# Patient Record
Sex: MALE | Race: ASIAN | NOT HISPANIC OR LATINO | URBAN - METROPOLITAN AREA
[De-identification: names, ages, dates, MRNs, and addresses within clinical notes are randomized per-mention and may not be internally consistent; named-entity substitution may affect disease eponyms.]

---

## 2020-01-23 ENCOUNTER — INPATIENT (INPATIENT)
Facility: HOSPITAL | Age: 60
LOS: 3 days | Discharge: HOME | DRG: 389 | End: 2020-01-27
Attending: SURGERY | Admitting: SURGERY
Payer: MEDICARE

## 2020-01-23 VITALS
WEIGHT: 300.05 LBS | TEMPERATURE: 99 F | DIASTOLIC BLOOD PRESSURE: 67 MMHG | OXYGEN SATURATION: 95 % | HEART RATE: 84 BPM | SYSTOLIC BLOOD PRESSURE: 115 MMHG | RESPIRATION RATE: 20 BRPM | HEIGHT: 72 IN

## 2020-01-23 DIAGNOSIS — K56.609 UNSPECIFIED INTESTINAL OBSTRUCTION, UNSPECIFIED AS TO PARTIAL VERSUS COMPLETE OBSTRUCTION: ICD-10-CM

## 2020-01-23 LAB
ALBUMIN SERPL ELPH-MCNC: 3.8 G/DL — SIGNIFICANT CHANGE UP (ref 3.3–5)
ALP SERPL-CCNC: 85 U/L — SIGNIFICANT CHANGE UP (ref 40–120)
ALT FLD-CCNC: 11 U/L — SIGNIFICANT CHANGE UP (ref 10–45)
ANION GAP SERPL CALC-SCNC: 11 MMOL/L — SIGNIFICANT CHANGE UP (ref 5–17)
ANION GAP SERPL CALC-SCNC: 14 MMOL/L — SIGNIFICANT CHANGE UP (ref 5–17)
APTT BLD: 31.9 SEC — SIGNIFICANT CHANGE UP (ref 27.5–36.3)
AST SERPL-CCNC: 16 U/L — SIGNIFICANT CHANGE UP (ref 10–40)
BASOPHILS # BLD AUTO: 0.02 K/UL — SIGNIFICANT CHANGE UP (ref 0–0.2)
BASOPHILS # BLD AUTO: 0.02 K/UL — SIGNIFICANT CHANGE UP (ref 0–0.2)
BASOPHILS NFR BLD AUTO: 0.1 % — SIGNIFICANT CHANGE UP (ref 0–2)
BASOPHILS NFR BLD AUTO: 0.2 % — SIGNIFICANT CHANGE UP (ref 0–2)
BILIRUB SERPL-MCNC: 0.8 MG/DL — SIGNIFICANT CHANGE UP (ref 0.2–1.2)
BLD GP AB SCN SERPL QL: NEGATIVE — SIGNIFICANT CHANGE UP
BUN SERPL-MCNC: 10 MG/DL — SIGNIFICANT CHANGE UP (ref 7–23)
BUN SERPL-MCNC: 14 MG/DL — SIGNIFICANT CHANGE UP (ref 7–23)
CALCIUM SERPL-MCNC: 8.8 MG/DL — SIGNIFICANT CHANGE UP (ref 8.4–10.5)
CALCIUM SERPL-MCNC: 9.6 MG/DL — SIGNIFICANT CHANGE UP (ref 8.4–10.5)
CHLORIDE SERPL-SCNC: 90 MMOL/L — LOW (ref 96–108)
CHLORIDE SERPL-SCNC: 91 MMOL/L — LOW (ref 96–108)
CO2 SERPL-SCNC: 28 MMOL/L — SIGNIFICANT CHANGE UP (ref 22–31)
CO2 SERPL-SCNC: 29 MMOL/L — SIGNIFICANT CHANGE UP (ref 22–31)
CREAT SERPL-MCNC: 0.58 MG/DL — SIGNIFICANT CHANGE UP (ref 0.5–1.3)
CREAT SERPL-MCNC: 0.75 MG/DL — SIGNIFICANT CHANGE UP (ref 0.5–1.3)
EOSINOPHIL # BLD AUTO: 0 K/UL — SIGNIFICANT CHANGE UP (ref 0–0.5)
EOSINOPHIL # BLD AUTO: 0.03 K/UL — SIGNIFICANT CHANGE UP (ref 0–0.5)
EOSINOPHIL NFR BLD AUTO: 0 % — SIGNIFICANT CHANGE UP (ref 0–6)
EOSINOPHIL NFR BLD AUTO: 0.2 % — SIGNIFICANT CHANGE UP (ref 0–6)
GAS PNL BLDV: SIGNIFICANT CHANGE UP
GLUCOSE SERPL-MCNC: 127 MG/DL — HIGH (ref 70–99)
GLUCOSE SERPL-MCNC: 135 MG/DL — HIGH (ref 70–99)
HCT VFR BLD CALC: 38.8 % — LOW (ref 39–50)
HCT VFR BLD CALC: 41.5 % — SIGNIFICANT CHANGE UP (ref 39–50)
HGB BLD-MCNC: 12.5 G/DL — LOW (ref 13–17)
HGB BLD-MCNC: 13.3 G/DL — SIGNIFICANT CHANGE UP (ref 13–17)
IMM GRANULOCYTES NFR BLD AUTO: 0.4 % — SIGNIFICANT CHANGE UP (ref 0–1.5)
IMM GRANULOCYTES NFR BLD AUTO: 0.4 % — SIGNIFICANT CHANGE UP (ref 0–1.5)
INR BLD: 1.07 RATIO — SIGNIFICANT CHANGE UP (ref 0.88–1.16)
LACTATE SERPL-SCNC: 1 MMOL/L — SIGNIFICANT CHANGE UP (ref 0.7–2)
LYMPHOCYTES # BLD AUTO: 0.44 K/UL — LOW (ref 1–3.3)
LYMPHOCYTES # BLD AUTO: 0.48 K/UL — LOW (ref 1–3.3)
LYMPHOCYTES # BLD AUTO: 3.2 % — LOW (ref 13–44)
LYMPHOCYTES # BLD AUTO: 4.9 % — LOW (ref 13–44)
MCHC RBC-ENTMCNC: 27.9 PG — SIGNIFICANT CHANGE UP (ref 27–34)
MCHC RBC-ENTMCNC: 28.1 PG — SIGNIFICANT CHANGE UP (ref 27–34)
MCHC RBC-ENTMCNC: 32 GM/DL — SIGNIFICANT CHANGE UP (ref 32–36)
MCHC RBC-ENTMCNC: 32.2 GM/DL — SIGNIFICANT CHANGE UP (ref 32–36)
MCV RBC AUTO: 87 FL — SIGNIFICANT CHANGE UP (ref 80–100)
MCV RBC AUTO: 87.2 FL — SIGNIFICANT CHANGE UP (ref 80–100)
MONOCYTES # BLD AUTO: 1.06 K/UL — HIGH (ref 0–0.9)
MONOCYTES # BLD AUTO: 1.63 K/UL — HIGH (ref 0–0.9)
MONOCYTES NFR BLD AUTO: 10.7 % — SIGNIFICANT CHANGE UP (ref 2–14)
MONOCYTES NFR BLD AUTO: 12 % — SIGNIFICANT CHANGE UP (ref 2–14)
NEUTROPHILS # BLD AUTO: 11.45 K/UL — HIGH (ref 1.8–7.4)
NEUTROPHILS # BLD AUTO: 8.27 K/UL — HIGH (ref 1.8–7.4)
NEUTROPHILS NFR BLD AUTO: 83.8 % — HIGH (ref 43–77)
NEUTROPHILS NFR BLD AUTO: 84.1 % — HIGH (ref 43–77)
NRBC # BLD: 0 /100 WBCS — SIGNIFICANT CHANGE UP (ref 0–0)
NRBC # BLD: 0 /100 WBCS — SIGNIFICANT CHANGE UP (ref 0–0)
PLATELET # BLD AUTO: 209 K/UL — SIGNIFICANT CHANGE UP (ref 150–400)
PLATELET # BLD AUTO: 221 K/UL — SIGNIFICANT CHANGE UP (ref 150–400)
POTASSIUM SERPL-MCNC: 4 MMOL/L — SIGNIFICANT CHANGE UP (ref 3.5–5.3)
POTASSIUM SERPL-MCNC: 4.4 MMOL/L — SIGNIFICANT CHANGE UP (ref 3.5–5.3)
POTASSIUM SERPL-SCNC: 4 MMOL/L — SIGNIFICANT CHANGE UP (ref 3.5–5.3)
POTASSIUM SERPL-SCNC: 4.4 MMOL/L — SIGNIFICANT CHANGE UP (ref 3.5–5.3)
PROT SERPL-MCNC: 7.4 G/DL — SIGNIFICANT CHANGE UP (ref 6–8.3)
PROTHROM AB SERPL-ACNC: 12.2 SEC — SIGNIFICANT CHANGE UP (ref 10–12.9)
RBC # BLD: 4.45 M/UL — SIGNIFICANT CHANGE UP (ref 4.2–5.8)
RBC # BLD: 4.77 M/UL — SIGNIFICANT CHANGE UP (ref 4.2–5.8)
RBC # FLD: 13.9 % — SIGNIFICANT CHANGE UP (ref 10.3–14.5)
RBC # FLD: 14.2 % — SIGNIFICANT CHANGE UP (ref 10.3–14.5)
RH IG SCN BLD-IMP: POSITIVE — SIGNIFICANT CHANGE UP
RH IG SCN BLD-IMP: POSITIVE — SIGNIFICANT CHANGE UP
SODIUM SERPL-SCNC: 131 MMOL/L — LOW (ref 135–145)
SODIUM SERPL-SCNC: 132 MMOL/L — LOW (ref 135–145)
WBC # BLD: 13.63 K/UL — HIGH (ref 3.8–10.5)
WBC # BLD: 9.87 K/UL — SIGNIFICANT CHANGE UP (ref 3.8–10.5)
WBC # FLD AUTO: 13.63 K/UL — HIGH (ref 3.8–10.5)
WBC # FLD AUTO: 9.87 K/UL — SIGNIFICANT CHANGE UP (ref 3.8–10.5)

## 2020-01-23 PROCEDURE — 99222 1ST HOSP IP/OBS MODERATE 55: CPT

## 2020-01-23 PROCEDURE — 43753 TX GASTRO INTUB W/ASP: CPT

## 2020-01-23 PROCEDURE — 71045 X-RAY EXAM CHEST 1 VIEW: CPT | Mod: 26

## 2020-01-23 PROCEDURE — 99285 EMERGENCY DEPT VISIT HI MDM: CPT | Mod: 25

## 2020-01-23 PROCEDURE — 74177 CT ABD & PELVIS W/CONTRAST: CPT | Mod: 26

## 2020-01-23 RX ORDER — ONDANSETRON 8 MG/1
4 TABLET, FILM COATED ORAL EVERY 6 HOURS
Refills: 0 | Status: DISCONTINUED | OUTPATIENT
Start: 2020-01-23 | End: 2020-01-26

## 2020-01-23 RX ORDER — SODIUM CHLORIDE 9 MG/ML
1000 INJECTION INTRAMUSCULAR; INTRAVENOUS; SUBCUTANEOUS ONCE
Refills: 0 | Status: COMPLETED | OUTPATIENT
Start: 2020-01-23 | End: 2020-01-23

## 2020-01-23 RX ORDER — HEPARIN SODIUM 5000 [USP'U]/ML
5000 INJECTION INTRAVENOUS; SUBCUTANEOUS EVERY 8 HOURS
Refills: 0 | Status: DISCONTINUED | OUTPATIENT
Start: 2020-01-23 | End: 2020-01-27

## 2020-01-23 RX ORDER — BENZTROPINE MESYLATE 1 MG
1 TABLET ORAL
Refills: 0 | Status: DISCONTINUED | OUTPATIENT
Start: 2020-01-23 | End: 2020-01-26

## 2020-01-23 RX ORDER — VALPROIC ACID (AS SODIUM SALT) 250 MG/5ML
500 SOLUTION, ORAL ORAL
Refills: 0 | Status: DISCONTINUED | OUTPATIENT
Start: 2020-01-23 | End: 2020-01-26

## 2020-01-23 RX ORDER — METOPROLOL TARTRATE 50 MG
5 TABLET ORAL EVERY 6 HOURS
Refills: 0 | Status: DISCONTINUED | OUTPATIENT
Start: 2020-01-23 | End: 2020-01-26

## 2020-01-23 RX ORDER — BUDESONIDE AND FORMOTEROL FUMARATE DIHYDRATE 160; 4.5 UG/1; UG/1
2 AEROSOL RESPIRATORY (INHALATION)
Refills: 0 | Status: DISCONTINUED | OUTPATIENT
Start: 2020-01-23 | End: 2020-01-27

## 2020-01-23 RX ORDER — SODIUM CHLORIDE 9 MG/ML
1000 INJECTION, SOLUTION INTRAVENOUS
Refills: 0 | Status: DISCONTINUED | OUTPATIENT
Start: 2020-01-23 | End: 2020-01-25

## 2020-01-23 RX ORDER — RISPERIDONE 4 MG/1
4 TABLET ORAL AT BEDTIME
Refills: 0 | Status: DISCONTINUED | OUTPATIENT
Start: 2020-01-23 | End: 2020-01-27

## 2020-01-23 RX ADMIN — RISPERIDONE 4 MILLIGRAM(S): 4 TABLET ORAL at 22:12

## 2020-01-23 RX ADMIN — SODIUM CHLORIDE 1000 MILLILITER(S): 9 INJECTION INTRAMUSCULAR; INTRAVENOUS; SUBCUTANEOUS at 12:09

## 2020-01-23 RX ADMIN — Medication 27.5 MILLIGRAM(S): at 19:43

## 2020-01-23 RX ADMIN — SODIUM CHLORIDE 1000 MILLILITER(S): 9 INJECTION INTRAMUSCULAR; INTRAVENOUS; SUBCUTANEOUS at 17:43

## 2020-01-23 RX ADMIN — Medication 1 MILLIGRAM(S): at 19:44

## 2020-01-23 RX ADMIN — Medication 5 MILLIGRAM(S): at 19:43

## 2020-01-23 RX ADMIN — HEPARIN SODIUM 5000 UNIT(S): 5000 INJECTION INTRAVENOUS; SUBCUTANEOUS at 22:11

## 2020-01-23 RX ADMIN — BUDESONIDE AND FORMOTEROL FUMARATE DIHYDRATE 2 PUFF(S): 160; 4.5 AEROSOL RESPIRATORY (INHALATION) at 19:46

## 2020-01-23 NOTE — ED PROVIDER NOTE - PROGRESS NOTE DETAILS
DH: CT showing SBO. Surgery paged and will see patient in ED. DH: Surgery at bedside - will admit patient.

## 2020-01-23 NOTE — H&P ADULT - NSHPPHYSICALEXAM_GEN_ALL_CORE
Physical Exam  T(C): 36.9  HR: 88 (84 - 88)  BP: 144/74 (115/67 - 146/85)  RR: 20 (20 - 20)  SpO2: 95% (95% - 95%)  Tmax: T(C): , Max: 37.2 (01-23-20 @ 09:59)    01-23-20  -  01-23-20  --------------------------------------------------------  IN:    Oral Fluid: 360 mL    Sodium Chloride 0.9% IV Bolus: 1000 mL  Total IN: 1360 mL    OUT:  Total OUT: 0 mL    Total NET: 1360 mL        General: well developed, well nourished, NAD  Neuro: alert and oriented x3  CVS: regular rate and rhythm  Abdomen: soft, mild tenderness in the left hemiabdomen, significantly distended, tympanitic, midline abdominal surgical scar well healed  Extremities: no edema, sensation and movement grossly intact  Skin: warm, dry, appropriate color

## 2020-01-23 NOTE — ED PROVIDER NOTE - CLINICAL SUMMARY MEDICAL DECISION MAKING FREE TEXT BOX
DH: 59 year old male p/w flu-like sx x1 week progressing to include GI sx x2 days. Patient on abx and Tamiflu x3 days. Hx c-diff in June req [facility]. Patient afebrile w/ stable vitals. Non-toxic appearing, abd grossly distended and tympanitic. Patient states last BM this morning. Concern for SBO vs volvulus, severe constipation, oglvie's. Plan for labs, CT A/P, IVF, reassess. Likely will require admission. DH: 59 year old male p/w flu-like sx x1 week progressing to include GI sx x2 days. Patient on abx and Tamiflu x3 days. Hx c-diff in June req [facility]. Patient afebrile w/ stable vitals. Non-toxic appearing, abd grossly distended and tympanitic. Patient states last BM this morning. Concern for SBO vs volvulus, severe constipation, ileus, oglvie's. Plan for labs, CT A/P, IVF, reassess. Likely will require admission.

## 2020-01-23 NOTE — H&P ADULT - NSHPLABSRESULTS_GEN_ALL_CORE
Labs:                        13.3   13.63 )-----------( 221      ( 23 Jan 2020 12:28 )             41.5     PT/INR - ( 23 Jan 2020 12:28 )   PT: 12.2 sec;   INR: 1.07 ratio         PTT - ( 23 Jan 2020 12:28 )  PTT:31.9 sec  01-23    132<L>  |  90<L>  |  14  ----------------------------<  135<H>  4.4   |  28  |  0.75    Ca    9.6      23 Jan 2020 12:28    TPro  7.4  /  Alb  3.8  /  TBili  0.8  /  DBili  x   /  AST  16  /  ALT  11  /  AlkPhos  85  01-23            Imaging and other studies:  < from: CT Abdomen and Pelvis w/ Oral Cont and w/ IV Cont (01.23.20 @ 14:37) >      FINDINGS:    LOWER CHEST: Bibasilar linear atelectasis. Coronary arterial calcification.    LIVER: Within normal limits.  BILE DUCTS: Normal caliber.  GALLBLADDER: Cholelithiasis.  SPLEEN: Within normal limits.  PANCREAS: Within normal limits.  ADRENALS: Within normal limits.  KIDNEYS/URETERS: Mild left hydroureteronephrosis and mild dilatation of the distal right ureter.     BLADDER: Bladder wall thickening which may be due to underdistention and/or cystitis.   REPRODUCTIVE ORGANS: Prostate gland is within normal limits.    BOWEL: Multiple dilated airand fluid-filled loops of small bowel with a single transition point in the upper anterior abdomen (2, 72-2,83). The stomach is markedly distended and fluid-filled. Collapsed distal small bowel. Questionable pneumatosis in small bowel (602:64 and 2:84).   PERITONEUM: Small amount of mesenteric edema/ascites.  VESSELS: Atherosclerotic calcification.  RETROPERITONEUM/LYMPH NODES: No lymphadenopathy.    ABDOMINAL WALL: Calcification in the anterior abdominal wall which may be secondary to prior surgery.  BONES: Degenerative changes.    IMPRESSION:     Small bowel obstruction with transition point in the anterior abdomen. Small mesenteric edema/ascites.  Questionable foci of pneumatosis. Recommend clinical correlation to assess for possible ischemia..    Bladder wall thickening which may be due to underdistention and/or cystitis. Recommend correlation with urinalysis.    Mild left hydroureteronephrosis and mild dilatation of the distal right ureter.    These findings were discussed with Dr. Knox on 1/23/2020 at 3:04 PM and with Dr. Knapp on 1/23/2020 at 3:15 PM by Dr. Cunningham with read back confirmation.        < end of copied text >

## 2020-01-23 NOTE — ED ADULT NURSE NOTE - OBJECTIVE STATEMENT
pt to room 31 via wheel chair c/o abd distention vomiting pt sts he was dx with flu started om tamiflu and zitnhromax pt dev abd distention yesterday abdomen large hard tympanic pt tolerated oral conttrast for ct

## 2020-01-23 NOTE — ED PROVIDER NOTE - PHYSICAL EXAMINATION
GENERAL: A&Ox3, non-toxic appearing, no acute distress  HEENT: NCAT, EOMI, oral mucosa moist, normal conjunctiva  RESP: no respiratory distress, mild wheezes diffusely, speaking in full sentences  CV: RRR, no murmurs/rubs/gallops  ABDOMEN: soft, non-tender, distended, tympanitic, no guarding, midline surgical scar  MSK: no visible deformities  NEURO: no focal sensory or motor deficits, LYN   SKIN: warm, normal color, well perfused, no rash  PSYCH: normal affect    -Justin Delgadillo, PGY-1

## 2020-01-23 NOTE — ED PROVIDER NOTE - OBJECTIVE STATEMENT
59 year old male PMH NIDDM, asthma, gout, HTN, neurogenic bladder requiring self-cath, psych, p/w wife for vomiting and body aches. Patient states sx started 1 week ago of general malaise, body aches, sore throat, non-productive cough, fatigue, decreased PO intake. Pt's wife is sick contact, reportedly has flu. No recent travel. Patient went to PCP this week and started on Z-marcus and Tamiflu. Patient states 2 days ago began w/ nausea, vomiting, diarrhea and worsening abd distension. Patient had 2 episodes NBNB vomiting. Low grade temps at home around 100*F. Of note, patient had C-diff colitis in June requiring Vancomycin, states the abd distension was similar. Patient denies chills, sob, cp, abd pain, constipation, dysuria, or weakness.

## 2020-01-23 NOTE — H&P ADULT - NSICDXPASTMEDICALHX_GEN_ALL_CORE_FT
PAST MEDICAL HISTORY:  Anxiety     Asthma     Gout     History of Schizophrenia     HTN (Hypertension)     Hyperlipidemia

## 2020-01-23 NOTE — ED PROCEDURE NOTE - CPROC ED INFORMED CONSENT1
MD at bedside.   Benefits, risks, and possible complications of procedure explained to patient/caregiver who verbalized understanding and gave verbal consent.

## 2020-01-23 NOTE — H&P ADULT - HISTORY OF PRESENT ILLNESS
GENERAL SURGERY     HPI:  59M PMH NIDDM, asthma, gout, HTN, neurogenic bladder requiring self-cath, schizophrenia, presenting to the ED for 2 days of vomiting and abdominal distension. Patient states his wife has had the flu and he started having symptoms of body aches, sore throat, non-productive cough, fatigue, for the last few days a/w decreased PO intake. Patient went to PCP this week and started on Z-marcus and Tamiflu. Patient states 2 days ago began w/ nausea, vomiting, diarrhea and worsening abd distension. Patient had 2 episodes NBNB vomiting. Low grade temps at home around 100*F. Of note, patient had C-diff colitis in June requiring Vancomycin, states the abd distension was similar. Patient denies chills, sob, cp, abd pain, constipation, dysuria, or weakness.      PMHx: Asthma  Anxiety  Gout  Hyperlipidemia  HTN (Hypertension)  History of Schizophrenia    PSHx: S/P Laparotomy  History of Tonsillectomy    Medications (inpatient): sodium chloride 0.9% Bolus 1000 milliLiter(s) IV Bolus once    Medications (PRN):  Allergies: No Known Allergies  (Intolerances: )  Social Hx:   Family Hx: GENERAL SURGERY     HPI:  59M PMH ex-lap about 10 years ago at [facility] for a suicide attempt of swallowing a razor blade,  NIDDM, asthma, gout, HTN, neurogenic bladder requiring self-cath 2/2 DM, schizophrenia, presenting to the ED for 2 days of vomiting and abdominal distension. Patient states his wife has had the flu and he started having symptoms of body aches, sore throat, non-productive cough, fatigue, for the last few days a/w decreased PO intake. Patient went to PCP this week and started on Z-marcus and Tamiflu. Patient states 2 days ago began w/ nausea, vomiting, diarrhea and worsening abd distension. Patient had 2 episodes NBNB vomiting. Low grade temps at home around 100*F. Of note, patient had C-diff colitis in June requiring Vancomycin, states the abd distension was similar. Patient denies chills, sob, cp, abd pain, constipation, dysuria, or weakness.

## 2020-01-23 NOTE — ED ADULT NURSE REASSESSMENT NOTE - NS ED NURSE REASSESS COMMENT FT1
Pt received 1900 from CANDACE Carbajal. VSS/ NAD. Admitted and awaiting bed. 6pm meds not yet administered, pharmacy contacted to send drugs. Will give when arrived-- see MAR. A&O x4 and well appearing, states "I just feel tired". Pending blood lactate drawn and sent. Pt NGT maintained and has total 3300 output in cannisters at bedside. Currently draining small amounts dark green fluid. Pt aware of plan.
pt was st cathed for urine 600 cc jas color drained
returned from ct pt with sbo vielka blank placed pt [id number] cc green material

## 2020-01-23 NOTE — H&P ADULT - ASSESSMENT
ASSESSMENT      PLAN ASSESSMENT  59M PMH ex-lap about 10 years ago at [facility] for a suicide attempt of swallowing a razor blade,  NIDDM, asthma, gout, HTN, neurogenic bladder requiring self-cath 2/2 DM, schizophrenia, presenting to the ED for 2 days of vomiting and abdominal distension.     Patient found to have SBO in mid abdomen with question of pneumatosis    PLAN    - F/u stat Lactate  - NPO  - NGT  - IV fluids  - 1 liter bolus now for high initial output of ~3.5 liters of bilious liquids  - DVT ppx  - C/w home medications IV formulation  - Nausea control  - Abdominal exams  - Abdominal exam prior to pain medication administration  - Seen and discussed with attending    ATP  [id number]

## 2020-01-23 NOTE — ED PROVIDER NOTE - NS ED ROS FT
GENERAL: +fever, +chills, no weight loss, +fatigue  EYES: no change in vision, no irritation, no discharge, no redness, no pain  HEENT: no trouble swallowing or speaking, +throat pain  CARDIAC: no chest pain, no palpitations   PULMONARY: no cough, no shortness of breath, no wheezing  GI: no abdominal pain, +abdominal distension , +nausea, +vomiting, +diarrhea, no constipation, no melena, no hematochezia, no hematemesis  : no changes in urination, no dysuria, no frequency, patient self-catheterizes at home  SKIN: no rashes  NEURO: no headache, no numbness, no weakness  MSK: no joint pain, no muscle pain, no back pain, no calf pain     -Justin Delgadillo, PGY-1

## 2020-01-23 NOTE — H&P ADULT - ATTENDING COMMENTS
Patient seen and examined on 1/23/20 in ED  Symptoms / history / signs / imaging consistent with adhesive SBO  No indications for acute surgical management  Will admit for IVFs / NGT / NPO   Plan of care discussed with patient who agrees

## 2020-01-24 DIAGNOSIS — Z86.59 PERSONAL HISTORY OF OTHER MENTAL AND BEHAVIORAL DISORDERS: ICD-10-CM

## 2020-01-24 DIAGNOSIS — I10 ESSENTIAL (PRIMARY) HYPERTENSION: ICD-10-CM

## 2020-01-24 DIAGNOSIS — M10.9 GOUT, UNSPECIFIED: ICD-10-CM

## 2020-01-24 DIAGNOSIS — K56.609 UNSPECIFIED INTESTINAL OBSTRUCTION, UNSPECIFIED AS TO PARTIAL VERSUS COMPLETE OBSTRUCTION: ICD-10-CM

## 2020-01-24 DIAGNOSIS — E78.5 HYPERLIPIDEMIA, UNSPECIFIED: ICD-10-CM

## 2020-01-24 DIAGNOSIS — J45.909 UNSPECIFIED ASTHMA, UNCOMPLICATED: ICD-10-CM

## 2020-01-24 DIAGNOSIS — Z02.9 ENCOUNTER FOR ADMINISTRATIVE EXAMINATIONS, UNSPECIFIED: ICD-10-CM

## 2020-01-24 DIAGNOSIS — N13.30 UNSPECIFIED HYDRONEPHROSIS: ICD-10-CM

## 2020-01-24 DIAGNOSIS — F20.9 SCHIZOPHRENIA, UNSPECIFIED: ICD-10-CM

## 2020-01-24 LAB
ANION GAP SERPL CALC-SCNC: 11 MMOL/L — SIGNIFICANT CHANGE UP (ref 5–17)
BUN SERPL-MCNC: 11 MG/DL — SIGNIFICANT CHANGE UP (ref 7–23)
CALCIUM SERPL-MCNC: 8.9 MG/DL — SIGNIFICANT CHANGE UP (ref 8.4–10.5)
CHLORIDE SERPL-SCNC: 91 MMOL/L — LOW (ref 96–108)
CO2 SERPL-SCNC: 31 MMOL/L — SIGNIFICANT CHANGE UP (ref 22–31)
CREAT SERPL-MCNC: 0.63 MG/DL — SIGNIFICANT CHANGE UP (ref 0.5–1.3)
GLUCOSE SERPL-MCNC: 106 MG/DL — HIGH (ref 70–99)
HCT VFR BLD CALC: 38 % — LOW (ref 39–50)
HGB BLD-MCNC: 12.1 G/DL — LOW (ref 13–17)
MAGNESIUM SERPL-MCNC: 2.2 MG/DL — SIGNIFICANT CHANGE UP (ref 1.6–2.6)
MCHC RBC-ENTMCNC: 28.2 PG — SIGNIFICANT CHANGE UP (ref 27–34)
MCHC RBC-ENTMCNC: 31.8 GM/DL — LOW (ref 32–36)
MCV RBC AUTO: 88.6 FL — SIGNIFICANT CHANGE UP (ref 80–100)
PHOSPHATE SERPL-MCNC: 3.2 MG/DL — SIGNIFICANT CHANGE UP (ref 2.5–4.5)
PLATELET # BLD AUTO: 216 K/UL — SIGNIFICANT CHANGE UP (ref 150–400)
POTASSIUM SERPL-MCNC: 3.6 MMOL/L — SIGNIFICANT CHANGE UP (ref 3.5–5.3)
POTASSIUM SERPL-SCNC: 3.6 MMOL/L — SIGNIFICANT CHANGE UP (ref 3.5–5.3)
RBC # BLD: 4.29 M/UL — SIGNIFICANT CHANGE UP (ref 4.2–5.8)
RBC # FLD: 14.4 % — SIGNIFICANT CHANGE UP (ref 10.3–14.5)
SODIUM SERPL-SCNC: 133 MMOL/L — LOW (ref 135–145)
WBC # BLD: 7.89 K/UL — SIGNIFICANT CHANGE UP (ref 3.8–10.5)
WBC # FLD AUTO: 7.89 K/UL — SIGNIFICANT CHANGE UP (ref 3.8–10.5)

## 2020-01-24 PROCEDURE — 99232 SBSQ HOSP IP/OBS MODERATE 35: CPT

## 2020-01-24 PROCEDURE — 76770 US EXAM ABDO BACK WALL COMP: CPT | Mod: 26

## 2020-01-24 PROCEDURE — 99223 1ST HOSP IP/OBS HIGH 75: CPT

## 2020-01-24 PROCEDURE — 99222 1ST HOSP IP/OBS MODERATE 55: CPT

## 2020-01-24 RX ORDER — ACETAMINOPHEN 500 MG
1000 TABLET ORAL ONCE
Refills: 0 | Status: COMPLETED | OUTPATIENT
Start: 2020-01-24 | End: 2020-01-24

## 2020-01-24 RX ORDER — HALOPERIDOL DECANOATE 100 MG/ML
2 INJECTION INTRAMUSCULAR AT BEDTIME
Refills: 0 | Status: DISCONTINUED | OUTPATIENT
Start: 2020-01-24 | End: 2020-01-26

## 2020-01-24 RX ADMIN — Medication 1 MILLIGRAM(S): at 05:31

## 2020-01-24 RX ADMIN — Medication 5 MILLIGRAM(S): at 18:06

## 2020-01-24 RX ADMIN — Medication 5 MILLIGRAM(S): at 01:32

## 2020-01-24 RX ADMIN — Medication 5 MILLIGRAM(S): at 06:59

## 2020-01-24 RX ADMIN — HEPARIN SODIUM 5000 UNIT(S): 5000 INJECTION INTRAVENOUS; SUBCUTANEOUS at 05:30

## 2020-01-24 RX ADMIN — BUDESONIDE AND FORMOTEROL FUMARATE DIHYDRATE 2 PUFF(S): 160; 4.5 AEROSOL RESPIRATORY (INHALATION) at 05:31

## 2020-01-24 RX ADMIN — Medication 1000 MILLIGRAM(S): at 13:30

## 2020-01-24 RX ADMIN — Medication 400 MILLIGRAM(S): at 12:34

## 2020-01-24 RX ADMIN — Medication 27.5 MILLIGRAM(S): at 17:12

## 2020-01-24 RX ADMIN — Medication 27.5 MILLIGRAM(S): at 05:31

## 2020-01-24 RX ADMIN — Medication 1 MILLIGRAM(S): at 17:27

## 2020-01-24 RX ADMIN — HEPARIN SODIUM 5000 UNIT(S): 5000 INJECTION INTRAVENOUS; SUBCUTANEOUS at 21:19

## 2020-01-24 RX ADMIN — HEPARIN SODIUM 5000 UNIT(S): 5000 INJECTION INTRAVENOUS; SUBCUTANEOUS at 13:21

## 2020-01-24 RX ADMIN — RISPERIDONE 4 MILLIGRAM(S): 4 TABLET ORAL at 21:39

## 2020-01-24 RX ADMIN — HALOPERIDOL DECANOATE 2 MILLIGRAM(S): 100 INJECTION INTRAMUSCULAR at 21:19

## 2020-01-24 RX ADMIN — BUDESONIDE AND FORMOTEROL FUMARATE DIHYDRATE 2 PUFF(S): 160; 4.5 AEROSOL RESPIRATORY (INHALATION) at 17:12

## 2020-01-24 NOTE — PROGRESS NOTE ADULT - ATTENDING COMMENTS
Pt seen and examined.  Chart reviewed.  Resident note confirmed.  Pt is a 59 year old male with a history signficant for schizophrenia who presented to [facility] with a SBO.  WBC and lactate are normal. Pain has resolved. NGT output remains high.    Continue NPO/NGT  Monitor and replace lytes  Continue IVF  Psych follow up to adjust medical treatment of schizophrenia while NPO  Continue supportive care  D/c planning Pt seen and examined.  Chart reviewed.  Resident note confirmed.  Pt is a 59 year old male with a history signficant for schizophrenia who presented to [facility] with a SBO.  WBC and lactate are normal. Pain has resolved. NGT output remains high. Mild, bilateral hydronephrosis is noted    Continue NPO/NGT  Bilateral renal ultrasound  Monitor and replace lytes  Continue IVF  Psych follow up to adjust medical treatment of schizophrenia while NPO  Continue supportive care  D/c planning

## 2020-01-24 NOTE — PROGRESS NOTE ADULT - ASSESSMENT
59M PMH ex-lap about 10 years ago at [facility] for a suicide attempt of swallowing a razor blade,  NIDDM, asthma, gout, HTN, neurogenic bladder requiring self-cath 2/2 DM, schizophrenia, presenting to the ED for 2 days of vomiting and abdominal distension. Patient found to have SBO in mid abdomen.    Plan:    - NPO  - NGT  - IV fluids  - DVT ppx  - C/w home medications IV formulation  - Nausea control  - Abdominal exams  - Abdominal exam prior to pain medication administration      ATP  [id number]

## 2020-01-24 NOTE — BEHAVIORAL HEALTH ASSESSMENT NOTE - NSBHCHARTREVIEWVS_PSY_A_CORE FT
Vital Signs Last 24 Hrs  T(C): 36.8 (24 Jan 2020 10:00), Max: 37.5 (24 Jan 2020 00:02)  T(F): 98.3 (24 Jan 2020 10:00), Max: 99.5 (24 Jan 2020 00:02)  HR: 77 (24 Jan 2020 10:00) (77 - 88)  BP: 113/71 (24 Jan 2020 10:00) (113/71 - 146/79)  BP(mean): --  RR: 18 (24 Jan 2020 10:00) (18 - 20)  SpO2: 95% (24 Jan 2020 10:00) (94% - 99%)

## 2020-01-24 NOTE — BEHAVIORAL HEALTH ASSESSMENT NOTE - DETAILS
SA attempt 10 years ago, patient swallowed a razor blade resulting in ex-lap Aunt, Cousin abdominal discomfort

## 2020-01-24 NOTE — CONSULT NOTE ADULT - PROBLEM SELECTOR RECOMMENDATION 8
Transitions of Care Status:    1.  Name of PCP: Dr. Keith  2.  PCP Contacted on Admission: [ ] Y    [ ] N    3.  PCP contacted at Discharge: [ ] Y    [ ] N    [ ] N/A  4.  Post-Discharge Appointment Date and Location:  5.  Summary of Handoff given to PCP:

## 2020-01-24 NOTE — BEHAVIORAL HEALTH ASSESSMENT NOTE - NSBHCONSULTMEDS_PSY_A_CORE FT
Revlimid 10 mg 7 days on 7 days off, repeat.     Refill sent to [facility]: [id number]     Haldol 2mg i.v. at bedtime  Continue Depakote IV and Cogentin i.m.

## 2020-01-24 NOTE — CONSULT NOTE ADULT - ASSESSMENT
59M PMH ex-lap about 10 years ago at [facility] for a suicide attempt of swallowing a razor blade,  DM2, asthma, gout, HTN, HLD, neurogenic bladder requiring self-cath 2/2 DM, schizophrenia, presenting to the ED for 2 days of vomiting and abdominal distension found to have small bowel obstruction with transition point in the anterior abdomen

## 2020-01-24 NOTE — BEHAVIORAL HEALTH ASSESSMENT NOTE - RISK ASSESSMENT
Low Acute Suicide Risk Chronic mental illness, history of suicide attempts, no recent suicidality, no recent relapses or admissions

## 2020-01-24 NOTE — BEHAVIORAL HEALTH ASSESSMENT NOTE - SUMMARY
This is a 59 y.o. male patient, , domiciled with wife and son, on disability for employment, with PPH of schizophrenia, currently in outpatient treatment with Dr. Patterson (last seen at [facility] 1/4/2020), one prior psychiatric hospitalization for suicide attempt ~10 years ago, no history of violence or legal issues, no history of substance abuse, with PMH of NIDDM, asthma, gout, HTN, HLD, neurogenic bladder requiring self-cath 2/2 DM presents to ED for 2 days of vomiting and abd distention, hospitalized for SBO. Psychiatry consulted for medication management. Pt is currently NPO secondary to NG tube placement for SBO. He is AAOx3, but lethargic and inattentive at times. Valproate and Benztropine converted to IV formulations, risperidone unable to be converted to IV formulation.

## 2020-01-24 NOTE — CONSULT NOTE ADULT - SUBJECTIVE AND OBJECTIVE BOX
HPI:  59M PMH ex-lap about 10 years ago at [facility] for a suicide attempt of swallowing a razor blade,  DM2, asthma, gout, HTN, HLD, neurogenic bladder requiring self-cath 2/2 DM, schizophrenia, presenting to the ED for 2 days of vomiting and abdominal distension. Patient states his wife has had the flu and he started having symptoms of body aches, sore throat, non-productive cough, fatigue, for the last few days a/w decreased PO intake. Patient went to PCP this week and started on Z-marcus and Tamiflu. Patient states 2 days ago began w/ nausea, vomiting, diarrhea and worsening abd distension. Patient had 2 episodes NBNB vomiting. Low grade temps at home around 100*F. Of note, patient had C-diff colitis in June requiring Vancomycin, states the abd distension was similar. Patient denies chills, sob, cp, abd pain, constipation, dysuria, or weakness. (23 Jan 2020 17:22)      PAST MEDICAL & SURGICAL HISTORY:  Asthma  Anxiety  Gout  Hyperlipidemia  HTN (Hypertension)  History of Schizophrenia  S/P Laparotomy  History of Tonsillectomy      Review of Systems:   CONSTITUTIONAL: No fever  EYES: No eye pain,  ENMT:  No difficulty hearing,  NECK: No pain or stiffness  RESPIRATORY:  No shortness of breath  CARDIOVASCULAR: No chest pain, palpitations, dizziness  GASTROINTESTINAL: abdominal pain better   GENITOURINARY: No dysuria  NEUROLOGICAL: No headaches,  SKIN: No itching, burning, rashes, or lesions   MUSCULOSKELETAL: No joint pain or swelling;   PSYCHIATRIC: No depression  HEME/LYMPH: No easy bruising, or bleeding gums      Allergies    No Known Allergies    Intolerances        Social History: no smoking or etoh use     FAMILY HISTORY:non pertinent       MEDICATIONS  (STANDING):  benztropine Injectable 1 milliGRAM(s) IV Push two times a day  budesonide  80 MICROgram(s)/formoterol 4.5 MICROgram(s) Inhaler 2 Puff(s) Inhalation two times a day  heparin  Injectable 5000 Unit(s) SubCutaneous every 8 hours  lactated ringers. 1000 milliLiter(s) (150 mL/Hr) IV Continuous <Continuous>  metoprolol tartrate Injectable 5 milliGRAM(s) IV Push every 6 hours  risperiDONE  Disintegrating Tablet 4 milliGRAM(s) Oral at bedtime  valproate sodium IVPB 500 milliGRAM(s) IV Intermittent two times a day    MEDICATIONS  (PRN):  ondansetron Injectable 4 milliGRAM(s) IV Push every 6 hours PRN Nausea        CAPILLARY BLOOD GLUCOSE        I&O's Summary    23 Jan 2020 07:01  -  24 Jan 2020 07:00  --------------------------------------------------------  IN: 3760 mL / OUT: 1400 mL / NET: 2360 mL    24 Jan 2020 07:01  -  24 Jan 2020 14:26  --------------------------------------------------------  IN: 0 mL / OUT: 1450 mL / NET: -1450 mL        PHYSICAL EXAM:  GENERAL: NAD, well-developed, obese  HEAD:  Atraumatic, Normocephalic  EYES: EOMI, PERRLA, conjunctiva and sclera clear  NECK: Supple, No JVD  CHEST/LUNG: Clear to auscultation bilaterally; No wheeze  HEART: Regular rate and rhythm; S1S2  ABDOMEN: Soft, Nontender, Nondistended; Bowel sounds present, midline scar  EXTREMITIES:  2+ Peripheral Pulses,  PSYCH: AAOx3  NEUROLOGY: non-focal  SKIN: No rashes or lesions    LABS:                        12.1   7.89  )-----------( 216      ( 24 Jan 2020 08:57 )             38.0     01-24    133<L>  |  91<L>  |  11  ----------------------------<  106<H>  3.6   |  31  |  0.63    Ca    8.9      24 Jan 2020 07:27  Phos  3.2     01-24  Mg     2.2     01-24    TPro  7.4  /  Alb  3.8  /  TBili  0.8  /  DBili  x   /  AST  16  /  ALT  11  /  AlkPhos  85  01-23    PT/INR - ( 23 Jan 2020 12:28 )   PT: 12.2 sec;   INR: 1.07 ratio         PTT - ( 23 Jan 2020 12:28 )  PTT:31.9 sec          RADIOLOGY & ADDITIONAL TESTS:    Imaging Personally Reviewed:    Consultant(s) Notes Reviewed:  sx    Care Discussed with Consultants/Other Providers: sx

## 2020-01-24 NOTE — PROGRESS NOTE ADULT - SUBJECTIVE AND OBJECTIVE BOX
Surgery Progress Note     Subjective/24hour Events:   Patient seen and examined. No acute events overnight. Denies n/v or abdominal pain. NGT adjusted overnight and functioning well. No gas or flatus yet.    Vital Signs:  Vital Signs Last 24 Hrs  T(C): 36.4 (24 Jan 2020 05:13), Max: 37.5 (24 Jan 2020 00:02)  T(F): 97.6 (24 Jan 2020 05:13), Max: 99.5 (24 Jan 2020 00:02)  HR: 82 (24 Jan 2020 05:13) (80 - 88)  BP: 128/76 (24 Jan 2020 05:13) (115/67 - 146/85)  BP(mean): --  RR: 18 (24 Jan 2020 05:13) (18 - 20)  SpO2: 94% (24 Jan 2020 05:13) (94% - 99%)    CAPILLARY BLOOD GLUCOSE          I&O's Detail    23 Jan 2020 07:01  -  24 Jan 2020 06:08  --------------------------------------------------------  IN:    Oral Fluid: 360 mL    Sodium Chloride 0.9% IV Bolus: 2000 mL  Total IN: 2360 mL    OUT:    Nasoenteral Tube: 500 mL    Voided: 900 mL  Total OUT: 1400 mL    Total NET: 960 mL          MEDICATIONS  (STANDING):  benztropine Injectable 1 milliGRAM(s) IV Push two times a day  budesonide  80 MICROgram(s)/formoterol 4.5 MICROgram(s) Inhaler 2 Puff(s) Inhalation two times a day  heparin  Injectable 5000 Unit(s) SubCutaneous every 8 hours  lactated ringers. 1000 milliLiter(s) (150 mL/Hr) IV Continuous <Continuous>  metoprolol tartrate Injectable 5 milliGRAM(s) IV Push every 6 hours  risperiDONE  Disintegrating Tablet 4 milliGRAM(s) Oral at bedtime  valproate sodium IVPB 500 milliGRAM(s) IV Intermittent two times a day    MEDICATIONS  (PRN):  ondansetron Injectable 4 milliGRAM(s) IV Push every 6 hours PRN Nausea      Physical Exam:  Gen: NAD.  Lungs: Non labored breathing.   Ab: Soft, nontender, nondistended. NGT in place with bilious output  Ext: Moves all 4 spontaneously.     Labs:    01-23    131<L>  |  91<L>  |  10  ----------------------------<  127<H>  4.0   |  29  |  0.58    Ca    8.8      23 Jan 2020 18:36    TPro  7.4  /  Alb  3.8  /  TBili  0.8  /  DBili  x   /  AST  16  /  ALT  11  /  AlkPhos  85  01-23    LIVER FUNCTIONS - ( 23 Jan 2020 12:28 )  Alb: 3.8 g/dL / Pro: 7.4 g/dL / ALK PHOS: 85 U/L / ALT: 11 U/L / AST: 16 U/L / GGT: x                                 12.5   9.87  )-----------( 209      ( 23 Jan 2020 18:36 )             38.8     PT/INR - ( 23 Jan 2020 12:28 )   PT: 12.2 sec;   INR: 1.07 ratio         PTT - ( 23 Jan 2020 12:28 )  PTT:31.9 sec    Imaging:

## 2020-01-24 NOTE — BEHAVIORAL HEALTH ASSESSMENT NOTE - NSBHCHARTREVIEWLAB_PSY_A_CORE FT
12.1   7.89  )-----------( 216      ( 24 Jan 2020 08:57 )             38.0     01-24    133<L>  |  91<L>  |  11  ----------------------------<  106<H>  3.6   |  31  |  0.63    Ca    8.9      24 Jan 2020 07:27  Phos  3.2     01-24  Mg     2.2     01-24    TPro  7.4  /  Alb  3.8  /  TBili  0.8  /  DBili  x   /  AST  16  /  ALT  11  /  AlkPhos  85  01-23

## 2020-01-24 NOTE — CONSULT NOTE ADULT - PROBLEM SELECTOR RECOMMENDATION 4
home meds: cognetin 1mg bid, depakote 500mg bid, fluoxetine 20mg, risperdal 4mg qhs   appreciate psych recs   c/w cogentin 1mg IV bid, risperdal 4mg qhs, depacon 500mg IV bid

## 2020-01-24 NOTE — BEHAVIORAL HEALTH ASSESSMENT NOTE - HPI (INCLUDE ILLNESS QUALITY, SEVERITY, DURATION, TIMING, CONTEXT, MODIFYING FACTORS, ASSOCIATED SIGNS AND SYMPTOMS)
This is a 59 y.o. male patient, , domiciled with wife and son, on disability for employment, with PPH of schizophrenia, currently in outpatient treatment with Dr. Patterson (last seen at [facility] 1/4/2020), one prior psychiatric hospitalization for suicide attempt ~10 years ago, no history of violence or legal issues, no history of substance abuse, with PMH of NIDDM, asthma, gout, HTN, HLD, neurogenic bladder requiring self-cath 2/2 DM presents to ED for 2 days of vomiting and abd distention, hospitalized for SBO. Psychiatry consulted for medication management.    Pt seen and examined. He is sleeping in chair, no acute distress, and wife at bedside. He is AAOx3 and cooperative, but somewhat irritable and inattentive secondary to lethargy. He reports some abdominal discomfort and poor sleep last night due to NG tube discomfort. He expresses understanding of his current medical condition and the reason for being kept NPO. He states that he is compliant with psychiatric medications and follow-ups, and receives Haldol injection once monthly at [facility], last dose 1/4/2020, in conjunction with other prescribed oral medications. He denies any current SIIP/HIIP, depressed mood, constant worry, paranoia, visual/auditory hallucinations, lack of concentration or lack of appetite. Pt fell asleep several times during the duration of interview, making it somewhat difficult to fully assess.    Per wife, patient has been compliant with medications, with her surveillance and SW weekly visits. He can be intermittently "cook," but is overall relatively asymptomatic. She states that he enjoys staying up-to-date with the stock market, watching TV, and spending time with their 4-year-old son. She denies patient having recent or current SIIP/HIIP, depressed mood, constant worry, paranoia, visual/auditory hallucinations, concentration changes, lack of appetite, or substance abuse.

## 2020-01-25 LAB
ALBUMIN SERPL ELPH-MCNC: 3.3 G/DL — SIGNIFICANT CHANGE UP (ref 3.3–5)
ALP SERPL-CCNC: 73 U/L — SIGNIFICANT CHANGE UP (ref 40–120)
ALT FLD-CCNC: 9 U/L — LOW (ref 10–45)
ANION GAP SERPL CALC-SCNC: 13 MMOL/L — SIGNIFICANT CHANGE UP (ref 5–17)
AST SERPL-CCNC: 13 U/L — SIGNIFICANT CHANGE UP (ref 10–40)
BILIRUB DIRECT SERPL-MCNC: 0.1 MG/DL — SIGNIFICANT CHANGE UP (ref 0–0.2)
BILIRUB INDIRECT FLD-MCNC: 0.4 MG/DL — SIGNIFICANT CHANGE UP (ref 0.2–1)
BILIRUB SERPL-MCNC: 0.5 MG/DL — SIGNIFICANT CHANGE UP (ref 0.2–1.2)
BUN SERPL-MCNC: 8 MG/DL — SIGNIFICANT CHANGE UP (ref 7–23)
CALCIUM SERPL-MCNC: 8.7 MG/DL — SIGNIFICANT CHANGE UP (ref 8.4–10.5)
CHLORIDE SERPL-SCNC: 94 MMOL/L — LOW (ref 96–108)
CO2 SERPL-SCNC: 25 MMOL/L — SIGNIFICANT CHANGE UP (ref 22–31)
CREAT SERPL-MCNC: 0.59 MG/DL — SIGNIFICANT CHANGE UP (ref 0.5–1.3)
GLUCOSE SERPL-MCNC: 81 MG/DL — SIGNIFICANT CHANGE UP (ref 70–99)
HCT VFR BLD CALC: 37.8 % — LOW (ref 39–50)
HGB BLD-MCNC: 12.1 G/DL — LOW (ref 13–17)
MAGNESIUM SERPL-MCNC: 2.2 MG/DL — SIGNIFICANT CHANGE UP (ref 1.6–2.6)
MCHC RBC-ENTMCNC: 28.5 PG — SIGNIFICANT CHANGE UP (ref 27–34)
MCHC RBC-ENTMCNC: 32 GM/DL — SIGNIFICANT CHANGE UP (ref 32–36)
MCV RBC AUTO: 89.2 FL — SIGNIFICANT CHANGE UP (ref 80–100)
PHOSPHATE SERPL-MCNC: 2.9 MG/DL — SIGNIFICANT CHANGE UP (ref 2.5–4.5)
PLATELET # BLD AUTO: 191 K/UL — SIGNIFICANT CHANGE UP (ref 150–400)
POTASSIUM SERPL-MCNC: 3.7 MMOL/L — SIGNIFICANT CHANGE UP (ref 3.5–5.3)
POTASSIUM SERPL-SCNC: 3.7 MMOL/L — SIGNIFICANT CHANGE UP (ref 3.5–5.3)
PROT SERPL-MCNC: 6.5 G/DL — SIGNIFICANT CHANGE UP (ref 6–8.3)
RBC # BLD: 4.24 M/UL — SIGNIFICANT CHANGE UP (ref 4.2–5.8)
RBC # FLD: 14.1 % — SIGNIFICANT CHANGE UP (ref 10.3–14.5)
SODIUM SERPL-SCNC: 132 MMOL/L — LOW (ref 135–145)
WBC # BLD: 9.06 K/UL — SIGNIFICANT CHANGE UP (ref 3.8–10.5)
WBC # FLD AUTO: 9.06 K/UL — SIGNIFICANT CHANGE UP (ref 3.8–10.5)

## 2020-01-25 PROCEDURE — 99232 SBSQ HOSP IP/OBS MODERATE 35: CPT

## 2020-01-25 PROCEDURE — 74018 RADEX ABDOMEN 1 VIEW: CPT | Mod: 26

## 2020-01-25 PROCEDURE — 71045 X-RAY EXAM CHEST 1 VIEW: CPT | Mod: 26

## 2020-01-25 RX ORDER — DEXTROSE MONOHYDRATE, SODIUM CHLORIDE, AND POTASSIUM CHLORIDE 50; .745; 4.5 G/1000ML; G/1000ML; G/1000ML
1000 INJECTION, SOLUTION INTRAVENOUS
Refills: 0 | Status: DISCONTINUED | OUTPATIENT
Start: 2020-01-25 | End: 2020-01-26

## 2020-01-25 RX ORDER — POTASSIUM CHLORIDE 20 MEQ
10 PACKET (EA) ORAL
Refills: 0 | Status: COMPLETED | OUTPATIENT
Start: 2020-01-25 | End: 2020-01-25

## 2020-01-25 RX ADMIN — RISPERIDONE 4 MILLIGRAM(S): 4 TABLET ORAL at 21:15

## 2020-01-25 RX ADMIN — Medication 5 MILLIGRAM(S): at 13:34

## 2020-01-25 RX ADMIN — HEPARIN SODIUM 5000 UNIT(S): 5000 INJECTION INTRAVENOUS; SUBCUTANEOUS at 13:33

## 2020-01-25 RX ADMIN — BUDESONIDE AND FORMOTEROL FUMARATE DIHYDRATE 2 PUFF(S): 160; 4.5 AEROSOL RESPIRATORY (INHALATION) at 05:12

## 2020-01-25 RX ADMIN — Medication 1 MILLIGRAM(S): at 18:05

## 2020-01-25 RX ADMIN — Medication 27.5 MILLIGRAM(S): at 05:12

## 2020-01-25 RX ADMIN — HEPARIN SODIUM 5000 UNIT(S): 5000 INJECTION INTRAVENOUS; SUBCUTANEOUS at 21:14

## 2020-01-25 RX ADMIN — Medication 27.5 MILLIGRAM(S): at 17:06

## 2020-01-25 RX ADMIN — Medication 5 MILLIGRAM(S): at 17:55

## 2020-01-25 RX ADMIN — Medication 100 MILLIEQUIVALENT(S): at 21:06

## 2020-01-25 RX ADMIN — Medication 1 MILLIGRAM(S): at 05:13

## 2020-01-25 RX ADMIN — Medication 100 MILLIEQUIVALENT(S): at 16:44

## 2020-01-25 RX ADMIN — Medication 100 MILLIEQUIVALENT(S): at 22:29

## 2020-01-25 RX ADMIN — BUDESONIDE AND FORMOTEROL FUMARATE DIHYDRATE 2 PUFF(S): 160; 4.5 AEROSOL RESPIRATORY (INHALATION) at 17:07

## 2020-01-25 RX ADMIN — HEPARIN SODIUM 5000 UNIT(S): 5000 INJECTION INTRAVENOUS; SUBCUTANEOUS at 05:12

## 2020-01-25 RX ADMIN — Medication 5 MILLIGRAM(S): at 05:13

## 2020-01-25 RX ADMIN — HALOPERIDOL DECANOATE 2 MILLIGRAM(S): 100 INJECTION INTRAMUSCULAR at 21:15

## 2020-01-25 RX ADMIN — Medication 5 MILLIGRAM(S): at 00:55

## 2020-01-25 NOTE — PROVIDER CONTACT NOTE (OTHER) - ASSESSMENT
pt resting comfortably in bed, no c/o pain, no s/s of distress. all VSS. Pt found with NGT out of place and resting on pt chest, pt stated "he is hungry and wants to eat and does not want to have the tube anymore."

## 2020-01-25 NOTE — PROGRESS NOTE ADULT - ATTENDING COMMENTS
Pt seen and examined.  Chart reviewed.  Resident note confirmed.  Pt is a 59 year old male with a history significant for schizophrenia who presented to [facility] with a SBO.  WBC and lactate remain normal. Pain has resolved. NGT output is decreasing. No acute events overnight    Continue NPO/NGT  F/u bilateral renal ultrasound  Monitor and replace lytes  Continue IVF  Psych follow up appreciated  Continue supportive care  D/c planning .

## 2020-01-25 NOTE — PROGRESS NOTE ADULT - SUBJECTIVE AND OBJECTIVE BOX
SURGERY DAILY PROGRESS NOTE:       SUBJECTIVE/ROS: Patient examined at bedside. denies abdominal pain and claims passing gas, patient endorses pain from GT  Denies nausea, vomiting, chest pain, shortness of breath         MEDICATIONS  (STANDING):  benztropine Injectable 1 milliGRAM(s) IV Push two times a day  budesonide  80 MICROgram(s)/formoterol 4.5 MICROgram(s) Inhaler 2 Puff(s) Inhalation two times a day  dextrose 5% + sodium chloride 0.45% with potassium chloride 20 mEq/L 1000 milliLiter(s) (125 mL/Hr) IV Continuous <Continuous>  haloperidol    Injectable 2 milliGRAM(s) IV Push at bedtime  heparin  Injectable 5000 Unit(s) SubCutaneous every 8 hours  metoprolol tartrate Injectable 5 milliGRAM(s) IV Push every 6 hours  risperiDONE  Disintegrating Tablet 4 milliGRAM(s) Oral at bedtime  valproate sodium IVPB 500 milliGRAM(s) IV Intermittent two times a day    MEDICATIONS  (PRN):  ondansetron Injectable 4 milliGRAM(s) IV Push every 6 hours PRN Nausea      OBJECTIVE:    Vital Signs Last 24 Hrs  T(C): 36.8 (25 Jan 2020 08:55), Max: 37.3 (25 Jan 2020 00:47)  T(F): 98.3 (25 Jan 2020 08:55), Max: 99.2 (25 Jan 2020 00:47)  HR: 82 (25 Jan 2020 08:55) (74 - 86)  BP: 157/79 (25 Jan 2020 08:55) (122/67 - 161/95)  BP(mean): --  RR: 18 (25 Jan 2020 08:55) (18 - 18)  SpO2: 94% (25 Jan 2020 08:55) (93% - 95%)        I&O's Detail    24 Jan 2020 07:01  -  25 Jan 2020 07:00  --------------------------------------------------------  IN:    dextrose 5% + sodium chloride 0.45% with potassium chloride 20 mEq/L: 1500 mL    lactated ringers.: 1800 mL    Solution: 100 mL  Total IN: 3400 mL    OUT:    Intermittent Catheterization - Urethral: 1200 mL    Nasoenteral Tube: 815 mL    Voided: 2450 mL  Total OUT: 4465 mL    Total NET: -1065 mL      25 Jan 2020 07:01  -  25 Jan 2020 10:02  --------------------------------------------------------  IN:  Total IN: 0 mL    OUT:    Voided: 1800 mL  Total OUT: 1800 mL    Total NET: -1800 mL          Daily     Daily     LABS:                        12.1   7.89  )-----------( 216      ( 24 Jan 2020 08:57 )             38.0     01-25    132<L>  |  94<L>  |  8   ----------------------------<  81  3.7   |  25  |  0.59    Ca    8.7      25 Jan 2020 07:26  Phos  2.9     01-25  Mg     2.2     01-25    TPro  6.5  /  Alb  3.3  /  TBili  0.5  /  DBili  0.1  /  AST  13  /  ALT  9<L>  /  AlkPhos  73  01-25    PT/INR - ( 23 Jan 2020 12:28 )   PT: 12.2 sec;   INR: 1.07 ratio         PTT - ( 23 Jan 2020 12:28 )  PTT:31.9 sec                  PHYSICAL EXAM:  Constitutional: well developed, well nourished, NAD  Eyes: anicteric  ENMT: normal facies, symmetric  Respiratory: Breathing comfortably    Gastrointestinal: abdomen soft, nontender, moderately distended, NGT in place.   Extremities: FROM, warm  Neurological: intact, non-focal  Psychiatric: oriented x 3; appropriate

## 2020-01-25 NOTE — PROVIDER CONTACT NOTE (OTHER) - ACTION/TREATMENT ORDERED:
MD made aware, MD to come and place a new NGT. PT educated on importance of NGT. will continue to monitor. pt safety maintained.

## 2020-01-25 NOTE — PROGRESS NOTE ADULT - ASSESSMENT
59M PMH ex-lap about 10 years ago at [facility] for a suicide attempt of swallowing a razor blade,  NIDDM, asthma, gout, HTN, neurogenic bladder requiring self-cath 2/2 DM, schizophrenia, presenting to the ED for 2 days of vomiting and abdominal distension. Patient found to have SBO in mid abdomen.    Plan:    - NPO  - NGT  - IV fluids  - DVT ppx  - C/w home medications IV formulation  - Nausea control  - Abdominal exams  - Abdominal exam prior to pain medication administration

## 2020-01-26 LAB
ANION GAP SERPL CALC-SCNC: 11 MMOL/L — SIGNIFICANT CHANGE UP (ref 5–17)
ANION GAP SERPL CALC-SCNC: 2 MMOL/L — LOW (ref 5–17)
BUN SERPL-MCNC: 5 MG/DL — LOW (ref 7–23)
BUN SERPL-MCNC: 6 MG/DL — LOW (ref 7–23)
CALCIUM SERPL-MCNC: 6.5 MG/DL — CRITICAL LOW (ref 8.4–10.5)
CALCIUM SERPL-MCNC: 9.1 MG/DL — SIGNIFICANT CHANGE UP (ref 8.4–10.5)
CHLORIDE SERPL-SCNC: 96 MMOL/L — SIGNIFICANT CHANGE UP (ref 96–108)
CHLORIDE SERPL-SCNC: 98 MMOL/L — SIGNIFICANT CHANGE UP (ref 96–108)
CO2 SERPL-SCNC: 21 MMOL/L — LOW (ref 22–31)
CO2 SERPL-SCNC: 26 MMOL/L — SIGNIFICANT CHANGE UP (ref 22–31)
CREAT SERPL-MCNC: 0.47 MG/DL — LOW (ref 0.5–1.3)
CREAT SERPL-MCNC: 0.61 MG/DL — SIGNIFICANT CHANGE UP (ref 0.5–1.3)
GLUCOSE SERPL-MCNC: 112 MG/DL — HIGH (ref 70–99)
GLUCOSE SERPL-MCNC: 1156 MG/DL — CRITICAL HIGH (ref 70–99)
HCT VFR BLD CALC: 33.3 % — LOW (ref 39–50)
HCT VFR BLD CALC: 37.2 % — LOW (ref 39–50)
HGB BLD-MCNC: 10 G/DL — LOW (ref 13–17)
HGB BLD-MCNC: 11.8 G/DL — LOW (ref 13–17)
MAGNESIUM SERPL-MCNC: 1.7 MG/DL — SIGNIFICANT CHANGE UP (ref 1.6–2.6)
MAGNESIUM SERPL-MCNC: 2.3 MG/DL — SIGNIFICANT CHANGE UP (ref 1.6–2.6)
MCHC RBC-ENTMCNC: 27.9 PG — SIGNIFICANT CHANGE UP (ref 27–34)
MCHC RBC-ENTMCNC: 28.3 PG — SIGNIFICANT CHANGE UP (ref 27–34)
MCHC RBC-ENTMCNC: 30 GM/DL — LOW (ref 32–36)
MCHC RBC-ENTMCNC: 31.7 GM/DL — LOW (ref 32–36)
MCV RBC AUTO: 87.9 FL — SIGNIFICANT CHANGE UP (ref 80–100)
MCV RBC AUTO: 94.3 FL — SIGNIFICANT CHANGE UP (ref 80–100)
NRBC # BLD: 0 /100 WBCS — SIGNIFICANT CHANGE UP (ref 0–0)
PHOSPHATE SERPL-MCNC: 2 MG/DL — LOW (ref 2.5–4.5)
PHOSPHATE SERPL-MCNC: 2.8 MG/DL — SIGNIFICANT CHANGE UP (ref 2.5–4.5)
PLATELET # BLD AUTO: 171 K/UL — SIGNIFICANT CHANGE UP (ref 150–400)
PLATELET # BLD AUTO: 208 K/UL — SIGNIFICANT CHANGE UP (ref 150–400)
POTASSIUM SERPL-MCNC: 4 MMOL/L — SIGNIFICANT CHANGE UP (ref 3.5–5.3)
POTASSIUM SERPL-MCNC: 8.9 MMOL/L — CRITICAL HIGH (ref 3.5–5.3)
POTASSIUM SERPL-SCNC: 4 MMOL/L — SIGNIFICANT CHANGE UP (ref 3.5–5.3)
POTASSIUM SERPL-SCNC: 8.9 MMOL/L — CRITICAL HIGH (ref 3.5–5.3)
RBC # BLD: 3.53 M/UL — LOW (ref 4.2–5.8)
RBC # BLD: 4.23 M/UL — SIGNIFICANT CHANGE UP (ref 4.2–5.8)
RBC # FLD: 14.1 % — SIGNIFICANT CHANGE UP (ref 10.3–14.5)
RBC # FLD: 14.7 % — HIGH (ref 10.3–14.5)
SODIUM SERPL-SCNC: 119 MMOL/L — CRITICAL LOW (ref 135–145)
SODIUM SERPL-SCNC: 135 MMOL/L — SIGNIFICANT CHANGE UP (ref 135–145)
WBC # BLD: 8.68 K/UL — SIGNIFICANT CHANGE UP (ref 3.8–10.5)
WBC # BLD: 9.34 K/UL — SIGNIFICANT CHANGE UP (ref 3.8–10.5)
WBC # FLD AUTO: 8.68 K/UL — SIGNIFICANT CHANGE UP (ref 3.8–10.5)
WBC # FLD AUTO: 9.34 K/UL — SIGNIFICANT CHANGE UP (ref 3.8–10.5)

## 2020-01-26 PROCEDURE — 71045 X-RAY EXAM CHEST 1 VIEW: CPT | Mod: 26

## 2020-01-26 RX ORDER — FLUOXETINE HCL 10 MG
20 CAPSULE ORAL DAILY
Refills: 0 | Status: DISCONTINUED | OUTPATIENT
Start: 2020-01-26 | End: 2020-01-27

## 2020-01-26 RX ORDER — MONTELUKAST 4 MG/1
10 TABLET, CHEWABLE ORAL DAILY
Refills: 0 | Status: DISCONTINUED | OUTPATIENT
Start: 2020-01-26 | End: 2020-01-27

## 2020-01-26 RX ORDER — BENZOCAINE AND MENTHOL 5; 1 G/100ML; G/100ML
1 LIQUID ORAL
Refills: 0 | Status: DISCONTINUED | OUTPATIENT
Start: 2020-01-26 | End: 2020-01-27

## 2020-01-26 RX ORDER — DIVALPROEX SODIUM 500 MG/1
500 TABLET, DELAYED RELEASE ORAL
Refills: 0 | Status: DISCONTINUED | OUTPATIENT
Start: 2020-01-26 | End: 2020-01-27

## 2020-01-26 RX ORDER — TAMSULOSIN HYDROCHLORIDE 0.4 MG/1
0.4 CAPSULE ORAL AT BEDTIME
Refills: 0 | Status: DISCONTINUED | OUTPATIENT
Start: 2020-01-26 | End: 2020-01-27

## 2020-01-26 RX ORDER — ATORVASTATIN CALCIUM 80 MG/1
20 TABLET, FILM COATED ORAL AT BEDTIME
Refills: 0 | Status: DISCONTINUED | OUTPATIENT
Start: 2020-01-26 | End: 2020-01-27

## 2020-01-26 RX ORDER — BETHANECHOL CHLORIDE 25 MG
50 TABLET ORAL DAILY
Refills: 0 | Status: DISCONTINUED | OUTPATIENT
Start: 2020-01-26 | End: 2020-01-27

## 2020-01-26 RX ORDER — FUROSEMIDE 40 MG
20 TABLET ORAL DAILY
Refills: 0 | Status: DISCONTINUED | OUTPATIENT
Start: 2020-01-26 | End: 2020-01-27

## 2020-01-26 RX ORDER — CARVEDILOL PHOSPHATE 80 MG/1
12.5 CAPSULE, EXTENDED RELEASE ORAL EVERY 12 HOURS
Refills: 0 | Status: DISCONTINUED | OUTPATIENT
Start: 2020-01-26 | End: 2020-01-27

## 2020-01-26 RX ORDER — RISPERIDONE 4 MG/1
4 TABLET ORAL AT BEDTIME
Refills: 0 | Status: DISCONTINUED | OUTPATIENT
Start: 2020-01-26 | End: 2020-01-26

## 2020-01-26 RX ORDER — BENZTROPINE MESYLATE 1 MG
1 TABLET ORAL
Refills: 0 | Status: DISCONTINUED | OUTPATIENT
Start: 2020-01-26 | End: 2020-01-27

## 2020-01-26 RX ORDER — ALLOPURINOL 300 MG
100 TABLET ORAL
Refills: 0 | Status: DISCONTINUED | OUTPATIENT
Start: 2020-01-26 | End: 2020-01-27

## 2020-01-26 RX ORDER — NIFEDIPINE 30 MG
30 TABLET, EXTENDED RELEASE 24 HR ORAL
Refills: 0 | Status: DISCONTINUED | OUTPATIENT
Start: 2020-01-26 | End: 2020-01-27

## 2020-01-26 RX ADMIN — TAMSULOSIN HYDROCHLORIDE 0.4 MILLIGRAM(S): 0.4 CAPSULE ORAL at 21:42

## 2020-01-26 RX ADMIN — Medication 30 MILLIGRAM(S): at 17:59

## 2020-01-26 RX ADMIN — BENZOCAINE AND MENTHOL 1 LOZENGE: 5; 1 LIQUID ORAL at 22:50

## 2020-01-26 RX ADMIN — DIVALPROEX SODIUM 500 MILLIGRAM(S): 500 TABLET, DELAYED RELEASE ORAL at 21:42

## 2020-01-26 RX ADMIN — BUDESONIDE AND FORMOTEROL FUMARATE DIHYDRATE 2 PUFF(S): 160; 4.5 AEROSOL RESPIRATORY (INHALATION) at 06:16

## 2020-01-26 RX ADMIN — HEPARIN SODIUM 5000 UNIT(S): 5000 INJECTION INTRAVENOUS; SUBCUTANEOUS at 06:15

## 2020-01-26 RX ADMIN — MONTELUKAST 10 MILLIGRAM(S): 4 TABLET, CHEWABLE ORAL at 22:50

## 2020-01-26 RX ADMIN — BENZOCAINE AND MENTHOL 1 LOZENGE: 5; 1 LIQUID ORAL at 14:29

## 2020-01-26 RX ADMIN — Medication 1 MILLIGRAM(S): at 06:16

## 2020-01-26 RX ADMIN — Medication 27.5 MILLIGRAM(S): at 06:15

## 2020-01-26 RX ADMIN — BUDESONIDE AND FORMOTEROL FUMARATE DIHYDRATE 2 PUFF(S): 160; 4.5 AEROSOL RESPIRATORY (INHALATION) at 17:56

## 2020-01-26 RX ADMIN — HEPARIN SODIUM 5000 UNIT(S): 5000 INJECTION INTRAVENOUS; SUBCUTANEOUS at 14:30

## 2020-01-26 RX ADMIN — Medication 100 MILLIGRAM(S): at 17:56

## 2020-01-26 RX ADMIN — ATORVASTATIN CALCIUM 20 MILLIGRAM(S): 80 TABLET, FILM COATED ORAL at 21:42

## 2020-01-26 RX ADMIN — Medication 1 MILLIGRAM(S): at 17:58

## 2020-01-26 RX ADMIN — BENZOCAINE AND MENTHOL 1 LOZENGE: 5; 1 LIQUID ORAL at 18:40

## 2020-01-26 RX ADMIN — CARVEDILOL PHOSPHATE 12.5 MILLIGRAM(S): 80 CAPSULE, EXTENDED RELEASE ORAL at 17:57

## 2020-01-26 RX ADMIN — Medication 5 MILLIGRAM(S): at 01:06

## 2020-01-26 RX ADMIN — HEPARIN SODIUM 5000 UNIT(S): 5000 INJECTION INTRAVENOUS; SUBCUTANEOUS at 21:42

## 2020-01-26 RX ADMIN — RISPERIDONE 4 MILLIGRAM(S): 4 TABLET ORAL at 21:42

## 2020-01-26 NOTE — PROGRESS NOTE ADULT - SUBJECTIVE AND OBJECTIVE BOX
SURGERY DAILY PROGRESS NOTE:       SUBJECTIVE/ROS: Patient examined at bedside. denies abdominal pain and claims passing gas, patient endorses pain from NGT  Denies nausea, vomiting, chest pain, shortness of breath       Vital Signs:  Vital Signs Last 24 Hrs  T(C): 36.3 (26 Jan 2020 09:41), Max: 37.2 (25 Jan 2020 13:25)  T(F): 97.4 (26 Jan 2020 09:41), Max: 98.9 (25 Jan 2020 13:25)  HR: 77 (26 Jan 2020 09:41) (61 - 82)  BP: 144/88 (26 Jan 2020 09:41) (131/77 - 160/79)  BP(mean): --  RR: 18 (26 Jan 2020 09:41) (18 - 18)  SpO2: 95% (26 Jan 2020 09:41) (93% - 96%)    CAPILLARY BLOOD GLUCOSE    PHYSICAL EXAM:  Constitutional: well developed, well nourished, NAD  Eyes: anicteric  ENMT: normal facies, symmetric  Respiratory: Breathing comfortably    Gastrointestinal: abdomen soft, nontender, moderately distended, NGT in place.   Extremities: FROM, warm  Neurological: intact, non-focal  Psychiatric: oriented x 3; appropriate          I&O's Detail    25 Jan 2020 07:01  -  26 Jan 2020 07:00  --------------------------------------------------------  IN:    dextrose 5% + sodium chloride 0.45% with potassium chloride 20 mEq/L: 2400 mL    Solution: 200 mL  Total IN: 2600 mL    OUT:    Intermittent Catheterization - Urethral: 2100 mL    Nasoenteral Tube: 650 mL    Voided: 2800 mL  Total OUT: 5550 mL    Total NET: -2950 mL      26 Jan 2020 07:01  -  26 Jan 2020 10:41  --------------------------------------------------------  IN:  Total IN: 0 mL    OUT:    Intermittent Catheterization - Urethral: 450 mL  Total OUT: 450 mL    Total NET: -450 mL          MEDICATIONS  (STANDING):  benztropine Injectable 1 milliGRAM(s) IV Push two times a day  budesonide  80 MICROgram(s)/formoterol 4.5 MICROgram(s) Inhaler 2 Puff(s) Inhalation two times a day  dextrose 5% + sodium chloride 0.45% with potassium chloride 20 mEq/L 1000 milliLiter(s) (100 mL/Hr) IV Continuous <Continuous>  haloperidol    Injectable 2 milliGRAM(s) IV Push at bedtime  heparin  Injectable 5000 Unit(s) SubCutaneous every 8 hours  metoprolol tartrate Injectable 5 milliGRAM(s) IV Push every 6 hours  risperiDONE  Disintegrating Tablet 4 milliGRAM(s) Oral at bedtime  valproate sodium IVPB 500 milliGRAM(s) IV Intermittent two times a day    MEDICATIONS  (PRN):  ondansetron Injectable 4 milliGRAM(s) IV Push every 6 hours PRN Nausea          Labs:    01-26    135  |  98  |  6<L>  ----------------------------<  112<H>  4.0   |  26  |  0.61    Ca    9.1      26 Jan 2020 09:28  Phos  2.8     01-26  Mg     2.3     01-26    TPro  6.5  /  Alb  3.3  /  TBili  0.5  /  DBili  0.1  /  AST  13  /  ALT  9<L>  /  AlkPhos  73  01-25    LIVER FUNCTIONS - ( 25 Jan 2020 07:26 )  Alb: 3.3 g/dL / Pro: 6.5 g/dL / ALK PHOS: 73 U/L / ALT: 9 U/L / AST: 13 U/L / GGT: x                                 10.0   8.68  )-----------( 171      ( 26 Jan 2020 09:30 )             33.3         Imaging:

## 2020-01-26 NOTE — PROGRESS NOTE ADULT - ASSESSMENT
59M PMH ex-lap about 10 years ago at [facility] for a suicide attempt of swallowing a razor blade,  NIDDM, asthma, gout, HTN, neurogenic bladder requiring self-cath 2/2 DM, schizophrenia, presenting to the ED for 2 days of vomiting and abdominal distension. Patient found to have SBO in mid abdomen.    Plan:    - NPO  - NGT clamp trial  - IV fluids  - DVT ppx  - C/w home medications IV formulation  - Nausea control  - Abdominal exams  - Abdominal exam prior to pain medication administration

## 2020-01-26 NOTE — PROGRESS NOTE ADULT - ATTENDING COMMENTS
Pt seen and examined.  Chart reviewed.  Resident note confirmed.  Pt is a 59 year old male with a history significant for schizophrenia who presented to [facility] with a SBO.  WBC and lactate remain normal. Pain has resolved. NGT output is decreasing. No acute events overnight    Continue NPO/NGT  F/u bilateral renal ultrasound  Monitor and replace lytes  Continue IVF  Psych follow up appreciated  Continue supportive care  D/c planning .

## 2020-01-27 ENCOUNTER — TRANSCRIPTION ENCOUNTER (OUTPATIENT)
Age: 60
End: 2020-01-27

## 2020-01-27 VITALS
RESPIRATION RATE: 18 BRPM | HEART RATE: 73 BPM | SYSTOLIC BLOOD PRESSURE: 152 MMHG | OXYGEN SATURATION: 95 % | TEMPERATURE: 98 F | DIASTOLIC BLOOD PRESSURE: 89 MMHG

## 2020-01-27 LAB
ANION GAP SERPL CALC-SCNC: 11 MMOL/L — SIGNIFICANT CHANGE UP (ref 5–17)
BUN SERPL-MCNC: 4 MG/DL — LOW (ref 7–23)
CALCIUM SERPL-MCNC: 8.9 MG/DL — SIGNIFICANT CHANGE UP (ref 8.4–10.5)
CHLORIDE SERPL-SCNC: 97 MMOL/L — SIGNIFICANT CHANGE UP (ref 96–108)
CO2 SERPL-SCNC: 26 MMOL/L — SIGNIFICANT CHANGE UP (ref 22–31)
CREAT SERPL-MCNC: 0.67 MG/DL — SIGNIFICANT CHANGE UP (ref 0.5–1.3)
GLUCOSE BLDC GLUCOMTR-MCNC: 126 MG/DL — HIGH (ref 70–99)
GLUCOSE BLDC GLUCOMTR-MCNC: 168 MG/DL — HIGH (ref 70–99)
GLUCOSE SERPL-MCNC: 108 MG/DL — HIGH (ref 70–99)
HCT VFR BLD CALC: 36.2 % — LOW (ref 39–50)
HGB BLD-MCNC: 11.8 G/DL — LOW (ref 13–17)
MAGNESIUM SERPL-MCNC: 2.3 MG/DL — SIGNIFICANT CHANGE UP (ref 1.6–2.6)
MCHC RBC-ENTMCNC: 28.6 PG — SIGNIFICANT CHANGE UP (ref 27–34)
MCHC RBC-ENTMCNC: 32.6 GM/DL — SIGNIFICANT CHANGE UP (ref 32–36)
MCV RBC AUTO: 87.9 FL — SIGNIFICANT CHANGE UP (ref 80–100)
PHOSPHATE SERPL-MCNC: 4.2 MG/DL — SIGNIFICANT CHANGE UP (ref 2.5–4.5)
PLATELET # BLD AUTO: 211 K/UL — SIGNIFICANT CHANGE UP (ref 150–400)
POTASSIUM SERPL-MCNC: 4 MMOL/L — SIGNIFICANT CHANGE UP (ref 3.5–5.3)
POTASSIUM SERPL-SCNC: 4 MMOL/L — SIGNIFICANT CHANGE UP (ref 3.5–5.3)
RBC # BLD: 4.12 M/UL — LOW (ref 4.2–5.8)
RBC # FLD: 14.2 % — SIGNIFICANT CHANGE UP (ref 10.3–14.5)
SODIUM SERPL-SCNC: 134 MMOL/L — LOW (ref 135–145)
WBC # BLD: 9.75 K/UL — SIGNIFICANT CHANGE UP (ref 3.8–10.5)
WBC # FLD AUTO: 9.75 K/UL — SIGNIFICANT CHANGE UP (ref 3.8–10.5)

## 2020-01-27 PROCEDURE — 94640 AIRWAY INHALATION TREATMENT: CPT

## 2020-01-27 PROCEDURE — 84132 ASSAY OF SERUM POTASSIUM: CPT

## 2020-01-27 PROCEDURE — 85730 THROMBOPLASTIN TIME PARTIAL: CPT

## 2020-01-27 PROCEDURE — 86850 RBC ANTIBODY SCREEN: CPT

## 2020-01-27 PROCEDURE — 99285 EMERGENCY DEPT VISIT HI MDM: CPT | Mod: 25

## 2020-01-27 PROCEDURE — 74177 CT ABD & PELVIS W/CONTRAST: CPT

## 2020-01-27 PROCEDURE — 83605 ASSAY OF LACTIC ACID: CPT

## 2020-01-27 PROCEDURE — 83735 ASSAY OF MAGNESIUM: CPT

## 2020-01-27 PROCEDURE — 86900 BLOOD TYPING SEROLOGIC ABO: CPT

## 2020-01-27 PROCEDURE — 99238 HOSP IP/OBS DSCHRG MGMT 30/<: CPT

## 2020-01-27 PROCEDURE — 74018 RADEX ABDOMEN 1 VIEW: CPT

## 2020-01-27 PROCEDURE — 86901 BLOOD TYPING SEROLOGIC RH(D): CPT

## 2020-01-27 PROCEDURE — 80053 COMPREHEN METABOLIC PANEL: CPT

## 2020-01-27 PROCEDURE — 85014 HEMATOCRIT: CPT

## 2020-01-27 PROCEDURE — 82803 BLOOD GASES ANY COMBINATION: CPT

## 2020-01-27 PROCEDURE — 85610 PROTHROMBIN TIME: CPT

## 2020-01-27 PROCEDURE — 85027 COMPLETE CBC AUTOMATED: CPT

## 2020-01-27 PROCEDURE — 84100 ASSAY OF PHOSPHORUS: CPT

## 2020-01-27 PROCEDURE — 80076 HEPATIC FUNCTION PANEL: CPT

## 2020-01-27 PROCEDURE — 76770 US EXAM ABDO BACK WALL COMP: CPT

## 2020-01-27 PROCEDURE — 51701 INSERT BLADDER CATHETER: CPT | Mod: XU

## 2020-01-27 PROCEDURE — 82947 ASSAY GLUCOSE BLOOD QUANT: CPT

## 2020-01-27 PROCEDURE — 99233 SBSQ HOSP IP/OBS HIGH 50: CPT

## 2020-01-27 PROCEDURE — 80048 BASIC METABOLIC PNL TOTAL CA: CPT

## 2020-01-27 PROCEDURE — 71045 X-RAY EXAM CHEST 1 VIEW: CPT

## 2020-01-27 PROCEDURE — 43753 TX GASTRO INTUB W/ASP: CPT

## 2020-01-27 PROCEDURE — 99232 SBSQ HOSP IP/OBS MODERATE 35: CPT

## 2020-01-27 PROCEDURE — 84295 ASSAY OF SERUM SODIUM: CPT

## 2020-01-27 PROCEDURE — 82435 ASSAY OF BLOOD CHLORIDE: CPT

## 2020-01-27 PROCEDURE — 82962 GLUCOSE BLOOD TEST: CPT

## 2020-01-27 PROCEDURE — 82330 ASSAY OF CALCIUM: CPT

## 2020-01-27 RX ORDER — SODIUM,POTASSIUM PHOSPHATES 278-250MG
1 POWDER IN PACKET (EA) ORAL ONCE
Refills: 0 | Status: COMPLETED | OUTPATIENT
Start: 2020-01-27 | End: 2020-01-27

## 2020-01-27 RX ORDER — ACETAMINOPHEN 500 MG
975 TABLET ORAL ONCE
Refills: 0 | Status: COMPLETED | OUTPATIENT
Start: 2020-01-27 | End: 2020-01-27

## 2020-01-27 RX ADMIN — DIVALPROEX SODIUM 500 MILLIGRAM(S): 500 TABLET, DELAYED RELEASE ORAL at 08:27

## 2020-01-27 RX ADMIN — Medication 100 MILLIGRAM(S): at 05:46

## 2020-01-27 RX ADMIN — Medication 20 MILLIGRAM(S): at 05:46

## 2020-01-27 RX ADMIN — BENZOCAINE AND MENTHOL 1 LOZENGE: 5; 1 LIQUID ORAL at 01:15

## 2020-01-27 RX ADMIN — HEPARIN SODIUM 5000 UNIT(S): 5000 INJECTION INTRAVENOUS; SUBCUTANEOUS at 05:45

## 2020-01-27 RX ADMIN — Medication 1 MILLIGRAM(S): at 17:04

## 2020-01-27 RX ADMIN — Medication 100 MILLIGRAM(S): at 17:04

## 2020-01-27 RX ADMIN — BENZOCAINE AND MENTHOL 1 LOZENGE: 5; 1 LIQUID ORAL at 11:32

## 2020-01-27 RX ADMIN — Medication 50 MILLIGRAM(S): at 11:32

## 2020-01-27 RX ADMIN — Medication 20 MILLIGRAM(S): at 11:29

## 2020-01-27 RX ADMIN — CARVEDILOL PHOSPHATE 12.5 MILLIGRAM(S): 80 CAPSULE, EXTENDED RELEASE ORAL at 17:04

## 2020-01-27 RX ADMIN — Medication 1 MILLIGRAM(S): at 05:45

## 2020-01-27 RX ADMIN — Medication 1 PACKET(S): at 08:33

## 2020-01-27 RX ADMIN — Medication 30 MILLIGRAM(S): at 17:04

## 2020-01-27 RX ADMIN — Medication 30 MILLIGRAM(S): at 05:45

## 2020-01-27 RX ADMIN — BUDESONIDE AND FORMOTEROL FUMARATE DIHYDRATE 2 PUFF(S): 160; 4.5 AEROSOL RESPIRATORY (INHALATION) at 17:04

## 2020-01-27 RX ADMIN — MONTELUKAST 10 MILLIGRAM(S): 4 TABLET, CHEWABLE ORAL at 11:32

## 2020-01-27 RX ADMIN — Medication 975 MILLIGRAM(S): at 17:06

## 2020-01-27 RX ADMIN — Medication 975 MILLIGRAM(S): at 17:11

## 2020-01-27 RX ADMIN — BUDESONIDE AND FORMOTEROL FUMARATE DIHYDRATE 2 PUFF(S): 160; 4.5 AEROSOL RESPIRATORY (INHALATION) at 05:45

## 2020-01-27 RX ADMIN — CARVEDILOL PHOSPHATE 12.5 MILLIGRAM(S): 80 CAPSULE, EXTENDED RELEASE ORAL at 05:46

## 2020-01-27 NOTE — DISCHARGE NOTE PROVIDER - HOSPITAL COURSE
59M PMH ex-lap about 10 years ago at [facility] for a suicide attempt of swallowing a razor blade,  NIDDM, asthma, gout, HTN, neurogenic bladder requiring self-cath 2/2 DM, schizophrenia, presenting to the ED for 2 days of vomiting and abdominal distension. Patient states his wife has had the flu and he started having symptoms of body aches, sore throat, non-productive cough, fatigue, for the last few days a/w decreased PO intake. Patient went to PCP this week and started on Z-marcus and Tamiflu. Patient states 2 days ago began w/ nausea, vomiting, diarrhea and worsening abd distension. Patient had 2 episodes NBNB vomiting. Low grade temps at home around 100*F. Of note, patient had C-diff colitis in June requiring Vancomycin, states the abd distension was similar. Patient denies chills, sob, cp, abd pain, constipation, dysuria, or weakness. Patient found to have SBO in mid abdomen with question of pneumatosis.  Admit to ACS, F/u stat Lactate, NPO/ NGT/ IV fluids, 1 liter bolus now for high initial output of ~3.5 liters of bilious liquids.  Psych was consulted for home meds conversation to IV while NPO. Trauma hospitalist following for medical co-management. Pt self-caths. Renal US showed hydronephrosis. HD4, clamp trial, passed and NGT dc, diet advanced to clears which he tolerated. IV meds changed to PO home regimen. HD5, diet adv to solid food. At the time of discharge, the patient was hemodynamically stable, was tolerating PO diet, was voiding urine and passing stool, was ambulating, and was comfortable with adequate pain control. The patient was instructed to follow up with Dr. Vargas as needed and PMD within 1-2 weeks after discharge from the hospital. The patient/family felt comfortable with discharge. The patient was discharged to home. The patient had no other issues.

## 2020-01-27 NOTE — DIETITIAN INITIAL EVALUATION ADULT. - ADD RECOMMEND
1. Continue current consistent carbohydrate diet order as tolerated 2. Diet education provided, reinforce as needed 3. BMI >40 alert placed in EMR, provider alerted 4. Monitor PO intake/tolerance, weights, labs, hydration status, bowels, and skin integrity.

## 2020-01-27 NOTE — DIETITIAN INITIAL EVALUATION ADULT. - LAB (SPECIFY)
electrolytes, blood glucose, renal indices electrolytes, blood glucose, renal indices, recommend obtaining A1c

## 2020-01-27 NOTE — PROGRESS NOTE ADULT - ASSESSMENT
59M PMH ex-lap about 10 years ago at [facility] for a suicide attempt of swallowing a razor blade,  DM2, asthma, gout, HTN, HLD, neurogenic bladder requiring self-cath 2/2 DM, schizophrenia, presenting to the ED for 2 days of vomiting and abdominal distension found to have small bowel obstruction with transition point in the anterior abdomen

## 2020-01-27 NOTE — DISCHARGE NOTE PROVIDER - NSDCCPCAREPLAN_GEN_ALL_CORE_FT
PRINCIPAL DISCHARGE DIAGNOSIS  Diagnosis: SBO (small bowel obstruction)  Assessment and Plan of Treatment: Notify your surgeon and return to ER for temperatures greater than 101, chills sweats, pain not controlled with pain medications, persistent nausea and vomiting, inability to tolerate food, significant abdominal bloating/distension, no passing of flatus or bowel movements, or acutely concerning matters to you, that may require urgent medical attention.   Follow up with your PMD within 1 week of discharge regarding your hospitalization.  Follow up with your trauma attending within 1-2 weeks upon discharge.      SECONDARY DISCHARGE DIAGNOSES  Diagnosis: History of Schizophrenia  Assessment and Plan of Treatment: Continue home meds and outpatient follow up as scheduled.    Diagnosis: Hyponatremia  Assessment and Plan of Treatment: repleted during admission, resolved.    Diagnosis: Nausea and vomiting  Assessment and Plan of Treatment: resolved.

## 2020-01-27 NOTE — DIETITIAN INITIAL EVALUATION ADULT. - PHYSICAL APPEARANCE
obese/other (specify) Ht:  72"   Wt:  299lbs   IBW: 190lbs  +/-10%    BMI:  40.7 kg/m2    %IBW: 157%  Skin: Intact, no pressure injuries per nurse's flow sheets  Edema: None noted per nurse's flow sheets

## 2020-01-27 NOTE — PROGRESS NOTE ADULT - PROBLEM SELECTOR PLAN 8
Discharge planning issues. Recommendation: Transitions of Care Status:    1.  Name of PCP: Dr. Keith  2.  PCP Contacted on Admission: [ ] Y    [ ] N    3.  PCP contacted at Discharge: [ ] Y    [ ] N    [ ] N/A  4.  Post-Discharge Appointment Date and Location:  5.  Summary of Handoff given to PCP:

## 2020-01-27 NOTE — DISCHARGE NOTE NURSING/CASE MANAGEMENT/SOCIAL WORK - PATIENT PORTAL LINK FT
You can access the FollowMyHealth Patient Portal offered by [facility] by registering at the following website: [email or web address]. By joining [facility]’s FollowMyHealth portal, you will also be able to view your health information using other applications (apps) compatible with our system.

## 2020-01-27 NOTE — PROGRESS NOTE ADULT - SUBJECTIVE AND OBJECTIVE BOX
Patient is a 59y old  Male who presents with a chief complaint of     SUBJECTIVE / OVERNIGHT EVENTS: somewhat lethargic appearing, pt just woke up, feels ok, no cp, sob, chills     MEDICATIONS  (STANDING):  allopurinol 100 milliGRAM(s) Oral two times a day  atorvastatin 20 milliGRAM(s) Oral at bedtime  benztropine 1 milliGRAM(s) Oral two times a day  bethanechol 50 milliGRAM(s) Oral daily  budesonide  80 MICROgram(s)/formoterol 4.5 MICROgram(s) Inhaler 2 Puff(s) Inhalation two times a day  carvedilol 12.5 milliGRAM(s) Oral every 12 hours  diVALproex  milliGRAM(s) Oral <User Schedule>  enalapril 20 milliGRAM(s) Oral daily  FLUoxetine 20 milliGRAM(s) Oral daily  furosemide    Tablet 20 milliGRAM(s) Oral daily  heparin  Injectable 5000 Unit(s) SubCutaneous every 8 hours  montelukast 10 milliGRAM(s) Oral daily  NIFEdipine XL 30 milliGRAM(s) Oral two times a day  risperiDONE  Disintegrating Tablet 4 milliGRAM(s) Oral at bedtime  tamsulosin 0.4 milliGRAM(s) Oral at bedtime    MEDICATIONS  (PRN):  benzocaine 15 mG/menthol 3.6 mG (Sugar-Free) Lozenge 1 Lozenge Oral every 2 hours PRN Sore Throat        CAPILLARY BLOOD GLUCOSE      POCT Blood Glucose.: 126 mg/dL (27 Jan 2020 11:45)  POCT Blood Glucose.: 168 mg/dL (27 Jan 2020 10:26)    I&O's Summary    26 Jan 2020 07:01  -  27 Jan 2020 07:00  --------------------------------------------------------  IN: 640 mL / OUT: 6350 mL / NET: -5710 mL    27 Jan 2020 07:01  -  27 Jan 2020 12:09  --------------------------------------------------------  IN: 360 mL / OUT: 1300 mL / NET: -940 mL        PHYSICAL EXAM:  GENERAL: NAD, well-developed, obese  HEAD:  Atraumatic, Normocephalic  EYES: conjunctiva and sclera clear  NECK:  No JVD  CHEST/LUNG: Clear to auscultation bilaterally; No wheeze  HEART: Regular rate and rhythm; S1S2  ABDOMEN: Soft, Nontender, Nondistended; Bowel sounds present  EXTREMITIES:  2+ Peripheral Pulses  PSYCH: AAOx3  NEUROLOGY: non-focal  SKIN: No rashes or lesions    LABS:                        11.8   9.75  )-----------( 211      ( 27 Jan 2020 07:48 )             36.2     01-27    134<L>  |  97  |  4<L>  ----------------------------<  108<H>  4.0   |  26  |  0.67    Ca    8.9      27 Jan 2020 06:39  Phos  4.2     01-27  Mg     2.3     01-27                RADIOLOGY & ADDITIONAL TESTS:    Imaging Personally Reviewed:    Consultant(s) Notes Reviewed:  sx    Care Discussed with Consultants/Other Providers: sx

## 2020-01-27 NOTE — PROGRESS NOTE ADULT - ATTENDING COMMENTS
seen and examined 01-27-20 @ 0910    tolerating clears w/o nausea or vomiting  +flatus / +BM    RRR with distant heart sounds  CTA bilat  soft / NT / ND  normoactive bowel sounds  obese  laparotomy scar (removal of ingested razor blade)    SBO from post-op adhesions has resolved  -discharge home if he tolerates a regular diet

## 2020-01-27 NOTE — PROGRESS NOTE ADULT - PROBLEM SELECTOR PLAN 1
Small bowel obstruction with transition point in the anterior abdomen  carb consistent diet  assess gi function   sx follow up.

## 2020-01-27 NOTE — PROGRESS NOTE ADULT - ASSESSMENT
59M PMH ex-lap about 10 years ago at [facility] for a suicide attempt of swallowing a razor blade,  NIDDM, asthma, gout, HTN, neurogenic bladder requiring self-cath 2/2 DM, schizophrenia, presenting to the ED for 2 days of vomiting and abdominal distension. Patient found to have SBO in mid abdomen.    Plan:  - Clears for breakfast, ADAT  - DVT ppx  - C/w home medications (transitioned to PO)  - Nausea control  - Abdominal exams      ACS  [id number]

## 2020-01-27 NOTE — DIETITIAN INITIAL EVALUATION ADULT. - OTHER INFO
Diet PTA: Pt reports good appetite and po intake PTA. At baseline eats 3 meals with snacks in between. Tries to follow carbohydrate consistent diet at home. Reports he eats out and cooks at home with his wife. Takes metformin to manage diabetes, uses finger sticks once a week. Usual fingerstick reading is ~140mg/dl. Reports taking CoQ10 and multivitamin. Confirms NKFA. No Restorationist or cultural food preferences. Denies difficulties chewing or swallowing.     Weights: UBW ~290lbs for the last 6 months. Reports he has noticed that he is gaining wt recently, believes it's due to overeating. Current admission dosing wt 299lbs. Last admission wt from 2010 reports pt weighed Diet PTA: Pt reports good appetite and po intake PTA. At baseline eats 3 meals with snacks in between. Tries to follow carbohydrate consistent diet at home. Reports he eats out and cooks at home with his wife. Takes metformin to manage diabetes, uses finger sticks once a week. Usual fingerstick reading is ~140mg/dl. Reports taking CoQ10 and multivitamin. Confirms NKFA. No Yazidism or cultural food preferences. Denies difficulties chewing or swallowing. Vomiting and abdominal distension x 2 days PTA.     Weights: UBW ~290lbs for the last 6 months. Reports he has noticed that he is gaining wt recently, believes it's due to overeating. Current admission dosing wt 299lbs. Last admission wts; 308lbs (2013) and 284lbs (2010).     Pt was tolerating clear liquids, per team advanced to consistent carbohydrate today (01-27). No c/o N+V, diarrhea or constipation. Passing flatus. Per RN, had BM last night.     Diet Education: Discussed use of low fiber diet at home to ease digestion and recovery. Encouraged adequate intake of protein for recovery. Provided general healthy eating guidelines. Emphasized importance of avoiding high sodium foods such as canned products and processed sweets and snacks. Briefly discussed carbohydrate serving size for meals and snacks. Patient demonstrated understanding. Written material left at bedside. Diet PTA: Pt reports good appetite and po intake PTA. At baseline eats 3 meals with snacks in between. Tries to follow carbohydrate consistent diet at home. Reports he eats out and cooks at home with his wife. Takes metformin to manage diabetes, uses finger sticks once a week. Usual fingerstick reading is ~140mg/dl. Reports taking CoQ10 and multivitamin. Confirms NKFA. No Advent or cultural food preferences. Denies difficulties chewing or swallowing. Vomiting and abdominal distension x 2 days PTA.     Weights: UBW ~290lbs for the last 6 months. Reports he has noticed that he is gaining wt recently, believes it's due to overeating. Current admission dosing wt 299lbs. Last admission wts; 308lbs (2013) and 284lbs (2010).     Pt was tolerating clear liquids, per team advanced to consistent carbohydrate today (01-27). No c/o N+V, diarrhea or constipation. Passing flatus. Per RN, had BM last night.     Diet Education: Pt interested in diet education. Discussed use of low fiber diet at home to ease digestion and recovery. Encouraged adequate intake of protein for recovery. Provided general healthy eating guidelines. Emphasized importance of avoiding high sodium foods such as canned products and processed sweets and snacks. Briefly discussed carbohydrate serving size for meals and snacks. Patient demonstrated understanding. Written material left at bedside.

## 2020-01-27 NOTE — DISCHARGE NOTE PROVIDER - CARE PROVIDER_API CALL
Ben Vargas (MD)  Surgery; Surgical Critical Care  [address], [address], [place] [id number]  Phone: [phone]  Fax: [phone]  Follow Up Time:

## 2020-01-27 NOTE — CHART NOTE - NSCHARTNOTEFT_GEN_A_CORE
Upon Nutritional Assessment by the Registered Dietitian your patient was determined to meet criteria / has evidence of the following diagnosis/diagnoses:          [ ]  Mild Protein Calorie Malnutrition        [ ]  Moderate Protein Calorie Malnutrition        [ ] Severe Protein Calorie Malnutrition        [ ] Unspecified Protein Calorie Malnutrition        [ ] Underweight / BMI <19        [x ] Morbid Obesity / BMI > 40      Findings as based on:  [ ] Comprehensive nutrition assessment   [ ] Nutrition Focused Physical Exam  [x ] Other: BMI 40.7kg/m2      Nutrition Plan/Recommendations:    see initial nutrition assessment in documents for details     Diet education provided, reinforce as needed    RD to remain available and follow-up as medically appropriate.        PROVIDER Section:     By signing this assessment you are acknowledging and agree with the diagnosis/diagnoses assigned by the Registered Dietitian    Comments:

## 2020-01-27 NOTE — DIETITIAN INITIAL EVALUATION ADULT. - REASON INDICATOR FOR ASSESSMENT
Pt NPO and clear liquids x 4 days    Per chart, Pt NPO and clear liquids x 4 days    Per chart, 59M PMH ex-lap about 10 years ago at [facility] for a suicide attempt of swallowing a razor blade,  NIDDM, asthma, gout, HTN, neurogenic bladder requiring self-cath 2/2 DM, schizophrenia, presenting to the ED for 2 days of vomiting and abdominal distension. Patient found to have SBO in mid abdomen. Pt seen for NPO and clear liquids x 4 days, information obtained pt, pt's wife at bedside and comprehensive chart review.     Per chart, 59M PMH ex-lap about 10 years ago at [facility] for a suicide attempt of swallowing a razor blade,  NIDDM, asthma, gout, HTN, neurogenic bladder requiring self-cath 2/2 DM, schizophrenia, presenting to the ED for 2 days of vomiting and abdominal distension. Patient found to have SBO in mid abdomen. Pt seen for NPO and clear liquids x 4 days and BMI >40, information obtained pt, pt's wife at bedside and comprehensive chart review.     Per chart, 59M PMH ex-lap about 10 years ago at [facility] for a suicide attempt of swallowing a razor blade,  NIDDM, asthma, gout, HTN, neurogenic bladder requiring self-cath 2/2 DM, schizophrenia, presenting to the ED for 2 days of vomiting and abdominal distension. Patient found to have SBO in mid abdomen.

## 2020-01-27 NOTE — PROGRESS NOTE ADULT - SUBJECTIVE AND OBJECTIVE BOX
SURGERY DAILY PROGRESS NOTE:       SUBJECTIVE/ROS: Patient examined at bedside. denies abdominal pain and claims passing gas and having BM.  NGT removed yesterday. Tolerating clears. Denies nausea, vomiting, chest pain, shortness of breath       Vital Signs:  Vital Signs Last 24 Hrs  T(C): 36.9 (27 Jan 2020 05:42), Max: 37.2 (26 Jan 2020 17:16)  T(F): 98.4 (27 Jan 2020 05:42), Max: 99 (26 Jan 2020 17:16)  HR: 75 (27 Jan 2020 05:42) (69 - 80)  BP: 124/71 (27 Jan 2020 05:42) (124/71 - 169/93)  BP(mean): --  RR: 18 (27 Jan 2020 05:42) (18 - 18)  SpO2: 95% (27 Jan 2020 05:42) (95% - 97%)  I&O's Detail    25 Jan 2020 07:01  -  26 Jan 2020 07:00  --------------------------------------------------------  IN:    dextrose 5% + sodium chloride 0.45% with potassium chloride 20 mEq/L: 2400 mL    Solution: 200 mL  Total IN: 2600 mL    OUT:    Intermittent Catheterization - Urethral: 2100 mL    Nasoenteral Tube: 650 mL    Voided: 2800 mL  Total OUT: 5550 mL    Total NET: -2950 mL      26 Jan 2020 07:01  -  27 Jan 2020 06:54  --------------------------------------------------------  IN:    Oral Fluid: 640 mL  Total IN: 640 mL    OUT:    Intermittent Catheterization - Urethral: 6350 mL  Total OUT: 6350 mL    Total NET: -5710 mL        CAPILLARY BLOOD GLUCOSE    PHYSICAL EXAM:  Constitutional: well developed, well nourished, NAD  Eyes: anicteric  ENMT: normal facies, symmetric  Respiratory: Breathing comfortably    Gastrointestinal: abdomen soft, nontender, moderately distended  Extremities: FROM, warm  Neurological: intact, non-focal  Psychiatric: oriented x 3; appropriate              MEDICATIONS  (STANDING):  benztropine Injectable 1 milliGRAM(s) IV Push two times a day  budesonide  80 MICROgram(s)/formoterol 4.5 MICROgram(s) Inhaler 2 Puff(s) Inhalation two times a day  dextrose 5% + sodium chloride 0.45% with potassium chloride 20 mEq/L 1000 milliLiter(s) (100 mL/Hr) IV Continuous <Continuous>  haloperidol    Injectable 2 milliGRAM(s) IV Push at bedtime  heparin  Injectable 5000 Unit(s) SubCutaneous every 8 hours  metoprolol tartrate Injectable 5 milliGRAM(s) IV Push every 6 hours  risperiDONE  Disintegrating Tablet 4 milliGRAM(s) Oral at bedtime  valproate sodium IVPB 500 milliGRAM(s) IV Intermittent two times a day    MEDICATIONS  (PRN):  ondansetron Injectable 4 milliGRAM(s) IV Push every 6 hours PRN Nausea          Labs:    01-26    135  |  98  |  6<L>  ----------------------------<  112<H>  4.0   |  26  |  0.61    Ca    9.1      26 Jan 2020 09:28  Phos  2.8     01-26  Mg     2.3     01-26    TPro  6.5  /  Alb  3.3  /  TBili  0.5  /  DBili  0.1  /  AST  13  /  ALT  9<L>  /  AlkPhos  73  01-25    LIVER FUNCTIONS - ( 25 Jan 2020 07:26 )  Alb: 3.3 g/dL / Pro: 6.5 g/dL / ALK PHOS: 73 U/L / ALT: 9 U/L / AST: 13 U/L / GGT: x                                 10.0   8.68  )-----------( 171      ( 26 Jan 2020 09:30 )             33.3         Imaging:

## 2020-01-27 NOTE — DISCHARGE NOTE PROVIDER - NSDCMRMEDTOKEN_GEN_ALL_CORE_FT
allopurinol 100 mg oral tablet: 1 tab(s) orally 2 times a day  benztropine 1 mg oral tablet: 1 tab(s) orally 2 times a day  bethanechol 50 mg oral tablet: orally once a day  carvedilol 12.5 mg oral tablet: 1 tab(s) orally 2 times a day  Crestor 5 mg oral tablet: 1 tab(s) orally once a day  divalproex sodium 500 mg oral tablet, extended release: orally 2 times a day  enalapril 20 mg oral tablet: 1 tab(s) orally once a day  Flomax 0.4 mg oral capsule: 1 cap(s) orally once a day  Flovent 220 mcg/inh inhalation aerosol with adapter:   FLUoxetine 20 mg oral tablet: 1 tab(s) orally once a day  furosemide 20 mg oral tablet: 1 tab(s) orally once a day  metFORMIN 500 mg oral tablet: 1 tab(s) orally 2 times a day  montelukast 10 mg oral tablet: 1 tab(s) orally once a day  NIFEdipine 30 mg oral tablet, extended release: 1 tab(s) orally 2 times a day  risperiDONE 4 mg oral tablet: orally once a day (at bedtime)

## 2020-08-01 ENCOUNTER — EMERGENCY (EMERGENCY)
Facility: HOSPITAL | Age: 60
LOS: 1 days | Discharge: HOME | End: 2020-08-01
Attending: EMERGENCY MEDICINE
Payer: MEDICARE

## 2020-08-01 VITALS
TEMPERATURE: 98 F | DIASTOLIC BLOOD PRESSURE: 92 MMHG | SYSTOLIC BLOOD PRESSURE: 152 MMHG | OXYGEN SATURATION: 96 % | RESPIRATION RATE: 20 BRPM | HEART RATE: 73 BPM

## 2020-08-01 VITALS
HEART RATE: 85 BPM | SYSTOLIC BLOOD PRESSURE: 152 MMHG | TEMPERATURE: 98 F | RESPIRATION RATE: 22 BRPM | WEIGHT: 300.05 LBS | DIASTOLIC BLOOD PRESSURE: 89 MMHG | OXYGEN SATURATION: 96 % | HEIGHT: 72 IN

## 2020-08-01 LAB
ALBUMIN SERPL ELPH-MCNC: 4.2 G/DL — SIGNIFICANT CHANGE UP (ref 3.3–5)
ALP SERPL-CCNC: 103 U/L — SIGNIFICANT CHANGE UP (ref 40–120)
ALT FLD-CCNC: 25 U/L — SIGNIFICANT CHANGE UP (ref 10–45)
ANION GAP SERPL CALC-SCNC: 15 MMOL/L — SIGNIFICANT CHANGE UP (ref 5–17)
AST SERPL-CCNC: 25 U/L — SIGNIFICANT CHANGE UP (ref 10–40)
BASOPHILS # BLD AUTO: 0.04 K/UL — SIGNIFICANT CHANGE UP (ref 0–0.2)
BASOPHILS NFR BLD AUTO: 0.3 % — SIGNIFICANT CHANGE UP (ref 0–2)
BILIRUB SERPL-MCNC: 0.5 MG/DL — SIGNIFICANT CHANGE UP (ref 0.2–1.2)
BUN SERPL-MCNC: 13 MG/DL — SIGNIFICANT CHANGE UP (ref 7–23)
CALCIUM SERPL-MCNC: 9.9 MG/DL — SIGNIFICANT CHANGE UP (ref 8.4–10.5)
CHLORIDE SERPL-SCNC: 96 MMOL/L — SIGNIFICANT CHANGE UP (ref 96–108)
CO2 SERPL-SCNC: 25 MMOL/L — SIGNIFICANT CHANGE UP (ref 22–31)
CREAT SERPL-MCNC: 0.66 MG/DL — SIGNIFICANT CHANGE UP (ref 0.5–1.3)
EOSINOPHIL # BLD AUTO: 0.27 K/UL — SIGNIFICANT CHANGE UP (ref 0–0.5)
EOSINOPHIL NFR BLD AUTO: 2.2 % — SIGNIFICANT CHANGE UP (ref 0–6)
GLUCOSE SERPL-MCNC: 135 MG/DL — HIGH (ref 70–99)
HCT VFR BLD CALC: 39.7 % — SIGNIFICANT CHANGE UP (ref 39–50)
HGB BLD-MCNC: 13.2 G/DL — SIGNIFICANT CHANGE UP (ref 13–17)
IMM GRANULOCYTES NFR BLD AUTO: 0.5 % — SIGNIFICANT CHANGE UP (ref 0–1.5)
LYMPHOCYTES # BLD AUTO: 1.2 K/UL — SIGNIFICANT CHANGE UP (ref 1–3.3)
LYMPHOCYTES # BLD AUTO: 9.6 % — LOW (ref 13–44)
MCHC RBC-ENTMCNC: 29 PG — SIGNIFICANT CHANGE UP (ref 27–34)
MCHC RBC-ENTMCNC: 33.2 GM/DL — SIGNIFICANT CHANGE UP (ref 32–36)
MCV RBC AUTO: 87.3 FL — SIGNIFICANT CHANGE UP (ref 80–100)
MONOCYTES # BLD AUTO: 1.2 K/UL — HIGH (ref 0–0.9)
MONOCYTES NFR BLD AUTO: 9.6 % — SIGNIFICANT CHANGE UP (ref 2–14)
NEUTROPHILS # BLD AUTO: 9.76 K/UL — HIGH (ref 1.8–7.4)
NEUTROPHILS NFR BLD AUTO: 77.8 % — HIGH (ref 43–77)
NRBC # BLD: 0 /100 WBCS — SIGNIFICANT CHANGE UP (ref 0–0)
PLATELET # BLD AUTO: 269 K/UL — SIGNIFICANT CHANGE UP (ref 150–400)
POTASSIUM SERPL-MCNC: 4 MMOL/L — SIGNIFICANT CHANGE UP (ref 3.5–5.3)
POTASSIUM SERPL-SCNC: 4 MMOL/L — SIGNIFICANT CHANGE UP (ref 3.5–5.3)
PROT SERPL-MCNC: 7.7 G/DL — SIGNIFICANT CHANGE UP (ref 6–8.3)
RBC # BLD: 4.55 M/UL — SIGNIFICANT CHANGE UP (ref 4.2–5.8)
RBC # FLD: 13 % — SIGNIFICANT CHANGE UP (ref 10.3–14.5)
SODIUM SERPL-SCNC: 136 MMOL/L — SIGNIFICANT CHANGE UP (ref 135–145)
TROPONIN T, HIGH SENSITIVITY RESULT: 13 NG/L — SIGNIFICANT CHANGE UP (ref 0–51)
TROPONIN T, HIGH SENSITIVITY RESULT: 16 NG/L — SIGNIFICANT CHANGE UP (ref 0–51)
WBC # BLD: 12.53 K/UL — HIGH (ref 3.8–10.5)
WBC # FLD AUTO: 12.53 K/UL — HIGH (ref 3.8–10.5)

## 2020-08-01 PROCEDURE — 99284 EMERGENCY DEPT VISIT MOD MDM: CPT | Mod: 25

## 2020-08-01 PROCEDURE — 93005 ELECTROCARDIOGRAM TRACING: CPT

## 2020-08-01 PROCEDURE — 84484 ASSAY OF TROPONIN QUANT: CPT

## 2020-08-01 PROCEDURE — 80053 COMPREHEN METABOLIC PANEL: CPT

## 2020-08-01 PROCEDURE — 96374 THER/PROPH/DIAG INJ IV PUSH: CPT

## 2020-08-01 PROCEDURE — 96376 TX/PRO/DX INJ SAME DRUG ADON: CPT

## 2020-08-01 PROCEDURE — 71045 X-RAY EXAM CHEST 1 VIEW: CPT

## 2020-08-01 PROCEDURE — 99285 EMERGENCY DEPT VISIT HI MDM: CPT

## 2020-08-01 PROCEDURE — 85027 COMPLETE CBC AUTOMATED: CPT

## 2020-08-01 PROCEDURE — 71045 X-RAY EXAM CHEST 1 VIEW: CPT | Mod: 26

## 2020-08-01 RX ORDER — KETOROLAC TROMETHAMINE 30 MG/ML
1 SYRINGE (ML) INJECTION
Qty: 20 | Refills: 0
Start: 2020-08-01

## 2020-08-01 RX ORDER — KETOROLAC TROMETHAMINE 30 MG/ML
15 SYRINGE (ML) INJECTION ONCE
Refills: 0 | Status: DISCONTINUED | OUTPATIENT
Start: 2020-08-01 | End: 2020-08-01

## 2020-08-01 RX ADMIN — Medication 15 MILLIGRAM(S): at 05:43

## 2020-08-01 RX ADMIN — Medication 15 MILLIGRAM(S): at 04:11

## 2020-08-01 RX ADMIN — Medication 15 MILLIGRAM(S): at 05:44

## 2020-08-01 NOTE — ED PROVIDER NOTE - ATTENDING CONTRIBUTION TO CARE
MD Vicente:  patient seen and evaluated personally.   I agree with the History & Physical,  Impression & Plan other than what was detailed in my note.  MD Vicente  59 y/o m hx of chronic neck pain, w acute exac x 1 week, numbness in finger tips, saw surgeon for this yest, no actaully presenting w/ cc of r chest pain underneath his r arm/axilla, worse with movement. no recent trauma, no hx of dvt/pe, not worse with ambulation. afebrile vitals stable, large habitus, pleasant, pt has point ttp r under r arm, no ecchymosis or skin discoloration no lymphadenopathy noted, no rash, pain is not radiating. likely msk however given habitus, will get trop ekg cbc cmp. pain control.  re eavluate.

## 2020-08-01 NOTE — ED PROVIDER NOTE - OBJECTIVE STATEMENT
Patient is a 61 yo man with chronic neck pain and PMH disc herniation coming in due to increased pain and numbness/tingling of fingertips of 1 week duration. Patient states he has had neck pain for 10 years, and recently pain has increased over the last week. This prompted him to see an orthopedic surgeon yesterday 7/31; x-ray showed disc herniation. Patient took Tylenol and Advil for pain, last time at 1am; no relief. Patient also took Mitrocin which he says was given to him for pain. Neck pain is 9/10 and travels down the spine. Denies smoking, drug use. Denies headache, trauma, new episodes of incontinence, N/V/D, LOC, weakness. Patient is a 59 yo man with chronic neck pain and PMH disc herniation coming in due to increased pain and numbness/tingling of fingertips of 1 week duration. Patient states he has had neck pain for 10 years, and recently pain has increased over the last week. This prompted him to see an orthopedic surgeon yesterday 7/31; x-ray showed disc herniation. Patient took Tylenol and Advil for pain, last time at 1am; no relief. Patient also took Mitrocin which he says was given to him for pain. Neck pain is 9/10 and travels down the spine. Denies smoking, drug use. Denies headache, trauma, new episodes of incontinence, N/V/D, LOC, weakness.    Robertoto DO PGY2: MS4 note as per above. Evaluated patient at bedside, reports he came to the ED for pain underneath his right axilla. States the pain is worse with a deep breath. Denies SOB, nausea, vomiting, fever, or chills. No hx of blood clots. States legs are chronically swollen. Reports pain is 9/10.

## 2020-08-01 NOTE — ED PROVIDER NOTE - CLINICAL SUMMARY MEDICAL DECISION MAKING FREE TEXT BOX
59 y/o male with hx of HTN, HLD, schizophrenia presenting to the ED c/o right sided chest wall pain. Vitals stable. Exam with tenderness underneath right axilla. Given risk factors, will check labs including CBC, CMP, troponin, and check CXR and EKG to r/o ACS. Will treat pain with toradol. Reassess. Rusty Noguera DO

## 2020-08-01 NOTE — ED ADULT NURSE NOTE - OBJECTIVE STATEMENT
Pt is a 60y Male c/o neck pain with numbness in R fingers and pain in his R upper back. Pt PMHx obesity, asthma, HLD, HTN, spinal stenosis. Pt states that he went to see his PCP and orthopedist today after having his MRI for his spine which showed herniated discs. Pt states he has been having these symptoms for the past week but came in due to his inability to sleep tonight due to his pain. Pt is sitting in bed and appears uncomfortable. Pt states he took Tylenol at 1900 and 0100 with no relief. Pt prefers to go by [place] and uses He/Him/His pronouns. Pt educated on call bell use and call bell placed at bedside. Pt safety maintained.

## 2020-08-01 NOTE — ED PROVIDER NOTE - PHYSICAL EXAMINATION
GENERAL: Awake, alert, NAD, morbidly obese  HEENT: NC/AT, moist mucous membranes  LUNGS: CTAB, no wheezes or crackles   CARDIAC: RRR, no m/r/g  CHEST WALL: right chest wall tender to palpation  ABDOMEN: Soft, normal BS, non tender, non distended, no rebound, no guarding  BACK: No midline spinal tenderness, no CVA tenderness  EXT: bilateral non pitting edema, no calf tenderness, 2+ DP pulses bilaterally, no deformities.  NEURO: A&Ox3. Moving all extremities. 5/5 strengths all extremities.  SKIN: Warm and dry. No rash.  PSYCH: Normal affect.    BARBI Noguera DO PGY2

## 2020-08-01 NOTE — ED PROVIDER NOTE - PATIENT PORTAL LINK FT
You can access the FollowMyHealth Patient Portal offered by [facility] by registering at the following website: [email or web address]. By joining [employer]’s FollowMyHealth portal, you will also be able to view your health information using other applications (apps) compatible with our system.

## 2020-08-01 NOTE — ED PROVIDER NOTE - PROGRESS NOTE DETAILS
Repeat troponin stable. Patient feels better with toradol. Will d/c home with prescription for toradol and follow up with his PCP.

## 2020-08-01 NOTE — ED ADULT TRIAGE NOTE - CHIEF COMPLAINT QUOTE
neck and back pain x 1 week; +numbness/tingling in fingertips x 1 week  dx with disk herniation after seeing orthopedic surgeon 7/31/20

## 2020-08-01 NOTE — ED ADULT NURSE NOTE - CAS EDN DISCHARGE ASSESSMENT
Pt hit in R side of chest with helmet during football game around [address], +pain and MANUEL noted after injury. Pt denies MANUEL at this time but continues to have pain with breathing.
Alert and oriented to person, place and time

## 2020-09-30 ENCOUNTER — EMERGENCY (EMERGENCY)
Facility: HOSPITAL | Age: 60
LOS: 1 days | Discharge: HOME | End: 2020-09-30
Attending: EMERGENCY MEDICINE
Payer: MEDICARE

## 2020-09-30 VITALS
OXYGEN SATURATION: 96 % | RESPIRATION RATE: 20 BRPM | SYSTOLIC BLOOD PRESSURE: 122 MMHG | HEART RATE: 90 BPM | DIASTOLIC BLOOD PRESSURE: 78 MMHG | TEMPERATURE: 99 F

## 2020-09-30 VITALS
HEART RATE: 98 BPM | DIASTOLIC BLOOD PRESSURE: 78 MMHG | RESPIRATION RATE: 18 BRPM | TEMPERATURE: 101 F | SYSTOLIC BLOOD PRESSURE: 132 MMHG | WEIGHT: 309.97 LBS | HEIGHT: 72 IN | OXYGEN SATURATION: 96 %

## 2020-09-30 LAB
ALBUMIN SERPL ELPH-MCNC: 4.2 G/DL — SIGNIFICANT CHANGE UP (ref 3.3–5)
ALP SERPL-CCNC: 93 U/L — SIGNIFICANT CHANGE UP (ref 40–120)
ALT FLD-CCNC: 18 U/L — SIGNIFICANT CHANGE UP (ref 10–45)
ANION GAP SERPL CALC-SCNC: 13 MMOL/L — SIGNIFICANT CHANGE UP (ref 5–17)
AST SERPL-CCNC: 14 U/L — SIGNIFICANT CHANGE UP (ref 10–40)
BASOPHILS # BLD AUTO: 0 K/UL — SIGNIFICANT CHANGE UP (ref 0–0.2)
BASOPHILS NFR BLD AUTO: 0 % — SIGNIFICANT CHANGE UP (ref 0–2)
BILIRUB SERPL-MCNC: 0.9 MG/DL — SIGNIFICANT CHANGE UP (ref 0.2–1.2)
BUN SERPL-MCNC: 12 MG/DL — SIGNIFICANT CHANGE UP (ref 7–23)
CALCIUM SERPL-MCNC: 9.6 MG/DL — SIGNIFICANT CHANGE UP (ref 8.4–10.5)
CHLORIDE SERPL-SCNC: 93 MMOL/L — LOW (ref 96–108)
CO2 SERPL-SCNC: 27 MMOL/L — SIGNIFICANT CHANGE UP (ref 22–31)
CREAT SERPL-MCNC: 0.92 MG/DL — SIGNIFICANT CHANGE UP (ref 0.5–1.3)
EOSINOPHIL # BLD AUTO: 0.15 K/UL — SIGNIFICANT CHANGE UP (ref 0–0.5)
EOSINOPHIL NFR BLD AUTO: 0.9 % — SIGNIFICANT CHANGE UP (ref 0–6)
GLUCOSE SERPL-MCNC: 170 MG/DL — HIGH (ref 70–99)
HCT VFR BLD CALC: 39.8 % — SIGNIFICANT CHANGE UP (ref 39–50)
HGB BLD-MCNC: 13.3 G/DL — SIGNIFICANT CHANGE UP (ref 13–17)
LYMPHOCYTES # BLD AUTO: 0.15 K/UL — LOW (ref 1–3.3)
LYMPHOCYTES # BLD AUTO: 0.9 % — LOW (ref 13–44)
MCHC RBC-ENTMCNC: 29.1 PG — SIGNIFICANT CHANGE UP (ref 27–34)
MCHC RBC-ENTMCNC: 33.4 GM/DL — SIGNIFICANT CHANGE UP (ref 32–36)
MCV RBC AUTO: 87.1 FL — SIGNIFICANT CHANGE UP (ref 80–100)
MONOCYTES # BLD AUTO: 1.15 K/UL — HIGH (ref 0–0.9)
MONOCYTES NFR BLD AUTO: 7 % — SIGNIFICANT CHANGE UP (ref 2–14)
NEUTROPHILS # BLD AUTO: 14.75 K/UL — HIGH (ref 1.8–7.4)
NEUTROPHILS NFR BLD AUTO: 89.5 % — HIGH (ref 43–77)
PLATELET # BLD AUTO: 209 K/UL — SIGNIFICANT CHANGE UP (ref 150–400)
POTASSIUM SERPL-MCNC: 3.4 MMOL/L — LOW (ref 3.5–5.3)
POTASSIUM SERPL-SCNC: 3.4 MMOL/L — LOW (ref 3.5–5.3)
PROT SERPL-MCNC: 7.2 G/DL — SIGNIFICANT CHANGE UP (ref 6–8.3)
RAPID RVP RESULT: SIGNIFICANT CHANGE UP
RBC # BLD: 4.57 M/UL — SIGNIFICANT CHANGE UP (ref 4.2–5.8)
RBC # FLD: 14.1 % — SIGNIFICANT CHANGE UP (ref 10.3–14.5)
SARS-COV-2 RNA SPEC QL NAA+PROBE: SIGNIFICANT CHANGE UP
SODIUM SERPL-SCNC: 133 MMOL/L — LOW (ref 135–145)
WBC # BLD: 16.48 K/UL — HIGH (ref 3.8–10.5)
WBC # FLD AUTO: 16.48 K/UL — HIGH (ref 3.8–10.5)

## 2020-09-30 PROCEDURE — 0225U NFCT DS DNA&RNA 21 SARSCOV2: CPT

## 2020-09-30 PROCEDURE — 99285 EMERGENCY DEPT VISIT HI MDM: CPT

## 2020-09-30 PROCEDURE — 71250 CT THORAX DX C-: CPT | Mod: 26

## 2020-09-30 PROCEDURE — 71250 CT THORAX DX C-: CPT

## 2020-09-30 PROCEDURE — 93005 ELECTROCARDIOGRAM TRACING: CPT

## 2020-09-30 PROCEDURE — 71045 X-RAY EXAM CHEST 1 VIEW: CPT

## 2020-09-30 PROCEDURE — 85025 COMPLETE CBC W/AUTO DIFF WBC: CPT

## 2020-09-30 PROCEDURE — 94640 AIRWAY INHALATION TREATMENT: CPT

## 2020-09-30 PROCEDURE — 71045 X-RAY EXAM CHEST 1 VIEW: CPT | Mod: 26

## 2020-09-30 PROCEDURE — 80053 COMPREHEN METABOLIC PANEL: CPT

## 2020-09-30 PROCEDURE — 99284 EMERGENCY DEPT VISIT MOD MDM: CPT | Mod: 25

## 2020-09-30 RX ORDER — ACETAMINOPHEN 500 MG
650 TABLET ORAL ONCE
Refills: 0 | Status: COMPLETED | OUTPATIENT
Start: 2020-09-30 | End: 2020-09-30

## 2020-09-30 RX ORDER — SODIUM CHLORIDE 9 MG/ML
1000 INJECTION INTRAMUSCULAR; INTRAVENOUS; SUBCUTANEOUS ONCE
Refills: 0 | Status: COMPLETED | OUTPATIENT
Start: 2020-09-30 | End: 2020-09-30

## 2020-09-30 RX ORDER — POTASSIUM CHLORIDE 20 MEQ
40 PACKET (EA) ORAL ONCE
Refills: 0 | Status: COMPLETED | OUTPATIENT
Start: 2020-09-30 | End: 2020-09-30

## 2020-09-30 RX ORDER — ALBUTEROL 90 UG/1
2.5 AEROSOL, METERED ORAL ONCE
Refills: 0 | Status: COMPLETED | OUTPATIENT
Start: 2020-09-30 | End: 2020-09-30

## 2020-09-30 RX ORDER — IPRATROPIUM/ALBUTEROL SULFATE 18-103MCG
3 AEROSOL WITH ADAPTER (GRAM) INHALATION ONCE
Refills: 0 | Status: COMPLETED | OUTPATIENT
Start: 2020-09-30 | End: 2020-09-30

## 2020-09-30 RX ORDER — IBUPROFEN 200 MG
600 TABLET ORAL ONCE
Refills: 0 | Status: COMPLETED | OUTPATIENT
Start: 2020-09-30 | End: 2020-09-30

## 2020-09-30 RX ADMIN — Medication 650 MILLIGRAM(S): at 19:20

## 2020-09-30 RX ADMIN — Medication 600 MILLIGRAM(S): at 19:19

## 2020-09-30 RX ADMIN — Medication 40 MILLIEQUIVALENT(S): at 17:56

## 2020-09-30 RX ADMIN — Medication 600 MILLIGRAM(S): at 16:04

## 2020-09-30 RX ADMIN — Medication 3 MILLILITER(S): at 19:20

## 2020-09-30 RX ADMIN — Medication 40 MILLIGRAM(S): at 19:20

## 2020-09-30 RX ADMIN — ALBUTEROL 2.5 MILLIGRAM(S): 90 AEROSOL, METERED ORAL at 16:05

## 2020-09-30 RX ADMIN — SODIUM CHLORIDE 1000 MILLILITER(S): 9 INJECTION INTRAMUSCULAR; INTRAVENOUS; SUBCUTANEOUS at 16:05

## 2020-09-30 NOTE — ED PROVIDER NOTE - CARE PLAN
Principal Discharge DX:	Fever and chills  Secondary Diagnosis:	Asthma exacerbation   Principal Discharge DX:	Fever and chills  Secondary Diagnosis:	Asthma exacerbation  Secondary Diagnosis:	URI (upper respiratory infection)

## 2020-09-30 NOTE — ED PROVIDER NOTE - PROGRESS NOTE DETAILS
spO2 94-95% on RA. Pending CT chest. - Raquel Luna PA-C Pt re-evaluated, + mild b/l expiratory wheezing. Not hypoxic. Plan for additional nebulizer treatment and will give steroids. Pending CT chest results prior to d/c. Pt has asthma inhalers at home and PCP to f/u with. - Raquel Luna PA-C

## 2020-09-30 NOTE — ED ADULT NURSE NOTE - OBJECTIVE STATEMENT
61 y/o male with pmhx of htn, dm and asthma c/o sudden onset of fever this morning with the highest T=103.  per pt, he took ibuprofen at 0600 and tylenol at 1300 today with no relief.  pt denies any h/a, dizziness, weakness, cough or covid contacts at this time.  pt is awake, alert and responsive to all stimuli.  mild sob noted but no respiratory distress at this time.  pt is febrile with all other v/s WNL.  pt resting in bed awaiting md reeval.  safety precautions in place.  will continue to monitor.

## 2020-09-30 NOTE — ED PROVIDER NOTE - PATIENT PORTAL LINK FT
You can access the FollowMyHealth Patient Portal offered by [facility] by registering at the following website: [email or web address]. By joining [employer]’s FollowMyHealth portal, you will also be able to view your health information using other applications (apps) compatible with our system.

## 2020-09-30 NOTE — ED PROVIDER NOTE - CLINICAL SUMMARY MEDICAL DECISION MAKING FREE TEXT BOX
ALEJANDRO Luna: 61yo M with PMH obesity, HTN, DMT2, asthma presenting with generalized fatigue and fever/chills tmax 102F x 2 days. Denies CP/cough/SOB/abd pain/n/v/d. Pt febrile in ED, borderline tachycardic, not hypoxic, exam otherwise unremarkable. Will give IVF, check cbc, cmp, RVP +COVID, check CXR, re-evaluate. ALEJANDRO Luna: 61yo M with PMH obesity, HTN, DMT2, asthma presenting with generalized fatigue and fever/chills tmax 102F x 2 days. Denies CP/cough/SOB/abd pain/n/v/d. Pt febrile in ED, borderline tachycardic, not hypoxic, exam otherwise unremarkable. Will give IVF, check cbc, cmp, RVP, check CXR, re-evaluate. ALEJANDRO Luna: 59yo M with PMH obesity, HTN, DMT2, asthma presenting with generalized fatigue and fever/chills tmax 102F x 2 days. Denies CP/cough/SOB/abd pain/n/v/d. Pt febrile in ED, borderline tachycardic, not hypoxic, exam otherwise unremarkable. Will give IVF, check cbc, cmp, RVP, check CXR, re-evaluate.  Hossein: 60 year old male with pmhx of obesity, htn, dm, asthma here with generalized fatigue and fever/chills. tmax 102 at home. + tachycardic, not hypoxic, mild wheeze b/l on exam. will get labs, covid swab, cxr, ct chest, reassess

## 2020-09-30 NOTE — ED PROVIDER NOTE - NSFOLLOWUPINSTRUCTIONS_ED_ALL_ED_FT
Rest. Stay well hydrated.   Take Prednisone 40mg daily for additional 4 days.   Use Albuterol inhaler 2 inhalations every 4-6 hours as needed for wheezing/bronchospasm.   Take all of your other medications as previously prescribed.     Follow up with your PMD within 48-72 hours.    Bring copy of results with you.   You may follow up with [facility] at [phone].     Return to ER for any worsening wheezing, shortness of breath, chest pain, fever, chills, or any other concerning symptoms. Rest. Stay well hydrated.   Take Prednisone 40mg daily for additional 4 days.   Use Albuterol inhaler 2 inhalations every 4-6 hours as needed for wheezing/bronchospasm.     May take Motrin 600mg every 8hrs for fever as needed. Take with food.   May alternate with Tylenol 650mg every 6 hours as needed.     Take all of your other medications as previously prescribed.     Follow up with your PMD within 48-72 hours.    Bring copy of results with you.   You may follow up with [facility] at [phone].     Return to ER for any worsening wheezing, shortness of breath, chest pain, fever, chills, or any other concerning symptoms.    --------------------------------------------------------    Bronchospasm    WHAT YOU NEED TO KNOW:    Bronchospasm is a narrowing of the airway that usually comes and goes. You may be at risk for bronchospasm if you have a chest cold or allergies. You may also be at risk if you are bothered by air pollution, certain medicines, cold, dry air, smoke, or strong odors. Exercise may worsen your symptoms. Bronchospasms may make it hard for you to breathe.    DISCHARGE INSTRUCTIONS:    Medicines: You may need any of the following:   •Bronchodilators help expand your airway for easier breathing. Some of these medicines may help prevent future spasms.      •Inhaled steroids help reduce swelling in your airway and soothe your breathing. These are used for long-term control.      •Anticholinergics help relax and open your airway.      •Take your medicine as directed. Contact your healthcare provider if you think your medicine is not helping or if you have side effects. Tell him of her if you are allergic to any medicine. Keep a list of the medicines, vitamins, and herbs you take. Include the amounts, and when and why you take them. Bring the list or the pill bottles to follow-up visits. Carry your medicine list with you in case of an emergency.      Follow up with your healthcare provider as directed: You may need more tests to find the cause of your condition. Write down your questions so you remember to ask them during your visits.    Self-care:   •Avoid triggers.      •Warm up before you exercise. Ask your healthcare provider about the best exercise plan for you.      •Try to avoid people who are sick. Ask your healthcare provider if you need a flu or pneumonia vaccine.       •Breathe through your nose when you are in cold, dry air or weather. This may help reduce lung irritation by warming the air before it reaches your lungs.      Contact your healthcare provider if:   •You have a fever.      •You have a cough that will not go away.       •Your wheezing worsens.      •You have questions or concerns about your condition or care.      Return to the emergency department if:   •You cough or spit up blood.      •You have trouble breathing.      •You have blue fingernails or toenails.      •You have chest pain.      •You have a fast or uneven heartbeat.

## 2020-09-30 NOTE — ED PROVIDER NOTE - OBJECTIVE STATEMENT
61yo M with PMH obesity, HTN, DMT2, asthma presenting with generalized fatigue and fever/chills tmax 102F x 2 days. +intermittent mild HA. Reports he goes to PT for his neck, otherwise no known sick contacts or recent travel. Last took motrin at 6am and 1300mg of tylenol 2 hours ago. Denies neck stiffness, sore throat, CP, SOB, cough, abd pain, n/v/d, urinary symptoms, LE pain/swelling.

## 2021-01-22 ENCOUNTER — APPOINTMENT (OUTPATIENT)
Dept: ANESTHESIOLOGY | Facility: CLINIC | Age: 61
End: 2021-01-22

## 2021-01-22 ENCOUNTER — OUTPATIENT (OUTPATIENT)
Dept: OUTPATIENT SERVICES | Facility: HOSPITAL | Age: 61
LOS: 1 days | End: 2021-01-22
Payer: MEDICARE

## 2021-01-22 DIAGNOSIS — M54.12 RADICULOPATHY, CERVICAL REGION: ICD-10-CM

## 2021-01-22 PROCEDURE — 62321 NJX INTERLAMINAR CRV/THRC: CPT

## 2021-05-17 ENCOUNTER — INPATIENT (INPATIENT)
Facility: HOSPITAL | Age: 61
LOS: 1 days | Discharge: HOME | DRG: 694 | End: 2021-05-19
Attending: INTERNAL MEDICINE | Admitting: STUDENT IN AN ORGANIZED HEALTH CARE EDUCATION/TRAINING PROGRAM
Payer: MEDICARE

## 2021-05-17 VITALS
WEIGHT: 300.05 LBS | HEART RATE: 69 BPM | RESPIRATION RATE: 18 BRPM | HEIGHT: 72 IN | OXYGEN SATURATION: 97 % | DIASTOLIC BLOOD PRESSURE: 69 MMHG | TEMPERATURE: 99 F | SYSTOLIC BLOOD PRESSURE: 106 MMHG

## 2021-05-17 PROCEDURE — 93010 ELECTROCARDIOGRAM REPORT: CPT

## 2021-05-17 PROCEDURE — 99285 EMERGENCY DEPT VISIT HI MDM: CPT

## 2021-05-18 DIAGNOSIS — N13.30 UNSPECIFIED HYDRONEPHROSIS: ICD-10-CM

## 2021-05-18 DIAGNOSIS — R55 SYNCOPE AND COLLAPSE: ICD-10-CM

## 2021-05-18 DIAGNOSIS — E11.9 TYPE 2 DIABETES MELLITUS WITHOUT COMPLICATIONS: ICD-10-CM

## 2021-05-18 DIAGNOSIS — Z29.9 ENCOUNTER FOR PROPHYLACTIC MEASURES, UNSPECIFIED: ICD-10-CM

## 2021-05-18 DIAGNOSIS — I10 ESSENTIAL (PRIMARY) HYPERTENSION: ICD-10-CM
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
## 2021-05-18 DIAGNOSIS — M10.9 GOUT, UNSPECIFIED: ICD-10-CM

## 2021-05-18 DIAGNOSIS — F20.9 SCHIZOPHRENIA, UNSPECIFIED: ICD-10-CM

## 2021-05-18 DIAGNOSIS — J45.909 UNSPECIFIED ASTHMA, UNCOMPLICATED: ICD-10-CM

## 2021-05-18 LAB
ALBUMIN SERPL ELPH-MCNC: 4.2 G/DL — SIGNIFICANT CHANGE UP (ref 3.3–5)
ALP SERPL-CCNC: 96 U/L — SIGNIFICANT CHANGE UP (ref 40–120)
ALT FLD-CCNC: 12 U/L — SIGNIFICANT CHANGE UP (ref 10–45)
ANION GAP SERPL CALC-SCNC: 11 MMOL/L — SIGNIFICANT CHANGE UP (ref 5–17)
ANION GAP SERPL CALC-SCNC: 18 MMOL/L — HIGH (ref 5–17)
APPEARANCE UR: CLEAR — SIGNIFICANT CHANGE UP
AST SERPL-CCNC: 22 U/L — SIGNIFICANT CHANGE UP (ref 10–40)
BACTERIA # UR AUTO: NEGATIVE — SIGNIFICANT CHANGE UP
BASOPHILS # BLD AUTO: 0 K/UL — SIGNIFICANT CHANGE UP (ref 0–0.2)
BASOPHILS NFR BLD AUTO: 0 % — SIGNIFICANT CHANGE UP (ref 0–2)
BILIRUB SERPL-MCNC: 0.6 MG/DL — SIGNIFICANT CHANGE UP (ref 0.2–1.2)
BILIRUB UR-MCNC: NEGATIVE — SIGNIFICANT CHANGE UP
BUN SERPL-MCNC: 12 MG/DL — SIGNIFICANT CHANGE UP (ref 7–23)
BUN SERPL-MCNC: 13 MG/DL — SIGNIFICANT CHANGE UP (ref 7–23)
CALCIUM SERPL-MCNC: 9.2 MG/DL — SIGNIFICANT CHANGE UP (ref 8.4–10.5)
CALCIUM SERPL-MCNC: 9.8 MG/DL — SIGNIFICANT CHANGE UP (ref 8.4–10.5)
CHLORIDE SERPL-SCNC: 91 MMOL/L — LOW (ref 96–108)
CHLORIDE SERPL-SCNC: 97 MMOL/L — SIGNIFICANT CHANGE UP (ref 96–108)
CO2 SERPL-SCNC: 25 MMOL/L — SIGNIFICANT CHANGE UP (ref 22–31)
CO2 SERPL-SCNC: 30 MMOL/L — SIGNIFICANT CHANGE UP (ref 22–31)
COLOR SPEC: COLORLESS — SIGNIFICANT CHANGE UP
CREAT SERPL-MCNC: 0.94 MG/DL — SIGNIFICANT CHANGE UP (ref 0.5–1.3)
CREAT SERPL-MCNC: 1.05 MG/DL — SIGNIFICANT CHANGE UP (ref 0.5–1.3)
DIFF PNL FLD: ABNORMAL
EOSINOPHIL # BLD AUTO: 0.62 K/UL — HIGH (ref 0–0.5)
EOSINOPHIL NFR BLD AUTO: 4.4 % — SIGNIFICANT CHANGE UP (ref 0–6)
EPI CELLS # UR: 0 /HPF — SIGNIFICANT CHANGE UP
GLUCOSE BLDC GLUCOMTR-MCNC: 133 MG/DL — HIGH (ref 70–99)
GLUCOSE BLDC GLUCOMTR-MCNC: 141 MG/DL — HIGH (ref 70–99)
GLUCOSE BLDC GLUCOMTR-MCNC: 163 MG/DL — HIGH (ref 70–99)
GLUCOSE SERPL-MCNC: 115 MG/DL — HIGH (ref 70–99)
GLUCOSE SERPL-MCNC: 146 MG/DL — HIGH (ref 70–99)
GLUCOSE UR QL: NEGATIVE — SIGNIFICANT CHANGE UP
HCT VFR BLD CALC: 41.6 % — SIGNIFICANT CHANGE UP (ref 39–50)
HGB BLD-MCNC: 13.6 G/DL — SIGNIFICANT CHANGE UP (ref 13–17)
HYALINE CASTS # UR AUTO: 0 /LPF — SIGNIFICANT CHANGE UP (ref 0–2)
KETONES UR-MCNC: NEGATIVE — SIGNIFICANT CHANGE UP
LEUKOCYTE ESTERASE UR-ACNC: NEGATIVE — SIGNIFICANT CHANGE UP
LYMPHOCYTES # BLD AUTO: 0.75 K/UL — LOW (ref 1–3.3)
LYMPHOCYTES # BLD AUTO: 5.3 % — LOW (ref 13–44)
MAGNESIUM SERPL-MCNC: 2.1 MG/DL — SIGNIFICANT CHANGE UP (ref 1.6–2.6)
MAGNESIUM SERPL-MCNC: 2.2 MG/DL — SIGNIFICANT CHANGE UP (ref 1.6–2.6)
MANUAL SMEAR VERIFICATION: SIGNIFICANT CHANGE UP
MCHC RBC-ENTMCNC: 28.7 PG — SIGNIFICANT CHANGE UP (ref 27–34)
MCHC RBC-ENTMCNC: 32.7 GM/DL — SIGNIFICANT CHANGE UP (ref 32–36)
MCV RBC AUTO: 87.8 FL — SIGNIFICANT CHANGE UP (ref 80–100)
MONOCYTES # BLD AUTO: 0.62 K/UL — SIGNIFICANT CHANGE UP (ref 0–0.9)
MONOCYTES NFR BLD AUTO: 4.4 % — SIGNIFICANT CHANGE UP (ref 2–14)
NEUTROPHILS # BLD AUTO: 12.08 K/UL — HIGH (ref 1.8–7.4)
NEUTROPHILS NFR BLD AUTO: 85.9 % — HIGH (ref 43–77)
NITRITE UR-MCNC: NEGATIVE — SIGNIFICANT CHANGE UP
PH UR: 6 — SIGNIFICANT CHANGE UP (ref 5–8)
PHOSPHATE SERPL-MCNC: 4.1 MG/DL — SIGNIFICANT CHANGE UP (ref 2.5–4.5)
PHOSPHATE SERPL-MCNC: 4.3 MG/DL — SIGNIFICANT CHANGE UP (ref 2.5–4.5)
PLAT MORPH BLD: NORMAL — SIGNIFICANT CHANGE UP
PLATELET # BLD AUTO: 242 K/UL — SIGNIFICANT CHANGE UP (ref 150–400)
POTASSIUM SERPL-MCNC: 3.7 MMOL/L — SIGNIFICANT CHANGE UP (ref 3.5–5.3)
POTASSIUM SERPL-MCNC: 4 MMOL/L — SIGNIFICANT CHANGE UP (ref 3.5–5.3)
POTASSIUM SERPL-SCNC: 3.7 MMOL/L — SIGNIFICANT CHANGE UP (ref 3.5–5.3)
POTASSIUM SERPL-SCNC: 4 MMOL/L — SIGNIFICANT CHANGE UP (ref 3.5–5.3)
PROT SERPL-MCNC: 7.3 G/DL — SIGNIFICANT CHANGE UP (ref 6–8.3)
PROT UR-MCNC: ABNORMAL
RBC # BLD: 4.74 M/UL — SIGNIFICANT CHANGE UP (ref 4.2–5.8)
RBC # FLD: 13.9 % — SIGNIFICANT CHANGE UP (ref 10.3–14.5)
RBC BLD AUTO: NORMAL — SIGNIFICANT CHANGE UP
RBC CASTS # UR COMP ASSIST: 2 /HPF — SIGNIFICANT CHANGE UP (ref 0–4)
SARS-COV-2 RNA SPEC QL NAA+PROBE: SIGNIFICANT CHANGE UP
SODIUM SERPL-SCNC: 134 MMOL/L — LOW (ref 135–145)
SODIUM SERPL-SCNC: 138 MMOL/L — SIGNIFICANT CHANGE UP (ref 135–145)
SP GR SPEC: 1.01 — LOW (ref 1.01–1.02)
UROBILINOGEN FLD QL: NEGATIVE — SIGNIFICANT CHANGE UP
WBC # BLD: 14.06 K/UL — HIGH (ref 3.8–10.5)
WBC # FLD AUTO: 14.06 K/UL — HIGH (ref 3.8–10.5)
WBC UR QL: 1 /HPF — SIGNIFICANT CHANGE UP (ref 0–5)

## 2021-05-18 PROCEDURE — 99221 1ST HOSP IP/OBS SF/LOW 40: CPT

## 2021-05-18 PROCEDURE — 99223 1ST HOSP IP/OBS HIGH 75: CPT | Mod: GC

## 2021-05-18 PROCEDURE — 74177 CT ABD & PELVIS W/CONTRAST: CPT | Mod: 26,MA

## 2021-05-18 RX ORDER — SENNA PLUS 8.6 MG/1
2 TABLET ORAL AT BEDTIME
Refills: 0 | Status: DISCONTINUED | OUTPATIENT
Start: 2021-05-18 | End: 2021-05-19

## 2021-05-18 RX ORDER — MONTELUKAST 4 MG/1
10 TABLET, CHEWABLE ORAL DAILY
Refills: 0 | Status: DISCONTINUED | OUTPATIENT
Start: 2021-05-18 | End: 2021-05-19

## 2021-05-18 RX ORDER — INSULIN LISPRO 100/ML
VIAL (ML) SUBCUTANEOUS AT BEDTIME
Refills: 0 | Status: DISCONTINUED | OUTPATIENT
Start: 2021-05-18 | End: 2021-05-19

## 2021-05-18 RX ORDER — CARVEDILOL PHOSPHATE 80 MG/1
12.5 CAPSULE, EXTENDED RELEASE ORAL EVERY 12 HOURS
Refills: 0 | Status: DISCONTINUED | OUTPATIENT
Start: 2021-05-18 | End: 2021-05-19

## 2021-05-18 RX ORDER — ENOXAPARIN SODIUM 100 MG/ML
40 INJECTION SUBCUTANEOUS DAILY
Refills: 0 | Status: DISCONTINUED | OUTPATIENT
Start: 2021-05-18 | End: 2021-05-19

## 2021-05-18 RX ORDER — MOMETASONE FUROATE 220 UG/1
2 INHALANT RESPIRATORY (INHALATION) DAILY
Refills: 0 | Status: DISCONTINUED | OUTPATIENT
Start: 2021-05-18 | End: 2021-05-18

## 2021-05-18 RX ORDER — FLUTICASONE PROPIONATE 220 MCG
1 AEROSOL WITH ADAPTER (GRAM) INHALATION
Refills: 0 | Status: DISCONTINUED | OUTPATIENT
Start: 2021-05-18 | End: 2021-05-19

## 2021-05-18 RX ORDER — TAMSULOSIN HYDROCHLORIDE 0.4 MG/1
1 CAPSULE ORAL
Qty: 0 | Refills: 0 | DISCHARGE

## 2021-05-18 RX ORDER — NIFEDIPINE 30 MG
30 TABLET, EXTENDED RELEASE 24 HR ORAL DAILY
Refills: 0 | Status: DISCONTINUED | OUTPATIENT
Start: 2021-05-18 | End: 2021-05-19

## 2021-05-18 RX ORDER — INSULIN LISPRO 100/ML
VIAL (ML) SUBCUTANEOUS
Refills: 0 | Status: DISCONTINUED | OUTPATIENT
Start: 2021-05-18 | End: 2021-05-19

## 2021-05-18 RX ORDER — BENZTROPINE MESYLATE 1 MG
1 TABLET ORAL DAILY
Refills: 0 | Status: DISCONTINUED | OUTPATIENT
Start: 2021-05-18 | End: 2021-05-19

## 2021-05-18 RX ORDER — METFORMIN HYDROCHLORIDE 850 MG/1
1 TABLET ORAL
Qty: 0 | Refills: 0 | DISCHARGE

## 2021-05-18 RX ORDER — DEXTROSE 50 % IN WATER 50 %
15 SYRINGE (ML) INTRAVENOUS ONCE
Refills: 0 | Status: DISCONTINUED | OUTPATIENT
Start: 2021-05-18 | End: 2021-05-19

## 2021-05-18 RX ORDER — DEXTROSE 50 % IN WATER 50 %
12.5 SYRINGE (ML) INTRAVENOUS ONCE
Refills: 0 | Status: DISCONTINUED | OUTPATIENT
Start: 2021-05-18 | End: 2021-05-19

## 2021-05-18 RX ORDER — BETHANECHOL CHLORIDE 25 MG
0 TABLET ORAL
Qty: 0 | Refills: 0 | DISCHARGE

## 2021-05-18 RX ORDER — GLUCAGON INJECTION, SOLUTION 0.5 MG/.1ML
1 INJECTION, SOLUTION SUBCUTANEOUS ONCE
Refills: 0 | Status: DISCONTINUED | OUTPATIENT
Start: 2021-05-18 | End: 2021-05-19

## 2021-05-18 RX ORDER — RISPERIDONE 4 MG/1
4 TABLET ORAL AT BEDTIME
Refills: 0 | Status: DISCONTINUED | OUTPATIENT
Start: 2021-05-18 | End: 2021-05-19

## 2021-05-18 RX ORDER — ALLOPURINOL 300 MG
100 TABLET ORAL
Refills: 0 | Status: DISCONTINUED | OUTPATIENT
Start: 2021-05-18 | End: 2021-05-19

## 2021-05-18 RX ORDER — HALOPERIDOL DECANOATE 100 MG/ML
10 INJECTION INTRAMUSCULAR DAILY
Refills: 0 | Status: DISCONTINUED | OUTPATIENT
Start: 2021-05-18 | End: 2021-05-19

## 2021-05-18 RX ORDER — DEXTROSE 50 % IN WATER 50 %
25 SYRINGE (ML) INTRAVENOUS ONCE
Refills: 0 | Status: DISCONTINUED | OUTPATIENT
Start: 2021-05-18 | End: 2021-05-19

## 2021-05-18 RX ORDER — FLUOXETINE HCL 10 MG
20 CAPSULE ORAL DAILY
Refills: 0 | Status: DISCONTINUED | OUTPATIENT
Start: 2021-05-18 | End: 2021-05-19

## 2021-05-18 RX ORDER — TAMSULOSIN HYDROCHLORIDE 0.4 MG/1
0.4 CAPSULE ORAL
Refills: 0 | Status: DISCONTINUED | OUTPATIENT
Start: 2021-05-18 | End: 2021-05-19

## 2021-05-18 RX ORDER — BENZTROPINE MESYLATE 1 MG
1 TABLET ORAL
Qty: 0 | Refills: 0 | DISCHARGE

## 2021-05-18 RX ORDER — SODIUM CHLORIDE 9 MG/ML
1000 INJECTION, SOLUTION INTRAVENOUS
Refills: 0 | Status: DISCONTINUED | OUTPATIENT
Start: 2021-05-18 | End: 2021-05-19

## 2021-05-18 RX ORDER — DIVALPROEX SODIUM 500 MG/1
500 TABLET, DELAYED RELEASE ORAL
Refills: 0 | Status: DISCONTINUED | OUTPATIENT
Start: 2021-05-18 | End: 2021-05-19

## 2021-05-18 RX ORDER — ACETAMINOPHEN 500 MG
975 TABLET ORAL EVERY 8 HOURS
Refills: 0 | Status: DISCONTINUED | OUTPATIENT
Start: 2021-05-18 | End: 2021-05-19

## 2021-05-18 RX ORDER — FUROSEMIDE 40 MG
40 TABLET ORAL DAILY
Refills: 0 | Status: DISCONTINUED | OUTPATIENT
Start: 2021-05-18 | End: 2021-05-19

## 2021-05-18 RX ORDER — FUROSEMIDE 40 MG
1 TABLET ORAL
Qty: 0 | Refills: 0 | DISCHARGE

## 2021-05-18 RX ORDER — ATORVASTATIN CALCIUM 80 MG/1
20 TABLET, FILM COATED ORAL AT BEDTIME
Refills: 0 | Status: DISCONTINUED | OUTPATIENT
Start: 2021-05-18 | End: 2021-05-19

## 2021-05-18 RX ADMIN — CARVEDILOL PHOSPHATE 12.5 MILLIGRAM(S): 80 CAPSULE, EXTENDED RELEASE ORAL at 19:08

## 2021-05-18 RX ADMIN — MONTELUKAST 10 MILLIGRAM(S): 4 TABLET, CHEWABLE ORAL at 15:27

## 2021-05-18 RX ADMIN — DIVALPROEX SODIUM 500 MILLIGRAM(S): 500 TABLET, DELAYED RELEASE ORAL at 23:05

## 2021-05-18 RX ADMIN — ENOXAPARIN SODIUM 40 MILLIGRAM(S): 100 INJECTION SUBCUTANEOUS at 15:27

## 2021-05-18 RX ADMIN — Medication 20 MILLIGRAM(S): at 09:52

## 2021-05-18 RX ADMIN — Medication 100 MILLIGRAM(S): at 19:07

## 2021-05-18 RX ADMIN — HALOPERIDOL DECANOATE 10 MILLIGRAM(S): 100 INJECTION INTRAMUSCULAR at 15:27

## 2021-05-18 RX ADMIN — Medication 1 TABLET(S): at 15:26

## 2021-05-18 RX ADMIN — RISPERIDONE 4 MILLIGRAM(S): 4 TABLET ORAL at 23:06

## 2021-05-18 RX ADMIN — Medication 1: at 19:44

## 2021-05-18 RX ADMIN — Medication 1 MILLIGRAM(S): at 15:27

## 2021-05-18 RX ADMIN — Medication 30 MILLIGRAM(S): at 09:52

## 2021-05-18 RX ADMIN — Medication 20 MILLIGRAM(S): at 15:27

## 2021-05-18 RX ADMIN — Medication 40 MILLIGRAM(S): at 09:52

## 2021-05-18 RX ADMIN — ATORVASTATIN CALCIUM 20 MILLIGRAM(S): 80 TABLET, FILM COATED ORAL at 23:05

## 2021-05-18 RX ADMIN — TAMSULOSIN HYDROCHLORIDE 0.4 MILLIGRAM(S): 0.4 CAPSULE ORAL at 19:07

## 2021-05-18 NOTE — H&P ADULT - PROBLEM SELECTOR PLAN 2
s/p Barnes placement with 3L output in ED. u/a unremarkable  - urology consult s/p Barnes placement with 3L output in ED. u/a unremarkable  - urology consult  - monitor for post obstructive diuresis

## 2021-05-18 NOTE — ED PROVIDER NOTE - CLINICAL SUMMARY MEDICAL DECISION MAKING FREE TEXT BOX
61M p/w syncopal episode after a sharp RLQ abd pain.  EKG shows 1st degree AV block and incomplete RBBB, which is unchanged from prior.  Syncope likely 2/2 pain, lower likelihood of arrhythmogenic etiology.  Appendicitis vs SBO vs ileitis.  Will obtain labs, CT abdomen/pelvis.  Will reassess and dispo pending results.

## 2021-05-18 NOTE — ED PROVIDER NOTE - PROGRESS NOTE DETAILS
Candido PGY2 - Repeat BMP does not show significant electrolyte derangements.  Will admit for syncope w/u and monitoring of electrolytes given pt. is high risk for postobstructive diuresis considering he self-catheterizes everyday and has DM.  Hospitalist paged. Candido PGY2 - Pt. voided more than 2.8L after pryor placement.  On further history, pt. stated that he has a neurogenic bladder and has known b/l hydronephrosis and is scheduled for a procedure in July.  Obtained repeat BMP to assess for postobstructive diuresis.  Repeat BMP does not show significant electrolyte derangements.  Will admit for syncope w/u and monitoring of electrolytes given pt. is high risk for postobstructive diuresis considering he self-catheterizes everyday and has DM.  Hospitalist paged.

## 2021-05-18 NOTE — H&P ADULT - NSHPPHYSICALEXAM_GEN_ALL_CORE
VITALS:   T(C): 36.9 (05-18-21 @ 06:55), Max: 37 (05-17-21 @ 23:42)  HR: 84 (05-18-21 @ 06:55) (69 - 85)  BP: 160/79 (05-18-21 @ 06:55) (106/69 - 162/99)  RR: 18 (05-18-21 @ 06:55) (18 - 20)  SpO2: 96% (05-18-21 @ 06:55) (95% - 99%)    GENERAL: NAD, lying in bed comfortably  HEAD:  Atraumatic, Normocephalic  EYES: EOMI, PERRLA, conjunctiva and sclera clear  ENT: Moist mucous membranes  NECK: Supple, No JVD  CHEST/LUNG: Clear to auscultation bilaterally; No rales, rhonchi, wheezing, or rubs. Unlabored respirations  HEART: Regular rate and rhythm; No murmurs, rubs, or gallops  ABDOMEN: NABS; Soft, nontender, nondistended  EXTREMITIES:  2+ Peripheral Pulses, brisk capillary refill. No clubbing, cyanosis, or edema  NERVOUS SYSTEM:  A&Ox3, no focal deficits   SKIN: No rashes or lesions VITALS:   T(C): 36.9 (05-18-21 @ 06:55), Max: 37 (05-17-21 @ 23:42)  HR: 84 (05-18-21 @ 06:55) (69 - 85)  BP: 160/79 (05-18-21 @ 06:55) (106/69 - 162/99)  RR: 18 (05-18-21 @ 06:55) (18 - 20)  SpO2: 96% (05-18-21 @ 06:55) (95% - 99%)    GENERAL: Obese male in NAD, lying in bed comfortably  HEAD:  Atraumatic, Normocephalic  EYES: EOMI, PERRLA, conjunctiva and sclera clear  ENT: Moist mucous membranes  NECK: Supple,  CHEST/LUNG: Clear to auscultation bilaterally; No rales, rhonchi, wheezing, or rubs. Unlabored respirations  HEART: Regular rate and rhythm; No murmurs, rubs, or gallops  ABDOMEN: NABS; Soft, nontender, nondistended  EXTREMITIES:  2+ Peripheral Pulses, brisk capillary refill. No clubbing, cyanosis, 1+ edema in LE to mid-shin, Feet with early changes concerning for charot foot  NERVOUS SYSTEM:  A&Ox3, no focal deficits   SKIN: venous stasis changes in bilat LE

## 2021-05-18 NOTE — H&P ADULT - ATTENDING COMMENTS
61M with hx significant for asthma and schizophrenia, prior suicide attempt, neurogenic bladder (self catheterizes) and IBD who presents with acute RLQ pain, near syncope and fall. Denies LOC, head trauma. Reports intermittent diarrhea which is chronic.  While In ED, pryor cath which produced 3L urine output. CT A/P revealed severe bilateral hydronephrosis, which per pt is chronic.  1) Fall - likely mechanical given that pt denies syncope. F/u TTE. PT eval.  2) Urinary retention - chronic; urology consult for imaging findings. F/u UA, Ucx. Maintain pryor for now. Trend BNP BID to monitor for post obstructive diuresis.  3) Diarrhea - chronic; stool culture collected in ED, will f/u results.   4) Asthma - not in exacerbation; cont home inhalers  5) Schizophrenia - cont home antipsychotics  Dispo: likely tomorrow pending PT results, urology recs and urine studies  Rest as above

## 2021-05-18 NOTE — ED PROVIDER NOTE - ATTENDING CONTRIBUTION TO CARE
Attending MD Trevino:  I personally have seen and examined this patient.  Resident note reviewed and agree on plan of care and except where noted.  See HPI, PE, and MDM for details.       61M with DM, neurogenic bladder with intermittent self-cath, schizophrenia presenting with abdominal pain and syncope. ECG with unchanged 1st deg AVB and RBBB, history consistent with likely vagal event and not suggestive of malignant dysrhythmia. CT a/p notable for massive urinary distention and severe hydro, pryor placed with 3L output, pain likely related to bladder distention. Plan to observe sp pryor for post-obstructive diuresis, rpt BMP and reassess

## 2021-05-18 NOTE — PROVIDER CONTACT NOTE (MEDICATION) - ACTION/TREATMENT ORDERED:
Dr. Reyes suspended asmanex, Dr. Reyes discontinued asmanex, Dr. Reyes discontinued asmanex, ordered flovent inhaler

## 2021-05-18 NOTE — ED PROVIDER NOTE - CARE PLAN
Principal Discharge DX:	Syncope  Secondary Diagnosis:	Urinary retention  Secondary Diagnosis:	Hydronephrosis

## 2021-05-18 NOTE — ED ADULT NURSE NOTE - OBJECTIVE STATEMENT
Pt is a 61 yr old male with pmh of HTN, HLD, previous suicide attempt in 2009 (no HI or SI now) and asthma coming from home after a syncopal episode. Pt states he started to have lower abd pain and went to use the bathroom when he fainted. Per the family the patient was out for 2 minutes. Pt was aroused by family and ems was called. Pt in the ED is a/ox 3- vitals stable but patient diaphoretic. Pt having diarrhea x 2- which relieved his abd pain. Pt states he now feels better. No abx therapy recently.

## 2021-05-18 NOTE — PROVIDER CONTACT NOTE (MEDICATION) - RECOMMENDATIONS
Kev Mae is a 56 y.o. male in for follow up of Serrated adenoma of colon  s/p laparoscopic sigmoid colon resection, laparoscopic left nephrectomy 5/25/2017    Eating well    No issues with BMs    No fevers or chills    Pain minimal    Went to ED in [place] 6/22/2017 for abdominal pain: diagnosed with H pylori and discharged to home with amox, clarithromycin    /78 (BP Location: Left arm, Patient Position: Sitting)  Pulse 78  Temp 97.8 °F (36.6 °C)  Wt 185 lb 11.2 oz (84.2 kg)  SpO2 97%  BMI 26.65 kg/m2  Body mass index is 26.65 kg/(m^2).      PE:  Physical Exam   Constitutional: He appears well-developed. No distress.   HENT:   Head: Normocephalic and atraumatic.   Abdominal:   -soft, non-tender, non-distended  -well-healed midline vertical incision   Musculoskeletal: Normal range of motion.   Neurological: He is alert.   Psychiatric: Thought content normal.         Assessment:   1. Serrated adenoma of colon     s/p laparoscopic sigmoid colon resection, laparoscopic left nephrectomy 5/25/2017    Plan:    -doing well post-op  -can drive as long as not taking pain medications  -Can slowly return to normal activities.  Can lift 20 lbs  -call, come in, or go to ED if fevers or chills, nausea or vomiting, issues with bowel or bladder function, or any questions or concerns    RTC 3 weeks      Scribed for Claudia Tang MD by Rosario Keller PA-C 6/30/2017    This patient was evaluated by me, recommendations made, documentation reviewed, edited, and revised by me, Claudia Tang MD          EMR Dragon/Transcription disclaimer:   Much of this encounter note is an electronic transcription/translation of spoken language to printed text. The electronic translation of spoken language may permit erroneous, or at times, nonsensical words or phrases to be inadvertently transcribed; Although I have reviewed the note for such errors, some may still exist.     
Pt get other med

## 2021-05-18 NOTE — ED PROVIDER NOTE - NS ED ROS FT
General: denies fever, chills, weight loss/weight gain.  HENT: denies nasal congestion, sore throat, rhinorrhea, ear pain.  Eyes: denies visual changes, blurred vision, eye discharge, eye redness.  Neck: denies neck pain, neck swelling.  CV: denies chest pain, palpitations.  Resp: denies difficulty breathing, cough.  Abdominal: +abd pain, diarrhea; denies nausea, vomiting, blood in stool, dark stool.  MSK: denies muscle aches, bony pain, leg pain, leg swelling.  Neuro: denies headaches, numbness, tingling, dizziness, lightheadedness.  Skin: denies rashes, cuts, bruises.  Hematologic: denies unexplained bruises.

## 2021-05-18 NOTE — ED PROVIDER NOTE - OBJECTIVE STATEMENT
61M w/ PMHx of HTN, HLD, gout, asthma p/w syncopal episode after a sharp RLQ abd pain today evening.  Denies any f/c, sick contacts, n/v.  Has had an abdominal surgery in 2009 for removal of razor blades (suicide attempt).  States he has diarrhea now.  States he gets alternating diarrhea and normal bowel movements.  Denies recent abx use.  No CP, SOB.  Syncope was witnessed by friend and wife, but pt. doesn't know how long he was down.

## 2021-05-18 NOTE — H&P ADULT - NSHPSOCIALHISTORY_GEN_ALL_CORE
Lives with wife and child at house in [place], friend lives with him. 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 Denies smoking, alc, drug use.

## 2021-05-18 NOTE — CONSULT NOTE ADULT - ATTENDING COMMENTS
Agree with plan as above. Patient with severe neurogenic bladder and bilateral hydronephrosis. Agree with plan for discharge with pryor catheter and outpatient follow up. Please send urine culture.

## 2021-05-18 NOTE — ED PROVIDER NOTE - PHYSICAL EXAMINATION
GENERAL: Patient awake alert NAD.  HEENT: NC/AT.  LUNGS: CTAB, no wheezes or crackles.  CARDIAC: RRR, no m/r/g.  ABDOMEN: Soft, RLQ tenderness, diffuse distention of the abdomen, negative psoas sign. No rebound, guarding.  EXT: No edema. No calf tenderness. CV 2+DP/PT bilaterally.   MSK: No pain with movement, no deformities.  NEURO: A&Ox3. Moving all extremities.  SKIN: Laparotomy scar on abdomen.  PSYCH: Normal affect.

## 2021-05-18 NOTE — ED ADULT NURSE REASSESSMENT NOTE - NS ED NURSE REASSESS COMMENT FT1
16F Barnes catheter inserted using sterile technique. Second RN present to confirm sterility. Bedside drainage to gravity. Stat lock in place, secured to upper thigh. Initial output approx 1800 cc yellow urine. 16F Barnes catheter inserted using sterile technique. Second RN present to confirm sterility. Bedside drainage to gravity. Stat lock in place, secured to upper thigh. Initial output approx 2800 cc yellow urine.

## 2021-05-18 NOTE — H&P ADULT - HISTORY OF PRESENT ILLNESS
61M PMH HTN, HLD, gout, asthma, hx of suicide attempt in past (swallowed razor blades which required abdominal surgery) presenting with syncope.  61M PMH HTN, HLD, gout, asthma, hx of suicide attempt in past (swallowed razor blades which required abdominal surgery >10 yrs prior) presenting with syncope.  61M PMH HTN, HLD, gout, asthma, hx of suicide attempt in past (swallowed razor blades which required abdominal surgery >10 yrs prior) presenting after a fall. Pt states that he has been having intermittent diarrhea recently, occurred on day of fall where he had a loose (but partially formed) BM. Pt states he remembers falling down and was awake the whole time he was down 61M PMH DM2, HTN, HLD, gout, asthma, schizophrenia, hx of suicide attempt in past (swallowed razor blades which required abdominal surgery >10 yrs prior), neurogenic bladder (has been self-cathing 3x daily) presenting after a fall. Pt states that he has been having intermittent diarrhea recently, occurred on day of fall where he had a loose (but partially formed) BM. Pt states he remembers falling down and was awake the whole time he was on the floor. Denies head trauma or LOC.     Of note pt has been self-catheterizing for years and follow with urologist Dr Yo at [facility].     Denies recent fevers, weight loss, HA, SOB, CP, palpitations, abd pain, dysuria, leg swelling, recent travel/illness other than above.

## 2021-05-18 NOTE — H&P ADULT - ASSESSMENT
61M PMH DM2, HTN, HLD, gout, asthma, schizophrenia, hx of suicide attempt in past (swallowed razor blades which required abdominal surgery >10 yrs prior), neurogenic bladder (has been self-cathing 3x daily); admitted for fall and severe hydronephrosis.

## 2021-05-18 NOTE — CONSULT NOTE ADULT - ASSESSMENT
A/P: 60 yo M PMH DM2, HTN, HLD, gout, asthma, schizophrenia, hx of suicide attempt in past (swallowed razor blades which required abdominal surgery >10 yrs prior), neurogenic bladder (has been self-cathing 3x daily) presenting after a fall, found to have severe bilateral hydronephrosis with distended bladder on CT scan. WBC 14.1, SCr 1.05 -> 0.88. UA with trace blood.  Bilateral hydro likely secondary to chronic obstruction from poor self cathing regimen/compliance.    Recs:  - Would keep pryor catheter for now  - follow up urine culture  - monitor for Post Obstructive Diuresis based on UOP after pryor placement   - Please trend BMP to monitor electrolytes.   - Replete electrolytes as necessary based on labs  - please have two pitchers of water at the bedside at all times and encourage the patient to drink to thirst. If pt unable to drink to thirst, may replete fluids at a rate of half the hourly output with 1/2NS.  - If you have any further questions or concerns, please feel free to page the Urology Department at [phone]   - Pt wishes to follow up with outpatient urologist after discharge.  - May consider repeat imaging in a few days to check that hydronephrosis is resolving. Renal US is sufficient.     WILL DISCUSS WITH ATTENDING ON CALL A/P: 62 yo M PMH DM2, HTN, HLD, gout, asthma, schizophrenia, hx of suicide attempt in past (swallowed razor blades which required abdominal surgery >10 yrs prior), neurogenic bladder (has been self-cathing 3x daily) presenting after a fall, found to have severe bilateral hydronephrosis with distended bladder on CT scan. WBC 14.1, SCr 1.05 -> 0.88. UA with trace blood.  Bilateral hydro likely secondary to chronic obstruction from poor self cathing regimen/compliance.    Recs:  - Would keep pryor catheter for now  - follow up urine culture  - monitor for Post Obstructive Diuresis based on UOP after pryor placement   - Please trend BMP to monitor electrolytes.   - Replete electrolytes as necessary based on labs  - please have two pitchers of water at the bedside at all times and encourage the patient to drink to thirst. If pt unable to drink to thirst, may replete fluids at a rate of half the hourly output with 1/2NS.  - If you have any further questions or concerns, please feel free to page the Urology Department at [phone]   - Pt wishes to follow up with outpatient urologist after discharge.  - May consider repeat imaging in a few days to check that hydronephrosis is resolving. Renal US is sufficient.     * post-obstructive diuresis*  -Maintain strict intake and output  -POD = >200cc of urine/hr for 3 or more consecutive hours  -Allow patient to drink to thirst.  Keep two pitchers of water at the bedside at all times.  -Check q6 BMPs to monitor electrolytes until UOP normalizes

## 2021-05-18 NOTE — CONSULT NOTE ADULT - SUBJECTIVE AND OBJECTIVE BOX
HPI: 61M PMH DM2, HTN, HLD, gout, asthma, schizophrenia, hx of suicide attempt in past (swallowed razor blades which required abdominal surgery >10 yrs prior), neurogenic bladder (has been self-cathing 3x daily) presenting after a fall. Pt states that severe lower abdominal pain and then remembers  falling down and was awake the whole time he was on the floor. Called ambulance to bring him in for further evaluation. No LOC or head trauma. In ED, pt found to have severe b/l hydro and a distended bladder on CT and ~3L of urine following pryor placement. Urology team consulted. Pt seen and examined. Follows with urologist Dr. Chu at [facility]. Has a history of neurogenic bladder secondary to poorly controlled diabetes. Pt has been self cathing for the past twelve years. Pt is supposed to cath 3 times a day, but admits to not always being complaint with catheterization. Has had urinary tract infections in the past requiring admission to hospital (last a few months ago at [facility]). Pt denies fever/chills, chest pain, shortness of breath, current abd pain, flank pain, dysuria, hematuria or other acute complaint.     PAST MEDICAL & SURGICAL HISTORY:  History of Schizophrenia  DM  HTN (Hypertension)  Hyperlipidemia  Gout  Anxiety  Asthma  History of Tonsillectomy  S/P Laparotomy    MEDICATIONS  (STANDING):  allopurinol 100 milliGRAM(s) Oral two times a day  atorvastatin 20 milliGRAM(s) Oral at bedtime  benztropine 1 milliGRAM(s) Oral daily  carvedilol 12.5 milliGRAM(s) Oral every 12 hours  dextrose 40% Gel 15 Gram(s) Oral once  dextrose 5%. 1000 milliLiter(s) (50 mL/Hr) IV Continuous <Continuous>  dextrose 5%. 1000 milliLiter(s) (100 mL/Hr) IV Continuous <Continuous>  dextrose 50% Injectable 25 Gram(s) IV Push once  dextrose 50% Injectable 12.5 Gram(s) IV Push once  dextrose 50% Injectable 25 Gram(s) IV Push once  diVALproex  milliGRAM(s) Oral <User Schedule>  enalapril 20 milliGRAM(s) Oral daily  enoxaparin Injectable 40 milliGRAM(s) SubCutaneous daily  FLUoxetine 20 milliGRAM(s) Oral daily  furosemide    Tablet 40 milliGRAM(s) Oral daily  glucagon  Injectable 1 milliGRAM(s) IntraMuscular once  haloperidol     Tablet 10 milliGRAM(s) Oral daily  insulin lispro (ADMELOG) corrective regimen sliding scale   SubCutaneous three times a day before meals  insulin lispro (ADMELOG) corrective regimen sliding scale   SubCutaneous at bedtime  mometasone 220 MICROgram(s) Inhaler 2 Puff(s) Inhalation daily  montelukast 10 milliGRAM(s) Oral daily  multivitamin 1 Tablet(s) Oral daily  NIFEdipine XL 30 milliGRAM(s) Oral daily  risperiDONE   Tablet 4 milliGRAM(s) Oral at bedtime  tamsulosin 0.4 milliGRAM(s) Oral two times a day    MEDICATIONS  (PRN):  senna 2 Tablet(s) Oral at bedtime PRN Constipation    FAMILY HISTORY:    Allergies:  No Known Allergies    SOCIAL HISTORY: Lives with family.     REVIEW OF SYSTEMS: Otherwise negative as stated in HPI    Physical Exam  Vital signs  T(C): 36.7 (21 @ 07:42), Max: 37 (21 @ 23:42)  HR: 87 (21 @ 09:47)  BP: 160/89 (21 @ 09:47)  SpO2: 100% (21 @ 07:42)    Output  OUT:    Indwelling Catheter - Urethral (mL): 2800 mL  Total OUT: 2800 mL    Gen: No Acute Distress  Pulm: No respiratory distress  Abd: obese, soft, nontender. no rebound or guarding. midline scar well healed.   Back: no CVAT bilaterally  : no suprapubic tenderness. pryor secured with yellow urine      LABS:             13.6   14.06 )-----------( 242      ( 18 May 2021 00:57 )             41.6         132<L>  |  93<L>  |  13  ----------------------------<  122<H>  3.7   |  25  |  0.88    Ca    8.9      18 May 2021 04:59  Phos  4.1       Mg     2.1         TPro  7.3  /  Alb  4.2  /  TBili  0.6  /  DBili  x   /  AST  22  /  ALT  12  /  AlkPhos  96        Urinalysis Basic - ( 18 May 2021 10:58 )    Color: Colorless / Appearance: Clear / S.009 / pH: x  Gluc: x / Ketone: Negative  / Bili: Negative / Urobili: Negative   Blood: x / Protein: Trace / Nitrite: Negative   Leuk Esterase: Negative / RBC: 2 /hpf / WBC 1 /HPF   Sq Epi: x / Non Sq Epi: 0 /hpf / Bacteria: Negative    Urine Cx: pending    RADIOLOGY:  < from: CT Abdomen and Pelvis w/ IV Cont (21 @ 01:54) >  EXAM:  CT ABDOMEN AND PELVIS IC                          PROCEDURE DATE:  2021      INTERPRETATION:  CLINICAL INFORMATION: Right lower quadrant abdominal pain, syncope.    COMPARISON: CT abdomen pelvis dated 2020.    CONTRAST/COMPLICATIONS:  IV Contrast: 90 mL of Omnipaque 350 were administered. 10 mL were discarded.  Oral Contrast: None.  Complications: None reported.    PROCEDURE:  CT of the Abdomen and Pelvis was performed.  Sagittal and coronal reformats were performed.    FINDINGS:  LOWER CHEST: Trace bilateral pleural effusions.    LIVER: Subcentimeter hypodensity along the upper border of the liver, too small to characterize.  BILE DUCTS: Normal caliber.  GALLBLADDER: Cholelithiasis.  SPLEEN: Within normal limits.  PANCREAS: Within normal limits.  ADRENALS: Within normal limits.  KIDNEYS/URETERS: Severe bilateral hydroureteronephrosis. No associated obstructing mass or stone is appreciated.    BLADDER: Distended bladder with diverticula and trabeculation at thedome indicative of chronic obstructive uropathy.  REPRODUCTIVE ORGANS: The prostate is not enlarged. Likely transurethral resection of prostate.    BOWEL: No bowel obstruction. Appendix is normal. The colon is underdistended without significant fecalload.  PERITONEUM: No ascites.  VESSELS: Atherosclerotic changes.  RETROPERITONEUM/LYMPH NODES: No lymphadenopathy.  ABDOMINAL WALL: Within normal limits.  BONES: Degenerative changes.    IMPRESSION:  Severe bilateral hydroureteronephrosis along with bladder distention may represent urinary retention.  Gallstones.  Normal appendix.  No small bowel obstruction.    JESSICA SMITH MD; Resident Radiology  This document has been electronically signed.  JAVIER WILLIS MD; Attending Radiologist  This document has been electronically signed. May 18 2021  3:21AM    < end of copied text >

## 2021-05-18 NOTE — ED ADULT NURSE NOTE - NSIMPLEMENTINTERV_GEN_ALL_ED
Implemented All Fall Risk Interventions:  [place] to call system. Call bell, personal items and telephone within reach. Instruct patient to call for assistance. Room bathroom lighting operational. Non-slip footwear when patient is off stretcher. Physically safe environment: no spills, clutter or unnecessary equipment. Stretcher in lowest position, wheels locked, appropriate side rails in place. Provide visual cue, wrist band, yellow gown, etc. Monitor gait and stability. Monitor for mental status changes and reorient to person, place, and time. Review medications for side effects contributing to fall risk. Reinforce activity limits and safety measures with patient and family.

## 2021-05-18 NOTE — H&P ADULT - NSHPREVIEWOFSYSTEMS_GEN_ALL_CORE

## 2021-05-18 NOTE — H&P ADULT - PROBLEM SELECTOR PLAN 1
Seems mechanical in nature. No head trauma, no areas of pain or bruising noted on exam.   - PT  - no need for syncope w/u as pt did not syncopize

## 2021-05-19 ENCOUNTER — TRANSCRIPTION ENCOUNTER (OUTPATIENT)
Age: 61
End: 2021-05-19

## 2021-05-19 VITALS
HEART RATE: 85 BPM | DIASTOLIC BLOOD PRESSURE: 79 MMHG | TEMPERATURE: 98 F | RESPIRATION RATE: 18 BRPM | OXYGEN SATURATION: 96 % | SYSTOLIC BLOOD PRESSURE: 142 MMHG

## 2021-05-19 LAB
A1C WITH ESTIMATED AVERAGE GLUCOSE RESULT: 6.3 % — HIGH (ref 4–5.6)
ANION GAP SERPL CALC-SCNC: 12 MMOL/L — SIGNIFICANT CHANGE UP (ref 5–17)
ANION GAP SERPL CALC-SCNC: 14 MMOL/L — SIGNIFICANT CHANGE UP (ref 5–17)
BUN SERPL-MCNC: 15 MG/DL — SIGNIFICANT CHANGE UP (ref 7–23)
BUN SERPL-MCNC: 16 MG/DL — SIGNIFICANT CHANGE UP (ref 7–23)
CALCIUM SERPL-MCNC: 9.1 MG/DL — SIGNIFICANT CHANGE UP (ref 8.4–10.5)
CALCIUM SERPL-MCNC: 9.4 MG/DL — SIGNIFICANT CHANGE UP (ref 8.4–10.5)
CHLORIDE SERPL-SCNC: 100 MMOL/L — SIGNIFICANT CHANGE UP (ref 96–108)
CHLORIDE SERPL-SCNC: 99 MMOL/L — SIGNIFICANT CHANGE UP (ref 96–108)
CO2 SERPL-SCNC: 27 MMOL/L — SIGNIFICANT CHANGE UP (ref 22–31)
CO2 SERPL-SCNC: 28 MMOL/L — SIGNIFICANT CHANGE UP (ref 22–31)
COVID-19 SPIKE DOMAIN AB INTERP: POSITIVE
COVID-19 SPIKE DOMAIN ANTIBODY RESULT: >250 U/ML — HIGH
CREAT SERPL-MCNC: 1.05 MG/DL — SIGNIFICANT CHANGE UP (ref 0.5–1.3)
CREAT SERPL-MCNC: 1.07 MG/DL — SIGNIFICANT CHANGE UP (ref 0.5–1.3)
CULTURE RESULTS: NO GROWTH — SIGNIFICANT CHANGE UP
CULTURE RESULTS: SIGNIFICANT CHANGE UP
ESTIMATED AVERAGE GLUCOSE: 134 MG/DL — HIGH (ref 68–114)
GLUCOSE BLDC GLUCOMTR-MCNC: 140 MG/DL — HIGH (ref 70–99)
GLUCOSE BLDC GLUCOMTR-MCNC: 190 MG/DL — HIGH (ref 70–99)
GLUCOSE BLDC GLUCOMTR-MCNC: 95 MG/DL — SIGNIFICANT CHANGE UP (ref 70–99)
GLUCOSE SERPL-MCNC: 107 MG/DL — HIGH (ref 70–99)
GLUCOSE SERPL-MCNC: 130 MG/DL — HIGH (ref 70–99)
HCT VFR BLD CALC: 40.2 % — SIGNIFICANT CHANGE UP (ref 39–50)
HCV AB S/CO SERPL IA: 0.21 S/CO — SIGNIFICANT CHANGE UP (ref 0–0.99)
HCV AB SERPL-IMP: SIGNIFICANT CHANGE UP
HGB BLD-MCNC: 12.5 G/DL — LOW (ref 13–17)
MAGNESIUM SERPL-MCNC: 2.3 MG/DL — SIGNIFICANT CHANGE UP (ref 1.6–2.6)
MCHC RBC-ENTMCNC: 27.7 PG — SIGNIFICANT CHANGE UP (ref 27–34)
MCHC RBC-ENTMCNC: 31.1 GM/DL — LOW (ref 32–36)
MCV RBC AUTO: 89.1 FL — SIGNIFICANT CHANGE UP (ref 80–100)
NRBC # BLD: 0 /100 WBCS — SIGNIFICANT CHANGE UP (ref 0–0)
PHOSPHATE SERPL-MCNC: 4 MG/DL — SIGNIFICANT CHANGE UP (ref 2.5–4.5)
PLATELET # BLD AUTO: 223 K/UL — SIGNIFICANT CHANGE UP (ref 150–400)
POTASSIUM SERPL-MCNC: 3.9 MMOL/L — SIGNIFICANT CHANGE UP (ref 3.5–5.3)
POTASSIUM SERPL-MCNC: 4 MMOL/L — SIGNIFICANT CHANGE UP (ref 3.5–5.3)
POTASSIUM SERPL-SCNC: 3.9 MMOL/L — SIGNIFICANT CHANGE UP (ref 3.5–5.3)
POTASSIUM SERPL-SCNC: 4 MMOL/L — SIGNIFICANT CHANGE UP (ref 3.5–5.3)
RBC # BLD: 4.51 M/UL — SIGNIFICANT CHANGE UP (ref 4.2–5.8)
RBC # FLD: 14.4 % — SIGNIFICANT CHANGE UP (ref 10.3–14.5)
SARS-COV-2 IGG+IGM SERPL QL IA: >250 U/ML — HIGH
SARS-COV-2 IGG+IGM SERPL QL IA: POSITIVE
SODIUM SERPL-SCNC: 140 MMOL/L — SIGNIFICANT CHANGE UP (ref 135–145)
SODIUM SERPL-SCNC: 140 MMOL/L — SIGNIFICANT CHANGE UP (ref 135–145)
SPECIMEN SOURCE: SIGNIFICANT CHANGE UP
SPECIMEN SOURCE: SIGNIFICANT CHANGE UP
WBC # BLD: 9.36 K/UL — SIGNIFICANT CHANGE UP (ref 3.8–10.5)
WBC # FLD AUTO: 9.36 K/UL — SIGNIFICANT CHANGE UP (ref 3.8–10.5)

## 2021-05-19 PROCEDURE — 87635 SARS-COV-2 COVID-19 AMP PRB: CPT

## 2021-05-19 PROCEDURE — 80048 BASIC METABOLIC PNL TOTAL CA: CPT

## 2021-05-19 PROCEDURE — 86803 HEPATITIS C AB TEST: CPT

## 2021-05-19 PROCEDURE — 87086 URINE CULTURE/COLONY COUNT: CPT

## 2021-05-19 PROCEDURE — 85025 COMPLETE CBC W/AUTO DIFF WBC: CPT

## 2021-05-19 PROCEDURE — 85027 COMPLETE CBC AUTOMATED: CPT

## 2021-05-19 PROCEDURE — 86769 SARS-COV-2 COVID-19 ANTIBODY: CPT

## 2021-05-19 PROCEDURE — 97162 PT EVAL MOD COMPLEX 30 MIN: CPT

## 2021-05-19 PROCEDURE — 87077 CULTURE AEROBIC IDENTIFY: CPT

## 2021-05-19 PROCEDURE — 87177 OVA AND PARASITES SMEARS: CPT

## 2021-05-19 PROCEDURE — 83036 HEMOGLOBIN GLYCOSYLATED A1C: CPT

## 2021-05-19 PROCEDURE — 94640 AIRWAY INHALATION TREATMENT: CPT

## 2021-05-19 PROCEDURE — 87045 FECES CULTURE AEROBIC BACT: CPT

## 2021-05-19 PROCEDURE — 80053 COMPREHEN METABOLIC PANEL: CPT

## 2021-05-19 PROCEDURE — 83735 ASSAY OF MAGNESIUM: CPT

## 2021-05-19 PROCEDURE — 84100 ASSAY OF PHOSPHORUS: CPT

## 2021-05-19 PROCEDURE — 99239 HOSP IP/OBS DSCHRG MGMT >30: CPT | Mod: GC

## 2021-05-19 PROCEDURE — 99285 EMERGENCY DEPT VISIT HI MDM: CPT

## 2021-05-19 PROCEDURE — 82962 GLUCOSE BLOOD TEST: CPT

## 2021-05-19 PROCEDURE — 87046 STOOL CULTR AEROBIC BACT EA: CPT

## 2021-05-19 PROCEDURE — 81001 URINALYSIS AUTO W/SCOPE: CPT

## 2021-05-19 PROCEDURE — G0378: CPT

## 2021-05-19 PROCEDURE — 74177 CT ABD & PELVIS W/CONTRAST: CPT

## 2021-05-19 RX ADMIN — Medication 100 MILLIGRAM(S): at 06:14

## 2021-05-19 RX ADMIN — Medication 20 MILLIGRAM(S): at 11:18

## 2021-05-19 RX ADMIN — HALOPERIDOL DECANOATE 10 MILLIGRAM(S): 100 INJECTION INTRAMUSCULAR at 11:17

## 2021-05-19 RX ADMIN — Medication 30 MILLIGRAM(S): at 06:15

## 2021-05-19 RX ADMIN — Medication 1 MILLIGRAM(S): at 11:18

## 2021-05-19 RX ADMIN — ENOXAPARIN SODIUM 40 MILLIGRAM(S): 100 INJECTION SUBCUTANEOUS at 11:18

## 2021-05-19 RX ADMIN — TAMSULOSIN HYDROCHLORIDE 0.4 MILLIGRAM(S): 0.4 CAPSULE ORAL at 06:22

## 2021-05-19 RX ADMIN — CARVEDILOL PHOSPHATE 12.5 MILLIGRAM(S): 80 CAPSULE, EXTENDED RELEASE ORAL at 06:14

## 2021-05-19 RX ADMIN — Medication 40 MILLIGRAM(S): at 06:15

## 2021-05-19 RX ADMIN — Medication 1 PUFF(S): at 06:15

## 2021-05-19 RX ADMIN — DIVALPROEX SODIUM 500 MILLIGRAM(S): 500 TABLET, DELAYED RELEASE ORAL at 09:19

## 2021-05-19 RX ADMIN — MONTELUKAST 10 MILLIGRAM(S): 4 TABLET, CHEWABLE ORAL at 11:17

## 2021-05-19 RX ADMIN — Medication 1 TABLET(S): at 11:17

## 2021-05-19 RX ADMIN — Medication 0: at 09:16

## 2021-05-19 RX ADMIN — Medication 20 MILLIGRAM(S): at 06:14

## 2021-05-19 RX ADMIN — Medication 975 MILLIGRAM(S): at 06:21

## 2021-05-19 NOTE — PHYSICAL THERAPY INITIAL EVALUATION ADULT - PERTINENT HX OF CURRENT PROBLEM, REHAB EVAL
61M PMH DM2, HTN, HLD, gout, asthma, schizophrenia, hx of suicide attempt in past (swallowed razor blades which required abdominal surgery >10 yrs prior), neurogenic bladder (has been self-cathing 3x daily); admitted for fall and severe hydronephrosis. CT ABD 5/18, severe b/l hydroureteronephrosis, Gallstones.

## 2021-05-19 NOTE — PROGRESS NOTE ADULT - ATTENDING COMMENTS
61M with hx significant for asthma and schizophrenia, prior suicide attempt, neurogenic bladder (self catheterizes) and IBD who presents with acute RLQ pain, near syncope and fall. CT A/P revealed b/l hydro, chronic in setting of poor self catheter compliance.  No acute events overnight. Electrolytes stable, and accurate output with pryor catheter. Appreciate urology recs.  Stable for discharge to home today with outpatient urology f/u, scheduled for 6/2. Will be discharged with pryor catheter.  F/u with PMD within 5-7 days   35 minutes spent on discharge planning

## 2021-05-19 NOTE — PROGRESS NOTE ADULT - SUBJECTIVE AND OBJECTIVE BOX
Subjective    Seen and examined.   Pryor clear yellow.    Objective    Vital signs  T(F): , Max: 98.8 (05-18-21 @ 20:11)  HR: 83 (05-19-21 @ 04:31)  BP: 148/79 (05-19-21 @ 04:31)  SpO2: 93% (05-19-21 @ 04:31)  Wt(kg): --    Output     OUT:    Indwelling Catheter - Urethral (mL): 1300 mL  Total OUT: 1300 mL    Total NET: -1300 mL          Physical Exam  Gen: NAD  Abd: soft NT ND  : pryor draining clear yellow urine     Labs      05-19 @ 01:31    WBC --    / Hct --    / SCr 1.07     05-18 @ 20:41    WBC --    / Hct --    / SCr 0.94       Imaging  < from: CT Abdomen and Pelvis w/ IV Cont (05.18.21 @ 01:54) >    EXAM:  CT ABDOMEN AND PELVIS IC                            PROCEDURE DATE:  05/18/2021            INTERPRETATION:  CLINICAL INFORMATION: Right lower quadrant abdominal pain, syncope.    COMPARISON: CT abdomen pelvis dated 1/23/2020.    CONTRAST/COMPLICATIONS:  IV Contrast: 90 mL of Omnipaque 350 were administered. 10 mL were discarded.  Oral Contrast: None.  Complications: None reported.    PROCEDURE:  CT of the Abdomen and Pelvis was performed.  Sagittal and coronal reformats were performed.    FINDINGS:  LOWER CHEST: Trace bilateral pleural effusions.    LIVER: Subcentimeter hypodensity along the upper border of the liver, too small to characterize.  BILE DUCTS: Normal caliber.  GALLBLADDER: Cholelithiasis.  SPLEEN: Within normal limits.  PANCREAS: Within normal limits.  ADRENALS: Within normal limits.  KIDNEYS/URETERS: Severe bilateral hydroureteronephrosis. No associated obstructing mass or stone is appreciated.    BLADDER: Distended bladder with diverticula and trabeculation at thedome indicative of chronic obstructive uropathy.  REPRODUCTIVE ORGANS: The prostate is not enlarged. Likely transurethral resection of prostate.    BOWEL: No bowel obstruction. Appendix is normal. The colon is underdistended without significant fecalload.  PERITONEUM: No ascites.  VESSELS: Atherosclerotic changes.  RETROPERITONEUM/LYMPH NODES: No lymphadenopathy.  ABDOMINAL WALL: Within normal limits.  BONES: Degenerative changes.    IMPRESSION:    Severe bilateral hydroureteronephrosis along with bladder distention may represent urinary retention.  Gallstones.  Normal appendix.  No small bowel obstruction.              JESSICA SMITH MD; Resident Radiology  This document has been electronically signed.  JAVIER WILLIS MD; Attending Radiologist  This document has been electronically signed. May 18 2021  3:21AM    < end of copied text >  
PROGRESS NOTE:   Jacque Angelo  PGY2 Internal Medicine  Pager [phone]    Patient is a 61y old  Male who presents with a chief complaint of urinary retention (19 May 2021 11:32)      SUBJECTIVE / OVERNIGHT EVENTS: No overnight events. examined in am at bedside. No complaints    ADDITIONAL REVIEW OF SYSTEMS: none    MEDICATIONS  (STANDING):  allopurinol 100 milliGRAM(s) Oral two times a day  atorvastatin 20 milliGRAM(s) Oral at bedtime  benztropine 1 milliGRAM(s) Oral daily  carvedilol 12.5 milliGRAM(s) Oral every 12 hours  dextrose 40% Gel 15 Gram(s) Oral once  dextrose 5%. 1000 milliLiter(s) (50 mL/Hr) IV Continuous <Continuous>  dextrose 5%. 1000 milliLiter(s) (100 mL/Hr) IV Continuous <Continuous>  dextrose 50% Injectable 25 Gram(s) IV Push once  dextrose 50% Injectable 12.5 Gram(s) IV Push once  dextrose 50% Injectable 25 Gram(s) IV Push once  diVALproex  milliGRAM(s) Oral <User Schedule>  enalapril 20 milliGRAM(s) Oral daily  enoxaparin Injectable 40 milliGRAM(s) SubCutaneous daily  FLUoxetine 20 milliGRAM(s) Oral daily  fluticasone propionate   220 MICROgram(s) HFA Inhaler 1 Puff(s) Inhalation two times a day  furosemide    Tablet 40 milliGRAM(s) Oral daily  glucagon  Injectable 1 milliGRAM(s) IntraMuscular once  haloperidol     Tablet 10 milliGRAM(s) Oral daily  insulin lispro (ADMELOG) corrective regimen sliding scale   SubCutaneous three times a day before meals  insulin lispro (ADMELOG) corrective regimen sliding scale   SubCutaneous at bedtime  montelukast 10 milliGRAM(s) Oral daily  multivitamin 1 Tablet(s) Oral daily  NIFEdipine XL 30 milliGRAM(s) Oral daily  risperiDONE   Tablet 4 milliGRAM(s) Oral at bedtime  tamsulosin 0.4 milliGRAM(s) Oral two times a day    MEDICATIONS  (PRN):  acetaminophen   Tablet .. 975 milliGRAM(s) Oral every 8 hours PRN Temp greater or equal to 38C (100.4F), Mild Pain (1 - 3), Moderate Pain (4 - 6), Severe Pain (7 - 10)  senna 2 Tablet(s) Oral at bedtime PRN Constipation      CAPILLARY BLOOD GLUCOSE      POCT Blood Glucose.: 95 mg/dL (19 May 2021 12:25)  POCT Blood Glucose.: 140 mg/dL (19 May 2021 08:23)  POCT Blood Glucose.: 141 mg/dL (18 May 2021 22:32)  POCT Blood Glucose.: 163 mg/dL (18 May 2021 19:39)  POCT Blood Glucose.: 190 mg/dL (18 May 2021 19:03)  POCT Blood Glucose.: 133 mg/dL (18 May 2021 13:02)    I&O's Summary    18 May 2021 07:01  -  19 May 2021 07:00  --------------------------------------------------------  IN: 600 mL / OUT: 1600 mL / NET: -1000 mL    19 May 2021 07:01  -  19 May 2021 12:47  --------------------------------------------------------  IN: 480 mL / OUT: 0 mL / NET: 480 mL        PHYSICAL EXAM:  Vital Signs Last 24 Hrs  T(C): 36.8 (19 May 2021 11:42), Max: 37.1 (18 May 2021 20:11)  T(F): 98.2 (19 May 2021 11:42), Max: 98.8 (18 May 2021 20:11)  HR: 74 (19 May 2021 11:42) (74 - 90)  BP: 148/84 (19 May 2021 11:42) (133/80 - 148/84)  BP(mean): --  RR: 18 (19 May 2021 11:42) (18 - 18)  SpO2: 95% (19 May 2021 11:42) (93% - 96%)    GENERAL: Obese male in NAD, lying in bed comfortably  HEAD:  Atraumatic, Normocephalic  EYES: EOMI, PERRLA, conjunctiva and sclera clear  ENT: Moist mucous membranes  NECK: Supple,  CHEST/LUNG: Clear to auscultation bilaterally; No rales, rhonchi, wheezing, or rubs. Unlabored respirations  HEART: Regular rate and rhythm; No murmurs, rubs, or gallops  ABDOMEN: NABS; Soft, nontender, nondistended  EXTREMITIES:  2+ Peripheral Pulses, brisk capillary refill. No clubbing, cyanosis, 1+ edema in LE to mid-shin, Feet with early changes concerning for charot foot  NERVOUS SYSTEM:  A&Ox3, no focal deficits   SKIN: venous stasis changes in bilat LE    LABS:                        12.5   9.36  )-----------( 223      ( 19 May 2021 07:04 )             40.2     05-    140  |  100  |  16  ----------------------------<  107<H>  4.0   |  28  |  1.05    Ca    9.1      19 May 2021 07:12  Phos  4.0       Mg     2.3         TPro  7.3  /  Alb  4.2  /  TBili  0.6  /  DBili  x   /  AST  22  /  ALT  12  /  AlkPhos  96  -          Urinalysis Basic - ( 18 May 2021 10:58 )    Color: Colorless / Appearance: Clear / S.009 / pH: x  Gluc: x / Ketone: Negative  / Bili: Negative / Urobili: Negative   Blood: x / Protein: Trace / Nitrite: Negative   Leuk Esterase: Negative / RBC: 2 /hpf / WBC 1 /HPF   Sq Epi: x / Non Sq Epi: 0 /hpf / Bacteria: Negative        Culture - Urine (collected 18 May 2021 14:26)  Source: .Urine Catheterized  Final Report (19 May 2021 10:23):    No growth    Culture - Stool (collected 18 May 2021 04:56)  Source: .Stool Feces  Preliminary Report (19 May 2021 10:10):    No enteric pathogens to date: Final culture pending        RADIOLOGY & ADDITIONAL TESTS:  Results Reviewed:   Imaging Personally Reviewed:  Electrocardiogram Personally Reviewed:    COORDINATION OF CARE:  Care Discussed with Consultants/Other Providers [Y/N]:  Prior or Outpatient Records Reviewed [Y/N]:

## 2021-05-19 NOTE — DISCHARGE NOTE PROVIDER - NSDCFUADDAPPT_GEN_ALL_CORE_FT
Please follow up with your urologist within 2 weeks.  Please follow up with your Primary care doctor within 1 month. If you don't have a PCP please schedule for follow up appointment with the clinic above, if you like you can schedule the follow up with Dr. Stephens.

## 2021-05-19 NOTE — DISCHARGE NOTE PROVIDER - HOSPITAL COURSE
HPI:  61M PMH DM2, HTN, HLD, gout, asthma, schizophrenia, hx of suicide attempt in past (swallowed razor blades which required abdominal surgery >10 yrs prior), neurogenic bladder (has been self-cathing 3x daily) presenting after a fall. Pt states that he has been having intermittent diarrhea recently, occurred on day of fall where he had a loose (but partially formed) BM. Pt states he remembers falling down and was awake the whole time he was on the floor. Denies head trauma or LOC.   Of note pt has been self-catheterizing for years and follow with urologist Dr Yo at [facility].   Denies recent fevers, weight loss, HA, SOB, CP, palpitations, abd pain, dysuria, leg swelling, recent travel/illness other than above.     ED: Pryor placed with 3L output.     Floors; Pt admitted to medicine floors for mechanical fall and severe hydronephrosis. Urology consulted and recommended leaving pryor in and monitoring for post obstructive diuresis. Repeat labs showed no electrolyte abnormalities. PT evaluated pt and recommended home with outpatient PT services. Pt ready for discharge.

## 2021-05-19 NOTE — DISCHARGE NOTE PROVIDER - NSFOLLOWUPCLINICS_GEN_ALL_ED_FT
[facility] [place]  Internal Medicine  [address] [place] [address]  Phone: [phone]  Fax: [phone]

## 2021-05-19 NOTE — DISCHARGE NOTE NURSING/CASE MANAGEMENT/SOCIAL WORK - PATIENT PORTAL LINK FT
You can access the FollowMyHealth Patient Portal offered by [facility] by registering at the following website: [email or web address]. By joining [employer]’s FollowMyHealth portal, you will also be able to view your health information using other applications (apps) compatible with our system.

## 2021-05-19 NOTE — PROGRESS NOTE ADULT - PROBLEM SELECTOR PLAN 1
Seems mechanical in nature. No head trauma, no areas of pain or bruising noted on exam.   - PT said home w outpatient PT  - dc home today

## 2021-05-19 NOTE — DISCHARGE NOTE PROVIDER - NSDCCPCAREPLAN_GEN_ALL_CORE_FT
PRINCIPAL DISCHARGE DIAGNOSIS  Diagnosis: Fall at home  Assessment and Plan of Treatment: You came in after a fall. You were checked for any injuries but none were found. Physical therapy evaluated you and recommended you to follow up with outpatient physical therapy to get stronger      SECONDARY DISCHARGE DIAGNOSES  Diagnosis: Hydronephrosis  Assessment and Plan of Treatment: An ultrasound was done of your kidneys that showed severe hydronephrosis, which is swelling of the kidneys. You had a pryor placed, that released more than 3 liters from your bladder. You were watched for electrolyte abnormalities that can be caused after a pryor placement with your neurpathic bladder, but your electrolytes continue to be stable. You are good to go home with the pryor and follow up with your Urologist within 1-2 weeks. A visiting nurse will come to help manage your pryor.

## 2021-05-19 NOTE — DISCHARGE NOTE NURSING/CASE MANAGEMENT/SOCIAL WORK - NSDCPEFALRISK_GEN_ALL_CORE
Patient information on fall and injury prevention
Well baby nursery  Routine [place] care  Feed ad melania  TCB @ 24HOL

## 2021-05-19 NOTE — PHYSICAL THERAPY INITIAL EVALUATION ADULT - ADDITIONAL COMMENTS
as per pt, resides in a PH with spouse, 1 stair to enter, one floor set up, PTA, pt amb (I), (I) with ADLs.

## 2021-05-19 NOTE — PROGRESS NOTE ADULT - PROBLEM SELECTOR PLAN 2
s/p Barnes placement with 3L output in ED. u/a unremarkable  - urology consult  - follow up w outpatient urology

## 2021-05-19 NOTE — PROGRESS NOTE ADULT - ASSESSMENT
A/P: 60 yo M PMH DM2, HTN, HLD, gout, asthma, schizophrenia, hx of suicide attempt in past (swallowed razor blades which required abdominal surgery >10 yrs prior), neurogenic bladder (has been self-cathing 3x daily) presenting after a fall, found to have severe bilateral hydronephrosis with distended bladder on CT scan. WBC 14.1, SCr 1.05 -> 0.88. UA with trace blood.  Bilateral hydro likely secondary to chronic obstruction from poor self cathing regimen/compliance.    Recs:  - Would keep pryor catheter for now  - follow up urine culture  - monitor for Post Obstructive Diuresis based on UOP after pryor placement   - Please trend BMP to monitor electrolytes.   - Replete electrolytes as necessary based on labs  - please have two pitchers of water at the bedside at all times and encourage the patient to drink to thirst. If pt unable to drink to thirst, may replete fluids at a rate of half the hourly output with 1/2NS.  - If you have any further questions or concerns, please feel free to page the Urology Department at [phone]   - Pt wishes to follow up with outpatient urologist after discharge.  - May consider repeat imaging in a few days to check that hydronephrosis is resolving. Renal US is sufficient.     * post-obstructive diuresis*  -Maintain strict intake and output  -POD = >200cc of urine/hr for 3 or more consecutive hours  -Allow patient to drink to thirst.  Keep two pitchers of water at the bedside at all times.  -Check q6 BMPs to monitor electrolytes until UOP normalizes      No further urologic workup, please call back with further questions    
61M PMH DM2, HTN, HLD, gout, asthma, schizophrenia, hx of suicide attempt in past (swallowed razor blades which required abdominal surgery >10 yrs prior), neurogenic bladder (has been self-cathing 3x daily); admitted for fall and severe hydronephrosis.

## 2021-05-19 NOTE — DISCHARGE NOTE PROVIDER - NSDCMRMEDTOKEN_GEN_ALL_CORE_FT
allopurinol 100 mg oral tablet: 1 tab(s) orally 2 times a day  BENZTROPINE MESYLATE  1 MG TABS:   carvedilol 12.5 mg oral tablet: 1 tab(s) orally 2 times a day  Crestor 5 mg oral tablet: 1 tab(s) orally once a day  divalproex sodium 500 mg oral tablet, extended release: orally 2 times a day  enalapril 20 mg oral tablet: 1 tab(s) orally once a day  Flomax 0.4 mg oral capsule: 1 cap(s) orally 2 times a day  Flovent 220 mcg/inh inhalation aerosol with adapter:   FLUoxetine 20 mg oral tablet: 1 tab(s) orally once a day  furosemide 20 mg oral tablet: 2 tab(s) orally once a day  HALOPERIDOL  10 MG TABS:   metFORMIN 500 mg oral tablet: 2 tab(s) orally 2 times a day  montelukast 10 mg oral tablet: 1 tab(s) orally once a day  NIFEdipine 30 mg oral tablet, extended release: 1 tab(s) orally 2 times a day  risperiDONE 4 mg oral tablet: orally once a day (at bedtime)  Trulicity Pen 1.5 mg/0.5 mL subcutaneous solution: 1 dose(s) subcutaneous once a week   allopurinol 100 mg oral tablet: 1 tab(s) orally 2 times a day  BENZTROPINE MESYLATE  1 MG TABS:   carvedilol 12.5 mg oral tablet: 1 tab(s) orally 2 times a day  Crestor 5 mg oral tablet: 1 tab(s) orally once a day  divalproex sodium 500 mg oral tablet, extended release: orally 2 times a day  enalapril 20 mg oral tablet: 1 tab(s) orally once a day  Flomax 0.4 mg oral capsule: 1 cap(s) orally 2 times a day  Flovent 220 mcg/inh inhalation aerosol with adapter:   FLUoxetine 20 mg oral tablet: 1 tab(s) orally once a day  furosemide 20 mg oral tablet: 2 tab(s) orally once a day  HALOPERIDOL  10 MG TABS:   metFORMIN 500 mg oral tablet: 2 tab(s) orally 2 times a day  montelukast 10 mg oral tablet: 1 tab(s) orally once a day  NIFEdipine 30 mg oral tablet, extended release: 1 tab(s) orally 2 times a day  Physical Therapy: 1 day(s) oral and rectal 3 times a day   risperiDONE 4 mg oral tablet: orally once a day (at bedtime)  Trulicity Pen 1.5 mg/0.5 mL subcutaneous solution: 1 dose(s) subcutaneous once a week

## 2021-05-20 LAB
CULTURE RESULTS: SIGNIFICANT CHANGE UP
SPECIMEN SOURCE: SIGNIFICANT CHANGE UP

## 2021-06-17 ENCOUNTER — INPATIENT (INPATIENT)
Facility: HOSPITAL | Age: 61
LOS: 4 days | Discharge: HOME | DRG: 872 | End: 2021-06-22
Attending: STUDENT IN AN ORGANIZED HEALTH CARE EDUCATION/TRAINING PROGRAM | Admitting: HOSPITALIST
Payer: MEDICARE

## 2021-06-17 VITALS
TEMPERATURE: 99 F | DIASTOLIC BLOOD PRESSURE: 65 MMHG | RESPIRATION RATE: 16 BRPM | WEIGHT: 304.9 LBS | HEIGHT: 72 IN | HEART RATE: 88 BPM | OXYGEN SATURATION: 98 % | SYSTOLIC BLOOD PRESSURE: 115 MMHG

## 2021-06-17 DIAGNOSIS — I10 ESSENTIAL (PRIMARY) HYPERTENSION: ICD-10-CM

## 2021-06-17 DIAGNOSIS — Z98.890 OTHER SPECIFIED POSTPROCEDURAL STATES: Chronic | ICD-10-CM

## 2021-06-17 DIAGNOSIS — N39.0 URINARY TRACT INFECTION, SITE NOT SPECIFIED: ICD-10-CM

## 2021-06-17 DIAGNOSIS — E11.9 TYPE 2 DIABETES MELLITUS WITHOUT COMPLICATIONS: ICD-10-CM

## 2021-06-17 DIAGNOSIS — R33.9 RETENTION OF URINE, UNSPECIFIED: ICD-10-CM

## 2021-06-17 DIAGNOSIS — Z29.9 ENCOUNTER FOR PROPHYLACTIC MEASURES, UNSPECIFIED: ICD-10-CM

## 2021-06-17 DIAGNOSIS — Z86.59 PERSONAL HISTORY OF OTHER MENTAL AND BEHAVIORAL DISORDERS: ICD-10-CM

## 2021-06-17 DIAGNOSIS — N13.30 UNSPECIFIED HYDRONEPHROSIS: ICD-10-CM

## 2021-06-17 LAB
ALBUMIN SERPL ELPH-MCNC: 3.9 G/DL — SIGNIFICANT CHANGE UP (ref 3.3–5)
ALP SERPL-CCNC: 77 U/L — SIGNIFICANT CHANGE UP (ref 40–120)
ALT FLD-CCNC: 10 U/L — SIGNIFICANT CHANGE UP (ref 10–45)
ANION GAP SERPL CALC-SCNC: 15 MMOL/L — SIGNIFICANT CHANGE UP (ref 5–17)
APPEARANCE UR: ABNORMAL
AST SERPL-CCNC: 26 U/L — SIGNIFICANT CHANGE UP (ref 10–40)
BACTERIA # UR AUTO: ABNORMAL
BASOPHILS # BLD AUTO: 0.12 K/UL — SIGNIFICANT CHANGE UP (ref 0–0.2)
BASOPHILS NFR BLD AUTO: 0.9 % — SIGNIFICANT CHANGE UP (ref 0–2)
BILIRUB SERPL-MCNC: 1 MG/DL — SIGNIFICANT CHANGE UP (ref 0.2–1.2)
BILIRUB UR-MCNC: NEGATIVE — SIGNIFICANT CHANGE UP
BUN SERPL-MCNC: 13 MG/DL — SIGNIFICANT CHANGE UP (ref 7–23)
CALCIUM SERPL-MCNC: 9.8 MG/DL — SIGNIFICANT CHANGE UP (ref 8.4–10.5)
CHLORIDE SERPL-SCNC: 93 MMOL/L — LOW (ref 96–108)
CO2 SERPL-SCNC: 23 MMOL/L — SIGNIFICANT CHANGE UP (ref 22–31)
COLOR SPEC: YELLOW — SIGNIFICANT CHANGE UP
CREAT SERPL-MCNC: 0.95 MG/DL — SIGNIFICANT CHANGE UP (ref 0.5–1.3)
DIFF PNL FLD: ABNORMAL
EOSINOPHIL # BLD AUTO: 0.12 K/UL — SIGNIFICANT CHANGE UP (ref 0–0.5)
EOSINOPHIL NFR BLD AUTO: 0.9 % — SIGNIFICANT CHANGE UP (ref 0–6)
EPI CELLS # UR: 0 /HPF — SIGNIFICANT CHANGE UP
GAS PNL BLDV: SIGNIFICANT CHANGE UP
GLUCOSE SERPL-MCNC: 111 MG/DL — HIGH (ref 70–99)
GLUCOSE UR QL: NEGATIVE — SIGNIFICANT CHANGE UP
HCT VFR BLD CALC: 38.5 % — LOW (ref 39–50)
HGB BLD-MCNC: 12.7 G/DL — LOW (ref 13–17)
HYALINE CASTS # UR AUTO: 1 /LPF — SIGNIFICANT CHANGE UP (ref 0–2)
KETONES UR-MCNC: NEGATIVE — SIGNIFICANT CHANGE UP
LEUKOCYTE ESTERASE UR-ACNC: ABNORMAL
LIDOCAIN IGE QN: 15 U/L — SIGNIFICANT CHANGE UP (ref 7–60)
LYMPHOCYTES # BLD AUTO: 0.57 K/UL — LOW (ref 1–3.3)
LYMPHOCYTES # BLD AUTO: 4.3 % — LOW (ref 13–44)
MANUAL SMEAR VERIFICATION: SIGNIFICANT CHANGE UP
MCHC RBC-ENTMCNC: 28.3 PG — SIGNIFICANT CHANGE UP (ref 27–34)
MCHC RBC-ENTMCNC: 33 GM/DL — SIGNIFICANT CHANGE UP (ref 32–36)
MCV RBC AUTO: 85.9 FL — SIGNIFICANT CHANGE UP (ref 80–100)
MONOCYTES # BLD AUTO: 1.37 K/UL — HIGH (ref 0–0.9)
MONOCYTES NFR BLD AUTO: 10.4 % — SIGNIFICANT CHANGE UP (ref 2–14)
NEUTROPHILS # BLD AUTO: 10.66 K/UL — HIGH (ref 1.8–7.4)
NEUTROPHILS NFR BLD AUTO: 80.9 % — HIGH (ref 43–77)
NITRITE UR-MCNC: NEGATIVE — SIGNIFICANT CHANGE UP
PH UR: 6.5 — SIGNIFICANT CHANGE UP (ref 5–8)
PLAT MORPH BLD: NORMAL — SIGNIFICANT CHANGE UP
PLATELET # BLD AUTO: 202 K/UL — SIGNIFICANT CHANGE UP (ref 150–400)
POTASSIUM SERPL-MCNC: 4.8 MMOL/L — SIGNIFICANT CHANGE UP (ref 3.5–5.3)
POTASSIUM SERPL-SCNC: 4.8 MMOL/L — SIGNIFICANT CHANGE UP (ref 3.5–5.3)
PROT SERPL-MCNC: 7.6 G/DL — SIGNIFICANT CHANGE UP (ref 6–8.3)
PROT UR-MCNC: ABNORMAL
RBC # BLD: 4.48 M/UL — SIGNIFICANT CHANGE UP (ref 4.2–5.8)
RBC # FLD: 13.9 % — SIGNIFICANT CHANGE UP (ref 10.3–14.5)
RBC BLD AUTO: SIGNIFICANT CHANGE UP
RBC CASTS # UR COMP ASSIST: 2 /HPF — SIGNIFICANT CHANGE UP (ref 0–4)
SARS-COV-2 RNA SPEC QL NAA+PROBE: SIGNIFICANT CHANGE UP
SODIUM SERPL-SCNC: 131 MMOL/L — LOW (ref 135–145)
SP GR SPEC: 1.01 — LOW (ref 1.01–1.02)
UROBILINOGEN FLD QL: NEGATIVE — SIGNIFICANT CHANGE UP
VARIANT LYMPHS # BLD: 2.6 % — SIGNIFICANT CHANGE UP (ref 0–6)
WBC # BLD: 13.18 K/UL — HIGH (ref 3.8–10.5)
WBC # FLD AUTO: 13.18 K/UL — HIGH (ref 3.8–10.5)
WBC UR QL: 147 /HPF — HIGH (ref 0–5)

## 2021-06-17 PROCEDURE — 74177 CT ABD & PELVIS W/CONTRAST: CPT | Mod: 26,MA

## 2021-06-17 PROCEDURE — 93010 ELECTROCARDIOGRAM REPORT: CPT

## 2021-06-17 PROCEDURE — 99223 1ST HOSP IP/OBS HIGH 75: CPT

## 2021-06-17 PROCEDURE — 99285 EMERGENCY DEPT VISIT HI MDM: CPT

## 2021-06-17 RX ORDER — CEFTRIAXONE 500 MG/1
1000 INJECTION, POWDER, FOR SOLUTION INTRAMUSCULAR; INTRAVENOUS ONCE
Refills: 0 | Status: COMPLETED | OUTPATIENT
Start: 2021-06-17 | End: 2021-06-17

## 2021-06-17 RX ORDER — DIVALPROEX SODIUM 500 MG/1
500 TABLET, DELAYED RELEASE ORAL
Refills: 0 | Status: DISCONTINUED | OUTPATIENT
Start: 2021-06-17 | End: 2021-06-22

## 2021-06-17 RX ORDER — INSULIN LISPRO 100/ML
VIAL (ML) SUBCUTANEOUS AT BEDTIME
Refills: 0 | Status: DISCONTINUED | OUTPATIENT
Start: 2021-06-17 | End: 2021-06-22

## 2021-06-17 RX ORDER — NIFEDIPINE 30 MG
30 TABLET, EXTENDED RELEASE 24 HR ORAL
Refills: 0 | Status: DISCONTINUED | OUTPATIENT
Start: 2021-06-17 | End: 2021-06-22

## 2021-06-17 RX ORDER — DEXTROSE 50 % IN WATER 50 %
25 SYRINGE (ML) INTRAVENOUS ONCE
Refills: 0 | Status: DISCONTINUED | OUTPATIENT
Start: 2021-06-17 | End: 2021-06-22

## 2021-06-17 RX ORDER — SODIUM CHLORIDE 9 MG/ML
1000 INJECTION INTRAMUSCULAR; INTRAVENOUS; SUBCUTANEOUS
Refills: 0 | Status: COMPLETED | OUTPATIENT
Start: 2021-06-17 | End: 2021-06-18

## 2021-06-17 RX ORDER — SODIUM CHLORIDE 9 MG/ML
1000 INJECTION, SOLUTION INTRAVENOUS
Refills: 0 | Status: DISCONTINUED | OUTPATIENT
Start: 2021-06-17 | End: 2021-06-22

## 2021-06-17 RX ORDER — DEXTROSE 50 % IN WATER 50 %
12.5 SYRINGE (ML) INTRAVENOUS ONCE
Refills: 0 | Status: DISCONTINUED | OUTPATIENT
Start: 2021-06-17 | End: 2021-06-22

## 2021-06-17 RX ORDER — FUROSEMIDE 40 MG
40 TABLET ORAL DAILY
Refills: 0 | Status: DISCONTINUED | OUTPATIENT
Start: 2021-06-17 | End: 2021-06-22

## 2021-06-17 RX ORDER — FLUOXETINE HCL 10 MG
20 CAPSULE ORAL DAILY
Refills: 0 | Status: DISCONTINUED | OUTPATIENT
Start: 2021-06-17 | End: 2021-06-22

## 2021-06-17 RX ORDER — ALLOPURINOL 300 MG
100 TABLET ORAL
Refills: 0 | Status: DISCONTINUED | OUTPATIENT
Start: 2021-06-17 | End: 2021-06-22

## 2021-06-17 RX ORDER — ACETAMINOPHEN 500 MG
650 TABLET ORAL ONCE
Refills: 0 | Status: COMPLETED | OUTPATIENT
Start: 2021-06-17 | End: 2021-06-17

## 2021-06-17 RX ORDER — GLUCAGON INJECTION, SOLUTION 0.5 MG/.1ML
1 INJECTION, SOLUTION SUBCUTANEOUS ONCE
Refills: 0 | Status: DISCONTINUED | OUTPATIENT
Start: 2021-06-17 | End: 2021-06-22

## 2021-06-17 RX ORDER — CEFTRIAXONE 500 MG/1
1000 INJECTION, POWDER, FOR SOLUTION INTRAMUSCULAR; INTRAVENOUS EVERY 24 HOURS
Refills: 0 | Status: DISCONTINUED | OUTPATIENT
Start: 2021-06-17 | End: 2021-06-22

## 2021-06-17 RX ORDER — MONTELUKAST 4 MG/1
10 TABLET, CHEWABLE ORAL DAILY
Refills: 0 | Status: DISCONTINUED | OUTPATIENT
Start: 2021-06-17 | End: 2021-06-22

## 2021-06-17 RX ORDER — ATORVASTATIN CALCIUM 80 MG/1
0 TABLET, FILM COATED ORAL
Qty: 0 | Refills: 0 | DISCHARGE

## 2021-06-17 RX ORDER — ENOXAPARIN SODIUM 100 MG/ML
40 INJECTION SUBCUTANEOUS DAILY
Refills: 0 | Status: DISCONTINUED | OUTPATIENT
Start: 2021-06-17 | End: 2021-06-22

## 2021-06-17 RX ORDER — SODIUM CHLORIDE 9 MG/ML
1000 INJECTION INTRAMUSCULAR; INTRAVENOUS; SUBCUTANEOUS ONCE
Refills: 0 | Status: COMPLETED | OUTPATIENT
Start: 2021-06-17 | End: 2021-06-17

## 2021-06-17 RX ORDER — CARVEDILOL PHOSPHATE 80 MG/1
12.5 CAPSULE, EXTENDED RELEASE ORAL EVERY 12 HOURS
Refills: 0 | Status: DISCONTINUED | OUTPATIENT
Start: 2021-06-17 | End: 2021-06-22

## 2021-06-17 RX ORDER — INSULIN LISPRO 100/ML
VIAL (ML) SUBCUTANEOUS
Refills: 0 | Status: DISCONTINUED | OUTPATIENT
Start: 2021-06-17 | End: 2021-06-22

## 2021-06-17 RX ORDER — TAMSULOSIN HYDROCHLORIDE 0.4 MG/1
0.4 CAPSULE ORAL AT BEDTIME
Refills: 0 | Status: DISCONTINUED | OUTPATIENT
Start: 2021-06-17 | End: 2021-06-22

## 2021-06-17 RX ORDER — BENZTROPINE MESYLATE 1 MG
1 TABLET ORAL DAILY
Refills: 0 | Status: DISCONTINUED | OUTPATIENT
Start: 2021-06-17 | End: 2021-06-22

## 2021-06-17 RX ORDER — RISPERIDONE 4 MG/1
4 TABLET ORAL AT BEDTIME
Refills: 0 | Status: DISCONTINUED | OUTPATIENT
Start: 2021-06-17 | End: 2021-06-22

## 2021-06-17 RX ORDER — DEXTROSE 50 % IN WATER 50 %
15 SYRINGE (ML) INTRAVENOUS ONCE
Refills: 0 | Status: DISCONTINUED | OUTPATIENT
Start: 2021-06-17 | End: 2021-06-22

## 2021-06-17 RX ORDER — ATORVASTATIN CALCIUM 80 MG/1
20 TABLET, FILM COATED ORAL AT BEDTIME
Refills: 0 | Status: DISCONTINUED | OUTPATIENT
Start: 2021-06-17 | End: 2021-06-22

## 2021-06-17 RX ADMIN — CEFTRIAXONE 100 MILLIGRAM(S): 500 INJECTION, POWDER, FOR SOLUTION INTRAMUSCULAR; INTRAVENOUS at 16:52

## 2021-06-17 RX ADMIN — Medication 650 MILLIGRAM(S): at 22:49

## 2021-06-17 RX ADMIN — Medication 30 MILLIGRAM(S): at 22:50

## 2021-06-17 RX ADMIN — CARVEDILOL PHOSPHATE 12.5 MILLIGRAM(S): 80 CAPSULE, EXTENDED RELEASE ORAL at 22:50

## 2021-06-17 RX ADMIN — Medication 650 MILLIGRAM(S): at 23:34

## 2021-06-17 RX ADMIN — SODIUM CHLORIDE 1000 MILLILITER(S): 9 INJECTION INTRAMUSCULAR; INTRAVENOUS; SUBCUTANEOUS at 16:52

## 2021-06-17 RX ADMIN — Medication 100 MILLIGRAM(S): at 22:49

## 2021-06-17 RX ADMIN — ATORVASTATIN CALCIUM 20 MILLIGRAM(S): 80 TABLET, FILM COATED ORAL at 22:50

## 2021-06-17 RX ADMIN — DIVALPROEX SODIUM 500 MILLIGRAM(S): 500 TABLET, DELAYED RELEASE ORAL at 22:50

## 2021-06-17 RX ADMIN — Medication 20 MILLIGRAM(S): at 22:49

## 2021-06-17 RX ADMIN — RISPERIDONE 4 MILLIGRAM(S): 4 TABLET ORAL at 22:50

## 2021-06-17 RX ADMIN — TAMSULOSIN HYDROCHLORIDE 0.4 MILLIGRAM(S): 0.4 CAPSULE ORAL at 22:50

## 2021-06-17 NOTE — H&P ADULT - NSICDXFAMILYHX_GEN_ALL_CORE_FT
FAMILY HISTORY:  Father  Still living? Unknown  FH: heart disease, Age at diagnosis: Age Unknown    Mother  Still living? Unknown  FH: diabetes mellitus, Age at diagnosis: Age Unknown

## 2021-06-17 NOTE — ED PROVIDER NOTE - PHYSICAL EXAMINATION
General: well appearing, no acute distress, AOx3  Skin: no rash, no pallor  Head: normocephalic, atraumatic  Eyes: clear conjunctiva, EOMI  ENMT: airway patent, no nasal discharge  Cardiovascular: normal rate, normal rhythm, S1/S2  Pulmonary: clear to auscultation bilaterally, no rales, rhonchi, or wheeze  Abdomen: soft, nontender, no guarding, non peritoneal, old surgical incisions in place   Musculoskeletal: moving extremities well, no deformity  Psych: normal mood, normal affect

## 2021-06-17 NOTE — ED ADULT NURSE REASSESSMENT NOTE - NS ED NURSE REASSESS COMMENT FT1
2025 [facility] Patient Status:  Hospital Outpatient Surgery   Age/Gender 54year old male MRN [id number]   [facility] Attending Jaime English MD   Hosp Day # 0 PCP Maggie Ac MD       Anesthesia Po pt resting on bed, have not complaints, VSS. pt remains in enhanced supervision for safety precautions.

## 2021-06-17 NOTE — H&P ADULT - NSHPPHYSICALEXAM_GEN_ALL_CORE
PHYSICAL EXAM:  Vital Signs Last 24 Hrs  T(C): 36.6 (06-17-21 @ 20:00)  T(F): 97.8 (06-17-21 @ 20:00), Max: 98.8 (06-17-21 @ 11:48)  HR: 94 (06-17-21 @ 20:00) (76 - 94)  BP: 172/93 (06-17-21 @ 20:00)  BP(mean): --  RR: 18 (06-17-21 @ 20:00) (16 - 20)  SpO2: 97% (06-17-21 @ 20:00) (96% - 99%)  Wt(kg): --    Constitutional: NAD, awake and alert  EYES: EOMI  ENMT:  Normal Hearing, no tonsillar exudates ; dry mucous membrane  Neck: Soft and supple, No JVD  Lungs: Breath sounds are clear bilaterally, No wheezing, rales or rhonchi  Heart: S1 and S2, regular rate and rhythm, no Murmurs, gallops or rubs  Abdomen: Bowel Sounds present, soft, nontender, nondistended, no guarding, no rebound  Extremities: No cyanosis or clubbing; warm to touch  Vascular: 2+ peripheral pulses lower ex  Neurological: A/O x 3, no focal deficits  Musculoskeletal: 5/5 strength b/l upper and lower extremities  Skin: No rashes  Psych: no depression or anhedonia  HEME: no bruises, no nose bleeds

## 2021-06-17 NOTE — H&P ADULT - PROBLEM SELECTOR PLAN 3
Patient with b/l severe hydronephrosis  per patient, he is scheduled for cystoscopy in July with his urologist  f/u as outpatient

## 2021-06-17 NOTE — ED PROVIDER NOTE - PROGRESS NOTE DETAILS
attending Chong: pt called for help from room. Pt found sitting on the floor, he reports he slipped and fell onto buttocks. Denies head strike or LOC. Pt able to get up with assistance and ambulated. Pt given call bell and instructed on how to use it.

## 2021-06-17 NOTE — H&P ADULT - HISTORY OF PRESENT ILLNESS
62 yo M with PMH of DM2, HTN, HLD, Gout, asthma, schizophrenia, previous suicide attempt (swallowed razor blades requiring abd surgery >10 yrs ago), neurogenic bladder (self catheterization 3x/daily), presents here with abdominal pain.    Patient was recently discharged on May 19th after being admitted for fall, diarrhea and urinary retention.  Patient had a WBC of 14.1, creatinine of 1.05.  CT showed b/l severe hydro. In ED pryor placed with 3L output.  Urology saw patient, recommended leaving pryor in and monitored for post obstructive diuresis.       60 yo M with PMH of Asthma, DM2, HTN, HLD, Gout, asthma, schizophrenia, previous suicide attempt (swallowed razor blades requiring abd surgery >10 yrs ago), neurogenic bladder (self catheterization 3x/daily), presents here with abdominal pain.    Patient was recently discharged on May 19th after being admitted for fall, diarrhea and urinary retention.  Patient had a WBC of 14.1, creatinine of 1.05.  CT showed b/l severe hydro. In ED pryor placed with 3L output.  Urology saw patient and monitored for post obstructive diuresis.  Patient states about one week ago he noticed cloudy urine and foul smelling urine.  On wednesday he provided urine sample to his Urologist, who sent him abx today for urine infection, but patient did not take it yet.  He also noticed decreased appetite over the last few days.  Denies fever, chills, cough, SOB, nausea or vomiting.  Today patient had diffuse abdominal pain and one episode of diarrhea.  His abdominal pain improved after bowel movement.  At home his BP was on the lower side, so his wife sent him to hospital.  In ED, vitals showed temp 98.8, HR 88, /65, saturation 98% on RA.  Bladder scan showed 750cc urine, s/p pryor catheter placement, drained 1.3L cloudy urine.    While in ED, patient had an episode of fall.  He says he was trying to get the nurse, but was unsuccessful.  So when he tried to get out of bed, he slipped on something and fell on his buttock.  ED staff helped him get up and patient was able to ambulate afterwards without any pain.

## 2021-06-17 NOTE — H&P ADULT - NSHPLABSRESULTS_GEN_ALL_CORE
Labs personally reviewed:                          12.7   13.18 )-----------( 202      ( 2021 16:26 )             38.5     06-17    131<L>  |  93<L>  |  13  ----------------------------<  111<H>  4.8   |  23  |  0.95    Ca    9.8      2021 15:10    TPro  7.6  /  Alb  3.9  /  TBili  1.0  /  DBili  x   /  AST  26  /  ALT  10  /  AlkPhos  77  06-17        LIVER FUNCTIONS - ( 2021 15:10 )  Alb: 3.9 g/dL / Pro: 7.6 g/dL / ALK PHOS: 77 U/L / ALT: 10 U/L / AST: 26 U/L / GGT: x             Urinalysis Basic - ( 2021 16:08 )    Color: Yellow / Appearance: Slightly Turbid / S.006 / pH: x  Gluc: x / Ketone: Negative  / Bili: Negative / Urobili: Negative   Blood: x / Protein: 30 mg/dL / Nitrite: Negative   Leuk Esterase: Large / RBC: 2 /hpf /  /HPF   Sq Epi: x / Non Sq Epi: 0 /hpf / Bacteria: Many    CT abd/pelv reviewed  FINDINGS:  LOWER CHEST: Bibasilar linear scarring versus atelectasis.    LIVER: A few scattered subcentimeter hypodensities too small to characterize.  BILE DUCTS: Normal caliber.  GALLBLADDER: Cholelithiasis.  SPLEEN: Within normal limits.  PANCREAS: Within normal limits.  ADRENALS: Within normal limits.  KIDNEYS/URETERS: Redemonstrated severe bilateral hydroureteronephrosis, unchanged when compared with 2021.    BLADDER: Circumferential bladder wall thickening. Trabeculation and small bladder diverticulum at the dome.  REPRODUCTIVE ORGANS: Prostate gland is normal in size.    BOWEL: No bowel obstruction. Appendix is normal.  PERITONEUM: No ascites.  VESSELS: Atherosclerotic changes.  RETROPERITONEUM/LYMPH NODES: No lymphadenopathy.  ABDOMINAL WALL: Small bilateral fat-containing inguinal hernias.  BONES: Degenerative changes.    EKG ordered

## 2021-06-17 NOTE — H&P ADULT - PROBLEM SELECTOR PLAN 2
s/p pryor with 1.3 L cloudy urine drained  no sign of renal failure  monitor for post obstructive diuresis  monitor BMP, replete electrolytes as needed  c/w IVF

## 2021-06-17 NOTE — ED ADULT NURSE NOTE - OBJECTIVE STATEMENT
60 y/o male arrives to the ER ambulatory c/o abdominal pain. PMH of Anxiety, schizophrenia, asthma, HTN, HLD, obesity, neurogenic bladder. Pt is A&Ox3, speaking coherently. Pt states having abdominal pain and diarrhea since this morning. At arrival denies any abdominal pain.  Pt denies SOB, chest pain, dizziness, N/V/D, fevers, chills.  On assessment airway is patent, breathing spontaneously and unlabored. Skin is dry, warm and color appropriate to race.  Abdomen is soft, no distended, no tender. Full ROM in all extremities. Comfort measures provided. call bell within reach, bed locked in the lowest position. will continue to reassess .

## 2021-06-17 NOTE — ED PROVIDER NOTE - ATTENDING CONTRIBUTION TO CARE
attending Chong: 61yM h/o DM, HTN, HLD, schizophrenia, previous suicide attempt (swallowed razor blades which required abdominal surgery >10 years ago), neurogenic bladder (self-catheterizes 3x a day), presents after episode of lower abdominal pain last night assoc with 2 episodes diarrhea, now since resolved. Obese abdomen, nontender. Given significant prior abdominal surgery will obtain labs, CT imaging, reassess

## 2021-06-17 NOTE — ED ADULT NURSE REASSESSMENT NOTE - NS ED NURSE REASSESS COMMENT FT1
Pt  alert and oriented x 3, ambulatory, found on the floor by Ed staff, after pt called for help. Pt reports trying to settle into the bed, getting up and slipping on the floor on his buttock. pt denies hinting hs head, LOC, dizziness, pain.  pt is neurologic intact PERRL, EOM intact, sensory intact, equal strength b/l in all upper and lower extremities.

## 2021-06-17 NOTE — H&P ADULT - PROBLEM SELECTOR PLAN 1
Patient with likely UTI  s/p pryor in ED  s/p 1L NS; c/w mainenance  s/p IV ceftriaxone in ED, which we will continue  f/u urine culture

## 2021-06-17 NOTE — ED ADULT NURSE REASSESSMENT NOTE - NS ED NURSE REASSESS COMMENT FT1
Post fall 4 hour vitals completed and documented. Paper documentation placed in chart, copy of paper handed to ADIS Ko.

## 2021-06-17 NOTE — ED PROVIDER NOTE - OBJECTIVE STATEMENT
61 year old male with a pmhx of DM, HTN, HLD, schizophrenia, previous suicide attempt (swallowed razor blades which required abdominal surgery >10 years prior), neurogenic bladder (self-catheterizes 3x a day), presents to ED for evaluation of RLQ abd pain. Pt reports pain began last night and worsened this morning. Notes 2 episodes of diarrhea. States pain is completely resolved now. No fever, chills, cp, sob, cough, nausea, vomiting, or hematuria.

## 2021-06-17 NOTE — H&P ADULT - NSHPREVIEWOFSYSTEMS_GEN_ALL_CORE
CONSTITUTIONAL: No weakness, fevers or chills  EYES/ENT: No visual changes;  No dysphagia  NECK: No pain or stiffness  RESPIRATORY: No cough, wheezing, hemoptysis; No shortness of breath  CARDIOVASCULAR: No chest pain or palpitations; No lower extremity edema  EXTREMITIES: no le edema, cyanosis, clubbing  MUSCULOSKELETAL: no joint pain, swelling  GASTROINTESTINAL: +abdominal pain, +one episode of diarrhea  BACK: No back pain  GENITOURINARY: neurogenic bladder  NEUROLOGICAL: No numbness or weakness  SKIN: No itching, burning, rashes, or lesions   PSYCH: no agitation  All other review of systems is negative unless indicated above.

## 2021-06-17 NOTE — ED ADULT NURSE REASSESSMENT NOTE - NS ED NURSE REASSESS COMMENT FT1
Barnes catheter placed using sterile technique. Second RN present to confirm sterility. Draining to gravity, 1300 cc of cloudy urine came out Secured w/ stat lock. Pt tolerated procedure well. Will cont to monitor.

## 2021-06-18 LAB
ALBUMIN SERPL ELPH-MCNC: 3.2 G/DL — LOW (ref 3.3–5)
ALBUMIN SERPL ELPH-MCNC: 3.6 G/DL — SIGNIFICANT CHANGE UP (ref 3.3–5)
ALP SERPL-CCNC: 64 U/L — SIGNIFICANT CHANGE UP (ref 40–120)
ALP SERPL-CCNC: 69 U/L — SIGNIFICANT CHANGE UP (ref 40–120)
ALT FLD-CCNC: 7 U/L — LOW (ref 10–45)
ALT FLD-CCNC: 8 U/L — LOW (ref 10–45)
ANION GAP SERPL CALC-SCNC: 12 MMOL/L — SIGNIFICANT CHANGE UP (ref 5–17)
ANION GAP SERPL CALC-SCNC: 13 MMOL/L — SIGNIFICANT CHANGE UP (ref 5–17)
AST SERPL-CCNC: 11 U/L — SIGNIFICANT CHANGE UP (ref 10–40)
AST SERPL-CCNC: 12 U/L — SIGNIFICANT CHANGE UP (ref 10–40)
BASOPHILS # BLD AUTO: 0.02 K/UL — SIGNIFICANT CHANGE UP (ref 0–0.2)
BASOPHILS NFR BLD AUTO: 0.2 % — SIGNIFICANT CHANGE UP (ref 0–2)
BILIRUB SERPL-MCNC: 0.5 MG/DL — SIGNIFICANT CHANGE UP (ref 0.2–1.2)
BILIRUB SERPL-MCNC: 0.5 MG/DL — SIGNIFICANT CHANGE UP (ref 0.2–1.2)
BUN SERPL-MCNC: 12 MG/DL — SIGNIFICANT CHANGE UP (ref 7–23)
BUN SERPL-MCNC: 13 MG/DL — SIGNIFICANT CHANGE UP (ref 7–23)
CALCIUM SERPL-MCNC: 8.8 MG/DL — SIGNIFICANT CHANGE UP (ref 8.4–10.5)
CALCIUM SERPL-MCNC: 9.3 MG/DL — SIGNIFICANT CHANGE UP (ref 8.4–10.5)
CHLORIDE SERPL-SCNC: 97 MMOL/L — SIGNIFICANT CHANGE UP (ref 96–108)
CHLORIDE SERPL-SCNC: 99 MMOL/L — SIGNIFICANT CHANGE UP (ref 96–108)
CO2 SERPL-SCNC: 25 MMOL/L — SIGNIFICANT CHANGE UP (ref 22–31)
CO2 SERPL-SCNC: 26 MMOL/L — SIGNIFICANT CHANGE UP (ref 22–31)
COVID-19 SPIKE DOMAIN AB INTERP: POSITIVE
COVID-19 SPIKE DOMAIN ANTIBODY RESULT: >250 U/ML — HIGH
CREAT SERPL-MCNC: 1.02 MG/DL — SIGNIFICANT CHANGE UP (ref 0.5–1.3)
CREAT SERPL-MCNC: 1.06 MG/DL — SIGNIFICANT CHANGE UP (ref 0.5–1.3)
EOSINOPHIL # BLD AUTO: 0.13 K/UL — SIGNIFICANT CHANGE UP (ref 0–0.5)
EOSINOPHIL NFR BLD AUTO: 1.3 % — SIGNIFICANT CHANGE UP (ref 0–6)
GLUCOSE SERPL-MCNC: 105 MG/DL — HIGH (ref 70–99)
GLUCOSE SERPL-MCNC: 139 MG/DL — HIGH (ref 70–99)
HCT VFR BLD CALC: 34.7 % — LOW (ref 39–50)
HGB BLD-MCNC: 11.2 G/DL — LOW (ref 13–17)
IMM GRANULOCYTES NFR BLD AUTO: 0.6 % — SIGNIFICANT CHANGE UP (ref 0–1.5)
LACTATE SERPL-SCNC: 0.9 MMOL/L — SIGNIFICANT CHANGE UP (ref 0.7–2)
LYMPHOCYTES # BLD AUTO: 0.84 K/UL — LOW (ref 1–3.3)
LYMPHOCYTES # BLD AUTO: 8.3 % — LOW (ref 13–44)
MAGNESIUM SERPL-MCNC: 2.3 MG/DL — SIGNIFICANT CHANGE UP (ref 1.6–2.6)
MCHC RBC-ENTMCNC: 28.4 PG — SIGNIFICANT CHANGE UP (ref 27–34)
MCHC RBC-ENTMCNC: 32.3 GM/DL — SIGNIFICANT CHANGE UP (ref 32–36)
MCV RBC AUTO: 88.1 FL — SIGNIFICANT CHANGE UP (ref 80–100)
MONOCYTES # BLD AUTO: 1.65 K/UL — HIGH (ref 0–0.9)
MONOCYTES NFR BLD AUTO: 16.4 % — HIGH (ref 2–14)
NEUTROPHILS # BLD AUTO: 7.37 K/UL — SIGNIFICANT CHANGE UP (ref 1.8–7.4)
NEUTROPHILS NFR BLD AUTO: 73.2 % — SIGNIFICANT CHANGE UP (ref 43–77)
NRBC # BLD: 0 /100 WBCS — SIGNIFICANT CHANGE UP (ref 0–0)
PHOSPHATE SERPL-MCNC: 4.4 MG/DL — SIGNIFICANT CHANGE UP (ref 2.5–4.5)
PLATELET # BLD AUTO: 200 K/UL — SIGNIFICANT CHANGE UP (ref 150–400)
POTASSIUM SERPL-MCNC: 3.6 MMOL/L — SIGNIFICANT CHANGE UP (ref 3.5–5.3)
POTASSIUM SERPL-MCNC: 3.6 MMOL/L — SIGNIFICANT CHANGE UP (ref 3.5–5.3)
POTASSIUM SERPL-SCNC: 3.6 MMOL/L — SIGNIFICANT CHANGE UP (ref 3.5–5.3)
POTASSIUM SERPL-SCNC: 3.6 MMOL/L — SIGNIFICANT CHANGE UP (ref 3.5–5.3)
PROT SERPL-MCNC: 6.5 G/DL — SIGNIFICANT CHANGE UP (ref 6–8.3)
PROT SERPL-MCNC: 6.9 G/DL — SIGNIFICANT CHANGE UP (ref 6–8.3)
RBC # BLD: 3.94 M/UL — LOW (ref 4.2–5.8)
RBC # FLD: 14.2 % — SIGNIFICANT CHANGE UP (ref 10.3–14.5)
SARS-COV-2 IGG+IGM SERPL QL IA: >250 U/ML — HIGH
SARS-COV-2 IGG+IGM SERPL QL IA: POSITIVE
SODIUM SERPL-SCNC: 135 MMOL/L — SIGNIFICANT CHANGE UP (ref 135–145)
SODIUM SERPL-SCNC: 137 MMOL/L — SIGNIFICANT CHANGE UP (ref 135–145)
WBC # BLD: 10.07 K/UL — SIGNIFICANT CHANGE UP (ref 3.8–10.5)
WBC # FLD AUTO: 10.07 K/UL — SIGNIFICANT CHANGE UP (ref 3.8–10.5)

## 2021-06-18 PROCEDURE — 99233 SBSQ HOSP IP/OBS HIGH 50: CPT

## 2021-06-18 RX ADMIN — SODIUM CHLORIDE 150 MILLILITER(S): 9 INJECTION INTRAMUSCULAR; INTRAVENOUS; SUBCUTANEOUS at 02:22

## 2021-06-18 RX ADMIN — ENOXAPARIN SODIUM 40 MILLIGRAM(S): 100 INJECTION SUBCUTANEOUS at 13:06

## 2021-06-18 RX ADMIN — Medication 1 MILLIGRAM(S): at 14:46

## 2021-06-18 RX ADMIN — MONTELUKAST 10 MILLIGRAM(S): 4 TABLET, CHEWABLE ORAL at 13:05

## 2021-06-18 RX ADMIN — Medication 30 MILLIGRAM(S): at 17:40

## 2021-06-18 RX ADMIN — Medication 100 MILLIGRAM(S): at 17:40

## 2021-06-18 RX ADMIN — CARVEDILOL PHOSPHATE 12.5 MILLIGRAM(S): 80 CAPSULE, EXTENDED RELEASE ORAL at 05:44

## 2021-06-18 RX ADMIN — Medication 20 MILLIGRAM(S): at 14:06

## 2021-06-18 RX ADMIN — Medication 40 MILLIGRAM(S): at 05:45

## 2021-06-18 RX ADMIN — Medication 20 MILLIGRAM(S): at 05:44

## 2021-06-18 RX ADMIN — ATORVASTATIN CALCIUM 20 MILLIGRAM(S): 80 TABLET, FILM COATED ORAL at 22:47

## 2021-06-18 RX ADMIN — DIVALPROEX SODIUM 500 MILLIGRAM(S): 500 TABLET, DELAYED RELEASE ORAL at 08:28

## 2021-06-18 RX ADMIN — DIVALPROEX SODIUM 500 MILLIGRAM(S): 500 TABLET, DELAYED RELEASE ORAL at 21:10

## 2021-06-18 RX ADMIN — CARVEDILOL PHOSPHATE 12.5 MILLIGRAM(S): 80 CAPSULE, EXTENDED RELEASE ORAL at 17:40

## 2021-06-18 RX ADMIN — Medication 100 MILLIGRAM(S): at 05:44

## 2021-06-18 RX ADMIN — TAMSULOSIN HYDROCHLORIDE 0.4 MILLIGRAM(S): 0.4 CAPSULE ORAL at 22:48

## 2021-06-18 RX ADMIN — RISPERIDONE 4 MILLIGRAM(S): 4 TABLET ORAL at 22:47

## 2021-06-18 RX ADMIN — CEFTRIAXONE 100 MILLIGRAM(S): 500 INJECTION, POWDER, FOR SOLUTION INTRAMUSCULAR; INTRAVENOUS at 14:46

## 2021-06-18 RX ADMIN — Medication 30 MILLIGRAM(S): at 07:15

## 2021-06-18 NOTE — PROGRESS NOTE ADULT - SUBJECTIVE AND OBJECTIVE BOX
PROGRESS NOTE:   Maryann Solitario DO  Hospitalist  Pager [phone]  After 5pm/weekends or if no answer ext: [phone]      Patient is a 61y old  Male who presents with a chief complaint of abdominal pain (2021 20:49)      SUBJECTIVE / OVERNIGHT EVENTS: MOJGAN    ADDITIONAL REVIEW OF SYSTEMS:  +fever overnight no chills  no n/v/d    MEDICATIONS  (STANDING):  allopurinol 100 milliGRAM(s) Oral two times a day  atorvastatin 20 milliGRAM(s) Oral at bedtime  benztropine 1 milliGRAM(s) Oral daily  carvedilol 12.5 milliGRAM(s) Oral every 12 hours  cefTRIAXone   IVPB 1000 milliGRAM(s) IV Intermittent every 24 hours  dextrose 40% Gel 15 Gram(s) Oral once  dextrose 5%. 1000 milliLiter(s) (50 mL/Hr) IV Continuous <Continuous>  dextrose 5%. 1000 milliLiter(s) (100 mL/Hr) IV Continuous <Continuous>  dextrose 50% Injectable 25 Gram(s) IV Push once  dextrose 50% Injectable 12.5 Gram(s) IV Push once  dextrose 50% Injectable 25 Gram(s) IV Push once  diVALproex  milliGRAM(s) Oral <User Schedule>  enalapril 20 milliGRAM(s) Oral daily  enoxaparin Injectable 40 milliGRAM(s) SubCutaneous daily  FLUoxetine 20 milliGRAM(s) Oral daily  furosemide    Tablet 40 milliGRAM(s) Oral daily  glucagon  Injectable 1 milliGRAM(s) IntraMuscular once  insulin lispro (ADMELOG) corrective regimen sliding scale   SubCutaneous three times a day before meals  insulin lispro (ADMELOG) corrective regimen sliding scale   SubCutaneous at bedtime  montelukast 10 milliGRAM(s) Oral daily  NIFEdipine XL 30 milliGRAM(s) Oral two times a day  risperiDONE   Tablet 4 milliGRAM(s) Oral at bedtime  tamsulosin 0.4 milliGRAM(s) Oral at bedtime    MEDICATIONS  (PRN):      CAPILLARY BLOOD GLUCOSE      POCT Blood Glucose.: 114 mg/dL (2021 09:24)  POCT Blood Glucose.: 127 mg/dL (2021 22:17)    I&O's Summary    2021 07:01  -  2021 07:00  --------------------------------------------------------  IN: 0 mL / OUT: 1200 mL / NET: -1200 mL        PHYSICAL EXAM:  Vital Signs Last 24 Hrs  T(C): 37.1 (2021 12:19), Max: 38.3 (2021 22:33)  T(F): 98.8 (2021 12:19), Max: 101 (2021 22:33)  HR: 73 (2021 12:19) (73 - 95)  BP: 119/81 (2021 12:19) (110/69 - 172/93)  BP(mean): --  RR: 20 (2021 12:19) (16 - 20)  SpO2: 93% (2021 12:19) (92% - 99%)    CONSTITUTIONAL: NAD, well-developed; non toxic appearing  RESPIRATORY: Normal respiratory effort; lungs are clear to auscultation bilaterally  CARDIOVASCULAR: Regular rate and rhythm, normal S1 and S2, no murmur/rub/gallop; No lower extremity edema; Peripheral pulses are 2+ bilaterally  ABDOMEN: Nontender to palpation, normoactive bowel sounds, no rebound/guarding; No hepatosplenomegaly  MUSCLOSKELETAL: no clubbing or cyanosis of digits; no joint swelling or tenderness to palpation  : pryor in place  PSYCH: A+O to person, place, and time; affect appropriate    LABS:                        11.2   10.07 )-----------( 200      ( 2021 06:33 )             34.7     06-18    137  |  99  |  13  ----------------------------<  105<H>  3.6   |  25  |  1.06    Ca    8.8      2021 06:33  Phos  4.4     06-18  Mg     2.3     06-18    TPro  6.5  /  Alb  3.2<L>  /  TBili  0.5  /  DBili  x   /  AST  11  /  ALT  7<L>  /  AlkPhos  64  06-18          Urinalysis Basic - ( 2021 16:08 )    Color: Yellow / Appearance: Slightly Turbid / S.006 / pH: x  Gluc: x / Ketone: Negative  / Bili: Negative / Urobili: Negative   Blood: x / Protein: 30 mg/dL / Nitrite: Negative   Leuk Esterase: Large / RBC: 2 /hpf /  /HPF   Sq Epi: x / Non Sq Epi: 0 /hpf / Bacteria: Many          RADIOLOGY & ADDITIONAL TESTS:  Results Reviewed:   Imaging Personally Reviewed:  Electrocardiogram Personally Reviewed:    COORDINATION OF CARE:  Care Discussed with Consultants/Other Providers [Y/N]:  Prior or Outpatient Records Reviewed [Y/N]:

## 2021-06-19 LAB
ANION GAP SERPL CALC-SCNC: 12 MMOL/L — SIGNIFICANT CHANGE UP (ref 5–17)
ANION GAP SERPL CALC-SCNC: 14 MMOL/L — SIGNIFICANT CHANGE UP (ref 5–17)
BUN SERPL-MCNC: 10 MG/DL — SIGNIFICANT CHANGE UP (ref 7–23)
BUN SERPL-MCNC: 12 MG/DL — SIGNIFICANT CHANGE UP (ref 7–23)
CALCIUM SERPL-MCNC: 8.9 MG/DL — SIGNIFICANT CHANGE UP (ref 8.4–10.5)
CALCIUM SERPL-MCNC: 9.3 MG/DL — SIGNIFICANT CHANGE UP (ref 8.4–10.5)
CHLORIDE SERPL-SCNC: 95 MMOL/L — LOW (ref 96–108)
CHLORIDE SERPL-SCNC: 96 MMOL/L — SIGNIFICANT CHANGE UP (ref 96–108)
CO2 SERPL-SCNC: 26 MMOL/L — SIGNIFICANT CHANGE UP (ref 22–31)
CO2 SERPL-SCNC: 26 MMOL/L — SIGNIFICANT CHANGE UP (ref 22–31)
CREAT SERPL-MCNC: 0.82 MG/DL — SIGNIFICANT CHANGE UP (ref 0.5–1.3)
CREAT SERPL-MCNC: 0.84 MG/DL — SIGNIFICANT CHANGE UP (ref 0.5–1.3)
GLUCOSE SERPL-MCNC: 128 MG/DL — HIGH (ref 70–99)
GLUCOSE SERPL-MCNC: 131 MG/DL — HIGH (ref 70–99)
HCT VFR BLD CALC: 35.4 % — LOW (ref 39–50)
HGB BLD-MCNC: 11.5 G/DL — LOW (ref 13–17)
MAGNESIUM SERPL-MCNC: 2.3 MG/DL — SIGNIFICANT CHANGE UP (ref 1.6–2.6)
MCHC RBC-ENTMCNC: 28.5 PG — SIGNIFICANT CHANGE UP (ref 27–34)
MCHC RBC-ENTMCNC: 32.5 GM/DL — SIGNIFICANT CHANGE UP (ref 32–36)
MCV RBC AUTO: 87.6 FL — SIGNIFICANT CHANGE UP (ref 80–100)
NRBC # BLD: 0 /100 WBCS — SIGNIFICANT CHANGE UP (ref 0–0)
PHOSPHATE SERPL-MCNC: 3.6 MG/DL — SIGNIFICANT CHANGE UP (ref 2.5–4.5)
PLATELET # BLD AUTO: 212 K/UL — SIGNIFICANT CHANGE UP (ref 150–400)
POTASSIUM SERPL-MCNC: 3.5 MMOL/L — SIGNIFICANT CHANGE UP (ref 3.5–5.3)
POTASSIUM SERPL-MCNC: 3.8 MMOL/L — SIGNIFICANT CHANGE UP (ref 3.5–5.3)
POTASSIUM SERPL-SCNC: 3.5 MMOL/L — SIGNIFICANT CHANGE UP (ref 3.5–5.3)
POTASSIUM SERPL-SCNC: 3.8 MMOL/L — SIGNIFICANT CHANGE UP (ref 3.5–5.3)
RBC # BLD: 4.04 M/UL — LOW (ref 4.2–5.8)
RBC # FLD: 14.2 % — SIGNIFICANT CHANGE UP (ref 10.3–14.5)
SODIUM SERPL-SCNC: 134 MMOL/L — LOW (ref 135–145)
SODIUM SERPL-SCNC: 135 MMOL/L — SIGNIFICANT CHANGE UP (ref 135–145)
WBC # BLD: 8.54 K/UL — SIGNIFICANT CHANGE UP (ref 3.8–10.5)
WBC # FLD AUTO: 8.54 K/UL — SIGNIFICANT CHANGE UP (ref 3.8–10.5)

## 2021-06-19 PROCEDURE — 99232 SBSQ HOSP IP/OBS MODERATE 35: CPT

## 2021-06-19 RX ORDER — ACETAMINOPHEN 500 MG
975 TABLET ORAL ONCE
Refills: 0 | Status: COMPLETED | OUTPATIENT
Start: 2021-06-19 | End: 2021-06-19

## 2021-06-19 RX ORDER — MOMETASONE FUROATE 220 UG/1
2 INHALANT RESPIRATORY (INHALATION) DAILY
Refills: 0 | Status: DISCONTINUED | OUTPATIENT
Start: 2021-06-19 | End: 2021-06-22

## 2021-06-19 RX ADMIN — CARVEDILOL PHOSPHATE 12.5 MILLIGRAM(S): 80 CAPSULE, EXTENDED RELEASE ORAL at 17:21

## 2021-06-19 RX ADMIN — Medication 20 MILLIGRAM(S): at 11:40

## 2021-06-19 RX ADMIN — CEFTRIAXONE 100 MILLIGRAM(S): 500 INJECTION, POWDER, FOR SOLUTION INTRAMUSCULAR; INTRAVENOUS at 13:51

## 2021-06-19 RX ADMIN — Medication 20 MILLIGRAM(S): at 07:04

## 2021-06-19 RX ADMIN — Medication 975 MILLIGRAM(S): at 21:44

## 2021-06-19 RX ADMIN — RISPERIDONE 4 MILLIGRAM(S): 4 TABLET ORAL at 21:26

## 2021-06-19 RX ADMIN — Medication 975 MILLIGRAM(S): at 01:13

## 2021-06-19 RX ADMIN — Medication 30 MILLIGRAM(S): at 17:21

## 2021-06-19 RX ADMIN — Medication 40 MILLIGRAM(S): at 07:04

## 2021-06-19 RX ADMIN — Medication 975 MILLIGRAM(S): at 02:10

## 2021-06-19 RX ADMIN — DIVALPROEX SODIUM 500 MILLIGRAM(S): 500 TABLET, DELAYED RELEASE ORAL at 09:14

## 2021-06-19 RX ADMIN — MONTELUKAST 10 MILLIGRAM(S): 4 TABLET, CHEWABLE ORAL at 11:40

## 2021-06-19 RX ADMIN — Medication 100 MILLIGRAM(S): at 07:04

## 2021-06-19 RX ADMIN — MOMETASONE FUROATE 2 PUFF(S): 220 INHALANT RESPIRATORY (INHALATION) at 21:12

## 2021-06-19 RX ADMIN — Medication 100 MILLIGRAM(S): at 17:22

## 2021-06-19 RX ADMIN — CARVEDILOL PHOSPHATE 12.5 MILLIGRAM(S): 80 CAPSULE, EXTENDED RELEASE ORAL at 07:04

## 2021-06-19 RX ADMIN — Medication 975 MILLIGRAM(S): at 20:56

## 2021-06-19 RX ADMIN — TAMSULOSIN HYDROCHLORIDE 0.4 MILLIGRAM(S): 0.4 CAPSULE ORAL at 21:26

## 2021-06-19 RX ADMIN — Medication 30 MILLIGRAM(S): at 07:11

## 2021-06-19 RX ADMIN — Medication 1 MILLIGRAM(S): at 11:40

## 2021-06-19 RX ADMIN — DIVALPROEX SODIUM 500 MILLIGRAM(S): 500 TABLET, DELAYED RELEASE ORAL at 20:18

## 2021-06-19 NOTE — CONSULT NOTE ADULT - ASSESSMENT
A/P: 62 yo M PMH DM2, HTN, HLD, gout, asthma, schizophrenia, hx of suicide attempt in past (swallowed razor blades which required abdominal surgery >10 yrs prior), neurogenic bladder (has been self-cathing 3x daily) presenting     , found to have severe bilateral hydronephrosis with distended bladder on CT scan. WBC 14.1, SCr 1.05 -> 0.88. UA with trace blood.  Bilateral hydro likely secondary to chronic obstruction from poor self cathing regimen/compliance.    Recs: A/P: 60 yo M PMH DM2, HTN, HLD, gout, asthma, schizophrenia, hx of suicide attempt in past (swallowed razor blades which required abdominal surgery >10 yrs prior), neurogenic bladder (has been self-cathing 3x daily) presenting with crampy abd pain and was found to be in urinary retention with b/l hydro and with UTI. Pryor placed and 1.3L of cloudy urine drained. Primary team monitored for post obstructive diursesis. Found to have severe bilateral hydronephrosis with distended bladder and bladder wall thickening on CT scan. WBC 13.2 -> 8.54, SCr 0.82. UCx with GNR.  Bilateral hydro likely secondary to chronic obstruction from poor self cathing regimen/compliance. Pt still with large UOP (>3L daily).    Recs:  - Would keep pryor catheter for now  - follow up urine culture final results  - continue antibiotics for treatment of UTI. Can narrow based on sensitivities  - continue to monitor for Post Obstructive Diuresis    - Please trend BMP to monitor electrolytes.   - Replete electrolytes as necessary based on labs  - please have two pitchers of water at the bedside at all times and encourage the patient to drink to thirst. If pt unable to drink to thirst, may replete fluids at a rate of half the hourly output with 1/2NS.  - If you have any further questions or concerns, please feel free to page the Urology Department at [phone]   - Pt wishes to follow up with outpatient urologist after discharge.  - May consider repeat imaging in a few days to check that hydronephrosis is resolving. Renal US is sufficient.     * post-obstructive diuresis*  -Maintain strict intake and output  -POD = >200cc of urine/hr for 3 or more consecutive hours  -Allow patient to drink to thirst.  Keep two pitchers of water at the bedside at all times.  -Check q6 BMPs to monitor electrolytes until UOP normalizes

## 2021-06-19 NOTE — PHYSICAL THERAPY INITIAL EVALUATION ADULT - PERTINENT HX OF CURRENT PROBLEM, REHAB EVAL
62 yo M with PMH of Asthma, DM2, HTN, HLD, Gout, asthma, schizophrenia, previous suicide attempt (swallowed razor blades requiring abd surgery >10 yrs ago), neurogenic bladder (self catheterization 3x/daily), p/w abdominal pain. Pt saw urologist and px'd abx but did not start.

## 2021-06-19 NOTE — CHART NOTE - NSCHARTNOTEFT_GEN_A_CORE
[id number]  ALEJANDRO BENITEZ      Notified by RN that patient has had large amount of urine output. Review of I/O's noted a 24 hour net balance of 6.8 Liters. Concern is for post obstructive diuresis. Recommendations at this time include:    -CMP, Mag and phos  -encouraging PO fluid intake  -strict i/o's  -if not improved, f/u with urology      Tami Lee [facility] #[id number]  Department of Medicine [id number]  ALEJANDRO BENITEZ      Notified by RN that patient has had large amount of urine output. Review of I/O's noted a 24 hour net balance of 6.8 Liters. Concern is for possible post obstructive diuresis. Unclear if net negative balance is accurate given inconsistent documentation of PO intake. Recommendations at this time include:    -CMP, Mag and phos  -encouraging PO fluid intake  -strict i/o's  -if not improved, f/u with urology      Tami Lee [facility] #[id number]  Department of Medicine

## 2021-06-19 NOTE — CHART NOTE - NSCHARTNOTEFT_GEN_A_CORE
[id number]  ALEJANDRO BENITEZ        CC:  60 yo M with PMH of Asthma, DM2, HTN, HLD, Gout, asthma, schizophrenia, previous suicide attempt (swallowed razor blades requiring abd surgery >10 yrs ago), neurogenic bladder (self catheterization 3x/daily), presents here with abdominal pain.    HPI:  Notified by RN that patient consumed PO contrast that was ordered for neighboring patient. Patient seen, assessed at bedside and made aware. States that he is feeling "fine", VS stable. Denies headache, dizziness, CP, SOB, abdominal  pain, N/V/D, numbness/tingling, extremity weakness, dysuria. Patient states he has received contrast previously with no incident. Discussed possible allergic reactions associated with contrast such as pruritus, urticaria, angioedema, anaphylaxis  and side effects that may include constipation/diarrhea. Spoke with hospitalist,  Dr. Hood, regarding PO contrast and agreed to continue to monitor for now.     -Primary Team to follow up in AM, attending to follow         Vital Signs Last 24 Hrs  T(C): 37.4 (18 Jun 2021 21:52), Max: 37.4 (18 Jun 2021 21:52)  T(F): 99.3 (18 Jun 2021 21:52), Max: 99.3 (18 Jun 2021 21:52)  HR: 80 (18 Jun 2021 22:53) (73 - 81)  BP: 147/91 (18 Jun 2021 22:53) (110/69 - 183/92)  BP(mean): --  RR: 20 (18 Jun 2021 21:52) (18 - 20)  SpO2: 94% (18 Jun 2021 21:52) (92% - 95%)        Tami Lee [facility]  Dept of Medicine                While in ED, patient had an episode of fall.  He says he was trying to get the nurse, but was unsuccessful.  So when he tried to get out of bed, he slipped on something and fell on his buttock.  ED staff helped him get up and patient was able to ambulate afterwards without any pain.       (17 Jun 2021 20:49)        -  -  -Will continue to closely monitor patient/vitals

## 2021-06-19 NOTE — PROGRESS NOTE ADULT - SUBJECTIVE AND OBJECTIVE BOX
Mohsin Khan, MD  Attending Physician, Division Of Hospital Medicine  Pager: [phone], Office: [phone]  Off hour pager: [phone]    Patient is a 61y old  Male who presents with a chief complaint of abdominal pain     SUBJECTIVE / OVERNIGHT EVENTS:  Seen, examined the patient this am  Resting in bed, afebrile- Tmax 99F, has pryor, feels better, no N/V or abdominal pain,. Hemodynamically stable      MEDICATIONS  (STANDING):  allopurinol 100 milliGRAM(s) Oral two times a day  atorvastatin 20 milliGRAM(s) Oral at bedtime  benztropine 1 milliGRAM(s) Oral daily  carvedilol 12.5 milliGRAM(s) Oral every 12 hours  cefTRIAXone   IVPB 1000 milliGRAM(s) IV Intermittent every 24 hours  dextrose 40% Gel 15 Gram(s) Oral once  dextrose 5%. 1000 milliLiter(s) (50 mL/Hr) IV Continuous <Continuous>  dextrose 5%. 1000 milliLiter(s) (100 mL/Hr) IV Continuous <Continuous>  dextrose 50% Injectable 25 Gram(s) IV Push once  dextrose 50% Injectable 12.5 Gram(s) IV Push once  dextrose 50% Injectable 25 Gram(s) IV Push once  diVALproex  milliGRAM(s) Oral <User Schedule>  enalapril 20 milliGRAM(s) Oral daily  enoxaparin Injectable 40 milliGRAM(s) SubCutaneous daily  FLUoxetine 20 milliGRAM(s) Oral daily  furosemide    Tablet 40 milliGRAM(s) Oral daily  glucagon  Injectable 1 milliGRAM(s) IntraMuscular once  insulin lispro (ADMELOG) corrective regimen sliding scale   SubCutaneous three times a day before meals  insulin lispro (ADMELOG) corrective regimen sliding scale   SubCutaneous at bedtime  montelukast 10 milliGRAM(s) Oral daily  NIFEdipine XL 30 milliGRAM(s) Oral two times a day  risperiDONE   Tablet 4 milliGRAM(s) Oral at bedtime  tamsulosin 0.4 milliGRAM(s) Oral at bedtime    MEDICATIONS  (PRN):      Vital Signs Last 24 Hrs  T(C): 36.5 (2021 04:37), Max: 37.4 (2021 21:52)  T(F): 97.7 (2021 04:37), Max: 99.3 (2021 21:52)  HR: 72 (2021 04:37) (72 - 81)  BP: 112/75 (2021 04:37) (112/75 - 183/92)  BP(mean): --  RR: 20 (2021 04:37) (18 - 20)  SpO2: 88% (2021 04:37) (88% - 95%)  CAPILLARY BLOOD GLUCOSE      POCT Blood Glucose.: 122 mg/dL (2021 08:23)  POCT Blood Glucose.: 151 mg/dL (2021 22:27)  POCT Blood Glucose.: 129 mg/dL (2021 17:37)  POCT Blood Glucose.: 138 mg/dL (2021 12:34)    I&O's Summary    2021 07:01  -  2021 07:00  --------------------------------------------------------  IN: 2290 mL / OUT: 9600 mL / NET: -7310 mL        PHYSICAL EXAM:-  GENERAL: NAD, well-developed  EYES: EOMI, PERRLA, conjunctiva and sclera clear  NECK: Supple, No JVD, no thyromegaly  CHEST/LUNG: Clear to auscultation bilaterally; No wheeze  HEART: Regular rate and rhythm; S1, S2 audible, No murmurs, rubs, or gallops  ABDOMEN: Soft, Nontender, Nondistended; Bowel sounds present  EXTREMITIES:  2+ Peripheral Pulses, No clubbing, cyanosis, or edema  NEURO: AAOx3, no focal deficit      LABS:                        11.5   8.54  )-----------( 212      ( 2021 07:29 )             35.4     06-19    134<L>  |  96  |  12  ----------------------------<  131<H>  3.8   |  26  |  0.82    Ca    8.9      2021 07:29  Phos  3.6     06-19  Mg     2.3     06-19    TPro  6.5  /  Alb  3.2<L>  /  TBili  0.5  /  DBili  x   /  AST  11  /  ALT  7<L>  /  AlkPhos  64  -18      Urinalysis Basic - ( 2021 16:08 )    Color: Yellow / Appearance: Slightly Turbid / S.006 / pH: x  Gluc: x / Ketone: Negative  / Bili: Negative / Urobili: Negative   Blood: x / Protein: 30 mg/dL / Nitrite: Negative   Leuk Esterase: Large / RBC: 2 /hpf /  /HPF   Sq Epi: x / Non Sq Epi: 0 /hpf / Bacteria: Many        RADIOLOGY & ADDITIONAL TESTS:    Imaging Personally Reviewed: CT abd/pelvis

## 2021-06-19 NOTE — PHYSICAL THERAPY INITIAL EVALUATION ADULT - ADDITIONAL COMMENTS
Pt lives with wife and young child  in a private house with 1 step to enter, and living area all one level.

## 2021-06-19 NOTE — PHYSICAL THERAPY INITIAL EVALUATION ADULT - PRECAUTIONS/LIMITATIONS, REHAB EVAL
In ED pt found to have retetion, so pryor placed; + UTI and sever hydronephrosis. Pt also fell in ED and ambulatory, no injuries noted./fall precautions In ED pt found to have retention, so pryor placed; + UTI and sever hydronephrosis. Pt also fell in ED and ambulatory, no injuries noted./fall precautions

## 2021-06-19 NOTE — CONSULT NOTE ADULT - SUBJECTIVE AND OBJECTIVE BOX
HPI: 61M PMH DM2, HTN, HLD, gout, asthma, schizophrenia, hx of suicide attempt in past (swallowed razor blades which required abdominal surgery >10 yrs prior), neurogenic bladder (has been self-cathing 3x daily) presenting     Follows with urologist Dr. Chu at [facility]. Has a history of neurogenic bladder secondary to poorly controlled diabetes. Pt has been self cathing for the past twelve years. Pt is supposed to cath 3 times a day, but admits to not always being complaint with catheterization. Has had urinary tract infections in the past requiring admission to hospital (last a few months ago at [facility]).    PAST MEDICAL & SURGICAL HISTORY:  History of Schizophrenia    HTN (Hypertension)    Hyperlipidemia    Gout    Anxiety    Asthma    History of Tonsillectomy    S/P Laparotomy    H/O knee surgery        MEDICATIONS  (STANDING):  allopurinol 100 milliGRAM(s) Oral two times a day  atorvastatin 20 milliGRAM(s) Oral at bedtime  benztropine 1 milliGRAM(s) Oral daily  carvedilol 12.5 milliGRAM(s) Oral every 12 hours  cefTRIAXone   IVPB 1000 milliGRAM(s) IV Intermittent every 24 hours  dextrose 40% Gel 15 Gram(s) Oral once  dextrose 5%. 1000 milliLiter(s) (50 mL/Hr) IV Continuous <Continuous>  dextrose 5%. 1000 milliLiter(s) (100 mL/Hr) IV Continuous <Continuous>  dextrose 50% Injectable 25 Gram(s) IV Push once  dextrose 50% Injectable 12.5 Gram(s) IV Push once  dextrose 50% Injectable 25 Gram(s) IV Push once  diVALproex  milliGRAM(s) Oral <User Schedule>  enalapril 20 milliGRAM(s) Oral daily  enoxaparin Injectable 40 milliGRAM(s) SubCutaneous daily  FLUoxetine 20 milliGRAM(s) Oral daily  furosemide    Tablet 40 milliGRAM(s) Oral daily  glucagon  Injectable 1 milliGRAM(s) IntraMuscular once  insulin lispro (ADMELOG) corrective regimen sliding scale   SubCutaneous three times a day before meals  insulin lispro (ADMELOG) corrective regimen sliding scale   SubCutaneous at bedtime  montelukast 10 milliGRAM(s) Oral daily  NIFEdipine XL 30 milliGRAM(s) Oral two times a day  risperiDONE   Tablet 4 milliGRAM(s) Oral at bedtime  tamsulosin 0.4 milliGRAM(s) Oral at bedtime    MEDICATIONS  (PRN):      FAMILY HISTORY:  FH: heart disease (Father)    FH: diabetes mellitus (Mother)        Allergies    No Known Allergies    Intolerances        SOCIAL HISTORY:    REVIEW OF SYSTEMS: Otherwise negative as stated in HPI    Physical Exam  Vital signs  T(C): 36.5 (21 @ 04:37), Max: 37.4 (21 @ 21:52)  HR: 72 (21 @ 04:37)  BP: 112/75 (21 @ 04:37)  SpO2: 88% (21 @ 04:37)    Output    OUT:    Indwelling Catheter - Urethral (mL): 9600 mL  Total OUT: 9600 mL    Total NET: -9600 mL      OUT:    Indwelling Catheter - Urethral (mL): 2150 mL  Total OUT: 2150 mL    Total NET: -2150 mL    Gen:   Pulm:  Abd:  Back:  :       LABS:                        11.5   8.54  )-----------( 212      ( 2021 07:29 )             35.4           134<L>  |  96  |  12  ----------------------------<  131<H>  3.8   |  26  |  0.82    Ca    8.9      2021 07:29  Phos  3.6       Mg     2.3         TPro  6.5  /  Alb  3.2<L>  /  TBili  0.5  /  DBili  x   /  AST  11  /  ALT  7<L>  /  AlkPhos  64  -18      Urinalysis Basic - ( 2021 16:08 )    Color: Yellow / Appearance: Slightly Turbid / S.006 / pH: x  Gluc: x / Ketone: Negative  / Bili: Negative / Urobili: Negative   Blood: x / Protein: 30 mg/dL / Nitrite: Negative   Leuk Esterase: Large / RBC: 2 /hpf /  /HPF   Sq Epi: x / Non Sq Epi: 0 /hpf / Bacteria: Many        Urine Cx: Culture - Urine (21 @ 18:57)    Specimen Source: .Urine Clean Catch (Midstream)    Culture Results:   >100,000 CFU/ml Gram Negative Rods    RADIOLOGY:  < from: CT Abdomen and Pelvis w/ IV Cont (21 @ 18:27) >  EXAM:  CT ABDOMEN AND PELVIS IC                            PROCEDURE DATE:  2021            INTERPRETATION:  CLINICAL INFORMATION: Abdominal pain and diarrhea. Prior abdominal surgery. History of neurogenic bladder with self-catheterization.    COMPARISON: CT abdomen pelvis 2021.    CONTRAST/COMPLICATIONS:  IV Contrast: Omnipaque 350  90 cc administered   10 cc discarded  Oral Contrast: None  Complications: None reported at study completion    PROCEDURE:  CT of the Abdomen and Pelviswas performed.  Sagittal and coronal reformats were performed.    FINDINGS:  LOWER CHEST: Bibasilar linear scarring versus atelectasis.    LIVER: A few scattered subcentimeter hypodensities too small to characterize.  BILE DUCTS: Normal caliber.  GALLBLADDER: Cholelithiasis.  SPLEEN: Within normal limits.  PANCREAS: Within normal limits.  ADRENALS: Within normal limits.  KIDNEYS/URETERS: Redemonstrated severe bilateral hydroureteronephrosis, unchanged when compared with 2021.    BLADDER: Circumferential bladder wall thickening. Trabeculation and small bladder diverticulum at the dome.  REPRODUCTIVE ORGANS: Prostate gland is normal in size.    BOWEL: No bowel obstruction. Appendix is normal.  PERITONEUM: No ascites.  VESSELS: Atherosclerotic changes.  RETROPERITONEUM/LYMPH NODES: No lymphadenopathy.  ABDOMINAL WALL: Small bilateral fat-containing inguinal hernias.  BONES: Degenerative changes.    IMPRESSION:  No significant interval change when compared with CT abdomen pelvis 2021.  Redemonstrated severe bilateral hydroureteronephrosis, unchanged.      JULIUS LIRA MD; Resident Radiology  This document has been electronically signed.  OLIVE SEN MD; Attending Radiologist  This document has been electronicallysigned. 2021  7:05PM    < end of copied text >     HPI: 61M PMH DM2, HTN, HLD, gout, asthma, schizophrenia, hx of suicide attempt in past (swallowed razor blades which required abdominal surgery >10 yrs prior), neurogenic bladder (has been self-cathing 3x daily) presented to ED with crampy abdominal pain after drinking coffee and was found to have a UTI and was subsequently admitted. Follows with urologist Dr. Chu at [facility]. Has a history of neurogenic bladder secondary to poorly controlled diabetes. Pt has been self cathing for the past twelve years. Pt is supposed to cath 3 times a day, but admits to not always being complaint with catheterization. Has had urinary tract infections in the past requiring admission to hospital (last a few months ago at [facility]). Patient states about one week ago he noticed cloudy urine and foul smelling urine.  On Wednesday, he provided urine sample to his Urologist, who sent him abx for urine infection, but patient did not take it yet. Pt had one episode of abd pain on Thursday after drinking coffe (which he believes "was poisoned by political enemies") and diarrhea and was sent to hospital for evaluation. Pt noticed decreased appetite over the last few days.  Denies fever, chills, cough, SOB, nausea or vomiting, flank pain, hematuria or other acute complaints. In ED, pryor, placed with 1.3L of cloudy urine draining.    PAST MEDICAL & SURGICAL HISTORY:  History of Schizophrenia    HTN (Hypertension)    Hyperlipidemia    Gout    Anxiety    Asthma    History of Tonsillectomy    S/P Laparotomy    H/O knee surgery        MEDICATIONS  (STANDING):  allopurinol 100 milliGRAM(s) Oral two times a day  atorvastatin 20 milliGRAM(s) Oral at bedtime  benztropine 1 milliGRAM(s) Oral daily  carvedilol 12.5 milliGRAM(s) Oral every 12 hours  cefTRIAXone   IVPB 1000 milliGRAM(s) IV Intermittent every 24 hours  dextrose 40% Gel 15 Gram(s) Oral once  dextrose 5%. 1000 milliLiter(s) (50 mL/Hr) IV Continuous <Continuous>  dextrose 5%. 1000 milliLiter(s) (100 mL/Hr) IV Continuous <Continuous>  dextrose 50% Injectable 25 Gram(s) IV Push once  dextrose 50% Injectable 12.5 Gram(s) IV Push once  dextrose 50% Injectable 25 Gram(s) IV Push once  diVALproex  milliGRAM(s) Oral <User Schedule>  enalapril 20 milliGRAM(s) Oral daily  enoxaparin Injectable 40 milliGRAM(s) SubCutaneous daily  FLUoxetine 20 milliGRAM(s) Oral daily  furosemide    Tablet 40 milliGRAM(s) Oral daily  glucagon  Injectable 1 milliGRAM(s) IntraMuscular once  insulin lispro (ADMELOG) corrective regimen sliding scale   SubCutaneous three times a day before meals  insulin lispro (ADMELOG) corrective regimen sliding scale   SubCutaneous at bedtime  montelukast 10 milliGRAM(s) Oral daily  NIFEdipine XL 30 milliGRAM(s) Oral two times a day  risperiDONE   Tablet 4 milliGRAM(s) Oral at bedtime  tamsulosin 0.4 milliGRAM(s) Oral at bedtime    MEDICATIONS  (PRN):      FAMILY HISTORY:  FH: heart disease (Father)    FH: diabetes mellitus (Mother)        Allergies    No Known Allergies    Intolerances        SOCIAL HISTORY:    REVIEW OF SYSTEMS: Otherwise negative as stated in HPI    Physical Exam  Vital signs  T(C): 36.5 (21 @ 04:37), Max: 37.4 (21 @ 21:52)  HR: 72 (21 @ 04:37)  BP: 112/75 (21 @ 04:37)  SpO2: 88% (21 @ 04:37)    Output    OUT:    Indwelling Catheter - Urethral (mL): 9600 mL  Total OUT: 9600 mL    Total NET: -9600 mL      OUT:    Indwelling Catheter - Urethral (mL): 2150 mL  Total OUT: 2150 mL    Total NET: -2150 mL    Gen: No Acute distress  Pulm: no respiratory distress  Abd: obese, soft, nontender. no rebound or guarding. midline scar C/D/I.  Back: no CVAT b/l  : pryor secured with clear yellow urine.       LABS:                        11.5   8.54  )-----------( 212      ( 2021 07:29 )             35.4           134<L>  |  96  |  12  ----------------------------<  131<H>  3.8   |  26  |  0.82    Ca    8.9      2021 07:29  Phos  3.6       Mg     2.3         TPro  6.5  /  Alb  3.2<L>  /  TBili  0.5  /  DBili  x   /  AST  11  /  ALT  7<L>  /  AlkPhos  64        Urinalysis Basic - ( 2021 16:08 )    Color: Yellow / Appearance: Slightly Turbid / S.006 / pH: x  Gluc: x / Ketone: Negative  / Bili: Negative / Urobili: Negative   Blood: x / Protein: 30 mg/dL / Nitrite: Negative   Leuk Esterase: Large / RBC: 2 /hpf /  /HPF   Sq Epi: x / Non Sq Epi: 0 /hpf / Bacteria: Many        Urine Cx: Culture - Urine (21 @ 18:57)    Specimen Source: .Urine Clean Catch (Midstream)    Culture Results:   >100,000 CFU/ml Gram Negative Rods    RADIOLOGY:  < from: CT Abdomen and Pelvis w/ IV Cont (21 @ 18:27) >  EXAM:  CT ABDOMEN AND PELVIS IC                            PROCEDURE DATE:  2021            INTERPRETATION:  CLINICAL INFORMATION: Abdominal pain and diarrhea. Prior abdominal surgery. History of neurogenic bladder with self-catheterization.    COMPARISON: CT abdomen pelvis 2021.    CONTRAST/COMPLICATIONS:  IV Contrast: Omnipaque 350  90 cc administered   10 cc discarded  Oral Contrast: None  Complications: None reported at study completion    PROCEDURE:  CT of the Abdomen and Pelviswas performed.  Sagittal and coronal reformats were performed.    FINDINGS:  LOWER CHEST: Bibasilar linear scarring versus atelectasis.    LIVER: A few scattered subcentimeter hypodensities too small to characterize.  BILE DUCTS: Normal caliber.  GALLBLADDER: Cholelithiasis.  SPLEEN: Within normal limits.  PANCREAS: Within normal limits.  ADRENALS: Within normal limits.  KIDNEYS/URETERS: Redemonstrated severe bilateral hydroureteronephrosis, unchanged when compared with 2021.    BLADDER: Circumferential bladder wall thickening. Trabeculation and small bladder diverticulum at the dome.  REPRODUCTIVE ORGANS: Prostate gland is normal in size.    BOWEL: No bowel obstruction. Appendix is normal.  PERITONEUM: No ascites.  VESSELS: Atherosclerotic changes.  RETROPERITONEUM/LYMPH NODES: No lymphadenopathy.  ABDOMINAL WALL: Small bilateral fat-containing inguinal hernias.  BONES: Degenerative changes.    IMPRESSION:  No significant interval change when compared with CT abdomen pelvis 2021.  Redemonstrated severe bilateral hydroureteronephrosis, unchanged.      JULIUS LIRA MD; Resident Radiology  This document has been electronically signed.  OLIVE SEN MD; Attending Radiologist  This document has been electronicallysigned. 2021  7:05PM    < end of copied text >

## 2021-06-19 NOTE — PROVIDER CONTACT NOTE (MEDICATION) - ASSESSMENT
A&Ox4, pt requests crestor, refusing atorvastatin A&Ox4, pt requests crestor, refusing atorvastatin, reason of change of med not documented

## 2021-06-19 NOTE — PROVIDER CONTACT NOTE (OTHER) - REASON
UC positive for e.coli I have personally seen and examined this patient. I have fully participated in the care of this patient. I have reviewed all pertinent clinical information, including history physical exam, plan and the Resident's note and agree except as noted

## 2021-06-19 NOTE — PROVIDER CONTACT NOTE (MEDICATION) - ACTION/TREATMENT ORDERED:
Will ask day team if pt can have crestor tomorrow night Will ask day team if pt can have crestor tomorrow night,

## 2021-06-20 LAB
-  AMIKACIN: SIGNIFICANT CHANGE UP
-  AMOXICILLIN/CLAVULANIC ACID: SIGNIFICANT CHANGE UP
-  AMPICILLIN/SULBACTAM: SIGNIFICANT CHANGE UP
-  AMPICILLIN: SIGNIFICANT CHANGE UP
-  AZTREONAM: SIGNIFICANT CHANGE UP
-  CEFAZOLIN: SIGNIFICANT CHANGE UP
-  CEFEPIME: SIGNIFICANT CHANGE UP
-  CEFOXITIN: SIGNIFICANT CHANGE UP
-  CEFTRIAXONE: SIGNIFICANT CHANGE UP
-  CIPROFLOXACIN: SIGNIFICANT CHANGE UP
-  ERTAPENEM: SIGNIFICANT CHANGE UP
-  GENTAMICIN: SIGNIFICANT CHANGE UP
-  IMIPENEM: SIGNIFICANT CHANGE UP
-  LEVOFLOXACIN: SIGNIFICANT CHANGE UP
-  MEROPENEM: SIGNIFICANT CHANGE UP
-  NITROFURANTOIN: SIGNIFICANT CHANGE UP
-  PIPERACILLIN/TAZOBACTAM: SIGNIFICANT CHANGE UP
-  TIGECYCLINE: SIGNIFICANT CHANGE UP
-  TOBRAMYCIN: SIGNIFICANT CHANGE UP
-  TRIMETHOPRIM/SULFAMETHOXAZOLE: SIGNIFICANT CHANGE UP
ANION GAP SERPL CALC-SCNC: 14 MMOL/L — SIGNIFICANT CHANGE UP (ref 5–17)
BUN SERPL-MCNC: 11 MG/DL — SIGNIFICANT CHANGE UP (ref 7–23)
CALCIUM SERPL-MCNC: 9.4 MG/DL — SIGNIFICANT CHANGE UP (ref 8.4–10.5)
CHLORIDE SERPL-SCNC: 96 MMOL/L — SIGNIFICANT CHANGE UP (ref 96–108)
CO2 SERPL-SCNC: 25 MMOL/L — SIGNIFICANT CHANGE UP (ref 22–31)
CREAT SERPL-MCNC: 0.77 MG/DL — SIGNIFICANT CHANGE UP (ref 0.5–1.3)
CULTURE RESULTS: SIGNIFICANT CHANGE UP
GLUCOSE SERPL-MCNC: 126 MG/DL — HIGH (ref 70–99)
HCT VFR BLD CALC: 37.3 % — LOW (ref 39–50)
HGB BLD-MCNC: 11.8 G/DL — LOW (ref 13–17)
MCHC RBC-ENTMCNC: 28.1 PG — SIGNIFICANT CHANGE UP (ref 27–34)
MCHC RBC-ENTMCNC: 31.6 GM/DL — LOW (ref 32–36)
MCV RBC AUTO: 88.8 FL — SIGNIFICANT CHANGE UP (ref 80–100)
METHOD TYPE: SIGNIFICANT CHANGE UP
NRBC # BLD: 0 /100 WBCS — SIGNIFICANT CHANGE UP (ref 0–0)
ORGANISM # SPEC MICROSCOPIC CNT: SIGNIFICANT CHANGE UP
ORGANISM # SPEC MICROSCOPIC CNT: SIGNIFICANT CHANGE UP
PLATELET # BLD AUTO: 231 K/UL — SIGNIFICANT CHANGE UP (ref 150–400)
POTASSIUM SERPL-MCNC: 3.7 MMOL/L — SIGNIFICANT CHANGE UP (ref 3.5–5.3)
POTASSIUM SERPL-SCNC: 3.7 MMOL/L — SIGNIFICANT CHANGE UP (ref 3.5–5.3)
RBC # BLD: 4.2 M/UL — SIGNIFICANT CHANGE UP (ref 4.2–5.8)
RBC # FLD: 13.8 % — SIGNIFICANT CHANGE UP (ref 10.3–14.5)
SODIUM SERPL-SCNC: 135 MMOL/L — SIGNIFICANT CHANGE UP (ref 135–145)
SPECIMEN SOURCE: SIGNIFICANT CHANGE UP
WBC # BLD: 9.34 K/UL — SIGNIFICANT CHANGE UP (ref 3.8–10.5)
WBC # FLD AUTO: 9.34 K/UL — SIGNIFICANT CHANGE UP (ref 3.8–10.5)

## 2021-06-20 PROCEDURE — 99232 SBSQ HOSP IP/OBS MODERATE 35: CPT

## 2021-06-20 RX ORDER — POLYETHYLENE GLYCOL 3350 17 G/17G
17 POWDER, FOR SOLUTION ORAL ONCE
Refills: 0 | Status: COMPLETED | OUTPATIENT
Start: 2021-06-20 | End: 2021-06-21

## 2021-06-20 RX ADMIN — MOMETASONE FUROATE 2 PUFF(S): 220 INHALANT RESPIRATORY (INHALATION) at 12:31

## 2021-06-20 RX ADMIN — MONTELUKAST 10 MILLIGRAM(S): 4 TABLET, CHEWABLE ORAL at 12:31

## 2021-06-20 RX ADMIN — Medication 100 MILLIGRAM(S): at 06:44

## 2021-06-20 RX ADMIN — Medication 100 MILLIGRAM(S): at 17:05

## 2021-06-20 RX ADMIN — ATORVASTATIN CALCIUM 20 MILLIGRAM(S): 80 TABLET, FILM COATED ORAL at 21:35

## 2021-06-20 RX ADMIN — TAMSULOSIN HYDROCHLORIDE 0.4 MILLIGRAM(S): 0.4 CAPSULE ORAL at 21:35

## 2021-06-20 RX ADMIN — Medication 20 MILLIGRAM(S): at 06:44

## 2021-06-20 RX ADMIN — DIVALPROEX SODIUM 500 MILLIGRAM(S): 500 TABLET, DELAYED RELEASE ORAL at 20:46

## 2021-06-20 RX ADMIN — RISPERIDONE 4 MILLIGRAM(S): 4 TABLET ORAL at 21:35

## 2021-06-20 RX ADMIN — Medication 30 MILLIGRAM(S): at 06:44

## 2021-06-20 RX ADMIN — CEFTRIAXONE 100 MILLIGRAM(S): 500 INJECTION, POWDER, FOR SOLUTION INTRAMUSCULAR; INTRAVENOUS at 13:50

## 2021-06-20 RX ADMIN — CARVEDILOL PHOSPHATE 12.5 MILLIGRAM(S): 80 CAPSULE, EXTENDED RELEASE ORAL at 06:44

## 2021-06-20 RX ADMIN — DIVALPROEX SODIUM 500 MILLIGRAM(S): 500 TABLET, DELAYED RELEASE ORAL at 08:54

## 2021-06-20 RX ADMIN — Medication 30 MILLIGRAM(S): at 17:05

## 2021-06-20 RX ADMIN — CARVEDILOL PHOSPHATE 12.5 MILLIGRAM(S): 80 CAPSULE, EXTENDED RELEASE ORAL at 17:05

## 2021-06-20 RX ADMIN — Medication 40 MILLIGRAM(S): at 06:44

## 2021-06-20 RX ADMIN — Medication 20 MILLIGRAM(S): at 12:31

## 2021-06-20 RX ADMIN — Medication 1 MILLIGRAM(S): at 12:31

## 2021-06-20 NOTE — PROGRESS NOTE ADULT - SUBJECTIVE AND OBJECTIVE BOX
Mohsin Khan, MD  Attending Physician, Division Of Hospital Medicine  Pager: [phone], Office: [phone]  Off hour pager: [phone]    Patient is a 61y old  Male who presents with a chief complaint of abdominal pain    SUBJECTIVE / OVERNIGHT EVENTS:  Seen, examined the patient     MEDICATIONS  (STANDING):  allopurinol 100 milliGRAM(s) Oral two times a day  atorvastatin 20 milliGRAM(s) Oral at bedtime  benztropine 1 milliGRAM(s) Oral daily  carvedilol 12.5 milliGRAM(s) Oral every 12 hours  cefTRIAXone   IVPB 1000 milliGRAM(s) IV Intermittent every 24 hours  dextrose 40% Gel 15 Gram(s) Oral once  dextrose 5%. 1000 milliLiter(s) (50 mL/Hr) IV Continuous <Continuous>  dextrose 5%. 1000 milliLiter(s) (100 mL/Hr) IV Continuous <Continuous>  dextrose 50% Injectable 25 Gram(s) IV Push once  dextrose 50% Injectable 12.5 Gram(s) IV Push once  dextrose 50% Injectable 25 Gram(s) IV Push once  diVALproex  milliGRAM(s) Oral <User Schedule>  enalapril 20 milliGRAM(s) Oral daily  enoxaparin Injectable 40 milliGRAM(s) SubCutaneous daily  FLUoxetine 20 milliGRAM(s) Oral daily  furosemide    Tablet 40 milliGRAM(s) Oral daily  glucagon  Injectable 1 milliGRAM(s) IntraMuscular once  insulin lispro (ADMELOG) corrective regimen sliding scale   SubCutaneous three times a day before meals  insulin lispro (ADMELOG) corrective regimen sliding scale   SubCutaneous at bedtime  mometasone 220 MICROgram(s) Inhaler 2 Puff(s) Inhalation daily  montelukast 10 milliGRAM(s) Oral daily  NIFEdipine XL 30 milliGRAM(s) Oral two times a day  risperiDONE   Tablet 4 milliGRAM(s) Oral at bedtime  tamsulosin 0.4 milliGRAM(s) Oral at bedtime    MEDICATIONS  (PRN):      Vital Signs Last 24 Hrs  T(C): 36.3 (20 Jun 2021 05:27), Max: 37.2 (19 Jun 2021 20:45)  T(F): 97.4 (20 Jun 2021 05:27), Max: 98.9 (19 Jun 2021 20:45)  HR: 73 (20 Jun 2021 05:27) (72 - 83)  BP: 127/79 (20 Jun 2021 05:27) (127/79 - 157/97)  BP(mean): --  RR: 18 (20 Jun 2021 05:27) (18 - 20)  SpO2: 96% (20 Jun 2021 05:27) (93% - 96%)  CAPILLARY BLOOD GLUCOSE      POCT Blood Glucose.: 110 mg/dL (20 Jun 2021 08:38)  POCT Blood Glucose.: 114 mg/dL (19 Jun 2021 21:23)  POCT Blood Glucose.: 107 mg/dL (19 Jun 2021 17:16)  POCT Blood Glucose.: 134 mg/dL (19 Jun 2021 13:06)    I&O's Summary    19 Jun 2021 07:01  -  20 Jun 2021 07:00  --------------------------------------------------------  IN: 1300 mL / OUT: 68223 mL / NET: -16849 mL    20 Jun 2021 07:01  -  20 Jun 2021 11:20  --------------------------------------------------------  IN: 0 mL / OUT: 3600 mL / NET: -3600 mL        PHYSICAL EXAM:-  GENERAL: NAD, well-developed  EYES: EOMI, PERRLA, conjunctiva and sclera clNECK: Supple, No JVD, no thyromegaly  CHEST/LUNG: Clear to auscultation bilaterally; No wheeze  HEART: Regular rate and rhythm; S1, S2 audible, No murmurs, rubs, or gallops  ABDOMEN: Soft, Nontender, Nondistended; Bowel sounds present  EXTREMITIES:  2+ Peripheral Pulses, No clubbing, cyanosis, or edema  NEURO: AAOx3, no focal deficit      LABS:                        11.8   9.34  )-----------( 231      ( 20 Jun 2021 06:50 )             37.3     06-20    135  |  96  |  11  ----------------------------<  126<H>  3.7   |  25  |  0.77    Ca    9.4      20 Jun 2021 06:50  Phos  3.6     06-19  Mg     2.3     06-19    RADIOLOGY & ADDITIONAL TESTS:    Imaging Personally Reviewed: CXR  Consultant(s) Notes Reviewed:

## 2021-06-20 NOTE — PROGRESS NOTE ADULT - SUBJECTIVE AND OBJECTIVE BOX
Mohsin Khan, MD  Attending Physician, Division Of Hospital Medicine  Pager: [phone], Office: [phone]  Off hour pager: [phone]    Patient is a 61y old  Male who presents with a chief complaint of abdominal pain     SUBJECTIVE / OVERNIGHT EVENTS:  Seen, examined the patient this am  Resting in bed, feels ok now, afebrile, no abdominal pain, has Barnes, hemodynamically stable    MEDICATIONS  (STANDING):  allopurinol 100 milliGRAM(s) Oral two times a day  atorvastatin 20 milliGRAM(s) Oral at bedtime  benztropine 1 milliGRAM(s) Oral daily  carvedilol 12.5 milliGRAM(s) Oral every 12 hours  cefTRIAXone   IVPB 1000 milliGRAM(s) IV Intermittent every 24 hours  dextrose 40% Gel 15 Gram(s) Oral once  dextrose 5%. 1000 milliLiter(s) (50 mL/Hr) IV Continuous <Continuous>  dextrose 5%. 1000 milliLiter(s) (100 mL/Hr) IV Continuous <Continuous>  dextrose 50% Injectable 25 Gram(s) IV Push once  dextrose 50% Injectable 12.5 Gram(s) IV Push once  dextrose 50% Injectable 25 Gram(s) IV Push once  diVALproex  milliGRAM(s) Oral <User Schedule>  enalapril 20 milliGRAM(s) Oral daily  enoxaparin Injectable 40 milliGRAM(s) SubCutaneous daily  FLUoxetine 20 milliGRAM(s) Oral daily  furosemide    Tablet 40 milliGRAM(s) Oral daily  glucagon  Injectable 1 milliGRAM(s) IntraMuscular once  insulin lispro (ADMELOG) corrective regimen sliding scale   SubCutaneous three times a day before meals  insulin lispro (ADMELOG) corrective regimen sliding scale   SubCutaneous at bedtime  mometasone 220 MICROgram(s) Inhaler 2 Puff(s) Inhalation daily  montelukast 10 milliGRAM(s) Oral daily  NIFEdipine XL 30 milliGRAM(s) Oral two times a day  risperiDONE   Tablet 4 milliGRAM(s) Oral at bedtime  tamsulosin 0.4 milliGRAM(s) Oral at bedtime    MEDICATIONS  (PRN):      Vital Signs Last 24 Hrs  T(C): 36.3 (20 Jun 2021 05:27), Max: 37.2 (19 Jun 2021 20:45)  T(F): 97.4 (20 Jun 2021 05:27), Max: 98.9 (19 Jun 2021 20:45)  HR: 73 (20 Jun 2021 05:27) (72 - 83)  BP: 127/79 (20 Jun 2021 05:27) (127/79 - 157/97)  BP(mean): --  RR: 18 (20 Jun 2021 05:27) (18 - 20)  SpO2: 96% (20 Jun 2021 05:27) (93% - 96%)  CAPILLARY BLOOD GLUCOSE      POCT Blood Glucose.: 110 mg/dL (20 Jun 2021 08:38)  POCT Blood Glucose.: 114 mg/dL (19 Jun 2021 21:23)  POCT Blood Glucose.: 107 mg/dL (19 Jun 2021 17:16)  POCT Blood Glucose.: 134 mg/dL (19 Jun 2021 13:06)    I&O's Summary    19 Jun 2021 07:01  -  20 Jun 2021 07:00  --------------------------------------------------------  IN: 1300 mL / OUT: 87890 mL / NET: -39895 mL    20 Jun 2021 07:01  -  20 Jun 2021 11:59  --------------------------------------------------------  IN: 0 mL / OUT: 3600 mL / NET: -3600 mL        PHYSICAL EXAM:-  GENERAL: NAD, well-developed  EYES: EOMI, PERRLA, conjunctiva and sclera clear  NECK: Supple, No JVD, no thyromegaly  CHEST/LUNG: Clear to auscultation bilaterally; No wheeze  HEART: Regular rate and rhythm; S1, S2 audible, No murmurs, rubs, or gallops  ABDOMEN: Soft, Nontender, Nondistended; Bowel sounds present, Barnes in place  EXTREMITIES:  2+ Peripheral Pulses, No clubbing, cyanosis, or edema  NEURO: AAOx3, no focal deficit      LABS:                        11.8   9.34  )-----------( 231      ( 20 Jun 2021 06:50 )             37.3     06-20    135  |  96  |  11  ----------------------------<  126<H>  3.7   |  25  |  0.77    Ca    9.4      20 Jun 2021 06:50  Phos  3.6     06-19  Mg     2.3     06-19    RADIOLOGY & ADDITIONAL TESTS:    Imaging Personally Reviewed: CT abd/pelvis  Consultant(s) Notes Reviewed: Urology

## 2021-06-20 NOTE — PROGRESS NOTE ADULT - SUBJECTIVE AND OBJECTIVE BOX
Subjective    Objective    Vital signs  T(F): , Max: 98.9 (06-19-21 @ 20:45)  HR: 73 (06-20-21 @ 05:27)  BP: 127/79 (06-20-21 @ 05:27)  SpO2: 96% (06-20-21 @ 05:27)  Wt(kg): --    Output     OUT:    Indwelling Catheter - Urethral (mL): 24067 mL  Total OUT: 35532 mL    Total NET: -62080 mL          Physical Exam  Gen  Abd      Labs      06-20 @ 06:50    WBC 9.34  / Hct 37.3  / SCr 0.77     06-19 @ 19:53    WBC --    / Hct --    / SCr 0.84       Urine Cx: ?  Blood Cx: ?    Imaging Subjective    Seen and examined.   Feeling well, pryor clear yellow.    Objective    Vital signs  T(F): , Max: 98.9 (06-19-21 @ 20:45)  HR: 73 (06-20-21 @ 05:27)  BP: 127/79 (06-20-21 @ 05:27)  SpO2: 96% (06-20-21 @ 05:27)  Wt(kg): --    Output     OUT:    Indwelling Catheter - Urethral (mL): 83049 mL  Total OUT: 01274 mL    Total NET: -66379 mL          Physical Exam  Gen: NAD  Abd: soft NT ND  : pryor draining clear yellow urine     Labs      06-20 @ 06:50    WBC 9.34  / Hct 37.3  / SCr 0.77     06-19 @ 19:53    WBC --    / Hct --    / SCr 0.84

## 2021-06-21 LAB
ANION GAP SERPL CALC-SCNC: 13 MMOL/L — SIGNIFICANT CHANGE UP (ref 5–17)
BUN SERPL-MCNC: 12 MG/DL — SIGNIFICANT CHANGE UP (ref 7–23)
CALCIUM SERPL-MCNC: 9.8 MG/DL — SIGNIFICANT CHANGE UP (ref 8.4–10.5)
CHLORIDE SERPL-SCNC: 96 MMOL/L — SIGNIFICANT CHANGE UP (ref 96–108)
CO2 SERPL-SCNC: 25 MMOL/L — SIGNIFICANT CHANGE UP (ref 22–31)
CREAT SERPL-MCNC: 0.7 MG/DL — SIGNIFICANT CHANGE UP (ref 0.5–1.3)
GLUCOSE SERPL-MCNC: 126 MG/DL — HIGH (ref 70–99)
HCT VFR BLD CALC: 38.5 % — LOW (ref 39–50)
HGB BLD-MCNC: 12.3 G/DL — LOW (ref 13–17)
MCHC RBC-ENTMCNC: 28.3 PG — SIGNIFICANT CHANGE UP (ref 27–34)
MCHC RBC-ENTMCNC: 31.9 GM/DL — LOW (ref 32–36)
MCV RBC AUTO: 88.7 FL — SIGNIFICANT CHANGE UP (ref 80–100)
NRBC # BLD: 0 /100 WBCS — SIGNIFICANT CHANGE UP (ref 0–0)
PLATELET # BLD AUTO: 258 K/UL — SIGNIFICANT CHANGE UP (ref 150–400)
POTASSIUM SERPL-MCNC: 3.8 MMOL/L — SIGNIFICANT CHANGE UP (ref 3.5–5.3)
POTASSIUM SERPL-SCNC: 3.8 MMOL/L — SIGNIFICANT CHANGE UP (ref 3.5–5.3)
RBC # BLD: 4.34 M/UL — SIGNIFICANT CHANGE UP (ref 4.2–5.8)
RBC # FLD: 13.6 % — SIGNIFICANT CHANGE UP (ref 10.3–14.5)
SODIUM SERPL-SCNC: 134 MMOL/L — LOW (ref 135–145)
WBC # BLD: 8.65 K/UL — SIGNIFICANT CHANGE UP (ref 3.8–10.5)
WBC # FLD AUTO: 8.65 K/UL — SIGNIFICANT CHANGE UP (ref 3.8–10.5)

## 2021-06-21 PROCEDURE — 99233 SBSQ HOSP IP/OBS HIGH 50: CPT

## 2021-06-21 RX ORDER — POLYETHYLENE GLYCOL 3350 17 G/17G
17 POWDER, FOR SOLUTION ORAL ONCE
Refills: 0 | Status: COMPLETED | OUTPATIENT
Start: 2021-06-21 | End: 2021-06-21

## 2021-06-21 RX ORDER — BENZOCAINE AND MENTHOL 5; 1 G/100ML; G/100ML
1 LIQUID ORAL
Refills: 0 | Status: DISCONTINUED | OUTPATIENT
Start: 2021-06-21 | End: 2021-06-22

## 2021-06-21 RX ORDER — SALIVA SUBSTITUTE COMB NO.11 351 MG
5 POWDER IN PACKET (EA) MUCOUS MEMBRANE
Refills: 0 | Status: DISCONTINUED | OUTPATIENT
Start: 2021-06-21 | End: 2021-06-22

## 2021-06-21 RX ORDER — SENNA PLUS 8.6 MG/1
2 TABLET ORAL AT BEDTIME
Refills: 0 | Status: DISCONTINUED | OUTPATIENT
Start: 2021-06-21 | End: 2021-06-22

## 2021-06-21 RX ADMIN — ATORVASTATIN CALCIUM 20 MILLIGRAM(S): 80 TABLET, FILM COATED ORAL at 21:27

## 2021-06-21 RX ADMIN — RISPERIDONE 4 MILLIGRAM(S): 4 TABLET ORAL at 21:27

## 2021-06-21 RX ADMIN — Medication 100 MILLIGRAM(S): at 17:36

## 2021-06-21 RX ADMIN — MONTELUKAST 10 MILLIGRAM(S): 4 TABLET, CHEWABLE ORAL at 12:00

## 2021-06-21 RX ADMIN — CARVEDILOL PHOSPHATE 12.5 MILLIGRAM(S): 80 CAPSULE, EXTENDED RELEASE ORAL at 06:36

## 2021-06-21 RX ADMIN — CARVEDILOL PHOSPHATE 12.5 MILLIGRAM(S): 80 CAPSULE, EXTENDED RELEASE ORAL at 17:36

## 2021-06-21 RX ADMIN — Medication 20 MILLIGRAM(S): at 06:36

## 2021-06-21 RX ADMIN — POLYETHYLENE GLYCOL 3350 17 GRAM(S): 17 POWDER, FOR SOLUTION ORAL at 06:36

## 2021-06-21 RX ADMIN — Medication 100 MILLIGRAM(S): at 06:36

## 2021-06-21 RX ADMIN — SENNA PLUS 2 TABLET(S): 8.6 TABLET ORAL at 21:27

## 2021-06-21 RX ADMIN — Medication 20 MILLIGRAM(S): at 12:00

## 2021-06-21 RX ADMIN — MOMETASONE FUROATE 2 PUFF(S): 220 INHALANT RESPIRATORY (INHALATION) at 12:00

## 2021-06-21 RX ADMIN — Medication 1 MILLIGRAM(S): at 12:00

## 2021-06-21 RX ADMIN — TAMSULOSIN HYDROCHLORIDE 0.4 MILLIGRAM(S): 0.4 CAPSULE ORAL at 21:27

## 2021-06-21 RX ADMIN — Medication 40 MILLIGRAM(S): at 06:36

## 2021-06-21 RX ADMIN — DIVALPROEX SODIUM 500 MILLIGRAM(S): 500 TABLET, DELAYED RELEASE ORAL at 08:10

## 2021-06-21 RX ADMIN — POLYETHYLENE GLYCOL 3350 17 GRAM(S): 17 POWDER, FOR SOLUTION ORAL at 18:49

## 2021-06-21 RX ADMIN — ENOXAPARIN SODIUM 40 MILLIGRAM(S): 100 INJECTION SUBCUTANEOUS at 12:00

## 2021-06-21 RX ADMIN — Medication 30 MILLIGRAM(S): at 06:36

## 2021-06-21 RX ADMIN — DIVALPROEX SODIUM 500 MILLIGRAM(S): 500 TABLET, DELAYED RELEASE ORAL at 21:27

## 2021-06-21 RX ADMIN — BENZOCAINE AND MENTHOL 1 LOZENGE: 5; 1 LIQUID ORAL at 14:25

## 2021-06-21 RX ADMIN — Medication 30 MILLIGRAM(S): at 17:36

## 2021-06-21 RX ADMIN — Medication 5 MILLILITER(S): at 17:45

## 2021-06-21 RX ADMIN — CEFTRIAXONE 100 MILLIGRAM(S): 500 INJECTION, POWDER, FOR SOLUTION INTRAMUSCULAR; INTRAVENOUS at 14:25

## 2021-06-21 NOTE — PROGRESS NOTE ADULT - SUBJECTIVE AND OBJECTIVE BOX
PROGRESS NOTE:   Maryann Solitario DO  Hospitalist  Pager [phone]  After 5pm/weekends or if no answer ext: [phone]      Patient is a 61y old  Male who presents with a chief complaint of Pt 62 y/o M with PMH as per chart: asthma, DM2, HTN, HLD, Gout, asthma, schizophrenia, neurogenic bladder (self catheterization 3x/daily), admitted with abdominal pain, found with sepsis 2/2 UTI. (21 Jun 2021 12:08)      SUBJECTIVE / OVERNIGHT EVENTS:    ADDITIONAL REVIEW OF SYSTEMS:    MEDICATIONS  (STANDING):  allopurinol 100 milliGRAM(s) Oral two times a day  atorvastatin 20 milliGRAM(s) Oral at bedtime  benztropine 1 milliGRAM(s) Oral daily  Biotene Dry Mouth Oral Rinse 5 milliLiter(s) Swish and Spit four times a day  carvedilol 12.5 milliGRAM(s) Oral every 12 hours  cefTRIAXone   IVPB 1000 milliGRAM(s) IV Intermittent every 24 hours  dextrose 40% Gel 15 Gram(s) Oral once  dextrose 5%. 1000 milliLiter(s) (50 mL/Hr) IV Continuous <Continuous>  dextrose 5%. 1000 milliLiter(s) (100 mL/Hr) IV Continuous <Continuous>  dextrose 50% Injectable 25 Gram(s) IV Push once  dextrose 50% Injectable 12.5 Gram(s) IV Push once  dextrose 50% Injectable 25 Gram(s) IV Push once  diVALproex  milliGRAM(s) Oral <User Schedule>  enalapril 20 milliGRAM(s) Oral daily  enoxaparin Injectable 40 milliGRAM(s) SubCutaneous daily  FLUoxetine 20 milliGRAM(s) Oral daily  furosemide    Tablet 40 milliGRAM(s) Oral daily  glucagon  Injectable 1 milliGRAM(s) IntraMuscular once  insulin lispro (ADMELOG) corrective regimen sliding scale   SubCutaneous three times a day before meals  insulin lispro (ADMELOG) corrective regimen sliding scale   SubCutaneous at bedtime  mometasone 220 MICROgram(s) Inhaler 2 Puff(s) Inhalation daily  montelukast 10 milliGRAM(s) Oral daily  NIFEdipine XL 30 milliGRAM(s) Oral two times a day  risperiDONE   Tablet 4 milliGRAM(s) Oral at bedtime  tamsulosin 0.4 milliGRAM(s) Oral at bedtime    MEDICATIONS  (PRN):  benzocaine 15 mG/menthol 3.6 mG (Sugar-Free) Lozenge 1 Lozenge Oral every 3 hours PRN dry mouth      CAPILLARY BLOOD GLUCOSE      POCT Blood Glucose.: 112 mg/dL (21 Jun 2021 12:20)  POCT Blood Glucose.: 128 mg/dL (21 Jun 2021 08:31)  POCT Blood Glucose.: 127 mg/dL (20 Jun 2021 21:33)  POCT Blood Glucose.: 149 mg/dL (20 Jun 2021 17:45)    I&O's Summary    20 Jun 2021 07:01  -  21 Jun 2021 07:00  --------------------------------------------------------  IN: 2640 mL / OUT: 03543 mL / NET: -01293 mL    21 Jun 2021 07:01  -  21 Jun 2021 14:40  --------------------------------------------------------  IN: 3000 mL / OUT: 5000 mL / NET: -2000 mL        PHYSICAL EXAM:  Vital Signs Last 24 Hrs  T(C): 36.9 (21 Jun 2021 12:50), Max: 36.9 (20 Jun 2021 20:37)  T(F): 98.5 (21 Jun 2021 12:50), Max: 98.5 (21 Jun 2021 06:12)  HR: 66 (21 Jun 2021 12:50) (66 - 77)  BP: 137/82 (21 Jun 2021 12:50) (122/74 - 151/88)  BP(mean): --  RR: 18 (21 Jun 2021 12:50) (18 - 18)  SpO2: 95% (21 Jun 2021 12:50) (92% - 95%)    CONSTITUTIONAL: NAD, well-developed  RESPIRATORY: Normal respiratory effort; lungs are clear to auscultation bilaterally  CARDIOVASCULAR: Regular rate and rhythm, normal S1 and S2, no murmur/rub/gallop; No lower extremity edema; Peripheral pulses are 2+ bilaterally  ABDOMEN: Nontender to palpation, normoactive bowel sounds, no rebound/guarding; No hepatosplenomegaly  MUSCLOSKELETAL: no clubbing or cyanosis of digits; no joint swelling or tenderness to palpation  PSYCH: A+O to person, place, and time; affect appropriate    LABS:                        12.3   8.65  )-----------( 258      ( 21 Jun 2021 06:45 )             38.5     06-21    134<L>  |  96  |  12  ----------------------------<  126<H>  3.8   |  25  |  0.70    Ca    9.8      21 Jun 2021 06:45                  RADIOLOGY & ADDITIONAL TESTS:  Results Reviewed:   Imaging Personally Reviewed:  Electrocardiogram Personally Reviewed:    COORDINATION OF CARE:  Care Discussed with Consultants/Other Providers [Y/N]:  Prior or Outpatient Records Reviewed [Y/N]:

## 2021-06-21 NOTE — PROGRESS NOTE ADULT - PROBLEM SELECTOR PLAN 2
2/2 neurogenic bladder from poorly controlled DM2  -Does not always straight cath 4 times per day at home but drinks up to 8L per day sometimes more of fluid likely causing excessive urine and hydro.  Now w/ proyr is urinating 8-13L per day likely multifactorial from excessive intake due to dry mouth from medications and post obstructive diuresis.  Pt says he makes excessive amounts of urine at home due to his intake but on last admission recrods makes approx 3L per day.   - no sign of renal failure, electrolytes stable  - urology consult appreciated. Rec- keep pryor until less than 200cc/hr and then  self cath 3-4 times/d  - outpatient f/u w his Urologist Dr. Crowe for chronic hydronephrosis   -Told pt we need to keep him until UOP at least < 5L per day as I suspect he has higher than usual UOP due to his intake.   He says he needs to leave tomorrow may need to be ama w/ pryor and outpt f/u
s/p pryor with 1.3 L cloudy urine drained  - no sign of renal failure  - Will call urology consult- c/w Flomax
s/p pryor with 1.3 L cloudy urine drained  - no sign of renal failure  - urology consult appreciated. Rec- keep pryor few days and then d/c, self cath 3-4 times/d  - outpatient f/u w his Urologist
s/p pryor with 1.3 L cloudy urine drained  - no sign of renal failure  - Will call urology consult- c/w Flomax
s/p pryor with 1.3 L cloudy urine drained  no sign of renal failure  monitor for post obstructive diuresis, 1200cc out overnight monitor daily   monitor BMP, replete electrolytes as needed  Cr baseline 1.02 stable

## 2021-06-21 NOTE — PROGRESS NOTE ADULT - ASSESSMENT
60 yo M with PMH of Asthma, DM2, HTN, HLD, Gout, asthma, schizophrenia, neurogenic bladder (self catheterization 3x/daily), presents here with sepsis 2/2 UTI. 
60 yo M with PMH of Asthma, DM2, HTN, HLD, Gout, asthma, schizophrenia, neurogenic bladder (self catheterization 3x/daily), presents here with sepsis 2/2 UTI and urinary retention w/ hydro now with high urine oupt. 
A/P: 62 yo M PMH DM2, HTN, HLD, gout, asthma, schizophrenia, hx of suicide attempt in past (swallowed razor blades which required abdominal surgery >10 yrs prior), neurogenic bladder (has been self-cathing 3x daily) presenting with crampy abd pain and was found to be in urinary retention with b/l hydro and with UTI. Pryor placed and 1.3L of cloudy urine drained. Primary team monitored for post obstructive diursesis. Found to have severe bilateral hydronephrosis with distended bladder and bladder wall thickening on CT scan. WBC 13.2 -> 8.54, SCr 0.82. UCx with GNR.  Bilateral hydro likely secondary to chronic obstruction from poor self cathing regimen/compliance. Pt still with large UOP (>3L daily).    Recs:  - Patient still in postobstructive diuresis (as defined in consult note)  - Would recommend leaving the catheter in place until urine output falls below 200cc/hr  - Drink to thirst and q6-8hr BMPs  - Patient may eventually be discharged with pryor in place or resume self catheterizing per his preference  - Patient instructed that he MUST catheterize 3-4 times/day to avoid future infections or episodes of retention  - Urology will sign off at this time, please call with any further questions
62 yo M with PMH of Asthma, DM2, HTN, HLD, Gout, asthma, schizophrenia, neurogenic bladder (self catheterization 3x/daily), presents here with sepsis 2/2 UTI. Has urinary retention, on Barnes in ED
60 yo M with PMH of Asthma, DM2, HTN, HLD, Gout, asthma, schizophrenia, neurogenic bladder (self catheterization 3x/daily), presents here with sepsis 2/2 UTI. Has urinary retention, on Barnes in ED
62 yo M with PMH of Asthma, DM2, HTN, HLD, Gout, asthma, schizophrenia, neurogenic bladder (self catheterization 3x/daily), presents here with sepsis 2/2 UTI. Has urinary retention, on Barnes in ED

## 2021-06-21 NOTE — DIETITIAN INITIAL EVALUATION ADULT. - PHYSCIAL ASSESSMENT
Skin: no noted pressure injuries as per documentation.   No visual signs of muscle/fat loss noted. obese

## 2021-06-21 NOTE — PROGRESS NOTE ADULT - PROBLEM SELECTOR PLAN 7
lovenox    Dispo: DC home pending urine cultures
lovenox    Dispo: DC home pending urine cultures
On Lovenox    Dispo: DC home pending improvement in UOP
On Lovenox    Dispo: DC home pending urine cultures
lovenox    Dispo: DC home pending urine cultures

## 2021-06-21 NOTE — DIETITIAN INITIAL EVALUATION ADULT. - ADD RECOMMEND
1. Will continue to monitor PO intake, weight, labs, skin, GI status, diet. 2. Reviewed education on T2DM and weight loss nutrition therapy - made aware RD remains available. 3. BMI >40 notification placed in chart.

## 2021-06-21 NOTE — PROGRESS NOTE ADULT - NUTRITIONAL ASSESSMENT
This patient has been assessed with a concern for Malnutrition and has been determined to have a diagnosis/diagnoses of Morbid obesity (BMI > 40).    This patient is being managed with:   Diet Regular-  Consistent Carbohydrate {No Snacks} (CSTCHO)  Entered: Jun 17 2021  9:28PM

## 2021-06-21 NOTE — DIETITIAN INITIAL EVALUATION ADULT. - DIET TYPE
Recommend Consistent Carbohydrate with snack diet. Will continue to monitor as able and adjust as needed.

## 2021-06-21 NOTE — PROGRESS NOTE ADULT - PROBLEM SELECTOR PLAN 4
A1C 6.3% sliding scale  - FS ac and hs
sliding scale  check A1c
A1C 6.3% sliding scale
A1C 6.3% sliding scale
A1C 6.3% sliding scale  - FS ac and hs

## 2021-06-21 NOTE — DIETITIAN INITIAL EVALUATION ADULT. - ORAL INTAKE PTA/DIET HISTORY
Pt reports good appetite and PO intake at home. Confirms NKFA. Reports taking pills of fruits and vegetables concentration PTA; denies drinking any nutritional supplement. Reports consuming a low carbohydrate diet at home. Reports monitoring BG 1xday in the morning with ranges between 115 - 150 mg/dl and states taking Metformin and Trulicity at home; HbA1c (05/19) 6.3% - indicates good BG control.

## 2021-06-21 NOTE — PROGRESS NOTE ADULT - PROBLEM SELECTOR PLAN 5
BP ha sbeen stable  - c/w home meds- nifedipine, carvedilol, enalapril  -held Lasix for now
c/w home meds, nifedipine, carvedilol, enalapril  hold lasix for now
c/w home meds- nifedipine, carvedilol, enalapril  -held Lasix for now
BP ha sbeen stable  - c/w home meds- nifedipine, carvedilol, enalapril  -held Lasix for now
c/w home meds- nifedipine, carvedilol, enalapril  -held Lasix for now

## 2021-06-21 NOTE — PROGRESS NOTE ADULT - PROBLEM SELECTOR PROBLEM 6
History of Schizophrenia

## 2021-06-21 NOTE — PROGRESS NOTE ADULT - PROBLEM SELECTOR PLAN 1
Was septic in ED due to UTI in the setting of urinary retention and B/L hydronephrosis  - s/p pryro in ED, he does self cath at home  - c/w IV Ceftriaxone 1g IV daily, will f/u c/s
afebrile, WBC is normal. Was septic in ED due to UTI in the setting of urinary retention and B/L hydronephrosis  - s/p pryor in ED, he does self cath at home  - c/w IV Ceftriaxone 1g IV daily, will f/u c/s
Patient with likely UTI  s/p pryor in ED   Ceftriaxone 1g IV daily for now f/u cultures treat 3-5 days depending on clinical improvement, still febrile overnight  Has multiple UTIs due to needing self cath for neurogenic bladder.
Was septic in ED due to UTI in the setting of urinary retention and B/L hydronephrosis  - s/p pryor in ED, he does self cath at home  - c/w IV Ceftriaxone 1g IV daily, will f/u c/s
afebrile, WBC is normal. Was septic in ED due to UTI in the setting of urinary retention and B/L hydronephrosis  - s/p pryor in ED, he does self cath at home  -  IV Ceftriaxone 1g IV daily day 4/5

## 2021-06-21 NOTE — PROGRESS NOTE ADULT - PROBLEM SELECTOR PLAN 3
Patient with b/l severe hydronephrosis on CT, Scr normal  - Urology consult and plan as above. Scr is normal> no plan for NT or ureteric stent   -Appreciate recs, continue hydration and monitor BMP and keep pryor until uop < 200cc/hr
Patient with b/l severe hydronephrosis  per patient, he is scheduled for cystoscopy in July with his urologist  f/u as outpatient
Patient with b/l severe hydronephrosis on CT, Scr normal  - Will get Urology consult
Patient with b/l severe hydronephrosis on CT, Scr normal  - Urology consult and plan as above. Scr is normal> no plan for NT or ureteric stent
Patient with b/l severe hydronephrosis on CT, Scr normal  - Will get Urology consult

## 2021-06-21 NOTE — DIETITIAN INITIAL EVALUATION ADULT. - OTHER INFO
Pt reports good appetite and PO intake. Noted 100% PO intake on (06/19) as per flow sheets. Denies difficulty chewing/swallowing. Pt denies nausea, vomiting, diarrhea, or constipation, reports last BM 4 days ago (06/17).     Pt reports history of weight gain followed by intentional 15 pounds weight loss x "weeks" PTA due to eating less, from 315 to 300 pounds. Weight as per previous RD note (01/27/2020) 299 pounds. Weight as per flow sheets (06/17) 304 pounds -?accuracy due to fluid accumulation.     Reviewed education on T2DM and weight loss nutrition therapy. Discussed foods containing carbohydrates, foods containing proteins, and portion sizes. Stressed the importance of a balanced meal to maintain blood glucose and help with gradual/healthy weight loss. Encouraged vegetables consumption. Described HbA1c and stressed importance of its normal levels. Encouraged Pt to continue monitoring blood glucose at home at different moments of the day every day. Recommended water consumption with avoidance of soda and juice. Discussed to avoid concentrated sweets. Pt amenable - made aware RD remains available.

## 2021-06-21 NOTE — CHART NOTE - NSCHARTNOTEFT_GEN_A_CORE
D/w patient who was asking if his catheter can be removed before he goes home.  Given he is still making large volumes of urine (8L overnight), his catheter should stay in and he should follow up with us at the [facility] for [facility] later this week or next week for a trial of void. D/w patient who was asking if his catheter can be removed before he goes home.  Given he is still making large volumes of urine (8L overnight), his catheter should stay in and he should follow up with us at the [facility] for [facility] later this week or next week for a trial of void.  If the patient wants his pryor removed, it can be removed after the urine output is lower than 200cc/hr but he must self catheterize 4 times a day. D/w patient who was asking if his catheter can be removed before he goes home.  Given he is still making large volumes of urine (8L overnight), his catheter should stay in and he should follow up with us at the [facility] for Urology later this week or next week for a trial of void.  If the patient wants his pryor removed, it can be removed after the urine output is lower than 200cc/hr but he must self catheterize 4 times a day.  Plan was d/w the primary team.

## 2021-06-21 NOTE — PROGRESS NOTE ADULT - PROBLEM SELECTOR PLAN 6
c/w home meds  benztropine, divalproax, risperidone

## 2021-06-21 NOTE — PROGRESS NOTE ADULT - PROBLEM SELECTOR PROBLEM 5
HTN (Hypertension)
HTN (Hypertension)
panculture  empiric antibiotics  ICU evaluation  ID evaluation called
HTN (Hypertension)

## 2021-06-22 ENCOUNTER — TRANSCRIPTION ENCOUNTER (OUTPATIENT)
Age: 61
End: 2021-06-22

## 2021-06-22 VITALS
DIASTOLIC BLOOD PRESSURE: 87 MMHG | HEART RATE: 75 BPM | OXYGEN SATURATION: 93 % | SYSTOLIC BLOOD PRESSURE: 145 MMHG | RESPIRATION RATE: 17 BRPM | TEMPERATURE: 99 F

## 2021-06-22 LAB
ANION GAP SERPL CALC-SCNC: 14 MMOL/L — SIGNIFICANT CHANGE UP (ref 5–17)
BUN SERPL-MCNC: 13 MG/DL — SIGNIFICANT CHANGE UP (ref 7–23)
CALCIUM SERPL-MCNC: 9.8 MG/DL — SIGNIFICANT CHANGE UP (ref 8.4–10.5)
CHLORIDE SERPL-SCNC: 98 MMOL/L — SIGNIFICANT CHANGE UP (ref 96–108)
CO2 SERPL-SCNC: 26 MMOL/L — SIGNIFICANT CHANGE UP (ref 22–31)
CREAT SERPL-MCNC: 0.77 MG/DL — SIGNIFICANT CHANGE UP (ref 0.5–1.3)
GLUCOSE SERPL-MCNC: 123 MG/DL — HIGH (ref 70–99)
HCT VFR BLD CALC: 39.6 % — SIGNIFICANT CHANGE UP (ref 39–50)
HGB BLD-MCNC: 12.8 G/DL — LOW (ref 13–17)
MAGNESIUM SERPL-MCNC: 2.4 MG/DL — SIGNIFICANT CHANGE UP (ref 1.6–2.6)
MCHC RBC-ENTMCNC: 29 PG — SIGNIFICANT CHANGE UP (ref 27–34)
MCHC RBC-ENTMCNC: 32.3 GM/DL — SIGNIFICANT CHANGE UP (ref 32–36)
MCV RBC AUTO: 89.8 FL — SIGNIFICANT CHANGE UP (ref 80–100)
NRBC # BLD: 0 /100 WBCS — SIGNIFICANT CHANGE UP (ref 0–0)
PHOSPHATE SERPL-MCNC: 3.9 MG/DL — SIGNIFICANT CHANGE UP (ref 2.5–4.5)
PLATELET # BLD AUTO: 263 K/UL — SIGNIFICANT CHANGE UP (ref 150–400)
POTASSIUM SERPL-MCNC: 4.1 MMOL/L — SIGNIFICANT CHANGE UP (ref 3.5–5.3)
POTASSIUM SERPL-SCNC: 4.1 MMOL/L — SIGNIFICANT CHANGE UP (ref 3.5–5.3)
RBC # BLD: 4.41 M/UL — SIGNIFICANT CHANGE UP (ref 4.2–5.8)
RBC # FLD: 13.7 % — SIGNIFICANT CHANGE UP (ref 10.3–14.5)
SODIUM SERPL-SCNC: 138 MMOL/L — SIGNIFICANT CHANGE UP (ref 135–145)
WBC # BLD: 8.9 K/UL — SIGNIFICANT CHANGE UP (ref 3.8–10.5)
WBC # FLD AUTO: 8.9 K/UL — SIGNIFICANT CHANGE UP (ref 3.8–10.5)

## 2021-06-22 PROCEDURE — 81001 URINALYSIS AUTO W/SCOPE: CPT

## 2021-06-22 PROCEDURE — 82947 ASSAY GLUCOSE BLOOD QUANT: CPT

## 2021-06-22 PROCEDURE — U0003: CPT

## 2021-06-22 PROCEDURE — 84100 ASSAY OF PHOSPHORUS: CPT

## 2021-06-22 PROCEDURE — U0005: CPT

## 2021-06-22 PROCEDURE — 85018 HEMOGLOBIN: CPT

## 2021-06-22 PROCEDURE — 85014 HEMATOCRIT: CPT

## 2021-06-22 PROCEDURE — 82962 GLUCOSE BLOOD TEST: CPT

## 2021-06-22 PROCEDURE — 87186 SC STD MICRODIL/AGAR DIL: CPT

## 2021-06-22 PROCEDURE — 74177 CT ABD & PELVIS W/CONTRAST: CPT

## 2021-06-22 PROCEDURE — 87086 URINE CULTURE/COLONY COUNT: CPT

## 2021-06-22 PROCEDURE — 80048 BASIC METABOLIC PNL TOTAL CA: CPT

## 2021-06-22 PROCEDURE — 99239 HOSP IP/OBS DSCHRG MGMT >30: CPT

## 2021-06-22 PROCEDURE — 84132 ASSAY OF SERUM POTASSIUM: CPT

## 2021-06-22 PROCEDURE — 82435 ASSAY OF BLOOD CHLORIDE: CPT

## 2021-06-22 PROCEDURE — 83690 ASSAY OF LIPASE: CPT

## 2021-06-22 PROCEDURE — 97162 PT EVAL MOD COMPLEX 30 MIN: CPT

## 2021-06-22 PROCEDURE — 99285 EMERGENCY DEPT VISIT HI MDM: CPT | Mod: 25

## 2021-06-22 PROCEDURE — 96374 THER/PROPH/DIAG INJ IV PUSH: CPT | Mod: XU

## 2021-06-22 PROCEDURE — 82330 ASSAY OF CALCIUM: CPT

## 2021-06-22 PROCEDURE — 94640 AIRWAY INHALATION TREATMENT: CPT

## 2021-06-22 PROCEDURE — 83605 ASSAY OF LACTIC ACID: CPT

## 2021-06-22 PROCEDURE — 82803 BLOOD GASES ANY COMBINATION: CPT

## 2021-06-22 PROCEDURE — 80053 COMPREHEN METABOLIC PANEL: CPT

## 2021-06-22 PROCEDURE — 86769 SARS-COV-2 COVID-19 ANTIBODY: CPT

## 2021-06-22 PROCEDURE — 85027 COMPLETE CBC AUTOMATED: CPT

## 2021-06-22 PROCEDURE — 84295 ASSAY OF SERUM SODIUM: CPT

## 2021-06-22 PROCEDURE — 83735 ASSAY OF MAGNESIUM: CPT

## 2021-06-22 PROCEDURE — 85025 COMPLETE CBC W/AUTO DIFF WBC: CPT

## 2021-06-22 RX ORDER — SENNA PLUS 8.6 MG/1
2 TABLET ORAL
Qty: 14 | Refills: 0
Start: 2021-06-22 | End: 2021-06-28

## 2021-06-22 RX ORDER — MULTIVIT WITH MIN/MFOLATE/K2 340-15/3 G
1 POWDER (GRAM) ORAL ONCE
Refills: 0 | Status: COMPLETED | OUTPATIENT
Start: 2021-06-22 | End: 2021-06-22

## 2021-06-22 RX ORDER — ACETAMINOPHEN 500 MG
650 TABLET ORAL EVERY 6 HOURS
Refills: 0 | Status: DISCONTINUED | OUTPATIENT
Start: 2021-06-22 | End: 2021-06-22

## 2021-06-22 RX ORDER — FUROSEMIDE 40 MG
2 TABLET ORAL
Qty: 0 | Refills: 0 | DISCHARGE

## 2021-06-22 RX ORDER — POLYETHYLENE GLYCOL 3350 17 G/17G
17 POWDER, FOR SOLUTION ORAL
Qty: 119 | Refills: 0
Start: 2021-06-22 | End: 2021-06-28

## 2021-06-22 RX ADMIN — Medication 20 MILLIGRAM(S): at 05:18

## 2021-06-22 RX ADMIN — Medication 5 MILLILITER(S): at 11:48

## 2021-06-22 RX ADMIN — DIVALPROEX SODIUM 500 MILLIGRAM(S): 500 TABLET, DELAYED RELEASE ORAL at 08:14

## 2021-06-22 RX ADMIN — Medication 1 MILLIGRAM(S): at 11:46

## 2021-06-22 RX ADMIN — Medication 20 MILLIGRAM(S): at 11:47

## 2021-06-22 RX ADMIN — ENOXAPARIN SODIUM 40 MILLIGRAM(S): 100 INJECTION SUBCUTANEOUS at 11:47

## 2021-06-22 RX ADMIN — Medication 650 MILLIGRAM(S): at 10:36

## 2021-06-22 RX ADMIN — MONTELUKAST 10 MILLIGRAM(S): 4 TABLET, CHEWABLE ORAL at 11:46

## 2021-06-22 RX ADMIN — Medication 30 MILLIGRAM(S): at 05:18

## 2021-06-22 RX ADMIN — Medication 10 MILLIGRAM(S): at 10:35

## 2021-06-22 RX ADMIN — CEFTRIAXONE 100 MILLIGRAM(S): 500 INJECTION, POWDER, FOR SOLUTION INTRAMUSCULAR; INTRAVENOUS at 11:45

## 2021-06-22 RX ADMIN — Medication 100 MILLIGRAM(S): at 05:18

## 2021-06-22 RX ADMIN — Medication 1 BOTTLE: at 10:47

## 2021-06-22 RX ADMIN — MOMETASONE FUROATE 2 PUFF(S): 220 INHALANT RESPIRATORY (INHALATION) at 11:48

## 2021-06-22 RX ADMIN — Medication 650 MILLIGRAM(S): at 11:10

## 2021-06-22 RX ADMIN — CARVEDILOL PHOSPHATE 12.5 MILLIGRAM(S): 80 CAPSULE, EXTENDED RELEASE ORAL at 05:18

## 2021-06-22 RX ADMIN — Medication 40 MILLIGRAM(S): at 05:18

## 2021-06-22 NOTE — DISCHARGE NOTE PROVIDER - CARE PROVIDER_API CALL
Gilbert Bruno)  Cardiovascular Disease; Internal Medicine  [address], [address]  [place] [address]  Phone: [phone]  Fax: [phone]  Follow Up Time: 1 week    [facility],   [address] [place] [address]  Phone: [phone]  Fax: (   )    -  Follow Up Time: 1 week

## 2021-06-22 NOTE — DISCHARGE NOTE PROVIDER - DETAILS OF MALNUTRITION DIAGNOSIS/DIAGNOSES
This patient has been assessed with a concern for Malnutrition and was treated during this hospitalization for the following Nutrition diagnosis/diagnoses:     -  06/21/2021: Morbid obesity (BMI > 40)

## 2021-06-22 NOTE — DISCHARGE NOTE NURSING/CASE MANAGEMENT/SOCIAL WORK - NSDCPNINST_GEN_ALL_CORE
call md for any discomforts felt-- safety measures reemphasized -- bilateral redness of abdominal fold-- could apply barrier cream and to keep area always clean and dry --

## 2021-06-22 NOTE — DISCHARGE NOTE PROVIDER - PROVIDER TOKENS
PROVIDER:[TOKEN:[[id number]],FOLLOWUP:[1 week]],FREE:[LAST:[[facility]],PHONE:[phone]],FAX:[(   )    -],ADDRESS:[[address] [place] [address]],FOLLOWUP:[1 week]]

## 2021-06-22 NOTE — DISCHARGE NOTE PROVIDER - HOSPITAL COURSE
61 year old male with PMH of Asthma, DM2, HTN, HLD, Gout, asthma, schizophrenia, neurogenic bladder (self catheterization 3x/daily), presents with sepsis 2/2 UTI and urinary retention. Patient was admitted for further medical management. s/p pryor in ED, he does self cath at home. CT A&P Redemonstrated severe bilateral hydroureteronephrosis, unchanged from hospital admission in May 2021. Patient completed a 5 day course of IV ceftriaxone 1g IV . Per pateint, he drinks up to 8L of fluid per day sometimes due to dry mouth from medications. Excessive urine and hydro likely multifactorial from excessive intake and post obstructive diuresis. No sign of renal failure, electrolytes stable. Urology was also consulted recommending patient must self catheterize 4 times/day to avoid future infections or episodes of retention and follow up with the  [facility] later this week.     Discharge/Dispo/Med rec discussed with attending. Patient medically cleared for discharge home with outpatient follow up with PCP and urology. 61 year old male with PMH of Asthma, DM2, HTN, HLD, Gout, asthma, schizophrenia, neurogenic bladder (self catheterization 3x/daily), presents with sepsis 2/2 UTI and urinary retention. Patient was admitted for further medical management. s/p pryor in ED, he does self cath at home. CT A&P Redemonstrated severe bilateral hydroureteronephrosis, unchanged from hospital admission in May 2021. Patient completed a 5 day course of IV ceftriaxone 1g IV for UTI. Per pateint, he drinks up to 8L of fluid per day sometimes due to dry mouth from medications. Excessive urine and hydro likely multifactorial from excessive intake and post obstructive diuresis. No sign of renal failure, electrolytes stable. Urology was also consulted recommending patient must self catheterize 4 times/day to avoid future infections or episodes of retention and follow up with the  [facility] for Urology later this week.     Discharge/Dispo/Med rec discussed with attending. Patient medically cleared for discharge home with outpatient follow up with PCP and urology.

## 2021-06-22 NOTE — CHART NOTE - NSCHARTNOTEFT_GEN_A_CORE
Request from Dr. Solitario to facilitate patient discharge. Medication reconciliation reviewed, revised, and resolved with Dr. Solitario who had medically cleared patient for discharge with follow-up as advised. Please refer to discharge note for detailed hospital course. Patient is currently stable for discharge to home at this time.    Discussed with patient regarding COVID vaccine.   As per patient, he has already received two doses of Pfizer in May 2021. Thus, patient does not qualify for COVID vaccine      Charity Marie PA-C  Dept of Medicine   Spectra [phone]

## 2021-06-22 NOTE — PHARMACOTHERAPY INTERVENTION NOTE - COMMENTS
62 yo M currently on ceftriaxone 1g IV Q24H for UTI x 5 days - completed 5th dose yesterday. Recommend discontinuing ceftriaxone.    Hung Robbins, PharmD, BCPS  [phone]

## 2021-06-22 NOTE — DISCHARGE NOTE PROVIDER - NSDCPNSUBOBJ_GEN_ALL_CORE
61M pmhx of DM2, HTN, schizophrenia well controlled admitted for UTI and urinary retention which is chronic causing hydronephrosis.  Had Barnes placed here with some post obstructive diuresis which is resolving.  He drinks excessive fluids due to his medication side effects so has high urine outpt, now closer to his baseline 6L in 24hrs.  Discussed with urology OK to f/u as outpt, electrolytes stable and pt self cath himself.  Will hold Lasix temporarily to avoid excessive dehydration if there is still some component of auto diuresis.  Can be resumed as outpt when stable.  Will f/u with his urologist Dr. Crowe for chronic hydronephrosis which I suspect is due to excessive thirst and needs more frequent straight cath or Barnes (pt refuses chronic Barnes).  Discharge time 45 minutes.

## 2021-06-22 NOTE — DISCHARGE NOTE PROVIDER - NSDCCPCAREPLAN_GEN_ALL_CORE_FT
PRINCIPAL DISCHARGE DIAGNOSIS  Diagnosis: UTI (urinary tract infection)  Assessment and Plan of Treatment: You had a bladder and/or kidney infection and was treated with IV antiAvoid medications that will cause urinary retention such as benadryl whenever possible. Contact your doctor if you experience new symptoms or continued symptoms after treatment, such as pain or burning with urination, frequent urination, urinary urgency, blood in the urine, fever, back pain, and/or nausea vomiting.        SECONDARY DISCHARGE DIAGNOSES  Diagnosis: Hydronephrosis  Assessment and Plan of Treatment: Per pateint, he drinks up to 8L of fluid per day sometimes due to dry mouth from medications. Excessive urine and hydronephrosis likely multifactorial from excessive intake and post obstructive diuresis. No sign of renal failure, electrolytes stable. Urology was also consulted recommending patient must self catheterize 4 times/day to avoid future infections or episodes of retention and follow up with the  [facility] for Urology and PCP within 1 week from discharge.       Diagnosis: HTN (Hypertension)  Assessment and Plan of Treatment: Continue to follow a low salt/sodium diet.  Perform physical activities as tolerated in consultation with your Primary Care Provider and physical therapist.  Take all medications as prescribed.  Please continue to hold furosemide. Follow up with your medical doctor for routine blood pressure monitoring at your next visit.  Notify your doctor if you have any of the following symptoms:  Dizziness, lightheadedness, blurry vision, headache, chest pain, or shortness of breath.      Diagnosis: Diabetes mellitus  Assessment and Plan of Treatment: Make sure you get your HgA1c checked every three months.  If you take oral diabetes medications, check your blood glucose at least two times a day.  If you take short-acting insulin, check your blood glucose before meals and at bedtime.  It's important not to skip any meals.  Keep a log of your blood glucose results and always take it with you to your doctor appointments.  Keep a list of your current medications including over the counter medications and bring this medication list with you to all your doctor appointments.  If you have not seen your ophthalmologist this year, call for appointment.  Check your feet daily for redness, sores, or openings.  Do not self treat.  If there is no improvement in two days, call your primary care physician for an appointment.      Diagnosis: History of Schizophrenia  Assessment and Plan of Treatment: Please continue taking medication as prescibed.  Please follow up with PCP within 1 week from discharge    Diagnosis: Urinary retention  Assessment and Plan of Treatment: Please follow up with [facility] for Urology and PCP within 1 week from discharge.     PRINCIPAL DISCHARGE DIAGNOSIS  Diagnosis: UTI (urinary tract infection)  Assessment and Plan of Treatment: You had a bladder and/or kidney infection and was treated with IV antiAvoid medications that will cause urinary retention such as benadryl whenever possible. Contact your doctor if you experience new symptoms or continued symptoms after treatment, such as pain or burning with urination, frequent urination, urinary urgency, blood in the urine, fever, back pain, and/or nausea vomiting.        SECONDARY DISCHARGE DIAGNOSES  Diagnosis: Hydronephrosis  Assessment and Plan of Treatment: Per pateint, he drinks up to 8L of fluid per day sometimes due to dry mouth from medications. Excessive urine and hydronephrosis likely multifactorial from excessive intake and post obstructive diuresis. No sign of renal failure, electrolytes stable. Urology was also consulted recommending patient must self catheterize 4 times/day to avoid future infections or episodes of retention and follow up with the  [facility] for Urology and PCP within 1 week from discharge.       Diagnosis: HTN (Hypertension)  Assessment and Plan of Treatment: Continue to follow a low salt/sodium diet.  Perform physical activities as tolerated in consultation with your Primary Care Provider and physical therapist.  Take all medications as prescribed.  Please continue to hold furosemide. Follow up with your medical doctor for routine blood pressure monitoring at your next visit.  Notify your doctor if you have any of the following symptoms:  Dizziness, lightheadedness, blurry vision, headache, chest pain, or shortness of breath.      Diagnosis: Diabetes mellitus  Assessment and Plan of Treatment: Make sure you get your HgA1c checked every three months.  If you take oral diabetes medications, check your blood glucose at least two times a day.  If you take short-acting insulin, check your blood glucose before meals and at bedtime.  It's important not to skip any meals.  Keep a log of your blood glucose results and always take it with you to your doctor appointments.  Keep a list of your current medications including over the counter medications and bring this medication list with you to all your doctor appointments.  If you have not seen your ophthalmologist this year, call for appointment.  Check your feet daily for redness, sores, or openings.  Do not self treat.  If there is no improvement in two days, call your primary care physician for an appointment.      Diagnosis: History of Schizophrenia  Assessment and Plan of Treatment: Please continue taking medication as prescibed.  Please follow up with PCP within 1 week from discharge    Diagnosis: Urinary retention  Assessment and Plan of Treatment: Please follow up with [facility] for Urology and PCP within 1 week from discharge.    Diagnosis: Constipation  Assessment and Plan of Treatment: For Constipation :   • Increase your water intake. Drink at least 8 glasses of water daily.  • Try adding fiber to your diet by eating fruits, vegetables and foods that are rich in grains.  • If you do experience constipation, you may take medication as prescribed  Pt. verbalized an understanding of all instructions.

## 2021-06-22 NOTE — DISCHARGE NOTE NURSING/CASE MANAGEMENT/SOCIAL WORK - PATIENT PORTAL LINK FT
You can access the FollowMyHealth Patient Portal offered by [facility] by registering at the following website: [email or web address]. By joining [facility]’s FollowMyHealth portal, you will also be able to view your health information using other applications (apps) compatible with our system.

## 2021-08-03 ENCOUNTER — APPOINTMENT (OUTPATIENT)
Dept: GASTROENTEROLOGY | Facility: CLINIC | Age: 61
End: 2021-08-03
Payer: MEDICARE

## 2021-08-03 VITALS
WEIGHT: 298 LBS | TEMPERATURE: 97.8 F | BODY MASS INDEX: 40.36 KG/M2 | DIASTOLIC BLOOD PRESSURE: 80 MMHG | HEIGHT: 72 IN | SYSTOLIC BLOOD PRESSURE: 130 MMHG

## 2021-08-03 PROCEDURE — 99214 OFFICE O/P EST MOD 30 MIN: CPT

## 2021-08-03 RX ORDER — SODIUM SULFATE, POTASSIUM SULFATE, MAGNESIUM SULFATE 17.5; 3.13; 1.6 G/ML; G/ML; G/ML
17.5-3.13-1.6 SOLUTION, CONCENTRATE ORAL TWICE DAILY
Qty: 2 | Refills: 0 | Status: ACTIVE | COMMUNITY
Start: 2021-08-03 | End: 1900-01-01

## 2021-08-03 NOTE — HISTORY OF PRESENT ILLNESS
[FreeTextEntry1] : He Is a 61-year-old asymptomatic male referred for a screening colonoscopy.  He has a past history of colon polyps. His  last colonoscopy was in 2017 in which 5 polyps were removed.  He denies a family history of colon cancer

## 2021-08-03 NOTE — PHYSICAL EXAM
[General Appearance - Alert] : alert [General Appearance - In No Acute Distress] : in no acute distress [Sclera] : the sclera and conjunctiva were normal [PERRL With Normal Accommodation] : pupils were equal in size, round, and reactive to light [Extraocular Movements] : extraocular movements were intact [Outer Ear] : the ears and nose were normal in appearance [Oropharynx] : the oropharynx was normal [Neck Appearance] : the appearance of the neck was normal [Neck Cervical Mass (___cm)] : no neck mass was observed [Jugular Venous Distention Increased] : there was no jugular-venous distention [Thyroid Diffuse Enlargement] : the thyroid was not enlarged [Thyroid Nodule] : there were no palpable thyroid nodules [Auscultation Breath Sounds / Voice Sounds] : lungs were clear to auscultation bilaterally [Heart Rate And Rhythm] : heart rate was normal and rhythm regular [Heart Sounds] : normal S1 and S2 [Heart Sounds Gallop] : no gallops [Murmurs] : no murmurs [Heart Sounds Pericardial Friction Rub] : no pericardial rub [Bowel Sounds] : normal bowel sounds [Abdomen Soft] : soft [Abdomen Tenderness] : non-tender [] : no hepato-splenomegaly [Abdomen Mass (___ Cm)] : no abdominal mass palpated [No CVA Tenderness] : no ~M costovertebral angle tenderness [No Spinal Tenderness] : no spinal tenderness [Abnormal Walk] : normal gait [Nail Clubbing] : no clubbing  or cyanosis of the fingernails [Musculoskeletal - Swelling] : no joint swelling seen [Motor Tone] : muscle strength and tone were normal [Deep Tendon Reflexes (DTR)] : deep tendon reflexes were 2+ and symmetric [Sensation] : the sensory exam was normal to light touch and pinprick [No Focal Deficits] : no focal deficits [Oriented To Time, Place, And Person] : oriented to person, place, and time [Impaired Insight] : insight and judgment were intact [Affect] : the affect was normal

## 2021-08-03 NOTE — ASSESSMENT
[FreeTextEntry1] : ALEJANDRO BENITEZ was advised to undergo colonoscopy to which he agreed. The procedure will be performed in [facility] \par [facility] with the assistance of an anesthesiologist. The patient was given a Suprep preparation prescription and understood the \par procedure as it was explained to his. He was given a booklet distributed by the American Society of Gastrointestinal\par  Endoscopy explaining the procedure in detail and he understood the risks of the procedure not limited to infection, bleeding,\par perforation or non- diagnosis of colorectal cancer. He was advised that he could not drive home, if he chooses to\par  receive sedation.\par \par Further diagnostic and treatment recommendations will be based upon the procedure and any biopsies, if they are taken.\par \par Thank you for allowing me to participate in this [facility] health care.\par \par , Best personal regards -- Don\par

## 2021-08-03 NOTE — CONSULT LETTER
[Dear  ___] : Dear  [unfilled], [Consult Letter:] : I had the pleasure of evaluating your patient, [unfilled]. [( Thank you for referring [unfilled] for consultation for _____ )] : Thank you for referring [unfilled] for consultation for [unfilled] [Please see my note below.] : Please see my note below. [Consult Closing:] : Thank you very much for allowing me to participate in the care of this patient.  If you have any questions, please do not hesitate to contact me. [Sincerely,] : Sincerely, [FreeTextEntry3] : Don\par \par Jeff Negron MD\par

## 2021-09-23 NOTE — PROGRESS NOTE ADULT - PROBLEM/PLAN-1
Mom called back stating  will cover cost of consultation. RN let mom know mom will still have to sign self pay form dur to insurance. Mom understanding. Mom scheduled new patient virtual visit 10/25/21. Portal instructions emailed to mom.  
DISPLAY PLAN FREE TEXT

## 2021-11-16 ENCOUNTER — RESULT REVIEW (OUTPATIENT)
Age: 61
End: 2021-11-16

## 2021-11-17 ENCOUNTER — APPOINTMENT (OUTPATIENT)
Dept: GASTROENTEROLOGY | Facility: AMBULATORY SURGERY CENTER | Age: 61
End: 2021-11-17
Payer: MEDICARE

## 2021-11-17 PROCEDURE — 45380 COLONOSCOPY AND BIOPSY: CPT

## 2021-12-19 NOTE — BEHAVIORAL HEALTH ASSESSMENT NOTE - ORIENTED TO TIME
[address] [address] [place] [address]  Phone: [phone]             December 21, 2021    Patient: Santiago Melo   YOB: 1927   Date of Visit: 12/19/2021       To Whom It May Concern:    Santiago Melo was seen and treated in our facility  beginning 12/19/2021 until 12/21/21 .       Sincerely,       Avis Gamez RN         Signature:__________________________________ Yes

## 2021-12-20 ENCOUNTER — APPOINTMENT (OUTPATIENT)
Dept: GASTROENTEROLOGY | Facility: CLINIC | Age: 61
End: 2021-12-20
Payer: MEDICARE

## 2021-12-20 VITALS — BODY MASS INDEX: 40.09 KG/M2 | WEIGHT: 296 LBS | HEIGHT: 72 IN

## 2021-12-20 DIAGNOSIS — K21.9 GASTRO-ESOPHAGEAL REFLUX DISEASE W/OUT ESOPHAGITIS: ICD-10-CM

## 2021-12-20 DIAGNOSIS — K63.5 POLYP OF COLON: ICD-10-CM

## 2021-12-20 PROCEDURE — 99214 OFFICE O/P EST MOD 30 MIN: CPT

## 2021-12-20 NOTE — HISTORY OF PRESENT ILLNESS
[FreeTextEntry1] : Colonoscopy revealed 3 small polyps which were benign. He  is feeling well except for occasional episodes of heartburn.  He wants to have a upper endoscopy in the early part of next year

## 2021-12-20 NOTE — ASSESSMENT
[FreeTextEntry1] : Repeat colonoscopy in 3 years\par \par ALEJANDRO BENITEZ was advised to undergo endoscopy to which he agreed. The procedure will be performed in [facility] with the assistance of an anesthesiologist. He was given a booklet distributed by the American Society of Gastrointestinal Endoscopy explaining the procedure in detail and he understood the risks of the procedure not limited to infection, bleeding, perforation or non- diagnosis of gastric or esophageal cancer.  He was advised that he could not drive home, if he chooses to receive sedation. Further diagnostic and treatment recommendations will be based upon the procedure and any biopsies, if they are taken. Thank you for allowing me to participate in this [facility] health care.\par

## 2021-12-20 NOTE — CONSULT LETTER
[Dear  ___] : Dear  [unfilled], [Consult Letter:] : I had the pleasure of evaluating your patient, [unfilled]. [( Thank you for referring [unfilled] for consultation for _____ )] : Thank you for referring [unfilled] for consultation for [unfilled] [Please see my note below.] : Please see my note below. [Consult Closing:] : Thank you very much for allowing me to participate in the care of this patient.  If you have any questions, please do not hesitate to contact me. [Sincerely,] : Sincerely, [FreeTextEntry3] : Don\par \par Jeff Negron MD\par \par Gastroenterology\par [facility]\par [facility]\par \par

## 2022-01-18 NOTE — ED PROVIDER NOTE - ATTENDING CONTRIBUTION TO CARE
[facility] ICU Initial Encounter note:    Critical Care Note: 1/18/2022    Cher Benjamin. Cite Fransico  Admission Date: 1/18/2022     Length of Stay: 0 days    HPI: 82 y.o. y/o female with acute hypoxemic respiratory failure secondary to COVID-19. Patient is 82year-old with morbid obesity/hypertension/atrial fibrillation/reflux/blood, who was at [facility] rehab for hypoxemia diagnosed with COVID approximately a week ago, found with sats in the 70s and unfortunately is a poor historian. Patient per notes has not been vaccinated and also had a temperature of 102.9 with an elevated white count and acute kidney injury with acidosis. Patient with progressive hypoxemia and currently on BiPAP. Unfortunately patient has been having issues with agitation has been getting Ativan as needed. Currently on 100% BiPAP and noted to be hypotensive. She is DNI. Asked by hospitalist to help assist with care. Patient also with supratherapeutic INR and coming down. No bleeding    When I went to see the patient, apparently unresponsive. BiPAP settings just adjusted by me and respiratory therapist to tidal volume about four forty and on AVAPS mode currently getting about four hundred. ABG to follow shortly. Blood pressure in the 80s and starting dopamine. Vaccination status: Unvaccinated    Notable PMH: As per above    Past surgical history/family history and allergies reviewed    ROS:   Unable assess due to patient's condition  Visit Vitals  BP (!) 66/42   Pulse 85   Temp 98.2 °F (36.8 °C)   Resp (!) 40   Ht 5' 1\" (1.549 m)   Wt 243 lb 4.8 oz (110.4 kg)   SpO2 93%   BMI 45.97 kg/m²     Pertinent Exam:            Constitutional:  intubated and mechanically ventilated.   EENMT:  Sclera clear, pupils equal, oral mucosa moist  Respiratory:  Coarse sounds bilaterally but distant  Cardiovascular:   S1 and S2 audible  Gastrointestinal:  soft with no tenderness; positive bowel sounds present  Musculoskeletal:  warm with no cyanosis, no lower extremity edema  Skin:  no jaundice or ecchymosis  Neurologic: Minimally opening eyes to painful stimuli pupils equal.  Psychiatric:  Lethargic      Recent Labs     01/17/22  1458 01/17/22  1452 01/17/22  1240   PHI 7.28* 7.29* 7.29*   PCO2I 61.3* 62.6* 62.5*   PO2I 60* 58* 65*   HCO3I 28.9* 30.3* 29.7*       CXR    Lines: (insertion date)   Peripheral IV    Drips: current dose (range)  Precedex:  Dopamine:    Pertinent Labs:   Recent Labs     01/18/22  0329 01/17/22  1256 01/17/22  1142 01/17/22  1141   WBC 12.4*  --   --  20.4*   HGB 12.4  --   --  13.9   HCT 40.5  --   --  44.1     --   --  325   INR 7.4*  --  5.3*  --    PCT  --   --   --  0.79*   LAC  --  2.0  --   --      Recent Labs     01/18/22  0329 01/17/22  1141   * 134*   K 4.3 4.8    97*   CO2 28 30   * 90   BUN 43* 39*   CREA 0.97 1.12*   CA 8.7 9.2   ALB 2.0* 2.2*   AST 30 30   ALT 14 15   AP 88 123     Recent Labs     01/17/22  1256 01/17/22  1141   LAC 2.0  --    CRP  --  28.2*     Recent Labs     01/17/22  1458 01/17/22  1452 01/17/22  1240   GLUCPOC 93 89 86     ECHO: No results found for this or any previous visit.      Results     Procedure Component Value Units Date/Time    CULTURE, BLOOD [[id number]] Collected: 01/17/22 1145    Order Status: Completed Specimen: Blood Updated: 01/18/22 0931     Special Requests: RIGHT ANTECUBITAL        Culture result: NO GROWTH AFTER 21 HOURS       CULTURE, BLOOD [[id number]] Collected: 01/17/22 1141    Order Status: Completed Specimen: Blood Updated: 01/18/22 0931     Special Requests: --        LEFT  Antecubital       Culture result: NO GROWTH AFTER 21 HOURS           Inpat Anti-Infectives (From admission, onward)     Start     Ordered Stop    01/18/22 1400  azithromycin (ZITHROMAX) 500 mg in 0.9% sodium chloride 250 mL (VIAL-MATE)  500 mg,   IntraVENous,   EVERY 24 HOURS         01/18/22 0158 01/24/22 1359    01/18/22 1400  cefTRIAXone (ROCEPHIN) 2 g in 0.9% sodium chloride (MBP/ADV) 50 mL MBP  2 g,   IntraVENous,   EVERY 24 HOURS        Note to Pharmacy: First dose Stat    01/18/22 0158 01/24/22 1359    01/18/22 0200  nystatin (MYCOSTATIN) 100,000 unit/gram powder  Topical,   AS NEEDED         01/18/22 0201 --                  Internal Administration   First Dose      Second Dose         Last COVID Lab SARS-CoV-2, BRIANNA (no units)   Date Value   01/12/2022 DETECTED (A)                    Assessment and Plan:  (Medical Decision Making)   Impression: 82 y.o. female with COVID-19 infection with progressive hypoxemia and now shock. Currently on BiPAP and DNI    NEURO:   Sedation: Tried Precedex, but two unresponsive and discontinued. Was getting some Ativan as needed  Analgesia: None at this time  Paralytics: None at this time  Agitation: see above  CV:   Volume Status: +216 mL  Shock: Likely from COVID-19 infection with possible secondary infection. Given fluid bolus and now dopamine    PULM:   Acute hypoxemic/hypercapneic respiratory failure: Progressive hypoxemia on 100% BiPAP. Mode adjusted. Follow-up ABGs. Last few ABGs not showing up on the monitor we will asked respiratory to recheck. .   Severe ARDS 2/2 COVID: See above  RENAL:  MARLEY: Creatinine in normal limits  GI:   Nutrition: N.p.o. at this time  HEME:   Anticoagulation: CoagulopathyINR elevated. No active bleeding no need to reverse at this time. ID:   [id number]: On Decadron, baricitinib. On Rocephin as a throw for possible COVID infection  ENDO:   DM:in 80's to 90's  Skin: no decub, turns, preventive care  Prophy: DVT/PUD --has coagulopathy/Protonix    Spoke with Nursing/Respiratory and IDT rounds in AM as well. PMD will talk to family and will let us know next step. Awaiting their response. If continuing aggressive care will need central line.        DNR    The patient is critically ill with respiratory failure, circulatory failure and requires high complexity decision making for assessment and support including frequent ventilator adjustment , frequent evaluation and titration of therapies , application of advanced monitoring technologies and extensive interpretation of multiple databases  Time devoted to patient care services described in this note- 15 min face to face/ 20 min total evaluation time    Cumulative time devoted to patient care services by me for day of service -35 min     Jason Cortes MD       Dictated using voice recognition software.   Proof read but unrecognized errors may exist. Pt with flu-like symptoms preceding several days with acute abdominal pain and distension.  Exam: tympanic, distended and tender abdomen, no guarding or rebound.

## 2022-02-03 NOTE — ED PROVIDER NOTE - NS ED ATTENDING STATEMENT MOD
Care Suites Admission Nursing Note    Patient Information  Name: Ivon Gamble  Age: 33 year old  Reason for admission: apheresis  Care Suites arrival time: 0800      Pre-procedure assessment complete: Yes  If abnormal assessment/labs, provider notified: N/A  NPO: N/A  Medications held per instructions/orders: N/A  Consent: N/A  If applicable, pregnancy test status: N/A  Patient oriented to room: Yes  Education/questions answered: Yes  Plan/other: per procedural plan of care    Discharge Planning  Discharge name/phone number:   Overnight post sedation caregiver: N/A  Discharge location: home    Alida Rodríguez RN          I have personally performed a face to face diagnostic evaluation on this patient. I have reviewed the ACP note and agree with the history, exam and plan of care, except as noted.

## 2022-04-19 ENCOUNTER — APPOINTMENT (OUTPATIENT)
Dept: GASTROENTEROLOGY | Facility: CLINIC | Age: 62
End: 2022-04-19
Payer: MEDICARE

## 2022-04-19 VITALS — BODY MASS INDEX: 39.96 KG/M2 | HEIGHT: 72 IN | WEIGHT: 295 LBS

## 2022-04-19 DIAGNOSIS — R10.13 EPIGASTRIC PAIN: ICD-10-CM

## 2022-04-19 PROCEDURE — 99214 OFFICE O/P EST MOD 30 MIN: CPT

## 2022-04-19 NOTE — CONSULT LETTER
[Dear  ___] : Dear  [unfilled], [Consult Letter:] : I had the pleasure of evaluating your patient, [unfilled]. [( Thank you for referring [unfilled] for consultation for _____ )] : Thank you for referring [unfilled] for consultation for [unfilled] [Please see my note below.] : Please see my note below. [Consult Closing:] : Thank you very much for allowing me to participate in the care of this patient.  If you have any questions, please do not hesitate to contact me. [Sincerely,] : Sincerely, [FreeTextEntry3] : Don\par \par Jeff Negron MD\par \par Gastroenterology\par [facility]\par [facility]\par \par

## 2022-04-19 NOTE — HISTORY OF PRESENT ILLNESS
[FreeTextEntry1] : He was recently  admitted to the hospital with rectal bleeding.  He underwent a  colonoscopy consistent with ischemic colitis.  He was feeling better and was discharged. He recently admits to intermittent epigastric abdominal pain. He denies  heartburn or early satiety.

## 2022-04-19 NOTE — ASSESSMENT
[FreeTextEntry1] : ALEJANDRO BENITEZ was advised to undergo endoscopy to which he agreed. The procedure will be performed in [facility] with the assistance of an anesthesiologist. He was given a booklet distributed by the American Society of Gastrointestinal Endoscopy explaining the procedure in detail and he understood the risks of the procedure not limited to infection, bleeding, perforation or non- diagnosis of gastric or esophageal cancer.  He was advised that he could not drive home, if he chooses to receive sedation. Further diagnostic and treatment recommendations will be based upon the procedure and any biopsies, if they are taken. Thank you for allowing me to participate in this [facility] health care.\par

## 2022-04-26 ENCOUNTER — APPOINTMENT (OUTPATIENT)
Dept: PULMONOLOGY | Facility: CLINIC | Age: 62
End: 2022-04-26
Payer: MEDICARE

## 2022-04-26 VITALS
SYSTOLIC BLOOD PRESSURE: 142 MMHG | DIASTOLIC BLOOD PRESSURE: 82 MMHG | HEART RATE: 83 BPM | TEMPERATURE: 97.7 F | HEIGHT: 72 IN

## 2022-04-26 DIAGNOSIS — G47.33 OBSTRUCTIVE SLEEP APNEA (ADULT) (PEDIATRIC): ICD-10-CM

## 2022-04-26 DIAGNOSIS — R06.83 SNORING: ICD-10-CM

## 2022-04-26 PROCEDURE — 99204 OFFICE O/P NEW MOD 45 MIN: CPT

## 2022-04-26 NOTE — REVIEW OF SYSTEMS
[Negative] : Psychiatric [EDS: ESS=____] : daytime somnolence: ESS=[unfilled] [Snoring] : snoring [Witnessed Apneas] : witnessed apnea [Obesity] : obesity [Heartburn] : heartburn [Difficulty Maintaining Sleep] : difficulty maintaining sleep [Lower Extremity Discomfort] : no lower extremity discomfort [Unusual Sleep Behavior] : no unusual sleep behavior [Cataplexy] :  no cataplexy

## 2022-04-26 NOTE — ASSESSMENT
[FreeTextEntry1] : 61yo M with severe obesity, who was diagnosed with severe DOUG over 20 years ago. He was on CPAP and doing well. However, in the past few years he discontinued CPAP because he did not like the masks he had tried and couldn't find the original one that worked well for him. Without CPAP, he complains of snoring, witnessed apneas, gasping, choking at night as well as excessive daytime sleepiness especially when he is engaged in passive activities. \par \par Will order HSAT followed by new APAP since he discontinued CPAP use years ago\par I explained the rationale for treatment of DOUG -- to improve quality of life, daytime function and to decrease the cardiometabolic and other medical risks that are associated with untreated DOUG. The patient verbalized understanding.\par I also explained that the patient can expect a follow up call once results of the above study becomes available.\par

## 2022-04-26 NOTE — CONSULT LETTER
[Dear  ___] : Dear  [unfilled], [Consult Letter:] : I had the pleasure of evaluating your patient, [unfilled]. [Please see my note below.] : Please see my note below. [Consult Closing:] : Thank you very much for allowing me to participate in the care of this patient.  If you have any questions, please do not hesitate to contact me. [Sincerely,] : Sincerely, [FreeTextEntry3] : Ave Madera MD

## 2022-04-26 NOTE — HISTORY OF PRESENT ILLNESS
[FreeTextEntry1] : 61yo M with severe obesity, who was diagnosed with severe DOUG over 20 years ago. He was on CPAP and doing well. However, in the past few years he discontinued CPAP because he did not like the masks he had tried and couldn't find the original one that worked well for him. Without CPAP, he complains of snoring, witnessed apneas, gasping, choking at night as well as excessive daytime sleepiness especially when he is engaged in passive activities.

## 2022-05-03 NOTE — H&P ADULT - PROBLEM SELECTOR PROBLEM 7
HTN (hypertension) Comment: Disc’d option of topicals vs oral in addition to topicals. Rx’d Epiduo Forte Q and Winlevi QADIANA. Mother will call office if interested in an additional rx of antibiotics. F/u in 2mos. Detail Level: Simple Render Risk Assessment In Note?: no

## 2022-05-07 ENCOUNTER — INPATIENT (INPATIENT)
Facility: HOSPITAL | Age: 62
LOS: 3 days | Discharge: HOME HEALTH | DRG: 698 | End: 2022-05-11
Attending: INTERNAL MEDICINE | Admitting: INTERNAL MEDICINE
Payer: MEDICARE

## 2022-05-07 VITALS
HEART RATE: 110 BPM | TEMPERATURE: 103 F | DIASTOLIC BLOOD PRESSURE: 89 MMHG | RESPIRATION RATE: 22 BRPM | HEIGHT: 72 IN | OXYGEN SATURATION: 95 % | SYSTOLIC BLOOD PRESSURE: 181 MMHG

## 2022-05-07 DIAGNOSIS — Z98.890 OTHER SPECIFIED POSTPROCEDURAL STATES: Chronic | ICD-10-CM

## 2022-05-07 PROCEDURE — 93010 ELECTROCARDIOGRAM REPORT: CPT

## 2022-05-07 PROCEDURE — 99285 EMERGENCY DEPT VISIT HI MDM: CPT

## 2022-05-07 NOTE — ED ADULT TRIAGE NOTE - AS HEIGHT TYPE
Is This A New Presentation, Or A Follow-Up?: Skin Lesions How Severe Is Your Skin Lesion?: mild Have Your Skin Lesions Been Treated?: not been treated stated Which Family Member (Optional)?: Mom, Dad

## 2022-05-08 DIAGNOSIS — A41.9 SEPSIS, UNSPECIFIED ORGANISM: ICD-10-CM

## 2022-05-08 LAB
ALBUMIN SERPL ELPH-MCNC: 3.9 G/DL — SIGNIFICANT CHANGE UP (ref 3.3–5)
ALP SERPL-CCNC: 94 U/L — SIGNIFICANT CHANGE UP (ref 40–120)
ALT FLD-CCNC: 26 U/L — SIGNIFICANT CHANGE UP (ref 10–45)
ANION GAP SERPL CALC-SCNC: 13 MMOL/L — SIGNIFICANT CHANGE UP (ref 5–17)
APPEARANCE UR: CLEAR — SIGNIFICANT CHANGE UP
APTT BLD: 29.5 SEC — SIGNIFICANT CHANGE UP (ref 27.5–35.5)
AST SERPL-CCNC: 24 U/L — SIGNIFICANT CHANGE UP (ref 10–40)
BACTERIA # UR AUTO: NEGATIVE — SIGNIFICANT CHANGE UP
BASE EXCESS BLDV CALC-SCNC: 4.9 MMOL/L — HIGH (ref -2–2)
BASOPHILS # BLD AUTO: 0.04 K/UL — SIGNIFICANT CHANGE UP (ref 0–0.2)
BASOPHILS NFR BLD AUTO: 0.3 % — SIGNIFICANT CHANGE UP (ref 0–2)
BILIRUB SERPL-MCNC: 0.4 MG/DL — SIGNIFICANT CHANGE UP (ref 0.2–1.2)
BILIRUB UR-MCNC: NEGATIVE — SIGNIFICANT CHANGE UP
BUN SERPL-MCNC: 6 MG/DL — LOW (ref 7–23)
CA-I SERPL-SCNC: 1.2 MMOL/L — SIGNIFICANT CHANGE UP (ref 1.15–1.33)
CALCIUM SERPL-MCNC: 9.4 MG/DL — SIGNIFICANT CHANGE UP (ref 8.4–10.5)
CHLORIDE BLDV-SCNC: 93 MMOL/L — LOW (ref 96–108)
CHLORIDE SERPL-SCNC: 94 MMOL/L — LOW (ref 96–108)
CO2 BLDV-SCNC: 32 MMOL/L — HIGH (ref 22–26)
CO2 SERPL-SCNC: 24 MMOL/L — SIGNIFICANT CHANGE UP (ref 22–31)
COLOR SPEC: SIGNIFICANT CHANGE UP
CREAT SERPL-MCNC: 0.8 MG/DL — SIGNIFICANT CHANGE UP (ref 0.5–1.3)
DIFF PNL FLD: ABNORMAL
E COLI DNA BLD POS QL NAA+NON-PROBE: SIGNIFICANT CHANGE UP
EGFR: 101 ML/MIN/1.73M2 — SIGNIFICANT CHANGE UP
EOSINOPHIL # BLD AUTO: 0.13 K/UL — SIGNIFICANT CHANGE UP (ref 0–0.5)
EOSINOPHIL NFR BLD AUTO: 1 % — SIGNIFICANT CHANGE UP (ref 0–6)
EPI CELLS # UR: 1 /HPF — SIGNIFICANT CHANGE UP
GAS PNL BLDV: 128 MMOL/L — LOW (ref 136–145)
GAS PNL BLDV: SIGNIFICANT CHANGE UP
GAS PNL BLDV: SIGNIFICANT CHANGE UP
GLUCOSE BLDC GLUCOMTR-MCNC: 103 MG/DL — HIGH (ref 70–99)
GLUCOSE BLDC GLUCOMTR-MCNC: 108 MG/DL — HIGH (ref 70–99)
GLUCOSE BLDV-MCNC: 143 MG/DL — HIGH (ref 70–99)
GLUCOSE SERPL-MCNC: 141 MG/DL — HIGH (ref 70–99)
GLUCOSE UR QL: NEGATIVE — SIGNIFICANT CHANGE UP
GRAM STN FLD: SIGNIFICANT CHANGE UP
HCO3 BLDV-SCNC: 31 MMOL/L — HIGH (ref 22–29)
HCT VFR BLD CALC: 41.3 % — SIGNIFICANT CHANGE UP (ref 39–50)
HCT VFR BLDA CALC: 44 % — SIGNIFICANT CHANGE UP (ref 39–51)
HGB BLD CALC-MCNC: 14.7 G/DL — SIGNIFICANT CHANGE UP (ref 12.6–17.4)
HGB BLD-MCNC: 13.6 G/DL — SIGNIFICANT CHANGE UP (ref 13–17)
HMPV RNA SPEC QL NAA+PROBE: DETECTED
HYALINE CASTS # UR AUTO: 3 /LPF — HIGH (ref 0–2)
IMM GRANULOCYTES NFR BLD AUTO: 0.6 % — SIGNIFICANT CHANGE UP (ref 0–1.5)
INR BLD: 1.03 RATIO — SIGNIFICANT CHANGE UP (ref 0.88–1.16)
KETONES UR-MCNC: NEGATIVE — SIGNIFICANT CHANGE UP
LACTATE BLDV-MCNC: 1.8 MMOL/L — SIGNIFICANT CHANGE UP (ref 0.7–2)
LEUKOCYTE ESTERASE UR-ACNC: ABNORMAL
LYMPHOCYTES # BLD AUTO: 0.27 K/UL — LOW (ref 1–3.3)
LYMPHOCYTES # BLD AUTO: 2.1 % — LOW (ref 13–44)
MANUAL SMEAR VERIFICATION: SIGNIFICANT CHANGE UP
MCHC RBC-ENTMCNC: 29.6 PG — SIGNIFICANT CHANGE UP (ref 27–34)
MCHC RBC-ENTMCNC: 32.9 GM/DL — SIGNIFICANT CHANGE UP (ref 32–36)
MCV RBC AUTO: 89.8 FL — SIGNIFICANT CHANGE UP (ref 80–100)
METHOD TYPE: SIGNIFICANT CHANGE UP
MICROCYTES BLD QL: SLIGHT — SIGNIFICANT CHANGE UP
MONOCYTES # BLD AUTO: 0.76 K/UL — SIGNIFICANT CHANGE UP (ref 0–0.9)
MONOCYTES NFR BLD AUTO: 5.9 % — SIGNIFICANT CHANGE UP (ref 2–14)
NEUTROPHILS # BLD AUTO: 11.56 K/UL — HIGH (ref 1.8–7.4)
NEUTROPHILS NFR BLD AUTO: 90.1 % — HIGH (ref 43–77)
NITRITE UR-MCNC: NEGATIVE — SIGNIFICANT CHANGE UP
NRBC # BLD: 0 /100 WBCS — SIGNIFICANT CHANGE UP (ref 0–0)
OVALOCYTES BLD QL SMEAR: SLIGHT — SIGNIFICANT CHANGE UP
PCO2 BLDV: 50 MMHG — SIGNIFICANT CHANGE UP (ref 42–55)
PH BLDV: 7.4 — SIGNIFICANT CHANGE UP (ref 7.32–7.43)
PH UR: 7 — SIGNIFICANT CHANGE UP (ref 5–8)
PLAT MORPH BLD: NORMAL — SIGNIFICANT CHANGE UP
PLATELET # BLD AUTO: 201 K/UL — SIGNIFICANT CHANGE UP (ref 150–400)
PLATELET COUNT - ESTIMATE: NORMAL — SIGNIFICANT CHANGE UP
PO2 BLDV: 38 MMHG — SIGNIFICANT CHANGE UP (ref 25–45)
POIKILOCYTOSIS BLD QL AUTO: SLIGHT — SIGNIFICANT CHANGE UP
POLYCHROMASIA BLD QL SMEAR: SLIGHT — SIGNIFICANT CHANGE UP
POTASSIUM BLDV-SCNC: 4.4 MMOL/L — SIGNIFICANT CHANGE UP (ref 3.5–5.1)
POTASSIUM SERPL-MCNC: 4.1 MMOL/L — SIGNIFICANT CHANGE UP (ref 3.5–5.3)
POTASSIUM SERPL-SCNC: 4.1 MMOL/L — SIGNIFICANT CHANGE UP (ref 3.5–5.3)
PROT SERPL-MCNC: 7.3 G/DL — SIGNIFICANT CHANGE UP (ref 6–8.3)
PROT UR-MCNC: ABNORMAL
PROTHROM AB SERPL-ACNC: 12 SEC — SIGNIFICANT CHANGE UP (ref 10.5–13.4)
RAPID RVP RESULT: DETECTED
RBC # BLD: 4.6 M/UL — SIGNIFICANT CHANGE UP (ref 4.2–5.8)
RBC # FLD: 13.2 % — SIGNIFICANT CHANGE UP (ref 10.3–14.5)
RBC BLD AUTO: SIGNIFICANT CHANGE UP
RBC CASTS # UR COMP ASSIST: 8 /HPF — HIGH (ref 0–4)
SAO2 % BLDV: 65.6 % — LOW (ref 67–88)
SARS-COV-2 RNA SPEC QL NAA+PROBE: SIGNIFICANT CHANGE UP
SODIUM SERPL-SCNC: 131 MMOL/L — LOW (ref 135–145)
SP GR SPEC: 1.01 — LOW (ref 1.01–1.02)
SPECIMEN SOURCE: SIGNIFICANT CHANGE UP
SPECIMEN SOURCE: SIGNIFICANT CHANGE UP
UROBILINOGEN FLD QL: NEGATIVE — SIGNIFICANT CHANGE UP
WBC # BLD: 12.84 K/UL — HIGH (ref 3.8–10.5)
WBC # FLD AUTO: 12.84 K/UL — HIGH (ref 3.8–10.5)
WBC UR QL: 27 /HPF — HIGH (ref 0–5)

## 2022-05-08 PROCEDURE — 99223 1ST HOSP IP/OBS HIGH 75: CPT

## 2022-05-08 PROCEDURE — 71045 X-RAY EXAM CHEST 1 VIEW: CPT | Mod: 26

## 2022-05-08 PROCEDURE — 74176 CT ABD & PELVIS W/O CONTRAST: CPT | Mod: 26

## 2022-05-08 PROCEDURE — 93308 TTE F-UP OR LMTD: CPT | Mod: 26

## 2022-05-08 RX ORDER — GLUCAGON INJECTION, SOLUTION 0.5 MG/.1ML
1 INJECTION, SOLUTION SUBCUTANEOUS ONCE
Refills: 0 | Status: DISCONTINUED | OUTPATIENT
Start: 2022-05-08 | End: 2022-05-11

## 2022-05-08 RX ORDER — BENZTROPINE MESYLATE 1 MG
1 TABLET ORAL DAILY
Refills: 0 | Status: DISCONTINUED | OUTPATIENT
Start: 2022-05-08 | End: 2022-05-11

## 2022-05-08 RX ORDER — DEXTROSE 50 % IN WATER 50 %
15 SYRINGE (ML) INTRAVENOUS ONCE
Refills: 0 | Status: DISCONTINUED | OUTPATIENT
Start: 2022-05-08 | End: 2022-05-11

## 2022-05-08 RX ORDER — DIVALPROEX SODIUM 500 MG/1
500 TABLET, DELAYED RELEASE ORAL DAILY
Refills: 0 | Status: DISCONTINUED | OUTPATIENT
Start: 2022-05-08 | End: 2022-05-11

## 2022-05-08 RX ORDER — ALBUTEROL 90 UG/1
1 AEROSOL, METERED ORAL ONCE
Refills: 0 | Status: COMPLETED | OUTPATIENT
Start: 2022-05-08 | End: 2022-05-09

## 2022-05-08 RX ORDER — DEXTROSE 50 % IN WATER 50 %
12.5 SYRINGE (ML) INTRAVENOUS ONCE
Refills: 0 | Status: DISCONTINUED | OUTPATIENT
Start: 2022-05-08 | End: 2022-05-11

## 2022-05-08 RX ORDER — SENNA PLUS 8.6 MG/1
2 TABLET ORAL AT BEDTIME
Refills: 0 | Status: DISCONTINUED | OUTPATIENT
Start: 2022-05-08 | End: 2022-05-11

## 2022-05-08 RX ORDER — ACETAMINOPHEN 500 MG
1000 TABLET ORAL ONCE
Refills: 0 | Status: COMPLETED | OUTPATIENT
Start: 2022-05-07 | End: 2022-05-08

## 2022-05-08 RX ORDER — SODIUM CHLORIDE 9 MG/ML
500 INJECTION INTRAMUSCULAR; INTRAVENOUS; SUBCUTANEOUS ONCE
Refills: 0 | Status: COMPLETED | OUTPATIENT
Start: 2022-05-07 | End: 2022-05-07

## 2022-05-08 RX ORDER — ATORVASTATIN CALCIUM 80 MG/1
20 TABLET, FILM COATED ORAL AT BEDTIME
Refills: 0 | Status: DISCONTINUED | OUTPATIENT
Start: 2022-05-08 | End: 2022-05-11

## 2022-05-08 RX ORDER — FLUOXETINE HCL 10 MG
10 CAPSULE ORAL DAILY
Refills: 0 | Status: DISCONTINUED | OUTPATIENT
Start: 2022-05-08 | End: 2022-05-11

## 2022-05-08 RX ORDER — ALLOPURINOL 300 MG
100 TABLET ORAL
Refills: 0 | Status: DISCONTINUED | OUTPATIENT
Start: 2022-05-08 | End: 2022-05-11

## 2022-05-08 RX ORDER — FLUTICASONE PROPIONATE 220 MCG
0 AEROSOL WITH ADAPTER (GRAM) INHALATION
Qty: 0 | Refills: 0 | DISCHARGE

## 2022-05-08 RX ORDER — SODIUM CHLORIDE 9 MG/ML
1000 INJECTION, SOLUTION INTRAVENOUS
Refills: 0 | Status: DISCONTINUED | OUTPATIENT
Start: 2022-05-08 | End: 2022-05-11

## 2022-05-08 RX ORDER — DEXTROSE 50 % IN WATER 50 %
25 SYRINGE (ML) INTRAVENOUS ONCE
Refills: 0 | Status: DISCONTINUED | OUTPATIENT
Start: 2022-05-08 | End: 2022-05-11

## 2022-05-08 RX ORDER — CEFEPIME 1 G/1
2000 INJECTION, POWDER, FOR SOLUTION INTRAMUSCULAR; INTRAVENOUS ONCE
Refills: 0 | Status: COMPLETED | OUTPATIENT
Start: 2022-05-07 | End: 2022-05-07

## 2022-05-08 RX ORDER — CEFTRIAXONE 500 MG/1
1000 INJECTION, POWDER, FOR SOLUTION INTRAMUSCULAR; INTRAVENOUS EVERY 24 HOURS
Refills: 0 | Status: DISCONTINUED | OUTPATIENT
Start: 2022-05-08 | End: 2022-05-09

## 2022-05-08 RX ORDER — SODIUM CHLORIDE 9 MG/ML
1000 INJECTION, SOLUTION INTRAVENOUS
Refills: 0 | Status: DISCONTINUED | OUTPATIENT
Start: 2022-05-08 | End: 2022-05-10

## 2022-05-08 RX ORDER — METFORMIN HYDROCHLORIDE 850 MG/1
2 TABLET ORAL
Qty: 0 | Refills: 0 | DISCHARGE

## 2022-05-08 RX ORDER — INSULIN LISPRO 100/ML
VIAL (ML) SUBCUTANEOUS
Refills: 0 | Status: DISCONTINUED | OUTPATIENT
Start: 2022-05-08 | End: 2022-05-11

## 2022-05-08 RX ORDER — MONTELUKAST 4 MG/1
10 TABLET, CHEWABLE ORAL DAILY
Refills: 0 | Status: DISCONTINUED | OUTPATIENT
Start: 2022-05-08 | End: 2022-05-11

## 2022-05-08 RX ORDER — TAMSULOSIN HYDROCHLORIDE 0.4 MG/1
0.4 CAPSULE ORAL AT BEDTIME
Refills: 0 | Status: DISCONTINUED | OUTPATIENT
Start: 2022-05-08 | End: 2022-05-11

## 2022-05-08 RX ORDER — RISPERIDONE 4 MG/1
1 TABLET ORAL AT BEDTIME
Refills: 0 | Status: DISCONTINUED | OUTPATIENT
Start: 2022-05-08 | End: 2022-05-11

## 2022-05-08 RX ORDER — SODIUM CHLORIDE 9 MG/ML
1000 INJECTION INTRAMUSCULAR; INTRAVENOUS; SUBCUTANEOUS ONCE
Refills: 0 | Status: DISCONTINUED | OUTPATIENT
Start: 2022-05-08 | End: 2022-05-10

## 2022-05-08 RX ORDER — BENZTROPINE MESYLATE 1 MG
0 TABLET ORAL
Qty: 0 | Refills: 0 | DISCHARGE

## 2022-05-08 RX ORDER — HALOPERIDOL DECANOATE 100 MG/ML
0 INJECTION INTRAMUSCULAR
Qty: 0 | Refills: 0 | DISCHARGE

## 2022-05-08 RX ORDER — HEPARIN SODIUM 5000 [USP'U]/ML
5000 INJECTION INTRAVENOUS; SUBCUTANEOUS EVERY 12 HOURS
Refills: 0 | Status: DISCONTINUED | OUTPATIENT
Start: 2022-05-08 | End: 2022-05-10

## 2022-05-08 RX ADMIN — Medication 400 MILLIGRAM(S): at 00:51

## 2022-05-08 RX ADMIN — MONTELUKAST 10 MILLIGRAM(S): 4 TABLET, CHEWABLE ORAL at 14:51

## 2022-05-08 RX ADMIN — Medication 10 MILLIGRAM(S): at 14:51

## 2022-05-08 RX ADMIN — RISPERIDONE 1 MILLIGRAM(S): 4 TABLET ORAL at 22:12

## 2022-05-08 RX ADMIN — HEPARIN SODIUM 5000 UNIT(S): 5000 INJECTION INTRAVENOUS; SUBCUTANEOUS at 18:17

## 2022-05-08 RX ADMIN — DIVALPROEX SODIUM 500 MILLIGRAM(S): 500 TABLET, DELAYED RELEASE ORAL at 14:53

## 2022-05-08 RX ADMIN — CEFEPIME 100 MILLIGRAM(S): 1 INJECTION, POWDER, FOR SOLUTION INTRAMUSCULAR; INTRAVENOUS at 00:51

## 2022-05-08 RX ADMIN — SODIUM CHLORIDE 500 MILLILITER(S): 9 INJECTION INTRAMUSCULAR; INTRAVENOUS; SUBCUTANEOUS at 00:49

## 2022-05-08 RX ADMIN — Medication 1 MILLIGRAM(S): at 14:52

## 2022-05-08 RX ADMIN — SODIUM CHLORIDE 80 MILLILITER(S): 9 INJECTION, SOLUTION INTRAVENOUS at 20:01

## 2022-05-08 RX ADMIN — TAMSULOSIN HYDROCHLORIDE 0.4 MILLIGRAM(S): 0.4 CAPSULE ORAL at 22:12

## 2022-05-08 RX ADMIN — ATORVASTATIN CALCIUM 20 MILLIGRAM(S): 80 TABLET, FILM COATED ORAL at 22:12

## 2022-05-08 RX ADMIN — Medication 100 MILLIGRAM(S): at 18:17

## 2022-05-08 RX ADMIN — Medication 1000 MILLIGRAM(S): at 04:32

## 2022-05-08 RX ADMIN — SENNA PLUS 2 TABLET(S): 8.6 TABLET ORAL at 22:12

## 2022-05-08 RX ADMIN — CEFTRIAXONE 100 MILLIGRAM(S): 500 INJECTION, POWDER, FOR SOLUTION INTRAMUSCULAR; INTRAVENOUS at 14:49

## 2022-05-08 NOTE — ED ADULT NURSE NOTE - OBJECTIVE STATEMENT
Pt is a 62 yr old male with pmh of schizophrenia, neurogenic bladder with self straight catheterizing at home coming from home for weakness and lethargy. Pt is a poor historian but hx provided by the wife. Per the wife- patient has had a UTI for the past week that has gone untreated- but starting yesterday he became increasingly more lethargic and had a harder time ambulating. 911 was called. Pt arrived to the ED with an active cough, tachycardic, stable blood pressure, but tachypneic into the 40's. Pt is a/ox 3- flat affect- but otherwise cooperative. Code sepsis protocol followed and pryor placed.

## 2022-05-08 NOTE — H&P ADULT - NSHPLABSRESULTS_GEN_ALL_CORE
LABS:                        13.6   12.84 )-----------( 201      ( 08 May 2022 00:27 )             41.3     05-    131<L>  |  94<L>  |  6<L>  ----------------------------<  141<H>  4.1   |  24  |  0.80    Ca    9.4      08 May 2022 00:27  Mg     2.1         TPro  7.3  /  Alb  3.9  /  TBili  0.4  /  DBili  x   /  AST  24  /  ALT  26  /  AlkPhos  94  05-08    PT/INR - ( 08 May 2022 00:27 )   PT: 12.0 sec;   INR: 1.03 ratio         PTT - ( 08 May 2022 00:27 )  PTT:29.5 sec      Urinalysis Basic - ( 08 May 2022 01:49 )    Color: Light Yellow / Appearance: Clear / S.007 / pH: x  Gluc: x / Ketone: Negative  / Bili: Negative / Urobili: Negative   Blood: x / Protein: Trace / Nitrite: Negative   Leuk Esterase: Large / RBC: 8 /hpf / WBC 27 /HPF   Sq Epi: x / Non Sq Epi: 1 /hpf / Bacteria: Negative           @ 00:19  4.4  38

## 2022-05-08 NOTE — ED ADULT NURSE REASSESSMENT NOTE - NS ED NURSE REASSESS COMMENT FT1
pryor placed with 2RN present. sterile technique maintained. return of yellow urine, bag below bladder for drainage

## 2022-05-08 NOTE — CONSULT NOTE ADULT - ATTENDING COMMENTS
62 year old male w/ PMHx of DM2, HTN, and HLD w/ intermittent straight catheterizations at home due to BPH who presented from home due to weakness and difficulty ambulating  Leukocytosis, Fever  RVP hMPV  CT dilation of ureters, mild L hydro  CXR clear  Suspect symptoms due to hMPV, lower suspicion UTI presently  Overall,  1) Viral URI  - +hMPV  - Supportive care  - Monitor resp status  2) Abnormal UA  - Suspect abnormal in setting of self caths, lower suspicion for UTI presently  - Would DC Ceftriaxone  - Monitor for symptoms UTI  - F/U UCX  3) Fever/Leukocytosis  - F/U BCXs  - Trend to normal  - Monitor for alternate sources    Barrett Glez MD  Contact on TEAMS messaging from 9am - 5pm  From 5pm-9am, and on weekends call [phone]    I was physically present for the key portions of the evaluation and management service provided. I saw and examined the patient. I agree with the above history, physical, and plan except for any discrepancies which I have documented in “Attending Statements.” Please refer to “Attending Statements” for final plan.

## 2022-05-08 NOTE — CONSULT NOTE ADULT - SUBJECTIVE AND OBJECTIVE BOX
CHIEF COMPLAINT:Patient is a 62y old  Male who presents with a chief complaint of weakness (08 May 2022 11:25)      HPI:   63yo w/Hx of DM2, HTN, HLD, Go Limited Hx from patient who is AAOx3 but mildly confused; collateral obtained from wife Cameron Vela ([phone]). Per wife, pt has been increasingly fatigued at home, sleeping a lot, w/new weakness now unable to ambulate at home. Pt straight cath  chronically at home, Hx of UTIs in the past. Pt c/o chronic cough but otherwise no complaints;   he denies HA, SOB, CP, palpitations, abd pain, n/v/d/c, hematuria/hematochezia/melena, numbness/tingling, focal weakness.     PAST MEDICAL & SURGICAL HISTORY:  History of Schizophrenia    HTN (Hypertension)    Hyperlipidemia    Gout    Anxiety    Asthma    History of Tonsillectomy    S/P Laparotomy    H/O knee surgery        MEDICATIONS  (STANDING):  allopurinol 100 milliGRAM(s) Oral two times a day  atorvastatin 20 milliGRAM(s) Oral at bedtime  benztropine 1 milliGRAM(s) Oral daily  cefTRIAXone   IVPB 1000 milliGRAM(s) IV Intermittent every 24 hours  dextrose 5%. 1000 milliLiter(s) (100 mL/Hr) IV Continuous <Continuous>  dextrose 5%. 1000 milliLiter(s) (50 mL/Hr) IV Continuous <Continuous>  dextrose 50% Injectable 25 Gram(s) IV Push once  dextrose 50% Injectable 12.5 Gram(s) IV Push once  dextrose 50% Injectable 25 Gram(s) IV Push once  diVALproex  milliGRAM(s) Oral daily  FLUoxetine 10 milliGRAM(s) Oral daily  glucagon  Injectable 1 milliGRAM(s) IntraMuscular once  heparin   Injectable 5000 Unit(s) SubCutaneous every 12 hours  insulin lispro (ADMELOG) corrective regimen sliding scale   SubCutaneous three times a day before meals  lactated ringers. 1000 milliLiter(s) (80 mL/Hr) IV Continuous <Continuous>  montelukast 10 milliGRAM(s) Oral daily  risperiDONE   Tablet 1 milliGRAM(s) Oral at bedtime  senna 2 Tablet(s) Oral at bedtime  sodium chloride 0.9% Bolus 1000 milliLiter(s) IV Bolus once  tamsulosin 0.4 milliGRAM(s) Oral at bedtime    MEDICATIONS  (PRN):  dextrose Oral Gel 15 Gram(s) Oral once PRN Blood Glucose LESS THAN 70 milliGRAM(s)/deciliter      FAMILY HISTORY:  FH: heart disease (Father)    FH: diabetes mellitus (Mother)        SOCIAL HISTORY:    [x ] Non-smoker  [ ] Smoker  [ ] Alcohol    Allergies    No Known Allergies    Intolerances    	    REVIEW OF SYSTEMS:  CONSTITUTIONAL: No fever, weight loss, or fatigue  EYES: No eye pain, visual disturbances, or discharge  ENT:  No difficulty hearing, tinnitus, vertigo; No sinus or throat pain  NECK: No pain or stiffness  RESPIRATORY: No cough, wheezing, chills or hemoptysis; No Shortness of Breath  CARDIOVASCULAR: No chest pain, palpitations, passing out, dizziness, or leg swelling  GASTROINTESTINAL: No abdominal or epigastric pain. No nausea, vomiting, or hematemesis; No diarrhea or constipation. No melena or hematochezia.  GENITOURINARY: No dysuria, frequency, hematuria, or incontinence  NEUROLOGICAL: No headaches, memory loss, loss of strength, numbness, or tremors  SKIN: No itching, burning, rashes, or lesions   LYMPH Nodes: No enlarged glands  ENDOCRINE: No heat or cold intolerance; No hair loss  MUSCULOSKELETAL: No joint pain or swelling; No muscle, back, or extremity pain  PSYCHIATRIC: No depression, anxiety, mood swings, or difficulty sleeping  HEME/LYMPH: No easy bruising, or bleeding gums  ALLERGY AND IMMUNOLOGIC: No hives or eczema	    [ ] All others negative	  [ x] Unable to obtain    PHYSICAL EXAM:  T(C): 36.3 (05-08-22 @ 07:46), Max: 39.6 (05-07-22 @ 23:32)  HR: 75 (05-08-22 @ 07:46) (75 - 112)  BP: 123/70 (05-08-22 @ 07:46) (107/72 - 181/89)  RR: 21 (05-08-22 @ 07:46) (18 - 25)  SpO2: 97% (05-08-22 @ 07:46) (95% - 100%)  Wt(kg): --  I&O's Summary      Appearance: Normal	  HEENT:   Normal oral mucosa, PERRL, EOMI	  Lymphatic: No lymphadenopathy  Cardiovascular: Normal S1 S2, No JVD, + murmurs, No edema  Respiratory: Lungs clear to auscultation	  Gastrointestinal:  Soft, Non-tender, + BS	  Skin: No rashes, No ecchymoses, No cyanosis	  Neurologic: Non-focal  Extremities: Normal range of motion, No clubbing, cyanosis or edema  Vascular: Peripheral pulses palpable 2+ bilaterally    TELEMETRY: 	    ECG:  	  RADIOLOGY:  OTHER: 	  	  LABS:	 	    CARDIAC MARKERS:                              13.6   12.84 )-----------( 201      ( 08 May 2022 00:27 )             41.3     05-08    131<L>  |  94<L>  |  6<L>  ----------------------------<  141<H>  4.1   |  24  |  0.80    Ca    9.4      08 May 2022 00:27  Mg     2.1     05-08    TPro  7.3  /  Alb  3.9  /  TBili  0.4  /  DBili  x   /  AST  24  /  ALT  26  /  AlkPhos  94  05-08    proBNP: Serum Pro-Brain Natriuretic Peptide: 320 pg/mL (05-08 @ 00:27)    Lipid Profile:   HgA1c:   TSH:   PT/INR - ( 08 May 2022 00:27 )   PT: 12.0 sec;   INR: 1.03 ratio         PTT - ( 08 May 2022 00:27 )  PTT:29.5 sec    PREVIOUS DIAGNOSTIC TESTING:    < from: 12 Lead ECG (06.17.21 @ 15:28) >  Diagnosis Line SINUS RHYTHM [id number] DEGREE A-V BLOCK  INCOMPLETE RIGHT BUNDLE BRANCH BLOCK  WHEN COMPARED WITH ECG OF 17-MAY-2021 23:55,  NO SIGNIFICANT CHANGE WAS FOUND    < from: POCUS ED TTE 2D F/U, Limited w/o Cont. (05.08.22 @ 01:31) >    INTERPRETATION:  A focused transthoracic cardiac ultrasound examination   was performed.  No pericardial effusion was present.  No global wall motion abnormality was identified  IVC with respiratory variation    IMPRESSION:  No Pericardial Effusion.    < from: CT Abdomen and Pelvis w/ IV Cont (06.17.21 @ 18:27) >  No significant interval change when compared with CT abdomen pelvis 5/18/2021.  Redemonstrated severe bilateral hydroureteronephrosis, unchanged.

## 2022-05-08 NOTE — CONSULT NOTE ADULT - ASSESSMENT
63yo w/Hx of DM2, HTN, HLD, Go Limited Hx from patient who is AAOx3 but mildly confused; collateral obtained from wife Cameron Vela ([phone]). Per wife, pt has been increasingly fatigued at home, sleeping a lot, w/new weakness now unable to ambulate at home. Pt straight cath  chronically at home, Hx of UTIs in the past. Pt c/o chronic cough but otherwise no complaints;   he denies HA, SOB, CP, palpitations, abd pain, n/v/d/c, hematuria/hematochezia/melena, numbness/tingling, focal weakness.  pt is poor historian with hx of schizophrenia with change of mental status and generalized weakness.  pt with known hx of htn, with decrease bp will hold all aureliano meds  tsh/ b12  echo r/o hypertensive heart disease  check cultures  dvt prophylaxis  am cortisol level  ecg noted

## 2022-05-08 NOTE — ED PROVIDER NOTE - PROGRESS NOTE DETAILS
Attending Dana Martinez: pt given broad spectrum abx. h/o ti in the past. reviewed sensitivities and given abx. pocus with mild hydro. on review of old images has had in the past. no known h/o ureteral stone.

## 2022-05-08 NOTE — CONSULT NOTE ADULT - SUBJECTIVE AND OBJECTIVE BOX
Patient is a 62 year old male who presents with a chief complaint of weakness. (08 May 2022 07:25)    HPI:  The patient is a 62 year old male w/ PMHx of DM2, HTN, and HLD w/ intermittent straight catheterizations at home due to BPH who presented from home due to weakness and difficulty ambulating. As per chart review, patient has a history of UTIs in the past also complaining of chronic cough but otherwise no complaints. He denies HA, fever, chills, SOB, CP, palpitations, abd pain, N/V/D/C, hematuria, hematochezia, melena, numbness, tingling, focal weakness.          prior hospital charts reviewed [  ]  primary team notes reviewed [  ]  other consultant notes reviewed [  ]    PAST MEDICAL & SURGICAL HISTORY:  History of Schizophrenia    HTN (Hypertension)    Hyperlipidemia    Gout    Anxiety    Asthma    History of Tonsillectomy    S/P Laparotomy    H/O knee surgery        Allergies  No Known Allergies    ANTIMICROBIALS (past 90 days)  MEDICATIONS  (STANDING):  cefepime   IVPB   100 mL/Hr IV Intermittent (22 @ 00:51)        cefTRIAXone   IVPB 1000 every 24 hours    OTHER MEDS: MEDICATIONS  (STANDING):  allopurinol 100 two times a day  atorvastatin 20 at bedtime  benztropine 1 daily  dextrose 50% Injectable 25 once  dextrose 50% Injectable 12.5 once  dextrose 50% Injectable 25 once  dextrose Oral Gel 15 once PRN  diVALproex  daily  FLUoxetine 10 daily  glucagon  Injectable 1 once  heparin   Injectable 5000 every 12 hours  insulin lispro (ADMELOG) corrective regimen sliding scale  three times a day before meals  montelukast 10 daily  risperiDONE   Tablet 1 at bedtime  senna 2 at bedtime  tamsulosin 0.4 at bedtime    SOCIAL HISTORY:       FAMILY HISTORY:  FH: heart disease (Father)    FH: diabetes mellitus (Mother)      REVIEW OF SYSTEMS  [  ] ROS unobtainable because:    [  ] All other systems negative except as noted below:	    Constitutional:  [ ] fever [ ] chills  [ ] weight loss  [ ] weakness  Skin:  [ ] rash [ ] phlebitis	  Eyes: [ ] icterus [ ] pain  [ ] discharge	  ENMT: [ ] sore throat  [ ] thrush [ ] ulcers [ ] exudates  Respiratory: [ ] dyspnea [ ] hemoptysis [ ] cough [ ] sputum	  Cardiovascular:  [ ] chest pain [ ] palpitations [ ] edema	  Gastrointestinal:  [ ] nausea [ ] vomiting [ ] diarrhea [ ] constipation [ ] pain	  Genitourinary:  [ ] dysuria [ ] frequency [ ] hematuria [ ] discharge [ ] flank pain  [ ] incontinence  Musculoskeletal:  [ ] myalgias [ ] arthralgias [ ] arthritis  [ ] back pain  Neurological:  [ ] headache [ ] seizures  [ ] confusion/altered mental status  Psychiatric:  [ ] anxiety [ ] depression	  Hematology/Lymphatics:  [ ] lymphadenopathy  Endocrine:  [ ] adrenal [ ] thyroid  Allergic/Immunologic:	 [ ] transplant [ ] seasonal    Vital Signs Last 24 Hrs  T(F): 97.4 (22 @ 07:46), Max: 103.2 (22 @ 23:32)  Vital Signs Last 24 Hrs  HR: 75 (22 @ 07:46) (75 - 112)  BP: 123/70 (22 @ 07:46) (107/72 - 181/89)  RR: 21 (22 @ 07:46)  SpO2: 97% (22 @ 07:46) (95% - 100%)  Wt(kg): --    PHYSICAL EXAM:  Constitutional: non-toxic, no distress  HEAD/EYES: anicteric, no conjunctival injection  ENT:  supple, no thrush  Cardiovascular:   normal S1, S2, no murmur, no edema  Respiratory:  clear BS bilaterally, no wheezes, no rales  GI:  soft, non-tender, normal bowel sounds  :  no pryor, no CVA tenderness  Musculoskeletal:  no synovitis, normal ROM  Neurologic: awake and alert, normal strength, no focal findings  Skin:  no rash, no erythema, no phlebitis  Heme/Onc: no lymphadenopathy   Psychiatric:  awake, alert, appropriate mood                            13.6   12.84 )-----------( 201      ( 08 May 2022 00:27 )             41.3   05    131<L>  |  94<L>  |  6<L>  ----------------------------<  141<H>  4.1   |  24  |  0.80    Ca    9.4      08 May 2022 00:27  Mg     2.1         TPro  7.3  /  Alb  3.9  /  TBili  0.4  /  DBili  x   /  AST  24  /  ALT  26  /  AlkPhos  94  05-08    Urinalysis Basic - ( 08 May 2022 01:49 )    Color: Light Yellow / Appearance: Clear / S.007 / pH: x  Gluc: x / Ketone: Negative  / Bili: Negative / Urobili: Negative   Blood: x / Protein: Trace / Nitrite: Negative   Leuk Esterase: Large / RBC: 8 /hpf / WBC 27 /HPF   Sq Epi: x / Non Sq Epi: 1 /hpf / Bacteria: Negative    MICROBIOLOGY:          Rapid RVP Result: Detected ( @ 00:26)      RADIOLOGY:  imaging below personally reviewed and agree with findings Patient is a 62 year old male who presents with a chief complaint of weakness. (08 May 2022 07:25)    HPI:  The patient is a 62 year old male w/ PMHx of DM2, HTN, and HLD w/ intermittent straight catheterizations at home due to BPH who presented from home due to weakness and difficulty ambulating. As per chart review, patient has a history of UTIs in the past also complaining of chronic cough but otherwise no complaints. He denies HA, chills, SOB, CP, palpitations, abd pain, N/V/D/C, hematuria, hematochezia, melena, numbness, tingling, focal weakness. Tmax was 103.1 in the ED.    Labs showed WBC 12.84K, Na 131 w/ normal renal and liver function. , UA showed WBCs 27 w/ + LE. BCx x 2 and UCx are in process. RVP was positive for human metapneumovirus. CXR negative. POCUS negative. CT AP was ordered. He received IV cefepime and started on IV ceftriaxone. ID was consulted for human metapneumovirus infection.          prior hospital charts reviewed [x]  primary team notes reviewed [x]  other consultant notes reviewed [x]    PAST MEDICAL & SURGICAL HISTORY:  History of Schizophrenia  HTN (Hypertension)  Hyperlipidemia  Gout  Anxiety  Asthma  History of Tonsillectomy  S/P Laparotomy  H/O knee surgery        Allergies  No Known Allergies    ANTIMICROBIALS (past 90 days)  MEDICATIONS  (STANDING):  cefepime   IVPB   100 mL/Hr IV Intermittent (22 @ 00:51)        cefTRIAXone   IVPB 1000 every 24 hours    OTHER MEDS: MEDICATIONS  (STANDING):  allopurinol 100 two times a day  atorvastatin 20 at bedtime  benztropine 1 daily  dextrose 50% Injectable 25 once  dextrose 50% Injectable 12.5 once  dextrose 50% Injectable 25 once  dextrose Oral Gel 15 once PRN  diVALproex  daily  FLUoxetine 10 daily  glucagon  Injectable 1 once  heparin   Injectable 5000 every 12 hours  insulin lispro (ADMELOG) corrective regimen sliding scale  three times a day before meals  montelukast 10 daily  risperiDONE   Tablet 1 at bedtime  senna 2 at bedtime  tamsulosin 0.4 at bedtime    SOCIAL HISTORY:   does not smoke, drink alcohol, or use recreational drugs     FAMILY HISTORY:  FH: heart disease (Father)  FH: diabetes mellitus (Mother)      REVIEW OF SYSTEMS  [  ] ROS unobtainable because:    [x] All other systems negative except as noted below:	    Constitutional:  [x] fever [ ] chills  [ ] weight loss  [x] weakness  Skin:  [ ] rash [ ] phlebitis	  Eyes: [ ] icterus [ ] pain  [ ] discharge	  ENMT: [ ] sore throat  [ ] thrush [ ] ulcers [ ] exudates  Respiratory: [ ] dyspnea [ ] hemoptysis [x] cough [ ] sputum	  Cardiovascular:  [ ] chest pain [ ] palpitations [ ] edema	  Gastrointestinal:  [ ] nausea [ ] vomiting [ ] diarrhea [ ] constipation [ ] pain	  Genitourinary:  [ ] dysuria [ ] frequency [ ] hematuria [ ] discharge [ ] flank pain  [ ] incontinence  Musculoskeletal:  [ ] myalgias [ ] arthralgias [ ] arthritis  [ ] back pain  Neurological:  [ ] headache [ ] seizures  [x] confusion/altered mental status  Psychiatric:  [ ] anxiety [ ] depression	  Hematology/Lymphatics:  [ ] lymphadenopathy  Endocrine:  [ ] adrenal [ ] thyroid  Allergic/Immunologic:	 [ ] transplant [ ] seasonal    Vital Signs Last 24 Hrs  T(F): 97.4 (22 @ 07:46), Max: 103.2 (22 @ 23:32)  Vital Signs Last 24 Hrs  HR: 75 (22 @ 07:46) (75 - 112)  BP: 123/70 (22 @ 07:46) (107/72 - 181/89)  RR: 21 (22 @ 07:46)  SpO2: 97% (22 @ 07:46) (95% - 100%)  Wt(kg): --    PHYSICAL EXAM:  Constitutional: non-toxic, no distress  HEAD/EYES: anicteric, no conjunctival injection  ENT:  supple, no thrush  Cardiovascular:   normal S1, S2, no murmur, no edema  Respiratory:  clear BS bilaterally, no wheezes, no rales  GI:  soft, non-tender, normal bowel sounds  :  no pryor, no CVA tenderness  Musculoskeletal:  no synovitis, normal ROM  Neurologic: awake and alert, normal strength, no focal findings  Skin:  no rash, no erythema, no phlebitis  Heme/Onc: no lymphadenopathy   Psychiatric:  awake, alert, appropriate mood                            13.6   12.84 )-----------( 201      ( 08 May 2022 00:27 )             41.3   05-08    131<L>  |  94<L>  |  6<L>  ----------------------------<  141<H>  4.1   |  24  |  0.80    Ca    9.4      08 May 2022 00:27  Mg     2.1         TPro  7.3  /  Alb  3.9  /  TBili  0.4  /  DBili  x   /  AST  24  /  ALT  26  /  AlkPhos  94      Urinalysis Basic - ( 08 May 2022 01:49 )    Color: Light Yellow / Appearance: Clear / S.007 / pH: x  Gluc: x / Ketone: Negative  / Bili: Negative / Urobili: Negative   Blood: x / Protein: Trace / Nitrite: Negative   Leuk Esterase: Large / RBC: 8 /hpf / WBC 27 /HPF   Sq Epi: x / Non Sq Epi: 1 /hpf / Bacteria: Negative    MICROBIOLOGY:      Rapid RVP Result: Detected ( @ 00:26)    BCx x 2 in process      RADIOLOGY:    < from: Xray Chest 1 View AP/PA (22 @ 00:08) >  FINDINGS:    No focal consolidation. No pleural effusion. No pneumothorax. Cardiac   silhouette size cannot be accurately assessed on this projection. No   acute osseous findings.    IMPRESSION:    No focal consolidation    --- End of Report ---        < end of copied text >      < from: POCUS ED TTE 2D F/U, Limited w/o Cont. (22 @ 01:31) >  INTERPRETATION:  A focused transthoracic cardiac ultrasound examination   was performed.  No pericardial effusion was present.  No global wall motion abnormality was identified  IVC with respiratory variation    IMPRESSION:  No Pericardial Effusion.    --- End of Report ---        < end of copied text >   Patient is a 62 year old male who presents with a chief complaint of weakness. (08 May 2022 07:25)    HPI:  The patient is a 62 year old male w/ PMHx of DM2, HTN, and HLD w/ intermittent straight catheterizations at home due to BPH who presented from home due to weakness and difficulty ambulating. As per chart review, patient has a history of UTIs in the past also complaining of chronic cough but otherwise no complaints. He denies HA, chills, SOB, CP, palpitations, abd pain, N/V/D/C, hematuria, hematochezia, melena, numbness, tingling, focal weakness. Tmax was 103.1 in the ED.    Labs showed WBC 12.84K, Na 131 w/ normal renal and liver function. , UA showed WBCs 27 w/ + LE. BCx x 2 and UCx are in process. RVP was positive for human metapneumovirus. CXR negative. POCUS negative. CT AP was ordered. He received IV cefepime and started on IV ceftriaxone. ID was consulted for human metapneumovirus infection.     prior hospital charts reviewed [x]  primary team notes reviewed [x]  other consultant notes reviewed [x]    PAST MEDICAL & SURGICAL HISTORY:  History of Schizophrenia  HTN (Hypertension)  Hyperlipidemia  Gout  Anxiety  Asthma  History of Tonsillectomy  S/P Laparotomy  H/O knee surgery        Allergies  No Known Allergies    ANTIMICROBIALS (past 90 days)  MEDICATIONS  (STANDING):  cefepime   IVPB   100 mL/Hr IV Intermittent (22 @ 00:51)        cefTRIAXone   IVPB 1000 every 24 hours    OTHER MEDS: MEDICATIONS  (STANDING):  allopurinol 100 two times a day  atorvastatin 20 at bedtime  benztropine 1 daily  dextrose 50% Injectable 25 once  dextrose 50% Injectable 12.5 once  dextrose 50% Injectable 25 once  dextrose Oral Gel 15 once PRN  diVALproex  daily  FLUoxetine 10 daily  glucagon  Injectable 1 once  heparin   Injectable 5000 every 12 hours  insulin lispro (ADMELOG) corrective regimen sliding scale  three times a day before meals  montelukast 10 daily  risperiDONE   Tablet 1 at bedtime  senna 2 at bedtime  tamsulosin 0.4 at bedtime    SOCIAL HISTORY:   does not smoke, drink alcohol, or use recreational drugs     FAMILY HISTORY:  FH: heart disease (Father)  FH: diabetes mellitus (Mother)      REVIEW OF SYSTEMS  [  ] ROS unobtainable because:    [x] All other systems negative except as noted below:	    Constitutional:  [x] fever [ ] chills  [ ] weight loss  [x] weakness  Skin:  [ ] rash [ ] phlebitis	  Eyes: [ ] icterus [ ] pain  [ ] discharge	  ENMT: [ ] sore throat  [ ] thrush [ ] ulcers [ ] exudates  Respiratory: [ ] dyspnea [ ] hemoptysis [x] cough [ ] sputum	  Cardiovascular:  [ ] chest pain [ ] palpitations [ ] edema	  Gastrointestinal:  [ ] nausea [ ] vomiting [ ] diarrhea [ ] constipation [ ] pain	  Genitourinary:  [ ] dysuria [ ] frequency [ ] hematuria [ ] discharge [ ] flank pain  [ ] incontinence  Musculoskeletal:  [ ] myalgias [ ] arthralgias [ ] arthritis  [ ] back pain  Neurological:  [ ] headache [ ] seizures  [x] confusion/altered mental status  Psychiatric:  [ ] anxiety [ ] depression	  Hematology/Lymphatics:  [ ] lymphadenopathy  Endocrine:  [ ] adrenal [ ] thyroid  Allergic/Immunologic:	 [ ] transplant [ ] seasonal    Vital Signs Last 24 Hrs  T(F): 97.4 (22 @ 07:46), Max: 103.2 (22 @ 23:32)  Vital Signs Last 24 Hrs  HR: 75 (22 @ 07:46) (75 - 112)  BP: 123/70 (22 @ 07:46) (107/72 - 181/89)  RR: 21 (22 @ 07:46)  SpO2: 97% (22 @ 07:46) (95% - 100%)  Wt(kg): --    PHYSICAL EXAM:  Constitutional: non-toxic, no distress  HEAD/EYES: anicteric, no conjunctival injection  ENT:  supple, no thrush  Cardiovascular:   normal S1, S2, no murmur, no edema  Respiratory:  clear BS bilaterally, no wheezes, no rales  GI:  soft, non-tender, normal bowel sounds  :  no pryor, no CVA tenderness  Musculoskeletal:  no synovitis, normal ROM  Neurologic: awake and alert, normal strength, no focal findings  Skin:  no rash, no erythema, no phlebitis  Heme/Onc: no lymphadenopathy   Psychiatric:  awake, alert, appropriate mood                            13.6   12.84 )-----------( 201      ( 08 May 2022 00:27 )             41.3   05-08    131<L>  |  94<L>  |  6<L>  ----------------------------<  141<H>  4.1   |  24  |  0.80    Ca    9.4      08 May 2022 00:27  Mg     2.1         TPro  7.3  /  Alb  3.9  /  TBili  0.4  /  DBili  x   /  AST  24  /  ALT  26  /  AlkPhos  94      Urinalysis Basic - ( 08 May 2022 01:49 )    Color: Light Yellow / Appearance: Clear / S.007 / pH: x  Gluc: x / Ketone: Negative  / Bili: Negative / Urobili: Negative   Blood: x / Protein: Trace / Nitrite: Negative   Leuk Esterase: Large / RBC: 8 /hpf / WBC 27 /HPF   Sq Epi: x / Non Sq Epi: 1 /hpf / Bacteria: Negative    MICROBIOLOGY:      Rapid RVP Result: Detected ( @ 00:26)    BCx x 2 in process    Prior microbiology    21 UCx E. coli      RADIOLOGY:    < from: Xray Chest 1 View AP/PA (22 @ 00:08) >  FINDINGS:    No focal consolidation. No pleural effusion. No pneumothorax. Cardiac   silhouette size cannot be accurately assessed on this projection. No   acute osseous findings.    IMPRESSION:    No focal consolidation    --- End of Report ---        < end of copied text >      < from: POCUS ED TTE 2D F/U, Limited w/o Cont. (22 @ 01:31) >  INTERPRETATION:  A focused transthoracic cardiac ultrasound examination   was performed.  No pericardial effusion was present.  No global wall motion abnormality was identified  IVC with respiratory variation    IMPRESSION:  No Pericardial Effusion.    --- End of Report ---        < end of copied text >   Patient is a 62 year old male who presents with a chief complaint of weakness. (08 May 2022 07:25)    HPI:  The patient is a 62 year old male w/ PMHx of DM2, HTN, and HLD w/ intermittent straight catheterizations at home due to BPH who presented from home due to weakness and difficulty ambulating. As per chart review, patient has a history of UTIs in the past also complaining of chronic cough but otherwise no complaints. He denies HA, chills, SOB, CP, palpitations, abd pain, N/V/D/C, hematuria, hematochezia, melena, numbness, tingling, focal weakness. Tmax was 103.1 in the ED. He reports that his son's friend was sick.     Labs showed WBC 12.84K, Na 131 w/ normal renal and liver function. , UA showed WBCs 27 w/ + LE. BCx x 2 and UCx are in process. RVP was positive for human metapneumovirus. CXR negative. POCUS negative. CT AP was ordered. He received IV cefepime and started on IV ceftriaxone. ID was consulted for human metapneumovirus infection.     prior hospital charts reviewed [x]  primary team notes reviewed [x]  other consultant notes reviewed [x]    PAST MEDICAL & SURGICAL HISTORY:  History of Schizophrenia  HTN (Hypertension)  Hyperlipidemia  Gout  Anxiety  Asthma  History of Tonsillectomy  S/P Laparotomy  H/O knee surgery        Allergies  No Known Allergies    ANTIMICROBIALS (past 90 days)  MEDICATIONS  (STANDING):  cefepime   IVPB   100 mL/Hr IV Intermittent (22 @ 00:51)        cefTRIAXone   IVPB 1000 every 24 hours    OTHER MEDS: MEDICATIONS  (STANDING):  allopurinol 100 two times a day  atorvastatin 20 at bedtime  benztropine 1 daily  dextrose 50% Injectable 25 once  dextrose 50% Injectable 12.5 once  dextrose 50% Injectable 25 once  dextrose Oral Gel 15 once PRN  diVALproex  daily  FLUoxetine 10 daily  glucagon  Injectable 1 once  heparin   Injectable 5000 every 12 hours  insulin lispro (ADMELOG) corrective regimen sliding scale  three times a day before meals  montelukast 10 daily  risperiDONE   Tablet 1 at bedtime  senna 2 at bedtime  tamsulosin 0.4 at bedtime    SOCIAL HISTORY:   does not smoke, drink alcohol, or use recreational drugs     FAMILY HISTORY:  FH: heart disease (Father)  FH: diabetes mellitus (Mother)      REVIEW OF SYSTEMS  [  ] ROS unobtainable because:    [x] All other systems negative except as noted below:	    Constitutional:  [x] fever [ ] chills  [ ] weight loss  [x] weakness  Skin:  [ ] rash [ ] phlebitis	  Eyes: [ ] icterus [ ] pain  [ ] discharge	  ENMT: [ ] sore throat  [ ] thrush [ ] ulcers [ ] exudates  Respiratory: [ ] dyspnea [ ] hemoptysis [x] cough [ ] sputum	  Cardiovascular:  [ ] chest pain [ ] palpitations [ ] edema	  Gastrointestinal:  [ ] nausea [ ] vomiting [ ] diarrhea [ ] constipation [ ] pain	  Genitourinary:  [ ] dysuria [ ] frequency [ ] hematuria [ ] discharge [ ] flank pain  [ ] incontinence  Musculoskeletal:  [ ] myalgias [ ] arthralgias [ ] arthritis  [ ] back pain  Neurological:  [ ] headache [ ] seizures  [x] confusion/altered mental status  Psychiatric:  [ ] anxiety [ ] depression	  Hematology/Lymphatics:  [ ] lymphadenopathy  Endocrine:  [ ] adrenal [ ] thyroid  Allergic/Immunologic:	 [ ] transplant [ ] seasonal    Vital Signs Last 24 Hrs  T(F): 97.4 (22 @ 07:46), Max: 103.2 (22 @ 23:32)  Vital Signs Last 24 Hrs  HR: 75 (22 @ 07:46) (75 - 112)  BP: 123/70 (22 @ 07:46) (107/72 - 181/89)  RR: 21 (22 @ 07:46)  SpO2: 97% (22 @ 07:46) (95% - 100%)  Wt(kg): --    PHYSICAL EXAM:  Constitutional: non-toxic, no distress  HEAD/EYES: anicteric, no conjunctival injection  ENT:  supple, no thrush  Cardiovascular:   normal S1, S2, no murmur, no edema  Respiratory: no wheezes or rales   GI:  soft, non-tender, normal bowel sounds  :  no pryor  Musculoskeletal:  decreased ROM due to weakness  Neurologic: awake and alert, decreased strength  Skin:  no rash, no erythema, no phlebitis  Heme/Onc: no lymphadenopathy   Psychiatric:  awake, alert, appropriate mood                            13.6   12.84 )-----------( 201      ( 08 May 2022 00:27 )             41.3   05-08    131<L>  |  94<L>  |  6<L>  ----------------------------<  141<H>  4.1   |  24  |  0.80    Ca    9.4      08 May 2022 00:27  Mg     2.1         TPro  7.3  /  Alb  3.9  /  TBili  0.4  /  DBili  x   /  AST  24  /  ALT  26  /  AlkPhos  94      Urinalysis Basic - ( 08 May 2022 01:49 )    Color: Light Yellow / Appearance: Clear / S.007 / pH: x  Gluc: x / Ketone: Negative  / Bili: Negative / Urobili: Negative   Blood: x / Protein: Trace / Nitrite: Negative   Leuk Esterase: Large / RBC: 8 /hpf / WBC 27 /HPF   Sq Epi: x / Non Sq Epi: 1 /hpf / Bacteria: Negative    MICROBIOLOGY:      Rapid RVP Result: Detected ( @ 00:26)    BCx x 2 in process    Prior microbiology    21 UCx E. coli      RADIOLOGY:    < from: Xray Chest 1 View AP/PA (22 @ 00:08) >  FINDINGS:    No focal consolidation. No pleural effusion. No pneumothorax. Cardiac   silhouette size cannot be accurately assessed on this projection. No   acute osseous findings.    IMPRESSION:    No focal consolidation    --- End of Report ---        < end of copied text >      < from: POCUS ED TTE 2D F/U, Limited w/o Cont. (22 @ 01:31) >  INTERPRETATION:  A focused transthoracic cardiac ultrasound examination   was performed.  No pericardial effusion was present.  No global wall motion abnormality was identified  IVC with respiratory variation    IMPRESSION:  No Pericardial Effusion.    --- End of Report ---        < end of copied text >

## 2022-05-08 NOTE — H&P ADULT - HISTORY OF PRESENT ILLNESS
62yoM   w/Hx of DM2, HTN, HLD, Go   Limited Hx from patient who is AAOx3 but mildly confused;   collateral obtained from wife Cameron Vela ([phone]). Per wife, pt has been increasingly fatigued at home, sleeping a lot, w/new weakness now unable to ambulate at home.   Pt straight caths chronically at home, Hx of UTIs in the past. Pt c/o chronic cough but otherwise no complaints;   he denies HA, SOB, CP, palpitations, abd pain, n/v/d/c, hematuria/hematochezia/melena, numbness/tingling, focal weakness.

## 2022-05-08 NOTE — ED PROVIDER NOTE - PHYSICAL EXAMINATION
Gen: AAOx3, non-toxic, WDWN male lying in bed in NAD  Head: NCAT  HEENT: EOMI, PERRLA, +oral mucosa dry, normal conjunctiva  Lung: distant BS b/l, +increased WOB  CV: tachycardic w/RR, +S1/S2, no murmurs, rubs or gallops  Abd: soft, NT, +distended, no guarding, no CVA tenderness, +midline old surgical incision  MSK: no visible deformities  Neuro: No focal sensory or motor deficits, spontaneously moving all extremities  Skin: Warm, well perfused, no rash, no edema  Psych: normal affect  ~Jimbo Cristobal M.D. Resident Gen: AAOx3, non-toxic, WDWN male lying in bed in NAD  Head: NCAT  HEENT: EOMI, PERRLA, +oral mucosa dry, normal conjunctiva  Lung: distant BS b/l, +increased WOB  CV: tachycardic w/RR, +S1/S2, no murmurs, rubs or gallops  Abd: soft, NT, +distended, no guarding, no CVA tenderness, +midline old surgical incision  MSK: no visible deformities  Neuro: No focal sensory or motor deficits, spontaneously moving all extremities  Skin: Warm, well perfused, no rash, no edema  Psych: normal affect  ~Jimbo Cristobal M.D. Resident  Attending Dana Martinez: Gen: increased wob, heent: atrauamtic, perrla, op pink, uvula midline, neck; nttp, no nuchal rigidity, no meningitsmuschest: nttp, no crepitus, cv: rrr, +murmur, lungs: ctab, abd: soft, nontender, nondistended, no peritoneal signs, , no guarding, ext: wwp, neg homans, skin: no rash, neuro: awake and alert, following commands, speech clear, sensation and strength intact, no focal deficits

## 2022-05-08 NOTE — H&P ADULT - ASSESSMENT
63 yo M      PMH DM2, HTN, HLD, gout, asthma,      schizophrenia, hx of suicide attempt in past (swallowed razor blades which required abdominal surgery >10 yrs   ago, ,      neurogenic bladder (has been self-cathing 3x dailyy.  s/p   severe bilateral hydronephrosis  in 2021, w chronic obstruction      * admitted  with  weakness,  wbc  of  12,000   h/o  mlple  uti's/  bladder  wall  thickening/  traberculation   uti, on iv  rocephin  follow  ucx  will  place  pryor/  on  flomax  *  HTN  now  with  low  sbp.  will  hold  coreg. procardia  and   enalapril   on iv  fluids.  card  eval   ( Gout, on  allopurino;   *  DM,  follow  fs  *  HLD, on crestor   * Schozpphrenia   on  haldol,  benztropine,  risperidone,  paxil  at  home, valproic  acid/  doses  lowered,  due  to  weakness.  lethargy   on  dvt  ppx   CT  a/p pending       rad     63 yo M      PMH DM2, HTN, HLD, gout, asthma,      schizophrenia, hx of suicide attempt in past (swallowed razor blades which required abdominal surgery >10 yrs   ago, ,      neurogenic bladder (has been self-cathing 3x dailyy.  s/p   severe bilateral hydronephrosis  in 2021, w chronic obstruction      * admitted  with  weakness,  wbc  of  12,000   h/o  mlple  uti's/  bladder  wall  thickening/  traberculation   uti,  suspect  from bladder  dysfunction  and  from  cauti/  pt  self  catherizes  at  home   on iv  rocephin  follow  ucx  will  place  pryor/  on  flomax  *  HTN  now  with  low  sbp.  will  hold  coreg. procardia  and   enalapril   on iv  fluids.  card  eval   ( Gout, on  allopurino;   *  DM,  follow  fs  *  HLD, on crestor   * Schozpphrenia   on  haldol,  benztropine,  risperidone,  paxil  at  home, valproic  acid/  doses  lowered,  due  to  weakness.  lethargy   on  dvt  ppx   CT  a/p pending       rad     63 yo M      PMH DM2, HTN, HLD, gout, asthma,      schizophrenia, hx of suicide attempt in past (swallowed razor blades which required abdominal surgery >10 yrs   ago, ,      neurogenic bladder (has been self-cathing 3x dailyy.  s/p   severe bilateral hydronephrosis  in 2021, w chronic obstruction      * admitted  with  weakness,  wbc  of  12,000   h/o  mlple  uti's/  bladder  wall  thickening/  traberculation   uti,  suspect  from bladder  dysfunction  and  from  cauti/  pt  self  catherizes  at  home   on iv  rocephin   H uman metapneumo virus  +  follow  ucx  will  place  pryor/  on  flomax  *  HTN  now  with  low  sbp.  will  hold  coreg. procardia  and   enalapril   on iv  fluids.  card  eval   ( Gout, on  allopurino;   *  DM,  follow  fs  *  HLD, on crestor   * Schozpphrenia   on  haldol,  benztropine,  risperidone,  paxil  at  home, valproic  acid/  doses  lowered,  due  to  weakness.  lethargy   on  dvt  ppx   CT  a/p pending       rad

## 2022-05-08 NOTE — H&P ADULT - NSHPPHYSICALEXAM_GEN_ALL_CORE
PHYSICAL EXAMINATION:  Vital Signs Last 24 Hrs  T(C): 37.1 (08 May 2022 05:00), Max: 39.6 (07 May 2022 23:32)  T(F): 98.8 (08 May 2022 05:00), Max: 103.2 (07 May 2022 23:32)  HR: 80 (08 May 2022 05:00) (80 - 112)  BP: 107/72 (08 May 2022 05:00) (107/72 - 181/89)  BP(mean): 83 (08 May 2022 05:00) (80 - 101)  RR: 18 (08 May 2022 05:00) (18 - 25)  SpO2: 100% (08 May 2022 05:00) (95% - 100%)  CAPILLARY BLOOD GLUCOSE            GENERAL: NAD, well-groomed,  HEAD:  atraumatic, normocephalic  EYES: sclera anicteric  ENMT: mucous membranes moist  NECK: supple, No JVD  CHEST/LUNG: clear to auscultation bilaterally;    no      rales   ,   no rhonchi,   HEART: normal S1, S2  ABDOMEN: BS+, soft, ND, NT   EXTREMITIES:    no    edema    b/l LEs  NEURO: awake, ,     moves all extremities  SKIN: no     rash

## 2022-05-08 NOTE — CHART NOTE - NSCHARTNOTEFT_GEN_A_CORE
Critical Blood cx results Critical Blood cx results    Culture - Blood (05.08.22 @ 00:20)    Gram Stain:   Growth in anaerobic bottle: Gram Negative Rods    Specimen Source: .Blood Blood-Peripheral    Culture Results:   Growth in anaerobic bottle: Gram Negative Rods    Evaluated the pta t bedside, patient sleeping now, afebrile, VSS    Vital Signs Last 24 Hrs  T(C): 37 (08 May 2022 19:01), Max: 39.6 (07 May 2022 23:32)  T(F): 98.6 (08 May 2022 19:01), Max: 103.2 (07 May 2022 23:32)  HR: 82 (08 May 2022 19:01) (72 - 112)  BP: 161/91 (08 May 2022 19:01) (107/72 - 181/89)  BP(mean): 83 (08 May 2022 05:00) (80 - 101)  RR: 19 (08 May 2022 19:01) (18 - 25)  SpO2: 95% (08 May 2022 19:01) (95% - 100%)      A/P   The patient is a 62 year old male w/ PMHx of DM2, HTN, and HLD w/ intermittent straight catheterizations at home due to BPH, admitted for metapneumovirus infection, UTi  Now with GNR bacteremia   Bcx positive for Gram negative bacteria in aerobic and anaerobic bottle  Pt appears non toxic, VSS  Repeat Bcx x2  ordered  C/W ceftriaxone 1 gm daily  F/U Bcx sensitivity  Discussed with ID   F/U ID note am  Will continue to monitor    Nannette Henriquez [facility] [place]  [id number]

## 2022-05-08 NOTE — ED PROVIDER NOTE - CARE PLAN
Principal Discharge DX:	Sepsis secondary to UTI  Secondary Diagnosis:	Infection due to human metapneumovirus (hMPV)   1

## 2022-05-08 NOTE — CONSULT NOTE ADULT - ASSESSMENT
Assessment:  The patient is a 62 year old male w/ PMHx of DM2, HTN, and HLD w/ intermittent straight catheterizations at home due to BPH who presented from home due to weakness and difficulty ambulating. As per chart review, patient has a history of UTIs in the past also complaining of chronic cough but otherwise no complaints. Tmax was 103.1 in the ED. Labs showed WBC 12.84K, Na 131 w/ normal renal and liver function. , UA showed WBCs 27 w/ + LE. BCx x 2 and UCx are in process. RVP was positive for human metapneumovirus. CXR negative. POCUS negative. CT AP was ordered. He received IV cefepime and started on IV ceftriaxone. ID was consulted for human metapneumovirus infection.     Plan:  # Sepsis due to UTI  - c/w IV ceftriaxone for now  - f/u BCx x 2 and UCx w/ sensitivities  - pending CT A/P w/o contrast    # Human metapneumovirus infecton  - continue supportive therapy  - maintain contact precautions  - monitor fever curve  - follow white count and renal function daily  - ID will continue to follow    Case not yet discussed w/ attending, note not final    Sekou Olmstead MD PGY-5  Fellow, Infectious Diseases   Pager: [phone]  If no response, after 5pm and on weekends: Call [phone]     Assessment:  The patient is a 62 year old male w/ PMHx of DM2, HTN, and HLD w/ intermittent straight catheterizations at home due to BPH who presented from home due to weakness and difficulty ambulating. As per chart review, patient has a history of UTIs in the past also complaining of chronic cough but otherwise no complaints. Tmax was 103.1 in the ED. Labs showed WBC 12.84K, Na 131 w/ normal renal and liver function. , UA showed WBCs 27 w/ + LE. BCx x 2 and UCx are in process. RVP was positive for human metapneumovirus. CXR negative. POCUS negative. CT AP was ordered. He received IV cefepime and started on IV ceftriaxone. ID was consulted for human metapneumovirus infection.     Plan:  # Pyuria on urinalysis due to self catheterization  - discontinue IV ceftriaxone  - f/u BCx x 2 and UCx w/ sensitivities  - pending CT A/P w/o contrast results     # Human metapneumovirus infecton  - likely of fevers  - continue supportive therapy  - maintain contact precautions  - monitor fever curve  - follow white count and renal function daily  - ID will continue to follow    Patient seen w/ attending MD Sekou Luciano MD PGY-5  Fellow, Infectious Diseases   Pager: [phone]  If no response, after 5pm and on weekends: Call [phone]

## 2022-05-08 NOTE — ED PROVIDER NOTE - OBJECTIVE STATEMENT
62yoM w/Hx of DM2, HTN, HLD, Gout, asthma, schizophrenia, neurogenic bladder (self catheterization 3x/daily), BIBEMS for lethargy/cough. Limited Hx from patient who is AAOx3 but mildly confused; collateral obtained from wife Cameron Vela ([phone]). Per wife, pt has been increasingly fatigued at home, sleeping a lot, w/new weakness now unable to ambulate at home. Pt straight caths chronically at home, Hx of UTIs in the past. Pt c/o chronic cough but otherwise no complaints; he denies HA, SOB, CP, palpitations, abd pain, n/v/d/c, hematuria/hematochezia/melena, numbness/tingling, focal weakness.     PMD: Peter Kiana

## 2022-05-08 NOTE — ED PROVIDER NOTE - CLINICAL SUMMARY MEDICAL DECISION MAKING FREE TEXT BOX
62yoM w/complex PMHx incl. neurogenic bladder, straight cath at home, chronic UTIs p/w fatigue and cough. VS significant for tachycardia and fever, phys exam w/distant BS, bedside US concerning for urinary retention and hydronephrosis c/w previous. DDx incl. URI vs. UTI vs. pna, order septic workup incl. vbg, cxr, ua+ucx, place pryor for retention and admit. - Jimbo Cristobal, PGY-1 62yoM w/complex PMHx incl. neurogenic bladder, straight cath at home, chronic UTIs p/w fatigue and cough. VS significant for tachycardia and fever, phys exam w/distant BS, bedside US concerning for urinary retention and hydronephrosis c/w previous. DDx incl. URI vs. UTI vs. pna, order septic workup incl. vbg, cxr, ua+ucx, place pryor for retention and admit. - Jimbo Cristobal, PGY-1  Attending Dana Martinez: 63 yo male with multiple medical issues presenting with fever, cough and not feeling well. +sick contacts at home with URI symptoms. upon arrival pt tachypnic, hypoxic requiring supplemental oxygen. pt peck cultured and started on abx. abdomen soft on exam. viral panel sent as well. less likely PE based on history. pocus with hydro on left, reviewed prior ct scans with evidence of hydro, however with fevers and sepsis will obtain ct to further evaluate. pt will need admission

## 2022-05-08 NOTE — ED PROVIDER NOTE - NS ED ROS FT
GENERAL: No fever or chills, EYES: no change in vision, HEENT: no trouble swallowing or speaking, CARDIAC: no chest pain, PULMONARY: +cough, no SOB, GI: no abdominal pain, no nausea, no vomiting, no diarrhea or constipation, : No changes in urination, SKIN: no rashes, NEURO: no headache,  MSK: No joint pain     All other ROS negative unless otherwise specified in HPI.     ~Jimbo Cristobal M.D. Resident

## 2022-05-08 NOTE — ED PROVIDER NOTE - ATTENDING CONTRIBUTION TO CARE
Attending MD Dana Martinez:  I personally have seen and examined this patient.  Resident note reviewed and agree on plan of care and except where noted.  See HPI, PE, and MDM for details.

## 2022-05-09 LAB
A1C WITH ESTIMATED AVERAGE GLUCOSE RESULT: 6.2 % — HIGH (ref 4–5.6)
ANION GAP SERPL CALC-SCNC: 13 MMOL/L — SIGNIFICANT CHANGE UP (ref 5–17)
BUN SERPL-MCNC: 7 MG/DL — SIGNIFICANT CHANGE UP (ref 7–23)
CALCIUM SERPL-MCNC: 8.9 MG/DL — SIGNIFICANT CHANGE UP (ref 8.4–10.5)
CHLORIDE SERPL-SCNC: 97 MMOL/L — SIGNIFICANT CHANGE UP (ref 96–108)
CO2 SERPL-SCNC: 24 MMOL/L — SIGNIFICANT CHANGE UP (ref 22–31)
CREAT SERPL-MCNC: 0.72 MG/DL — SIGNIFICANT CHANGE UP (ref 0.5–1.3)
CULTURE RESULTS: SIGNIFICANT CHANGE UP
EGFR: 105 ML/MIN/1.73M2 — SIGNIFICANT CHANGE UP
ESTIMATED AVERAGE GLUCOSE: 131 MG/DL — HIGH (ref 68–114)
GLUCOSE BLDC GLUCOMTR-MCNC: 110 MG/DL — HIGH (ref 70–99)
GLUCOSE BLDC GLUCOMTR-MCNC: 120 MG/DL — HIGH (ref 70–99)
GLUCOSE BLDC GLUCOMTR-MCNC: 124 MG/DL — HIGH (ref 70–99)
GLUCOSE BLDC GLUCOMTR-MCNC: 124 MG/DL — HIGH (ref 70–99)
GLUCOSE SERPL-MCNC: 124 MG/DL — HIGH (ref 70–99)
HCT VFR BLD CALC: 38.3 % — LOW (ref 39–50)
HGB BLD-MCNC: 12.8 G/DL — LOW (ref 13–17)
MCHC RBC-ENTMCNC: 29.9 PG — SIGNIFICANT CHANGE UP (ref 27–34)
MCHC RBC-ENTMCNC: 33.4 GM/DL — SIGNIFICANT CHANGE UP (ref 32–36)
MCV RBC AUTO: 89.5 FL — SIGNIFICANT CHANGE UP (ref 80–100)
NRBC # BLD: 0 /100 WBCS — SIGNIFICANT CHANGE UP (ref 0–0)
PLATELET # BLD AUTO: 200 K/UL — SIGNIFICANT CHANGE UP (ref 150–400)
POTASSIUM SERPL-MCNC: 3.7 MMOL/L — SIGNIFICANT CHANGE UP (ref 3.5–5.3)
POTASSIUM SERPL-SCNC: 3.7 MMOL/L — SIGNIFICANT CHANGE UP (ref 3.5–5.3)
RBC # BLD: 4.28 M/UL — SIGNIFICANT CHANGE UP (ref 4.2–5.8)
RBC # FLD: 13.6 % — SIGNIFICANT CHANGE UP (ref 10.3–14.5)
SODIUM SERPL-SCNC: 134 MMOL/L — LOW (ref 135–145)
SPECIMEN SOURCE: SIGNIFICANT CHANGE UP
WBC # BLD: 12.33 K/UL — HIGH (ref 3.8–10.5)
WBC # FLD AUTO: 12.33 K/UL — HIGH (ref 3.8–10.5)

## 2022-05-09 PROCEDURE — 99232 SBSQ HOSP IP/OBS MODERATE 35: CPT

## 2022-05-09 RX ORDER — CEFTRIAXONE 500 MG/1
2000 INJECTION, POWDER, FOR SOLUTION INTRAMUSCULAR; INTRAVENOUS EVERY 24 HOURS
Refills: 0 | Status: DISCONTINUED | OUTPATIENT
Start: 2022-05-09 | End: 2022-05-10

## 2022-05-09 RX ORDER — CARVEDILOL PHOSPHATE 80 MG/1
12.5 CAPSULE, EXTENDED RELEASE ORAL EVERY 12 HOURS
Refills: 0 | Status: DISCONTINUED | OUTPATIENT
Start: 2022-05-09 | End: 2022-05-11

## 2022-05-09 RX ADMIN — CEFTRIAXONE 100 MILLIGRAM(S): 500 INJECTION, POWDER, FOR SOLUTION INTRAMUSCULAR; INTRAVENOUS at 17:04

## 2022-05-09 RX ADMIN — ALBUTEROL 1 PUFF(S): 90 AEROSOL, METERED ORAL at 12:44

## 2022-05-09 RX ADMIN — Medication 1 MILLIGRAM(S): at 12:43

## 2022-05-09 RX ADMIN — Medication 100 MILLIGRAM(S): at 06:04

## 2022-05-09 RX ADMIN — CARVEDILOL PHOSPHATE 12.5 MILLIGRAM(S): 80 CAPSULE, EXTENDED RELEASE ORAL at 17:04

## 2022-05-09 RX ADMIN — TAMSULOSIN HYDROCHLORIDE 0.4 MILLIGRAM(S): 0.4 CAPSULE ORAL at 22:12

## 2022-05-09 RX ADMIN — RISPERIDONE 1 MILLIGRAM(S): 4 TABLET ORAL at 22:12

## 2022-05-09 RX ADMIN — HEPARIN SODIUM 5000 UNIT(S): 5000 INJECTION INTRAVENOUS; SUBCUTANEOUS at 17:18

## 2022-05-09 RX ADMIN — ATORVASTATIN CALCIUM 20 MILLIGRAM(S): 80 TABLET, FILM COATED ORAL at 22:12

## 2022-05-09 RX ADMIN — HEPARIN SODIUM 5000 UNIT(S): 5000 INJECTION INTRAVENOUS; SUBCUTANEOUS at 06:04

## 2022-05-09 RX ADMIN — Medication 100 MILLIGRAM(S): at 17:05

## 2022-05-09 RX ADMIN — DIVALPROEX SODIUM 500 MILLIGRAM(S): 500 TABLET, DELAYED RELEASE ORAL at 12:44

## 2022-05-09 RX ADMIN — Medication 10 MILLIGRAM(S): at 12:44

## 2022-05-09 RX ADMIN — MONTELUKAST 10 MILLIGRAM(S): 4 TABLET, CHEWABLE ORAL at 12:43

## 2022-05-09 NOTE — PROGRESS NOTE ADULT - SUBJECTIVE AND OBJECTIVE BOX
CC: F/U for Viral URI    Saw/spoke to patient. No new complaints. Unchanged.    Allergies  No Known Allergies    ANTIMICROBIALS:  Off    PE:    Vital Signs Last 24 Hrs  T(C): 36.8 (09 May 2022 05:16), Max: 37 (08 May 2022 19:01)  T(F): 98.2 (09 May 2022 05:16), Max: 98.6 (08 May 2022 19:01)  HR: 97 (09 May 2022 05:16) (72 - 97)  BP: 161/88 (09 May 2022 05:16) (121/75 - 161/91)  RR: 18 (09 May 2022 05:16) (18 - 22)  SpO2: 95% (09 May 2022 05:16) (95% - 97%)    Gen: AOx3, NAD, non-toxic  Resp: Breathing comfortably, RA  Abd: Soft, nontender  : Barnes, no suprapubic tenderness    LABS:                        12.8   12.33 )-----------( 200      ( 09 May 2022 06:58 )             38.3     05    134<L>  |  97  |  7   ----------------------------<  124<H>  3.7   |  24  |  0.72    Ca    8.9      09 May 2022 06:58  Mg     2.1         TPro  7.3  /  Alb  3.9  /  TBili  0.4  /  DBili  x   /  AST  24  /  ALT  26  /  AlkPhos  94      Urinalysis Basic - ( 08 May 2022 01:49 )    Color: Light Yellow / Appearance: Clear / S.007 / pH: x  Gluc: x / Ketone: Negative  / Bili: Negative / Urobili: Negative   Blood: x / Protein: Trace / Nitrite: Negative   Leuk Esterase: Large / RBC: 8 /hpf / WBC 27 /HPF   Sq Epi: x / Non Sq Epi: 1 /hpf / Bacteria: Negative    MICROBIOLOGY:    Clean Catch Clean Catch (Midstream)  22   <10,000 CFU/mL Normal Urogenital Danielle  --  --    .Blood Blood-Peripheral  22   Growth in anaerobic bottle: Gram Negative Rods  --    Growth in anaerobic bottle: Gram Negative Rods    .Blood Blood-Peripheral  22   Growth in aerobic bottle: Gram Negative Rods  Growth in anaerobic bottle: Gram Negative Rods  ***Blood Panel PCR results on this specimen are available  approximately 3 hours after the Gram stain result.***  Gram stain, PCR, and/or culture results may not always  correspond due to difference in methodologies.  ************************************************************  This PCR assay was performed by multiplex PCR. This  Assay tests for 66 bacterial and resistance gene targets.  Please refer to the [facility] test directory  at [email or web address] for details.  --  Blood Culture PCR    Rapid RVP Result: Detected ( @ 00:26)    (otherwise reviewed)    RADIOLOGY:     CT:    IMPRESSION: Dilatation of the distal ureters noted. Mild left   hydronephrosis. No urinary calculus.

## 2022-05-09 NOTE — PROGRESS NOTE ADULT - ASSESSMENT
62 year old male w/ PMHx of DM2, HTN, and HLD w/ intermittent straight catheterizations at home due to BPH who presented from home due to weakness and difficulty ambulating  Leukocytosis, Fever  RVP hMPV  CT dilation of ureters, mild L hydro  CXR clear  New BCX now with E coli no ESBL (possible UTI source?)  Overall,  1) Viral URI  - +hMPV  - Supportive care  2) E coli bacteremia  - Likely related to UTI given history of self Cath; ESBL tag negative  - Ceftriaxone 2g q 24  - F/U BCXs for clear  - If not improving check CT A/P  3) Fever/Leukocytosis  - F/U BCXs  - Trend to normal  - Monitor for alternate sources    Barrett Glez MD  Contact on TEAMS messaging from 9am - 5pm  From 5pm-9am, and on weekends call [phone]

## 2022-05-09 NOTE — PROGRESS NOTE ADULT - ASSESSMENT
61 yo M      PMH DM2, HTN, HLD, gout, asthma,      schizophrenia, hx of suicide attempt in past (swallowed razor blades which required abdominal surgery >10 yrs   ago, ,      neurogenic bladder (has been self-cathing 3x dailyy.  s/p   severe bilateral hydronephrosis  in 2021, w chronic obstruction      * admitted  with  weakness,  wbc  of  12,000   h/o  mlple  uti's/  bladder  wall  thickening/  traberculation   uti,  suspect  from bladder  dysfunction  and  from  cauti/  pt  self  catherizes  at  home    e coli  bacteremia,/  s/p  sepsis  on arrival, resolving,  had  high  ebc  and  was  hypotensive  on iv  rocephin   H uman metapneumo virus  +  follow  ucx  will  place  pryor/  on  flomax  *  HTN  home  meds,   coreg. procardia  and   enalapril   on iv  fluids.  card  eval  *  Gout, on  allopurino;   *  DM,  follow  fs  *  HLD, on crestor   * Schozpphrenia   on  haldol,  benztropine,  risperidone,  paxil  at  home, valproic  acid/  doses  lowered,  due  to  weakness.  lethargy   on  dvt  ppx   CT  a/p pending       rad

## 2022-05-09 NOTE — PROGRESS NOTE ADULT - SUBJECTIVE AND OBJECTIVE BOX
afberile    REVIEW OF SYSTEMS:  GEN: no fever,    no chills  RESP: no SOB,   no cough  CVS: no chest pain,   no palpitations  GI: no abdominal pain,   no nausea,   no vomiting,   no constipation,   no diarrhea  : no dysuria,   no frequency  NEURO: no headache,   no dizziness  PSYCH: no depression,   not anxious  Derm : no rash    MEDICATIONS  (STANDING):  ALBUTerol    90 MICROgram(s) HFA Inhaler 1 Puff(s) Inhalation once  allopurinol 100 milliGRAM(s) Oral two times a day  atorvastatin 20 milliGRAM(s) Oral at bedtime  benztropine 1 milliGRAM(s) Oral daily  cefTRIAXone   IVPB 1000 milliGRAM(s) IV Intermittent every 24 hours  dextrose 5%. 1000 milliLiter(s) (100 mL/Hr) IV Continuous <Continuous>  dextrose 5%. 1000 milliLiter(s) (50 mL/Hr) IV Continuous <Continuous>  dextrose 50% Injectable 25 Gram(s) IV Push once  dextrose 50% Injectable 12.5 Gram(s) IV Push once  dextrose 50% Injectable 25 Gram(s) IV Push once  diVALproex  milliGRAM(s) Oral daily  FLUoxetine 10 milliGRAM(s) Oral daily  glucagon  Injectable 1 milliGRAM(s) IntraMuscular once  heparin   Injectable 5000 Unit(s) SubCutaneous every 12 hours  insulin lispro (ADMELOG) corrective regimen sliding scale   SubCutaneous three times a day before meals  lactated ringers. 1000 milliLiter(s) (80 mL/Hr) IV Continuous <Continuous>  montelukast 10 milliGRAM(s) Oral daily  risperiDONE   Tablet 1 milliGRAM(s) Oral at bedtime  senna 2 Tablet(s) Oral at bedtime  sodium chloride 0.9% Bolus 1000 milliLiter(s) IV Bolus once  tamsulosin 0.4 milliGRAM(s) Oral at bedtime    MEDICATIONS  (PRN):  dextrose Oral Gel 15 Gram(s) Oral once PRN Blood Glucose LESS THAN 70 milliGRAM(s)/deciliter      Vital Signs Last 24 Hrs  T(C): 36.8 (09 May 2022 05:16), Max: 37 (08 May 2022 19:01)  T(F): 98.2 (09 May 2022 05:16), Max: 98.6 (08 May 2022 19:01)  HR: 97 (09 May 2022 05:16) (72 - 97)  BP: 161/88 (09 May 2022 05:16) (121/75 - 161/91)  BP(mean): --  RR: 18 (09 May 2022 05:16) (18 - 22)  SpO2: 95% (09 May 2022 05:16) (95% - 97%)  CAPILLARY BLOOD GLUCOSE      POCT Blood Glucose.: 103 mg/dL (08 May 2022 21:39)  POCT Blood Glucose.: 108 mg/dL (08 May 2022 18:12)    I&O's Summary    08 May 2022 07:01  -  09 May 2022 06:49  --------------------------------------------------------  IN: 2960 mL / OUT: 5900 mL / NET: -2940 mL        PHYSICAL EXAM:  HEAD:  Atraumatic, Normocephalic  NECK: Supple, No   JVD  CHEST/LUNG:   no     rales,     no,    rhonchi  HEART: Regular rate and rhythm;         murmur  ABDOMEN: Soft, Nontender, ;   EXTREMITIES:  no      edema  NEUROLOGY:  alert    LABS:                        13.6   12.84 )-----------( 201      ( 08 May 2022 00:27 )             41.3     05-08    131<L>  |  94<L>  |  6<L>  ----------------------------<  141<H>  4.1   |  24  |  0.80    Ca    9.4      08 May 2022 00:27  Mg     2.1     05-08    TPro  7.3  /  Alb  3.9  /  TBili  0.4  /  DBili  x   /  AST  24  /  ALT  26  /  AlkPhos  94  05-08    PT/INR - ( 08 May 2022 00:27 )   PT: 12.0 sec;   INR: 1.03 ratio         PTT - ( 08 May 2022 00:27 )  PTT:29.5 sec      Urinalysis Basic - ( 08 May 2022 01:49 )    Color: Light Yellow / Appearance: Clear / S.007 / pH: x  Gluc: x / Ketone: Negative  / Bili: Negative / Urobili: Negative   Blood: x / Protein: Trace / Nitrite: Negative   Leuk Esterase: Large / RBC: 8 /hpf / WBC 27 /HPF   Sq Epi: x / Non Sq Epi: 1 /hpf / Bacteria: Negative           @ 00:19  4.4  38              Consultant(s) Notes Reviewed:      Care Discussed with Consultants/Other Providers:

## 2022-05-09 NOTE — PROGRESS NOTE ADULT - SUBJECTIVE AND OBJECTIVE BOX
CARDIOLOGY     PROGRESS  NOTE   ________________________________________________    CHIEF COMPLAINT:Patient is a 62y old  Male who presents with a chief complaint of weakness (09 May 2022 06:48)  no complain.  	  REVIEW OF SYSTEMS:  CONSTITUTIONAL: No fever, weight loss, or fatigue  EYES: No eye pain, visual disturbances, or discharge  ENT:  No difficulty hearing, tinnitus, vertigo; No sinus or throat pain  NECK: No pain or stiffness  RESPIRATORY: No cough, wheezing, chills or hemoptysis; No Shortness of Breath  CARDIOVASCULAR: No chest pain, palpitations, passing out, dizziness, or leg swelling  GASTROINTESTINAL: No abdominal or epigastric pain. No nausea, vomiting, or hematemesis; No diarrhea or constipation. No melena or hematochezia.  GENITOURINARY: No dysuria, frequency, hematuria, or incontinence  NEUROLOGICAL: No headaches, memory loss, loss of strength, numbness, or tremors  SKIN: No itching, burning, rashes, or lesions   LYMPH Nodes: No enlarged glands  ENDOCRINE: No heat or cold intolerance; No hair loss  MUSCULOSKELETAL: No joint pain or swelling; No muscle, back, or extremity pain  PSYCHIATRIC: No depression, anxiety, mood swings, or difficulty sleeping  HEME/LYMPH: No easy bruising, or bleeding gums  ALLERGY AND IMMUNOLOGIC: No hives or eczema	    [ ] All others negative	  [ ] Unable to obtain    PHYSICAL EXAM:  T(C): 36.7 (05-10-22 @ 05:05), Max: 37.3 (05-09-22 @ 14:05)  HR: 85 (05-10-22 @ 05:05) (84 - 89)  BP: 158/88 (05-10-22 @ 05:05) (158/88 - 171/90)  RR: 18 (05-10-22 @ 05:05) (17 - 19)  SpO2: 93% (05-10-22 @ 05:05) (93% - 94%)  Wt(kg): --  I&O's Summary    08 May 2022 07:01  -  09 May 2022 07:00  --------------------------------------------------------  IN: 2960 mL / OUT: 5900 mL / NET: -2940 mL    09 May 2022 07:01  -  10 May 2022 06:41  --------------------------------------------------------  IN: 660 mL / OUT: 6800 mL / NET: -6140 mL        Appearance: Normal	  HEENT:   Normal oral mucosa, PERRL, EOMI	  Lymphatic: No lymphadenopathy  Cardiovascular: Normal S1 S2, No JVD, + murmurs, No edema  Respiratory: rhonchi  Psychiatry: A & O x 3, Mood & affect appropriate  Gastrointestinal:  Soft, Non-tender, + BS	  Skin: No rashes, No ecchymoses, No cyanosis	  Neurologic: Non-focal  Extremities: Normal range of motion, No clubbing, cyanosis or edema  Vascular: Peripheral pulses palpable 2+ bilaterally    MEDICATIONS  (STANDING):  allopurinol 100 milliGRAM(s) Oral two times a day  atorvastatin 20 milliGRAM(s) Oral at bedtime  benztropine 1 milliGRAM(s) Oral daily  carvedilol 12.5 milliGRAM(s) Oral every 12 hours  cefTRIAXone   IVPB 2000 milliGRAM(s) IV Intermittent every 24 hours  dextrose 5%. 1000 milliLiter(s) (100 mL/Hr) IV Continuous <Continuous>  dextrose 5%. 1000 milliLiter(s) (50 mL/Hr) IV Continuous <Continuous>  dextrose 50% Injectable 25 Gram(s) IV Push once  dextrose 50% Injectable 12.5 Gram(s) IV Push once  dextrose 50% Injectable 25 Gram(s) IV Push once  diVALproex  milliGRAM(s) Oral daily  enalapril 20 milliGRAM(s) Oral daily  FLUoxetine 10 milliGRAM(s) Oral daily  glucagon  Injectable 1 milliGRAM(s) IntraMuscular once  heparin   Injectable 5000 Unit(s) SubCutaneous every 12 hours  insulin lispro (ADMELOG) corrective regimen sliding scale   SubCutaneous three times a day before meals  lactated ringers. 1000 milliLiter(s) (80 mL/Hr) IV Continuous <Continuous>  montelukast 10 milliGRAM(s) Oral daily  risperiDONE   Tablet 1 milliGRAM(s) Oral at bedtime  senna 2 Tablet(s) Oral at bedtime  sodium chloride 0.9% Bolus 1000 milliLiter(s) IV Bolus once  tamsulosin 0.4 milliGRAM(s) Oral at bedtime      TELEMETRY: 	    ECG:  	  RADIOLOGY:  OTHER: 	  	  LABS:	 	    CARDIAC MARKERS:                                12.8   12.33 )-----------( 200      ( 09 May 2022 06:58 )             38.3     05-09    134<L>  |  97  |  7   ----------------------------<  124<H>  3.7   |  24  |  0.72    Ca    8.9      09 May 2022 06:58      proBNP: Serum Pro-Brain Natriuretic Peptide: 320 pg/mL (05-08 @ 00:27)    Lipid Profile:   HgA1c:   TSH:         Assessment and plan  ---------------------------   61yo w/Hx of DM2, HTN, HLD, Go Limited Hx from patient who is AAOx3 but mildly confused; collateral obtained from wife Cameron Vela ([phone]). Per wife, pt has been increasingly fatigued at home, sleeping a lot, w/new weakness now unable to ambulate at home. Pt straight cath  chronically at home, Hx of UTIs in the past. Pt c/o chronic cough but otherwise no complaints;   he denies HA, SOB, CP, palpitations, abd pain, n/v/d/c, hematuria/hematochezia/melena, numbness/tingling, focal weakness.  pt is poor historian with hx of schizophrenia with change of mental status and generalized weakness.  pt with known hx of htn, with decrease bp will hold all aureliano meds  tsh/ b12  echo r/o hypertensive heart disease  check cultures  dvt prophylaxis  am cortisol level  ecg noted  follow up blood work

## 2022-05-10 ENCOUNTER — TRANSCRIPTION ENCOUNTER (OUTPATIENT)
Age: 62
End: 2022-05-10

## 2022-05-10 LAB
-  AMIKACIN: SIGNIFICANT CHANGE UP
-  AMPICILLIN/SULBACTAM: SIGNIFICANT CHANGE UP
-  AMPICILLIN: SIGNIFICANT CHANGE UP
-  AZTREONAM: SIGNIFICANT CHANGE UP
-  CEFAZOLIN: SIGNIFICANT CHANGE UP
-  CEFEPIME: SIGNIFICANT CHANGE UP
-  CEFOXITIN: SIGNIFICANT CHANGE UP
-  CEFTRIAXONE: SIGNIFICANT CHANGE UP
-  CIPROFLOXACIN: SIGNIFICANT CHANGE UP
-  ERTAPENEM: SIGNIFICANT CHANGE UP
-  GENTAMICIN: SIGNIFICANT CHANGE UP
-  IMIPENEM: SIGNIFICANT CHANGE UP
-  LEVOFLOXACIN: SIGNIFICANT CHANGE UP
-  MEROPENEM: SIGNIFICANT CHANGE UP
-  PIPERACILLIN/TAZOBACTAM: SIGNIFICANT CHANGE UP
-  TOBRAMYCIN: SIGNIFICANT CHANGE UP
-  TRIMETHOPRIM/SULFAMETHOXAZOLE: SIGNIFICANT CHANGE UP
ALBUMIN SERPL ELPH-MCNC: 3.4 G/DL — SIGNIFICANT CHANGE UP (ref 3.3–5)
ALP SERPL-CCNC: 83 U/L — SIGNIFICANT CHANGE UP (ref 40–120)
ALT FLD-CCNC: 22 U/L — SIGNIFICANT CHANGE UP (ref 10–45)
ANION GAP SERPL CALC-SCNC: 12 MMOL/L — SIGNIFICANT CHANGE UP (ref 5–17)
AST SERPL-CCNC: 25 U/L — SIGNIFICANT CHANGE UP (ref 10–40)
BILIRUB SERPL-MCNC: 0.4 MG/DL — SIGNIFICANT CHANGE UP (ref 0.2–1.2)
BUN SERPL-MCNC: 8 MG/DL — SIGNIFICANT CHANGE UP (ref 7–23)
CALCIUM SERPL-MCNC: 9 MG/DL — SIGNIFICANT CHANGE UP (ref 8.4–10.5)
CHLORIDE SERPL-SCNC: 99 MMOL/L — SIGNIFICANT CHANGE UP (ref 96–108)
CO2 SERPL-SCNC: 26 MMOL/L — SIGNIFICANT CHANGE UP (ref 22–31)
CREAT SERPL-MCNC: 0.79 MG/DL — SIGNIFICANT CHANGE UP (ref 0.5–1.3)
CULTURE RESULTS: SIGNIFICANT CHANGE UP
CULTURE RESULTS: SIGNIFICANT CHANGE UP
EGFR: 102 ML/MIN/1.73M2 — SIGNIFICANT CHANGE UP
GLUCOSE BLDC GLUCOMTR-MCNC: 118 MG/DL — HIGH (ref 70–99)
GLUCOSE BLDC GLUCOMTR-MCNC: 150 MG/DL — HIGH (ref 70–99)
GLUCOSE BLDC GLUCOMTR-MCNC: 155 MG/DL — HIGH (ref 70–99)
GLUCOSE BLDC GLUCOMTR-MCNC: 93 MG/DL — SIGNIFICANT CHANGE UP (ref 70–99)
GLUCOSE SERPL-MCNC: 122 MG/DL — HIGH (ref 70–99)
HCT VFR BLD CALC: 39.2 % — SIGNIFICANT CHANGE UP (ref 39–50)
HGB BLD-MCNC: 12.7 G/DL — LOW (ref 13–17)
MAGNESIUM SERPL-MCNC: 2.3 MG/DL — SIGNIFICANT CHANGE UP (ref 1.6–2.6)
MCHC RBC-ENTMCNC: 29 PG — SIGNIFICANT CHANGE UP (ref 27–34)
MCHC RBC-ENTMCNC: 32.4 GM/DL — SIGNIFICANT CHANGE UP (ref 32–36)
MCV RBC AUTO: 89.5 FL — SIGNIFICANT CHANGE UP (ref 80–100)
METHOD TYPE: SIGNIFICANT CHANGE UP
NRBC # BLD: 0 /100 WBCS — SIGNIFICANT CHANGE UP (ref 0–0)
ORGANISM # SPEC MICROSCOPIC CNT: SIGNIFICANT CHANGE UP
PLATELET # BLD AUTO: 203 K/UL — SIGNIFICANT CHANGE UP (ref 150–400)
POTASSIUM SERPL-MCNC: 3.9 MMOL/L — SIGNIFICANT CHANGE UP (ref 3.5–5.3)
POTASSIUM SERPL-SCNC: 3.9 MMOL/L — SIGNIFICANT CHANGE UP (ref 3.5–5.3)
PROT SERPL-MCNC: 6.6 G/DL — SIGNIFICANT CHANGE UP (ref 6–8.3)
RBC # BLD: 4.38 M/UL — SIGNIFICANT CHANGE UP (ref 4.2–5.8)
RBC # FLD: 13.3 % — SIGNIFICANT CHANGE UP (ref 10.3–14.5)
SODIUM SERPL-SCNC: 137 MMOL/L — SIGNIFICANT CHANGE UP (ref 135–145)
SPECIMEN SOURCE: SIGNIFICANT CHANGE UP
SPECIMEN SOURCE: SIGNIFICANT CHANGE UP
WBC # BLD: 9.16 K/UL — SIGNIFICANT CHANGE UP (ref 3.8–10.5)
WBC # FLD AUTO: 9.16 K/UL — SIGNIFICANT CHANGE UP (ref 3.8–10.5)

## 2022-05-10 PROCEDURE — 99232 SBSQ HOSP IP/OBS MODERATE 35: CPT

## 2022-05-10 RX ORDER — POLYETHYLENE GLYCOL 3350 17 G/17G
17 POWDER, FOR SOLUTION ORAL DAILY
Refills: 0 | Status: DISCONTINUED | OUTPATIENT
Start: 2022-05-10 | End: 2022-05-11

## 2022-05-10 RX ORDER — HEPARIN SODIUM 5000 [USP'U]/ML
5000 INJECTION INTRAVENOUS; SUBCUTANEOUS EVERY 8 HOURS
Refills: 0 | Status: DISCONTINUED | OUTPATIENT
Start: 2022-05-10 | End: 2022-05-11

## 2022-05-10 RX ORDER — AMLODIPINE BESYLATE 2.5 MG/1
5 TABLET ORAL DAILY
Refills: 0 | Status: DISCONTINUED | OUTPATIENT
Start: 2022-05-10 | End: 2022-05-11

## 2022-05-10 RX ORDER — CEFTRIAXONE 500 MG/1
1000 INJECTION, POWDER, FOR SOLUTION INTRAMUSCULAR; INTRAVENOUS EVERY 24 HOURS
Refills: 0 | Status: DISCONTINUED | OUTPATIENT
Start: 2022-05-10 | End: 2022-05-11

## 2022-05-10 RX ADMIN — TAMSULOSIN HYDROCHLORIDE 0.4 MILLIGRAM(S): 0.4 CAPSULE ORAL at 21:24

## 2022-05-10 RX ADMIN — Medication 100 MILLIGRAM(S): at 05:14

## 2022-05-10 RX ADMIN — CARVEDILOL PHOSPHATE 12.5 MILLIGRAM(S): 80 CAPSULE, EXTENDED RELEASE ORAL at 05:14

## 2022-05-10 RX ADMIN — CEFTRIAXONE 100 MILLIGRAM(S): 500 INJECTION, POWDER, FOR SOLUTION INTRAMUSCULAR; INTRAVENOUS at 13:15

## 2022-05-10 RX ADMIN — CARVEDILOL PHOSPHATE 12.5 MILLIGRAM(S): 80 CAPSULE, EXTENDED RELEASE ORAL at 18:35

## 2022-05-10 RX ADMIN — HEPARIN SODIUM 5000 UNIT(S): 5000 INJECTION INTRAVENOUS; SUBCUTANEOUS at 21:24

## 2022-05-10 RX ADMIN — MONTELUKAST 10 MILLIGRAM(S): 4 TABLET, CHEWABLE ORAL at 13:16

## 2022-05-10 RX ADMIN — HEPARIN SODIUM 5000 UNIT(S): 5000 INJECTION INTRAVENOUS; SUBCUTANEOUS at 13:13

## 2022-05-10 RX ADMIN — ATORVASTATIN CALCIUM 20 MILLIGRAM(S): 80 TABLET, FILM COATED ORAL at 21:25

## 2022-05-10 RX ADMIN — SENNA PLUS 2 TABLET(S): 8.6 TABLET ORAL at 21:24

## 2022-05-10 RX ADMIN — POLYETHYLENE GLYCOL 3350 17 GRAM(S): 17 POWDER, FOR SOLUTION ORAL at 13:17

## 2022-05-10 RX ADMIN — RISPERIDONE 1 MILLIGRAM(S): 4 TABLET ORAL at 21:24

## 2022-05-10 RX ADMIN — Medication 20 MILLIGRAM(S): at 05:14

## 2022-05-10 RX ADMIN — AMLODIPINE BESYLATE 5 MILLIGRAM(S): 2.5 TABLET ORAL at 13:13

## 2022-05-10 RX ADMIN — HEPARIN SODIUM 5000 UNIT(S): 5000 INJECTION INTRAVENOUS; SUBCUTANEOUS at 05:13

## 2022-05-10 RX ADMIN — Medication 1 MILLIGRAM(S): at 13:13

## 2022-05-10 RX ADMIN — DIVALPROEX SODIUM 500 MILLIGRAM(S): 500 TABLET, DELAYED RELEASE ORAL at 13:13

## 2022-05-10 RX ADMIN — Medication 10 MILLIGRAM(S): at 13:16

## 2022-05-10 RX ADMIN — Medication 100 MILLIGRAM(S): at 18:35

## 2022-05-10 NOTE — PHYSICAL THERAPY INITIAL EVALUATION ADULT - DISCHARGE DISPOSITION, PT EVAL
TBD upon completion of functional assessment. Home with home PT for safety assessment, gait,stair negotiation, balance, & strength training and to return pt to baseline functional mobility status.

## 2022-05-10 NOTE — PROGRESS NOTE ADULT - ASSESSMENT
61 yo male PMH DM2, HTN, HLD, gout, asthma, schizophrenia, hx of suicide attempt in past (swallowed razor blades which required abdominal surgery >10 yrs   ago,   neurogenic bladder (has been self-cathing 3x dailyy.  s/p   severe bilateral hydronephrosis  in 2021, w chronic obstruction    Admitted with  weakness,  wbc  of  12,000, h/o  multiple uti's/  bladder  wall  thickening/  traberculation  uti,  suspect  from bladder  dysfunction  and  from  cauti/  pt  self  catherizes  at  home. E coli  bacteremia,/  s/p  sepsis  on arrival, resolving,  had  high  ebc  and  was  hypotensive  on iv  Rocephin    Human metapneumo virus, no treatment needed. Follow  ucx  will  place  pryor/  on  flomax.     HTN, continue home meds coreg. procardia  and   enalapril. Stop IVF.     Schozpphrenia, continue risperidone,  paxil  at  home.   61 yo male PMH DM2, HTN, HLD, gout, asthma, schizophrenia, hx of suicide attempt in past (swallowed razor blades which required abdominal surgery >10 yrs   ago,   neurogenic bladder (has been self-cathing 3x dailyy.  s/p   severe bilateral hydronephrosis  in 2021, w chronic obstruction    Admitted with  weakness,  wbc  of  12,000, h/o  multiple uti's/  bladder  wall  thickening/  traberculation. Suspect  from bladder  dysfunction  and  from  cauti/  pt  self  catherizes  at  home. E coli  bacteremia,/  s/p  sepsis  on arrival, resolving,  had  high  ebc  and  was  hypotensive  on iv  Rocephin, urine culture negative, stop ABX.     Human metapneumo virus, no treatment needed. Likely couse of weakness.     Will  place  pryor/  on  flomax.     HTN, continue home meds coreg. procardia  and   enalapril. Stop IVF.     Schozpphrenia, continue risperidone,  paxil  at  home.   61 yo male PMH DM2, HTN, HLD, gout, asthma, schizophrenia, hx of suicide attempt in past (swallowed razor blades which required abdominal surgery >10 yrs   ago,   neurogenic bladder (has been self-cathing 3x dailyy.  s/p   severe bilateral hydronephrosis  in 2021, w chronic obstruction    Admitted with  weakness,  wbc  of  12,000, h/o  multiple uti's/  bladder  wall  thickening/  traberculation. Suspect  from bladder  dysfunction  and  from  cauti/  pt  self  catherizes  at  home. E coli  bacteremia,/  s/p  sepsis  on arrival, resolving,  had  high  ebc  and  was  hypotensive  on iv  Rocephin, urine culture negative, stop ABX.     Human metapneumo virus, no treatment needed. Likely couse of weakness.     Will  place  pryor/  on  flomax.     HTN, continue home meds coreg. procardia  and   enalapril. Stop IVF.     Schozpphrenia, continue risperidone,  paxil  at  home.     Discharge home Wednesday on oral ABX. Discussed with ID.

## 2022-05-10 NOTE — PROGRESS NOTE ADULT - SUBJECTIVE AND OBJECTIVE BOX
INTERVAL HPI/OVERNIGHT EVENTS:  Pt seen and examined at bedside.     Allergies/Intolerance: No Known Allergies      MEDICATIONS  (STANDING):  allopurinol 100 milliGRAM(s) Oral two times a day  atorvastatin 20 milliGRAM(s) Oral at bedtime  benztropine 1 milliGRAM(s) Oral daily  carvedilol 12.5 milliGRAM(s) Oral every 12 hours  cefTRIAXone   IVPB 2000 milliGRAM(s) IV Intermittent every 24 hours  dextrose 5%. 1000 milliLiter(s) (100 mL/Hr) IV Continuous <Continuous>  dextrose 5%. 1000 milliLiter(s) (50 mL/Hr) IV Continuous <Continuous>  dextrose 50% Injectable 25 Gram(s) IV Push once  dextrose 50% Injectable 12.5 Gram(s) IV Push once  dextrose 50% Injectable 25 Gram(s) IV Push once  diVALproex  milliGRAM(s) Oral daily  enalapril 20 milliGRAM(s) Oral daily  FLUoxetine 10 milliGRAM(s) Oral daily  glucagon  Injectable 1 milliGRAM(s) IntraMuscular once  heparin   Injectable 5000 Unit(s) SubCutaneous every 12 hours  insulin lispro (ADMELOG) corrective regimen sliding scale   SubCutaneous three times a day before meals  montelukast 10 milliGRAM(s) Oral daily  risperiDONE   Tablet 1 milliGRAM(s) Oral at bedtime  senna 2 Tablet(s) Oral at bedtime  sodium chloride 0.9% Bolus 1000 milliLiter(s) IV Bolus once  tamsulosin 0.4 milliGRAM(s) Oral at bedtime    MEDICATIONS  (PRN):  dextrose Oral Gel 15 Gram(s) Oral once PRN Blood Glucose LESS THAN 70 milliGRAM(s)/deciliter        ROS: all systems reviewed and wnl      PHYSICAL EXAMINATION:  Vital Signs Last 24 Hrs  T(C): 36.7 (10 May 2022 05:05), Max: 37.3 (09 May 2022 14:05)  T(F): 98 (10 May 2022 05:05), Max: 99.1 (09 May 2022 14:05)  HR: 85 (10 May 2022 05:05) (84 - 89)  BP: 158/88 (10 May 2022 05:05) (158/88 - 171/90)  BP(mean): --  RR: 18 (10 May 2022 05:05) (17 - 19)  SpO2: 93% (10 May 2022 05:05) (93% - 94%)  CAPILLARY BLOOD GLUCOSE      POCT Blood Glucose.: 155 mg/dL (10 May 2022 08:22)  POCT Blood Glucose.: 124 mg/dL (09 May 2022 21:32)  POCT Blood Glucose.: 120 mg/dL (09 May 2022 17:31)  POCT Blood Glucose.: 124 mg/dL (09 May 2022 13:04)  POCT Blood Glucose.: 110 mg/dL (09 May 2022 09:42)      05-09 @ 07:01  -  05-10 @ 07:00  --------------------------------------------------------  IN: 660 mL / OUT: 6800 mL / NET: -6140 mL        GENERAL:   NECK: supple, No JVD  CHEST/LUNG: clear to auscultation bilaterally; no rales, rhonchi, or wheezing b/l  HEART: normal S1, S2  ABDOMEN: BS+, soft, ND, NT   EXTREMITIES:  pulses palpable; no clubbing, cyanosis, or edema b/l LEs  SKIN: no rashes or lesions      LABS:                        12.7   9.16  )-----------( 203      ( 10 May 2022 06:24 )             39.2     05-10    137  |  99  |  8   ----------------------------<  122<H>  3.9   |  26  |  0.79    Ca    9.0      10 May 2022 06:24  Mg     2.3     05-10    TPro  6.6  /  Alb  3.4  /  TBili  0.4  /  DBili  x   /  AST  25  /  ALT  22  /  AlkPhos  83  05-10               INTERVAL HPI/OVERNIGHT EVENTS:  Pt seen and examined at bedside.     Allergies/Intolerance: No Known Allergies      MEDICATIONS  (STANDING):  allopurinol 100 milliGRAM(s) Oral two times a day  atorvastatin 20 milliGRAM(s) Oral at bedtime  benztropine 1 milliGRAM(s) Oral daily  carvedilol 12.5 milliGRAM(s) Oral every 12 hours  cefTRIAXone   IVPB 2000 milliGRAM(s) IV Intermittent every 24 hours  dextrose 5%. 1000 milliLiter(s) (100 mL/Hr) IV Continuous <Continuous>  dextrose 5%. 1000 milliLiter(s) (50 mL/Hr) IV Continuous <Continuous>  dextrose 50% Injectable 25 Gram(s) IV Push once  dextrose 50% Injectable 12.5 Gram(s) IV Push once  dextrose 50% Injectable 25 Gram(s) IV Push once  diVALproex  milliGRAM(s) Oral daily  enalapril 20 milliGRAM(s) Oral daily  FLUoxetine 10 milliGRAM(s) Oral daily  glucagon  Injectable 1 milliGRAM(s) IntraMuscular once  heparin   Injectable 5000 Unit(s) SubCutaneous every 12 hours  insulin lispro (ADMELOG) corrective regimen sliding scale   SubCutaneous three times a day before meals  montelukast 10 milliGRAM(s) Oral daily  risperiDONE   Tablet 1 milliGRAM(s) Oral at bedtime  senna 2 Tablet(s) Oral at bedtime  sodium chloride 0.9% Bolus 1000 milliLiter(s) IV Bolus once  tamsulosin 0.4 milliGRAM(s) Oral at bedtime    MEDICATIONS  (PRN):  dextrose Oral Gel 15 Gram(s) Oral once PRN Blood Glucose LESS THAN 70 milliGRAM(s)/deciliter        ROS: all systems reviewed and wnl      PHYSICAL EXAMINATION:  Vital Signs Last 24 Hrs  T(C): 36.7 (10 May 2022 05:05), Max: 37.3 (09 May 2022 14:05)  T(F): 98 (10 May 2022 05:05), Max: 99.1 (09 May 2022 14:05)  HR: 85 (10 May 2022 05:05) (84 - 89)  BP: 158/88 (10 May 2022 05:05) (158/88 - 171/90)  BP(mean): --  RR: 18 (10 May 2022 05:05) (17 - 19)  SpO2: 93% (10 May 2022 05:05) (93% - 94%)  CAPILLARY BLOOD GLUCOSE      POCT Blood Glucose.: 155 mg/dL (10 May 2022 08:22)  POCT Blood Glucose.: 124 mg/dL (09 May 2022 21:32)  POCT Blood Glucose.: 120 mg/dL (09 May 2022 17:31)  POCT Blood Glucose.: 124 mg/dL (09 May 2022 13:04)  POCT Blood Glucose.: 110 mg/dL (09 May 2022 09:42)      05-09 @ 07:01  -  05-10 @ 07:00  --------------------------------------------------------  IN: 660 mL / OUT: 6800 mL / NET: -6140 mL        GENERAL: stable, in bed, comfortable, no fevers or CP  NECK: supple, No JVD  CHEST/LUNG: clear to auscultation bilaterally; no rales, rhonchi, or wheezing b/l  HEART: normal S1, S2  ABDOMEN: BS+, soft, ND, NT   EXTREMITIES:  pulses palpable; no clubbing, cyanosis, or edema b/l LEs  SKIN: no rashes or lesions      LABS:                        12.7   9.16  )-----------( 203      ( 10 May 2022 06:24 )             39.2     05-10    137  |  99  |  8   ----------------------------<  122<H>  3.9   |  26  |  0.79    Ca    9.0      10 May 2022 06:24  Mg     2.3     05-10    TPro  6.6  /  Alb  3.4  /  TBili  0.4  /  DBili  x   /  AST  25  /  ALT  22  /  AlkPhos  83  05-10

## 2022-05-10 NOTE — PROGRESS NOTE ADULT - SUBJECTIVE AND OBJECTIVE BOX
CARDIOLOGY     PROGRESS  NOTE   ________________________________________________    CHIEF COMPLAINT:Patient is a 62y old  Male who presents with a chief complaint of weakness (09 May 2022 10:28)  doing better.  	  REVIEW OF SYSTEMS:  CONSTITUTIONAL: No fever, weight loss, or fatigue  EYES: No eye pain, visual disturbances, or discharge  ENT:  No difficulty hearing, tinnitus, vertigo; No sinus or throat pain  NECK: No pain or stiffness  RESPIRATORY: No cough, wheezing, chills or hemoptysis; No Shortness of Breath  CARDIOVASCULAR: No chest pain, palpitations, passing out, dizziness, or leg swelling  GASTROINTESTINAL: No abdominal or epigastric pain. No nausea, vomiting, or hematemesis; No diarrhea or constipation. No melena or hematochezia.  GENITOURINARY: No dysuria, frequency, hematuria, or incontinence  NEUROLOGICAL: No headaches, memory loss, loss of strength, numbness, or tremors  SKIN: No itching, burning, rashes, or lesions   LYMPH Nodes: No enlarged glands  ENDOCRINE: No heat or cold intolerance; No hair loss  MUSCULOSKELETAL: No joint pain or swelling; No muscle, back, or extremity pain  PSYCHIATRIC: No depression, anxiety, mood swings, or difficulty sleeping  HEME/LYMPH: No easy bruising, or bleeding gums  ALLERGY AND IMMUNOLOGIC: No hives or eczema	    [ ] All others negative	  [ ] Unable to obtain    PHYSICAL EXAM:  T(C): 36.7 (05-10-22 @ 05:05), Max: 37.3 (05-09-22 @ 14:05)  HR: 85 (05-10-22 @ 05:05) (84 - 89)  BP: 158/88 (05-10-22 @ 05:05) (158/88 - 171/90)  RR: 18 (05-10-22 @ 05:05) (17 - 19)  SpO2: 93% (05-10-22 @ 05:05) (93% - 94%)  Wt(kg): --  I&O's Summary    08 May 2022 07:01  -  09 May 2022 07:00  --------------------------------------------------------  IN: 2960 mL / OUT: 5900 mL / NET: -2940 mL    09 May 2022 07:01  -  10 May 2022 06:42  --------------------------------------------------------  IN: 660 mL / OUT: 6800 mL / NET: -6140 mL        Appearance: Normal	  HEENT:   Normal oral mucosa, PERRL, EOMI	  Lymphatic: No lymphadenopathy  Cardiovascular: Normal S1 S2, No JVD, + murmurs, No edema  Respiratory: rhonchi  Psychiatry: A & O x 3, Mood & affect appropriate  Gastrointestinal:  Soft, Non-tender, + BS	  Skin: No rashes, No ecchymoses, No cyanosis	  Neurologic: Non-focal  Extremities: Normal range of motion, No clubbing, cyanosis or edema  Vascular: Peripheral pulses palpable 2+ bilaterally    MEDICATIONS  (STANDING):  allopurinol 100 milliGRAM(s) Oral two times a day  atorvastatin 20 milliGRAM(s) Oral at bedtime  benztropine 1 milliGRAM(s) Oral daily  carvedilol 12.5 milliGRAM(s) Oral every 12 hours  cefTRIAXone   IVPB 2000 milliGRAM(s) IV Intermittent every 24 hours  dextrose 5%. 1000 milliLiter(s) (100 mL/Hr) IV Continuous <Continuous>  dextrose 5%. 1000 milliLiter(s) (50 mL/Hr) IV Continuous <Continuous>  dextrose 50% Injectable 25 Gram(s) IV Push once  dextrose 50% Injectable 12.5 Gram(s) IV Push once  dextrose 50% Injectable 25 Gram(s) IV Push once  diVALproex  milliGRAM(s) Oral daily  enalapril 20 milliGRAM(s) Oral daily  FLUoxetine 10 milliGRAM(s) Oral daily  glucagon  Injectable 1 milliGRAM(s) IntraMuscular once  heparin   Injectable 5000 Unit(s) SubCutaneous every 12 hours  insulin lispro (ADMELOG) corrective regimen sliding scale   SubCutaneous three times a day before meals  lactated ringers. 1000 milliLiter(s) (80 mL/Hr) IV Continuous <Continuous>  montelukast 10 milliGRAM(s) Oral daily  risperiDONE   Tablet 1 milliGRAM(s) Oral at bedtime  senna 2 Tablet(s) Oral at bedtime  sodium chloride 0.9% Bolus 1000 milliLiter(s) IV Bolus once  tamsulosin 0.4 milliGRAM(s) Oral at bedtime      TELEMETRY: 	    ECG:  	  RADIOLOGY:  OTHER: 	  	  LABS:	 	    CARDIAC MARKERS:                                12.8   12.33 )-----------( 200      ( 09 May 2022 06:58 )             38.3     05-09    134<L>  |  97  |  7   ----------------------------<  124<H>  3.7   |  24  |  0.72    Ca    8.9      09 May 2022 06:58      proBNP: Serum Pro-Brain Natriuretic Peptide: 320 pg/mL (05-08 @ 00:27)    Lipid Profile:   HgA1c:   TSH:   Culture - Blood (05.08.22 @ 22:51)    Specimen Source: .Blood Blood    Culture Results:   No growth to date.          Assessment and plan  ---------------------------   63yo w/Hx of DM2, HTN, HLD, Go Limited Hx from patient who is AAOx3 but mildly confused; collateral obtained from wife Cameron Vela ([phone]). Per wife, pt has been increasingly fatigued at home, sleeping a lot, w/new weakness now unable to ambulate at home. Pt straight cath  chronically at home, Hx of UTIs in the past. Pt c/o chronic cough but otherwise no complaints;   he denies HA, SOB, CP, palpitations, abd pain, n/v/d/c, hematuria/hematochezia/melena, numbness/tingling, focal weakness.  pt is poor historian with hx of schizophrenia with change of mental status and generalized weakness.  pt with known hx of htn, no further hypotension, re started on bp meds  will adjust bp meds  tsh/ b12  echo r/o hypertensive heart disease  check cultures  dvt prophylaxis  ecg noted  dc ivf  cultures are all negative so far  awaiting echo  continue Enalapril and coreg

## 2022-05-10 NOTE — PHYSICAL THERAPY INITIAL EVALUATION ADULT - PATIENT/FAMILY AGREES WITH PLAN
TBD upon completion of functional assessment. Home with home PT for safety assessment, gait,stair negotiation, balance, & strength training and to return pt to baseline functional mobility status./yes

## 2022-05-10 NOTE — DISCHARGE NOTE PROVIDER - NSDCMRMEDTOKEN_GEN_ALL_CORE_FT
albuterol 90 mcg/inh inhalation aerosol: 1 puff(s) inhaled , As Needed  allopurinol 100 mg oral tablet: 1 tab(s) orally 2 times a day  benzonatate 200 mg oral capsule: 1 cap(s) orally 3 times a day  BENZTROPINE MESYLATE  1 MG TABS: 1 tab(s) orally 2 times a day  bethanechol 50 mg oral tablet: 1 tab(s) orally 4 times a day  carvedilol 12.5 mg oral tablet: 1 tab(s) orally 2 times a day  Crestor 5 mg oral tablet: 1 tab(s) orally once a day  divalproex sodium 500 mg oral tablet, extended release: orally 2 times a day  enalapril 20 mg oral tablet: 1 tab(s) orally once a day  Flomax 0.4 mg oral capsule: 1 cap(s) orally 2 times a day  Flovent 220 mcg/inh inhalation aerosol with adapter: inhaled 2 times a day, As Needed  FLUoxetine 20 mg oral tablet: 1 tab(s) orally once a day  haloperidol 10 mg oral tablet: 1 tab(s) orally once a day  Lasix 20 mg oral tablet: 1 tab(s) orally once a day  metFORMIN 1000 mg oral tablet: 1 tab(s) orally 2 times a day  montelukast 10 mg oral tablet: 1 tab(s) orally once a day  NIFEdipine 30 mg oral tablet, extended release: 1 tab(s) orally 2 times a day  potassium chloride 20 mEq oral tablet, extended release: 1 tab(s) orally 2 times a day  risperiDONE 4 mg oral tablet: orally once a day (at bedtime)  Trulicity Pen 1.5 mg/0.5 mL subcutaneous solution: 1 dose(s) subcutaneous once a week   albuterol 90 mcg/inh inhalation aerosol: 1 puff(s) inhaled , As Needed  allopurinol 100 mg oral tablet: 1 tab(s) orally 2 times a day  benzonatate 200 mg oral capsule: 1 cap(s) orally 3 times a day  BENZTROPINE MESYLATE  1 MG TABS: 1 tab(s) orally 2 times a day  bethanechol 50 mg oral tablet: 1 tab(s) orally 4 times a day  carvedilol 12.5 mg oral tablet: 1 tab(s) orally 2 times a day  cefuroxime 500 mg oral tablet: 1 tab(s) orally 2 times a day, please start on 5/12 for 10 days.   Crestor 5 mg oral tablet: 1 tab(s) orally once a day  divalproex sodium 500 mg oral tablet, extended release: 1 tab(s) orally once a day  enalapril 20 mg oral tablet: 1 tab(s) orally once a day  Flovent 220 mcg/inh inhalation aerosol with adapter: inhaled 2 times a day, As Needed  FLUoxetine 20 mg oral tablet: 1 tab(s) orally once a day  metFORMIN 1000 mg oral tablet: 1 tab(s) orally 2 times a day  montelukast 10 mg oral tablet: 1 tab(s) orally once a day  NIFEdipine 30 mg oral tablet, extended release: 1 tab(s) orally 2 times a day  risperiDONE 1 mg oral tablet: 2 tab(s) orally once a day (at bedtime)   tamsulosin 0.4 mg oral capsule: 1 cap(s) orally once a day (at bedtime)  Trulicity Pen 1.5 mg/0.5 mL subcutaneous solution: 1 dose(s) subcutaneous once a week   albuterol 90 mcg/inh inhalation aerosol: 1 puff(s) inhaled , As Needed  allopurinol 100 mg oral tablet: 1 tab(s) orally 2 times a day  benzonatate 200 mg oral capsule: 1 cap(s) orally 3 times a day  BENZTROPINE MESYLATE  1 MG TABS: 1 tab(s) orally 2 times a day  bethanechol 50 mg oral tablet: 1 tab(s) orally 4 times a day  carvedilol 12.5 mg oral tablet: 1 tab(s) orally 2 times a day  cefuroxime 500 mg oral tablet: 1 tab(s) orally 2 times a day, please start on 5/12 for 10 days.   cefuroxime 500 mg oral tablet: 1 tab(s) orally 2 times a day starting 5/12  Crestor 5 mg oral tablet: 1 tab(s) orally once a day  divalproex sodium 500 mg oral tablet, extended release: 1 tab(s) orally once a day  enalapril 20 mg oral tablet: 1 tab(s) orally once a day  Flovent 220 mcg/inh inhalation aerosol with adapter: inhaled 2 times a day, As Needed  FLUoxetine 20 mg oral tablet: 1 tab(s) orally once a day  metFORMIN 1000 mg oral tablet: 1 tab(s) orally 2 times a day  montelukast 10 mg oral tablet: 1 tab(s) orally once a day  NIFEdipine 30 mg oral tablet, extended release: 1 tab(s) orally 2 times a day  risperiDONE 1 mg oral tablet: 2 tab(s) orally once a day (at bedtime)   tamsulosin 0.4 mg oral capsule: 1 cap(s) orally once a day (at bedtime)  Trulicity Pen 1.5 mg/0.5 mL subcutaneous solution: 1 dose(s) subcutaneous once a week

## 2022-05-10 NOTE — PROVIDER CONTACT NOTE (OTHER) - ASSESSMENT
Pt A&O X3, denies any symptoms at this time
Pt A&O X4, asymptomatic. Denies chest pain, headache, blurry vision, or any discomfort at this time

## 2022-05-10 NOTE — DISCHARGE NOTE PROVIDER - CARE PROVIDER_API CALL
Gilbert Bruno)  Cardiovascular Disease; Internal Medicine  [address], [facility] [address]  [place] [address]  Phone: [phone]  Fax: [phone]  Follow Up Time: 1 week

## 2022-05-10 NOTE — DISCHARGE NOTE PROVIDER - NSDCCPCAREPLAN_GEN_ALL_CORE_FT
PRINCIPAL DISCHARGE DIAGNOSIS  Diagnosis: Sepsis secondary to UTI  Assessment and Plan of Treatment: You received IV  Ceftriaxone. Now please take oral ceftin 500mg two times a day for 10 days starting tomorrow 5/12.  You had a pryor placed which was removed. Continue to self cath at home.       PRINCIPAL DISCHARGE DIAGNOSIS  Diagnosis: Sepsis secondary to UTI  Assessment and Plan of Treatment: You received IV  Ceftriaxone. Now please take oral ceftin 500mg two times a day for 6 days starting tomorrow 5/12.  You had a pryor placed which was removed. Continue to self cath at home.

## 2022-05-10 NOTE — PHYSICAL THERAPY INITIAL EVALUATION ADULT - ADDITIONAL COMMENTS
Patient lives in pvt house with family  1 step to enter.  Patient ambulated with rollator independent. Pt owns w/c at home. Patient lives in pvt house with family  1 step to enter.  Patient ambulated with rollator independent. Pt owns w/c at home. Per pt. and HHA, assist available at home all times.

## 2022-05-10 NOTE — DISCHARGE NOTE PROVIDER - HOSPITAL COURSE
61 yo male PMH DM2, HTN, HLD, gout, asthma, schizophrenia, hx of suicide attempt in past (swallowed razor blades which required abdominal surgery >10 yrs   ago,   neurogenic bladder , intermittent straight catheterizations at home due to BPH who presented from home due to weakness and difficulty ambulating.  Leukocytosis, Fever  RVP hMPV  CT dilation of ureters, mild L hydro  CXR clear  New BCX now with E coli no ESBL (possible UTI source?)    1) Viral URI  - +hMPV  - Supportive care  2) E coli bacteremia  - Likely related to UTI given history of self Cath; ESBL tag negative  - Ceftriaxone 1g q 24  - Anticipate to elect PO options when DC planning   3) Fever/Leukocytosis  - F/U BCXs      *****Incomplete    63 yo male PMH DM2, HTN, HLD, gout, asthma, schizophrenia, hx of suicide attempt in past (swallowed razor blades which required abdominal surgery >10 yrs   ago,   neurogenic bladder , intermittent straight catheterizations at home due to BPH who presented from home due to weakness and difficulty ambulating.  Leukocytosis, Fever  CT dilation of ureters, mild L hydro  CXR clear  New BCX now with E coli no ESBL (possible UTI source?)    1) Viral URI  - +hMPV  - Supportive care    2) E coli bacteremia  - Likely related to UTI given history of self Cath; ESBL tag negative  - s/p IV Ceftriaxone 1g q 24, will go home with po ceftin 500mg BID for 10 days as per Dr.Oey Hyde     3) Fever/Leukocytosis  - blood culture no growth     4). BPH  -continue Flomax   - Barnes removed, patient to continue self cathing at home       Patient is medically cleared and stable for discharge. Discussed with .    61 yo male PMH DM2, HTN, HLD, gout, asthma, schizophrenia, hx of suicide attempt in past (swallowed razor blades which required abdominal surgery >10 yrs   ago,   neurogenic bladder , intermittent straight catheterizations at home due to BPH who presented from home due to weakness and difficulty ambulating.  Leukocytosis, Fever  CT dilation of ureters, mild L hydro  CXR clear  New BCX now with E coli no ESBL (possible UTI source?)    1) Viral URI  - +hMPV  - Supportive care    2) E coli bacteremia  - Likely related to UTI given history of self Cath; ESBL tag negative  - s/p IV Ceftriaxone 1g q 24, will go home with po ceftin 500mg BID for 6 days as per Dr.Oey Hyde     3) Fever/Leukocytosis  - blood culture no growth     4). BPH  -continue Flomax   - Barnes removed, patient to continue self cathing at home       Patient is medically cleared and stable for discharge. Discussed with . 63 yo male PMH DM(II), HTN, HLD, gout, asthma, schizophrenia, hx of suicide attempt in past (swallowed razor blades which required abdominal surgery >10 yrs   ago, neurogenic bladder , intermittent straight catheterizations at home due to BPH who presented from home due to weakness and difficulty ambulating.    On arrival, CXR was clear, BCX now with E coli no ESBL (possible UTI source?) although urine culture was negative. CBC normal at discharge, SMA-7 all   normal. Also had viral URI (MPV) treated with supportive care.  E coli bacteremia resolved, likely related to UTI given history of self cath at home.     s/p IV Ceftriaxone 1g q 24. Will go home with PO Ceftin 500mg BID for 6 days as per Dr.Oey Hyde from ID. Repeat blood culture no growth. BPH continue Flomax.     Barnes removed upon discharge, patient to continue self cathing at home. See attached med list. 61 yo male PMH DM(II), HTN, HLD, gout, asthma, schizophrenia, hx of suicide attempt in past (swallowed razor blades which required abdominal surgery >10 yrs  ago, neurogenic bladder , intermittent straight catheterizations at home due to BPH who presented from home due to weakness and difficulty ambulating.    On arrival, CXR was clear. BCX showed E coli no ESBL (possible UTI source?) although urine culture was negative. CBC normal at discharge, SMA-7 all   normal. Also had viral URI (MPV) treated with supportive care and IVF.  E coli bacteremia resolved, likely related to UTI given history of self cath at home.     s/p IV Ceftriaxone 1g q 24. Will go home with PO Ceftin 500mg BID for 6 days as per Dr.Oey Hyde from ID. Repeat blood culture no growth. BPH continue Flomax.     Barnes removed upon discharge, patient to continue self cathing at home. See attached med list.  All prior home meds will continue.

## 2022-05-10 NOTE — PROVIDER CONTACT NOTE (OTHER) - ACTION/TREATMENT ORDERED:
administer Norvasc, continue to monitor
administer medications as per order, no further interventions at this time

## 2022-05-10 NOTE — PROGRESS NOTE ADULT - SUBJECTIVE AND OBJECTIVE BOX
CC: F/U for Bacteremia    Saw/spoke to patient. No fevers, no chills. No new complaints.    Allergies  No Known Allergies    ANTIMICROBIALS:  cefTRIAXone   IVPB 1000 every 24 hours    PE:    Vital Signs Last 24 Hrs  T(C): 36.6 (10 May 2022 12:12), Max: 37.3 (09 May 2022 21:38)  T(F): 97.8 (10 May 2022 12:12), Max: 99.1 (09 May 2022 21:38)  HR: 75 (10 May 2022 13:00) (75 - 85)  BP: 155/85 (10 May 2022 13:00) (155/85 - 174/111)  RR: 18 (10 May 2022 12:33) (18 - 19)  SpO2: 97% (10 May 2022 12:33) (93% - 97%)    Gen: AOx3, NAD, non-toxic  CV: Nontachycardic  Resp: Breathing comfortably, RA  Abd: Soft, nontender  IV/Skin: No thrombophlebitis    LABS:                        12.7   9.16  )-----------( 203      ( 10 May 2022 06:24 )             39.2     05-10    137  |  99  |  8   ----------------------------<  122<H>  3.9   |  26  |  0.79    Ca    9.0      10 May 2022 06:24  Mg     2.3     05-10    TPro  6.6  /  Alb  3.4  /  TBili  0.4  /  DBili  x   /  AST  25  /  ALT  22  /  AlkPhos  83  05-10    MICROBIOLOGY:    .Blood Blood  05-08-22   No growth to date.    .Blood Blood  05-08-22   No growth to date.    Clean Catch Clean Catch (Midstream)  05-08-22   <10,000 CFU/mL Normal Urogenital Danielle     .Blood Blood-Peripheral  05-08-22   Growth in anaerobic bottle: Escherichia coli  See previous culture [id number]  --    Growth in anaerobic bottle: Gram Negative Rods    .Blood Blood-Peripheral  05-08-22   Growth in aerobic and anaerobic bottles: Escherichia coli  ***Blood Panel PCR results on this specimen are available  approximately 3 hours after the Gram stain result.***  Gram stain, PCR, and/or culture results may not always  correspond due to difference in methodologies.  ************************************************************  This PCR assay was performed by multiplex PCR. This  Assay tests for 66 bacterial and resistance gene targets.  Please refer to the [facility] test directory  at [email or web address] for details.  --  Blood Culture PCR  Escherichia coli    Rapid RVP Result: Detected (05-08 @ 00:26)    (otherwise reviewed)    RADIOLOGY:    5/8 CT:    IMPRESSION: Dilatation of the distal ureters noted. Mild left   hydronephrosis. No urinary calculus.

## 2022-05-10 NOTE — PHYSICAL THERAPY INITIAL EVALUATION ADULT - PERTINENT HX OF CURRENT PROBLEM, REHAB EVAL
62yoM w/Hx of DM2, HTN, HLD, Gout, asthma, schizophrenia, neurogenic bladder (self catheterization 3x/daily), BIBEMS for lethargy/cough. US concerning for urinary retention and hydronephrosis c/w previous. DDx incl. URI vs. UTI vs. pna, order septic workup.

## 2022-05-10 NOTE — DISCHARGE NOTE PROVIDER - NSDCFUSCHEDAPPT_GEN_ALL_CORE_FT
[facility]  Gastro MAJOR [id number]  Scheduled Appointment: 06/01/2022

## 2022-05-11 ENCOUNTER — TRANSCRIPTION ENCOUNTER (OUTPATIENT)
Age: 62
End: 2022-05-11

## 2022-05-11 VITALS
DIASTOLIC BLOOD PRESSURE: 86 MMHG | SYSTOLIC BLOOD PRESSURE: 155 MMHG | OXYGEN SATURATION: 96 % | RESPIRATION RATE: 18 BRPM

## 2022-05-11 LAB
ANION GAP SERPL CALC-SCNC: 13 MMOL/L — SIGNIFICANT CHANGE UP (ref 5–17)
BASOPHILS # BLD AUTO: 0.05 K/UL — SIGNIFICANT CHANGE UP (ref 0–0.2)
BASOPHILS NFR BLD AUTO: 0.5 % — SIGNIFICANT CHANGE UP (ref 0–2)
BUN SERPL-MCNC: 10 MG/DL — SIGNIFICANT CHANGE UP (ref 7–23)
CALCIUM SERPL-MCNC: 9 MG/DL — SIGNIFICANT CHANGE UP (ref 8.4–10.5)
CHLORIDE SERPL-SCNC: 95 MMOL/L — LOW (ref 96–108)
CO2 SERPL-SCNC: 24 MMOL/L — SIGNIFICANT CHANGE UP (ref 22–31)
CREAT SERPL-MCNC: 0.81 MG/DL — SIGNIFICANT CHANGE UP (ref 0.5–1.3)
EGFR: 101 ML/MIN/1.73M2 — SIGNIFICANT CHANGE UP
EOSINOPHIL # BLD AUTO: 0.37 K/UL — SIGNIFICANT CHANGE UP (ref 0–0.5)
EOSINOPHIL NFR BLD AUTO: 3.6 % — SIGNIFICANT CHANGE UP (ref 0–6)
GLUCOSE BLDC GLUCOMTR-MCNC: 112 MG/DL — HIGH (ref 70–99)
GLUCOSE BLDC GLUCOMTR-MCNC: 147 MG/DL — HIGH (ref 70–99)
GLUCOSE SERPL-MCNC: 107 MG/DL — HIGH (ref 70–99)
HCT VFR BLD CALC: 38.5 % — LOW (ref 39–50)
HGB BLD-MCNC: 12.7 G/DL — LOW (ref 13–17)
IMM GRANULOCYTES NFR BLD AUTO: 0.8 % — SIGNIFICANT CHANGE UP (ref 0–1.5)
LYMPHOCYTES # BLD AUTO: 1.04 K/UL — SIGNIFICANT CHANGE UP (ref 1–3.3)
LYMPHOCYTES # BLD AUTO: 10.2 % — LOW (ref 13–44)
MCHC RBC-ENTMCNC: 29.2 PG — SIGNIFICANT CHANGE UP (ref 27–34)
MCHC RBC-ENTMCNC: 33 GM/DL — SIGNIFICANT CHANGE UP (ref 32–36)
MCV RBC AUTO: 88.5 FL — SIGNIFICANT CHANGE UP (ref 80–100)
MONOCYTES # BLD AUTO: 1.09 K/UL — HIGH (ref 0–0.9)
MONOCYTES NFR BLD AUTO: 10.7 % — SIGNIFICANT CHANGE UP (ref 2–14)
NEUTROPHILS # BLD AUTO: 7.56 K/UL — HIGH (ref 1.8–7.4)
NEUTROPHILS NFR BLD AUTO: 74.2 % — SIGNIFICANT CHANGE UP (ref 43–77)
NRBC # BLD: 0 /100 WBCS — SIGNIFICANT CHANGE UP (ref 0–0)
PLATELET # BLD AUTO: 231 K/UL — SIGNIFICANT CHANGE UP (ref 150–400)
POTASSIUM SERPL-MCNC: 3.9 MMOL/L — SIGNIFICANT CHANGE UP (ref 3.5–5.3)
POTASSIUM SERPL-SCNC: 3.9 MMOL/L — SIGNIFICANT CHANGE UP (ref 3.5–5.3)
RBC # BLD: 4.35 M/UL — SIGNIFICANT CHANGE UP (ref 4.2–5.8)
RBC # FLD: 13.2 % — SIGNIFICANT CHANGE UP (ref 10.3–14.5)
SODIUM SERPL-SCNC: 132 MMOL/L — LOW (ref 135–145)
WBC # BLD: 10.19 K/UL — SIGNIFICANT CHANGE UP (ref 3.8–10.5)
WBC # FLD AUTO: 10.19 K/UL — SIGNIFICANT CHANGE UP (ref 3.8–10.5)

## 2022-05-11 PROCEDURE — 80053 COMPREHEN METABOLIC PANEL: CPT

## 2022-05-11 PROCEDURE — 99285 EMERGENCY DEPT VISIT HI MDM: CPT

## 2022-05-11 PROCEDURE — 97110 THERAPEUTIC EXERCISES: CPT

## 2022-05-11 PROCEDURE — 82947 ASSAY GLUCOSE BLOOD QUANT: CPT

## 2022-05-11 PROCEDURE — 99232 SBSQ HOSP IP/OBS MODERATE 35: CPT

## 2022-05-11 PROCEDURE — 83605 ASSAY OF LACTIC ACID: CPT

## 2022-05-11 PROCEDURE — 85027 COMPLETE CBC AUTOMATED: CPT

## 2022-05-11 PROCEDURE — 87040 BLOOD CULTURE FOR BACTERIA: CPT

## 2022-05-11 PROCEDURE — 82803 BLOOD GASES ANY COMBINATION: CPT

## 2022-05-11 PROCEDURE — 97162 PT EVAL MOD COMPLEX 30 MIN: CPT

## 2022-05-11 PROCEDURE — 36415 COLL VENOUS BLD VENIPUNCTURE: CPT

## 2022-05-11 PROCEDURE — 85014 HEMATOCRIT: CPT

## 2022-05-11 PROCEDURE — 80048 BASIC METABOLIC PNL TOTAL CA: CPT

## 2022-05-11 PROCEDURE — 87086 URINE CULTURE/COLONY COUNT: CPT

## 2022-05-11 PROCEDURE — 74176 CT ABD & PELVIS W/O CONTRAST: CPT

## 2022-05-11 PROCEDURE — 83036 HEMOGLOBIN GLYCOSYLATED A1C: CPT

## 2022-05-11 PROCEDURE — 87150 DNA/RNA AMPLIFIED PROBE: CPT

## 2022-05-11 PROCEDURE — 82435 ASSAY OF BLOOD CHLORIDE: CPT

## 2022-05-11 PROCEDURE — 81001 URINALYSIS AUTO W/SCOPE: CPT

## 2022-05-11 PROCEDURE — 82962 GLUCOSE BLOOD TEST: CPT

## 2022-05-11 PROCEDURE — 96375 TX/PRO/DX INJ NEW DRUG ADDON: CPT

## 2022-05-11 PROCEDURE — 83880 ASSAY OF NATRIURETIC PEPTIDE: CPT

## 2022-05-11 PROCEDURE — 94640 AIRWAY INHALATION TREATMENT: CPT

## 2022-05-11 PROCEDURE — 85730 THROMBOPLASTIN TIME PARTIAL: CPT

## 2022-05-11 PROCEDURE — 97116 GAIT TRAINING THERAPY: CPT

## 2022-05-11 PROCEDURE — 87186 SC STD MICRODIL/AGAR DIL: CPT

## 2022-05-11 PROCEDURE — 85025 COMPLETE CBC W/AUTO DIFF WBC: CPT

## 2022-05-11 PROCEDURE — 84295 ASSAY OF SERUM SODIUM: CPT

## 2022-05-11 PROCEDURE — 96374 THER/PROPH/DIAG INJ IV PUSH: CPT

## 2022-05-11 PROCEDURE — 84132 ASSAY OF SERUM POTASSIUM: CPT

## 2022-05-11 PROCEDURE — 85018 HEMOGLOBIN: CPT

## 2022-05-11 PROCEDURE — 85610 PROTHROMBIN TIME: CPT

## 2022-05-11 PROCEDURE — 71045 X-RAY EXAM CHEST 1 VIEW: CPT

## 2022-05-11 PROCEDURE — 82330 ASSAY OF CALCIUM: CPT

## 2022-05-11 PROCEDURE — 83735 ASSAY OF MAGNESIUM: CPT

## 2022-05-11 PROCEDURE — 93308 TTE F-UP OR LMTD: CPT

## 2022-05-11 PROCEDURE — 87077 CULTURE AEROBIC IDENTIFY: CPT

## 2022-05-11 PROCEDURE — 0225U NFCT DS DNA&RNA 21 SARSCOV2: CPT

## 2022-05-11 RX ORDER — DIVALPROEX SODIUM 500 MG/1
0 TABLET, DELAYED RELEASE ORAL
Qty: 0 | Refills: 0 | DISCHARGE

## 2022-05-11 RX ORDER — RISPERIDONE 4 MG/1
0 TABLET ORAL
Qty: 0 | Refills: 0 | DISCHARGE

## 2022-05-11 RX ORDER — RISPERIDONE 4 MG/1
2 TABLET ORAL
Qty: 60 | Refills: 0
Start: 2022-05-11 | End: 2022-06-09

## 2022-05-11 RX ORDER — FUROSEMIDE 40 MG
1 TABLET ORAL
Qty: 0 | Refills: 0 | DISCHARGE

## 2022-05-11 RX ORDER — NIFEDIPINE 30 MG
30 TABLET, EXTENDED RELEASE 24 HR ORAL DAILY
Refills: 0 | Status: DISCONTINUED | OUTPATIENT
Start: 2022-05-11 | End: 2022-05-11

## 2022-05-11 RX ORDER — DIVALPROEX SODIUM 500 MG/1
1 TABLET, DELAYED RELEASE ORAL
Qty: 30 | Refills: 0
Start: 2022-05-11 | End: 2022-06-09

## 2022-05-11 RX ORDER — HALOPERIDOL DECANOATE 100 MG/ML
1 INJECTION INTRAMUSCULAR
Qty: 0 | Refills: 0 | DISCHARGE

## 2022-05-11 RX ORDER — TAMSULOSIN HYDROCHLORIDE 0.4 MG/1
1 CAPSULE ORAL
Qty: 0 | Refills: 0 | DISCHARGE

## 2022-05-11 RX ORDER — POTASSIUM CHLORIDE 20 MEQ
1 PACKET (EA) ORAL
Qty: 0 | Refills: 0 | DISCHARGE

## 2022-05-11 RX ORDER — CEFUROXIME AXETIL 250 MG
1 TABLET ORAL
Qty: 12 | Refills: 0
Start: 2022-05-11 | End: 2022-05-16

## 2022-05-11 RX ORDER — TAMSULOSIN HYDROCHLORIDE 0.4 MG/1
1 CAPSULE ORAL
Qty: 30 | Refills: 0
Start: 2022-05-11 | End: 2022-06-09

## 2022-05-11 RX ADMIN — CEFTRIAXONE 100 MILLIGRAM(S): 500 INJECTION, POWDER, FOR SOLUTION INTRAMUSCULAR; INTRAVENOUS at 14:20

## 2022-05-11 RX ADMIN — MONTELUKAST 10 MILLIGRAM(S): 4 TABLET, CHEWABLE ORAL at 13:11

## 2022-05-11 RX ADMIN — CARVEDILOL PHOSPHATE 12.5 MILLIGRAM(S): 80 CAPSULE, EXTENDED RELEASE ORAL at 05:00

## 2022-05-11 RX ADMIN — Medication 1 MILLIGRAM(S): at 13:11

## 2022-05-11 RX ADMIN — Medication 20 MILLIGRAM(S): at 05:00

## 2022-05-11 RX ADMIN — Medication 10 MILLIGRAM(S): at 13:11

## 2022-05-11 RX ADMIN — HEPARIN SODIUM 5000 UNIT(S): 5000 INJECTION INTRAVENOUS; SUBCUTANEOUS at 05:00

## 2022-05-11 RX ADMIN — AMLODIPINE BESYLATE 5 MILLIGRAM(S): 2.5 TABLET ORAL at 05:00

## 2022-05-11 RX ADMIN — Medication 30 MILLIGRAM(S): at 13:12

## 2022-05-11 RX ADMIN — HEPARIN SODIUM 5000 UNIT(S): 5000 INJECTION INTRAVENOUS; SUBCUTANEOUS at 14:20

## 2022-05-11 RX ADMIN — DIVALPROEX SODIUM 500 MILLIGRAM(S): 500 TABLET, DELAYED RELEASE ORAL at 13:11

## 2022-05-11 RX ADMIN — Medication 100 MILLIGRAM(S): at 05:00

## 2022-05-11 NOTE — PROGRESS NOTE ADULT - ASSESSMENT
62 year old male w/ PMHx of DM2, HTN, and HLD w/ intermittent straight catheterizations at home due to BPH who presented from home due to weakness and difficulty ambulating  Leukocytosis, Fever  RVP hMPV  CT dilation of ureters, mild L hydro  CXR clear  New BCX now with E coli no ESBL (possible UTI source?)  Overall,  1) Viral URI  - +hMPV  - Supportive care  2) E coli bacteremia  - Likely related to UTI given history of self Cath; ESBL tag negative  - Ceftriaxone 1g q 24  - When DC planning, can send out on Ceftin 500mg q 12 to complete 10 days  - F/U BCXs for clear  3) Fever/Leukocytosis  - F/U BCXs  - Trend to normal  - Monitor for alternate sources    F/U in ID clinic PRN, [phone]    Barrett Glez MD  Contact on TEAMS messaging from 9am - 5pm  From 5pm-9am, and on weekends call [phone]

## 2022-05-11 NOTE — PROGRESS NOTE ADULT - SUBJECTIVE AND OBJECTIVE BOX
INTERVAL HPI/OVERNIGHT EVENTS:  Pt seen and examined at bedside.     Allergies/Intolerance: No Known Allergies      MEDICATIONS  (STANDING):  allopurinol 100 milliGRAM(s) Oral two times a day  atorvastatin 20 milliGRAM(s) Oral at bedtime  benztropine 1 milliGRAM(s) Oral daily  carvedilol 12.5 milliGRAM(s) Oral every 12 hours  cefTRIAXone   IVPB 1000 milliGRAM(s) IV Intermittent every 24 hours  dextrose 5%. 1000 milliLiter(s) (50 mL/Hr) IV Continuous <Continuous>  dextrose 5%. 1000 milliLiter(s) (100 mL/Hr) IV Continuous <Continuous>  dextrose 50% Injectable 25 Gram(s) IV Push once  dextrose 50% Injectable 12.5 Gram(s) IV Push once  dextrose 50% Injectable 25 Gram(s) IV Push once  diVALproex  milliGRAM(s) Oral daily  enalapril 20 milliGRAM(s) Oral daily  FLUoxetine 10 milliGRAM(s) Oral daily  glucagon  Injectable 1 milliGRAM(s) IntraMuscular once  heparin   Injectable 5000 Unit(s) SubCutaneous every 8 hours  insulin lispro (ADMELOG) corrective regimen sliding scale   SubCutaneous three times a day before meals  montelukast 10 milliGRAM(s) Oral daily  NIFEdipine XL 30 milliGRAM(s) Oral daily  polyethylene glycol 3350 17 Gram(s) Oral daily  risperiDONE   Tablet 1 milliGRAM(s) Oral at bedtime  senna 2 Tablet(s) Oral at bedtime  tamsulosin 0.4 milliGRAM(s) Oral at bedtime    MEDICATIONS  (PRN):  dextrose Oral Gel 15 Gram(s) Oral once PRN Blood Glucose LESS THAN 70 milliGRAM(s)/deciliter        ROS: all systems reviewed and wnl      PHYSICAL EXAMINATION:  Vital Signs Last 24 Hrs  T(C): 36.8 (11 May 2022 05:14), Max: 37.6 (10 May 2022 18:30)  T(F): 98.2 (11 May 2022 05:14), Max: 99.7 (10 May 2022 18:30)  HR: 78 (11 May 2022 05:14) (75 - 88)  BP: 151/83 (11 May 2022 05:14) (151/83 - 186/115)  BP(mean): --  RR: 19 (11 May 2022 05:14) (18 - 20)  SpO2: 94% (11 May 2022 05:14) (93% - 97%)  CAPILLARY BLOOD GLUCOSE      POCT Blood Glucose.: 147 mg/dL (11 May 2022 07:32)  POCT Blood Glucose.: 118 mg/dL (10 May 2022 21:54)  POCT Blood Glucose.: 93 mg/dL (10 May 2022 16:44)  POCT Blood Glucose.: 150 mg/dL (10 May 2022 11:54)      05-10 @ 07:01  -  05-11 @ 07:00  --------------------------------------------------------  IN: 280 mL / OUT: 5800 mL / NET: -5520 mL        GENERAL:   NECK: supple, No JVD  CHEST/LUNG: clear to auscultation bilaterally; no rales, rhonchi, or wheezing b/l  HEART: normal S1, S2  ABDOMEN: BS+, soft, ND, NT   EXTREMITIES:  pulses palpable; no clubbing, cyanosis, or edema b/l LEs  SKIN: no rashes or lesions      LABS:                        12.7   10.19 )-----------( 231      ( 11 May 2022 07:09 )             38.5     05-11    132<L>  |  95<L>  |  10  ----------------------------<  107<H>  3.9   |  24  |  0.81    Ca    9.0      11 May 2022 07:06  Mg     2.3     05-10    TPro  6.6  /  Alb  3.4  /  TBili  0.4  /  DBili  x   /  AST  25  /  ALT  22  /  AlkPhos  83  05-10               INTERVAL HPI/OVERNIGHT EVENTS:  Pt seen and examined at bedside.     Allergies/Intolerance: No Known Allergies      MEDICATIONS  (STANDING):  allopurinol 100 milliGRAM(s) Oral two times a day  atorvastatin 20 milliGRAM(s) Oral at bedtime  benztropine 1 milliGRAM(s) Oral daily  carvedilol 12.5 milliGRAM(s) Oral every 12 hours  cefTRIAXone   IVPB 1000 milliGRAM(s) IV Intermittent every 24 hours  dextrose 5%. 1000 milliLiter(s) (50 mL/Hr) IV Continuous <Continuous>  dextrose 5%. 1000 milliLiter(s) (100 mL/Hr) IV Continuous <Continuous>  dextrose 50% Injectable 25 Gram(s) IV Push once  dextrose 50% Injectable 12.5 Gram(s) IV Push once  dextrose 50% Injectable 25 Gram(s) IV Push once  diVALproex  milliGRAM(s) Oral daily  enalapril 20 milliGRAM(s) Oral daily  FLUoxetine 10 milliGRAM(s) Oral daily  glucagon  Injectable 1 milliGRAM(s) IntraMuscular once  heparin   Injectable 5000 Unit(s) SubCutaneous every 8 hours  insulin lispro (ADMELOG) corrective regimen sliding scale   SubCutaneous three times a day before meals  montelukast 10 milliGRAM(s) Oral daily  NIFEdipine XL 30 milliGRAM(s) Oral daily  polyethylene glycol 3350 17 Gram(s) Oral daily  risperiDONE   Tablet 1 milliGRAM(s) Oral at bedtime  senna 2 Tablet(s) Oral at bedtime  tamsulosin 0.4 milliGRAM(s) Oral at bedtime    MEDICATIONS  (PRN):  dextrose Oral Gel 15 Gram(s) Oral once PRN Blood Glucose LESS THAN 70 milliGRAM(s)/deciliter        ROS: all systems reviewed and wnl      PHYSICAL EXAMINATION:  Vital Signs Last 24 Hrs  T(C): 36.8 (11 May 2022 05:14), Max: 37.6 (10 May 2022 18:30)  T(F): 98.2 (11 May 2022 05:14), Max: 99.7 (10 May 2022 18:30)  HR: 78 (11 May 2022 05:14) (75 - 88)  BP: 151/83 (11 May 2022 05:14) (151/83 - 186/115)  BP(mean): --  RR: 19 (11 May 2022 05:14) (18 - 20)  SpO2: 94% (11 May 2022 05:14) (93% - 97%)  CAPILLARY BLOOD GLUCOSE      POCT Blood Glucose.: 147 mg/dL (11 May 2022 07:32)  POCT Blood Glucose.: 118 mg/dL (10 May 2022 21:54)  POCT Blood Glucose.: 93 mg/dL (10 May 2022 16:44)  POCT Blood Glucose.: 150 mg/dL (10 May 2022 11:54)      05-10 @ 07:01  -  05-11 @ 07:00  --------------------------------------------------------  IN: 280 mL / OUT: 5800 mL / NET: -5520 mL        GENERAL: stable on RA, comfortable, no fevers, pryor in place here.   NECK: supple, No JVD  CHEST/LUNG: clear to auscultation bilaterally; no rales, rhonchi, or wheezing b/l  HEART: normal S1, S2  ABDOMEN: BS+, soft, ND, NT   EXTREMITIES:  pulses palpable; no clubbing, cyanosis, or edema b/l LEs  SKIN: no rashes or lesions      LABS:                        12.7   10.19 )-----------( 231      ( 11 May 2022 07:09 )             38.5     05-11    132<L>  |  95<L>  |  10  ----------------------------<  107<H>  3.9   |  24  |  0.81    Ca    9.0      11 May 2022 07:06  Mg     2.3     05-10    TPro  6.6  /  Alb  3.4  /  TBili  0.4  /  DBili  x   /  AST  25  /  ALT  22  /  AlkPhos  83  05-10

## 2022-05-11 NOTE — PROGRESS NOTE ADULT - PROVIDER SPECIALTY LIST ADULT
Cardiology
Hospitalist
Infectious Disease
Cardiology
Hospitalist
Cardiology
Infectious Disease
Infectious Disease
Internal Medicine

## 2022-05-11 NOTE — PROGRESS NOTE ADULT - SUBJECTIVE AND OBJECTIVE BOX
CC: F/U for bacteremia    Saw/spoke to patient. Unchanged. Doing well.    Allergies  No Known Allergies    ANTIMICROBIALS:  cefTRIAXone   IVPB 1000 every 24 hours    PE:    Vital Signs Last 24 Hrs  T(C): 36.8 (11 May 2022 05:14), Max: 37.6 (10 May 2022 18:30)  T(F): 98.2 (11 May 2022 05:14), Max: 99.7 (10 May 2022 18:30)  HR: 78 (11 May 2022 05:14) (77 - 88)  BP: 155/86 (11 May 2022 11:30) (151/83 - 186/115)  RR: 18 (11 May 2022 11:30) (18 - 20)  SpO2: 96% (11 May 2022 11:30) (93% - 96%)    Gen: AOx3, NAD, non-toxic  Resp: Breathing comfortably, RA    LABS:                        12.7   10.19 )-----------( 231      ( 11 May 2022 07:09 )             38.5     05-11    132<L>  |  95<L>  |  10  ----------------------------<  107<H>  3.9   |  24  |  0.81    Ca    9.0      11 May 2022 07:06  Mg     2.3     05-10    TPro  6.6  /  Alb  3.4  /  TBili  0.4  /  DBili  x   /  AST  25  /  ALT  22  /  AlkPhos  83  05-10    MICROBIOLOGY:    .Blood Blood  05-08-22   No growth to date    .Blood Blood  05-08-22   No growth to date.  --  --    Clean Catch Clean Catch (Midstream)  05-08-22   <10,000 CFU/mL Normal Urogenital Danielle  --  --    .Blood Blood-Peripheral  05-08-22   Growth in anaerobic bottle: Escherichia coli  See previous culture [id number]  --    Growth in anaerobic bottle: Gram Negative Rods    .Blood Blood-Peripheral  05-08-22   Growth in aerobic and anaerobic bottles: Escherichia coli  ***Blood Panel PCR results on this specimen are available  approximately 3 hours after the Gram stain result.***  Gram stain, PCR, and/or culture results may not always  correspond due to difference in methodologies.  ************************************************************  This PCR assay was performed by multiplex PCR. This  Assay tests for 66 bacterial and resistance gene targets.  Please refer to the [facility] test directory  at [email or web address] for details.  --  Blood Culture PCR  Escherichia coli    Rapid RVP Result: Detected (05-08 @ 00:26)    (otherwise reviewed)    RADIOLOGY:    5/8 CT:    IMPRESSION: Dilatation of the distal ureters noted. Mild left   hydronephrosis. No urinary calculus.

## 2022-05-11 NOTE — PROGRESS NOTE ADULT - SUBJECTIVE AND OBJECTIVE BOX
CARDIOLOGY     PROGRESS  NOTE   ________________________________________________    CHIEF COMPLAINT:Patient is a 62y old  Male who presents with a chief complaint of weakness (10 May 2022 17:19)  no complain.  	  REVIEW OF SYSTEMS:  CONSTITUTIONAL: No fever, weight loss, or fatigue  EYES: No eye pain, visual disturbances, or discharge  ENT:  No difficulty hearing, tinnitus, vertigo; No sinus or throat pain  NECK: No pain or stiffness  RESPIRATORY: No cough, wheezing, chills or hemoptysis; No Shortness of Breath  CARDIOVASCULAR: No chest pain, palpitations, passing out, dizziness, or leg swelling  GASTROINTESTINAL: No abdominal or epigastric pain. No nausea, vomiting, or hematemesis; No diarrhea or constipation. No melena or hematochezia.  GENITOURINARY: No dysuria, frequency, hematuria, or incontinence  NEUROLOGICAL: No headaches, memory loss, loss of strength, numbness, or tremors  SKIN: No itching, burning, rashes, or lesions   LYMPH Nodes: No enlarged glands  ENDOCRINE: No heat or cold intolerance; No hair loss  MUSCULOSKELETAL: No joint pain or swelling; No muscle, back, or extremity pain  PSYCHIATRIC: No depression, anxiety, mood swings, or difficulty sleeping  HEME/LYMPH: No easy bruising, or bleeding gums  ALLERGY AND IMMUNOLOGIC: No hives or eczema	    [ ] All others negative	  [ ] Unable to obtain    PHYSICAL EXAM:  T(C): 36.8 (05-11-22 @ 05:14), Max: 37.6 (05-10-22 @ 18:30)  HR: 78 (05-11-22 @ 05:14) (75 - 88)  BP: 151/83 (05-11-22 @ 05:14) (151/83 - 186/115)  RR: 19 (05-11-22 @ 05:14) (18 - 20)  SpO2: 94% (05-11-22 @ 05:14) (93% - 97%)  Wt(kg): --  I&O's Summary    09 May 2022 07:01  -  10 May 2022 07:00  --------------------------------------------------------  IN: 660 mL / OUT: 6800 mL / NET: -6140 mL    10 May 2022 07:01  -  11 May 2022 06:58  --------------------------------------------------------  IN: 280 mL / OUT: 5800 mL / NET: -5520 mL        Appearance: Normal	  HEENT:   Normal oral mucosa, PERRL, EOMI	  Lymphatic: No lymphadenopathy  Cardiovascular: Normal S1 S2, No JVD, + murmurs, No edema  Respiratory: rhonchi  Psychiatry: A & O x 3, Mood & affect appropriate  Gastrointestinal:  Soft, Non-tender, + BS	  Skin: No rashes, No ecchymoses, No cyanosis	  Neurologic: Non-focal  Extremities: Normal range of motion, No clubbing, cyanosis or edema  Vascular: Peripheral pulses palpable 2+ bilaterally    MEDICATIONS  (STANDING):  allopurinol 100 milliGRAM(s) Oral two times a day  amLODIPine   Tablet 5 milliGRAM(s) Oral daily  atorvastatin 20 milliGRAM(s) Oral at bedtime  benztropine 1 milliGRAM(s) Oral daily  carvedilol 12.5 milliGRAM(s) Oral every 12 hours  cefTRIAXone   IVPB 1000 milliGRAM(s) IV Intermittent every 24 hours  dextrose 5%. 1000 milliLiter(s) (50 mL/Hr) IV Continuous <Continuous>  dextrose 5%. 1000 milliLiter(s) (100 mL/Hr) IV Continuous <Continuous>  dextrose 50% Injectable 25 Gram(s) IV Push once  dextrose 50% Injectable 12.5 Gram(s) IV Push once  dextrose 50% Injectable 25 Gram(s) IV Push once  diVALproex  milliGRAM(s) Oral daily  enalapril 20 milliGRAM(s) Oral daily  FLUoxetine 10 milliGRAM(s) Oral daily  glucagon  Injectable 1 milliGRAM(s) IntraMuscular once  heparin   Injectable 5000 Unit(s) SubCutaneous every 8 hours  insulin lispro (ADMELOG) corrective regimen sliding scale   SubCutaneous three times a day before meals  montelukast 10 milliGRAM(s) Oral daily  polyethylene glycol 3350 17 Gram(s) Oral daily  risperiDONE   Tablet 1 milliGRAM(s) Oral at bedtime  senna 2 Tablet(s) Oral at bedtime  tamsulosin 0.4 milliGRAM(s) Oral at bedtime      TELEMETRY: 	    ECG:  	  RADIOLOGY:  OTHER: 	  	  LABS:	 	    CARDIAC MARKERS:                                12.7   9.16  )-----------( 203      ( 10 May 2022 06:24 )             39.2     05-10    137  |  99  |  8   ----------------------------<  122<H>  3.9   |  26  |  0.79    Ca    9.0      10 May 2022 06:24  Mg     2.3     05-10    TPro  6.6  /  Alb  3.4  /  TBili  0.4  /  DBili  x   /  AST  25  /  ALT  22  /  AlkPhos  83  05-10    proBNP: Serum Pro-Brain Natriuretic Peptide: 320 pg/mL (05-08 @ 00:27)    Lipid Profile:   HgA1c:   TSH:         Assessment and plan  ---------------------------   61yo w/Hx of DM2, HTN, HLD, Go Limited Hx from patient who is AAOx3 but mildly confused; collateral obtained from wife Cameron Vela ([phone]). Per wife, pt has been increasingly fatigued at home, sleeping a lot, w/new weakness now unable to ambulate at home. Pt straight cath  chronically at home, Hx of UTIs in the past. Pt c/o chronic cough but otherwise no complaints;   he denies HA, SOB, CP, palpitations, abd pain, n/v/d/c, hematuria/hematochezia/melena, numbness/tingling, focal weakness.  pt is poor historian with hx of schizophrenia with change of mental status and generalized weakness.  pt with known hx of htn, no further hypotension, re started on bp meds  will adjust bp meds  tsh/ b12  echo r/o hypertensive heart disease  check cultures  dvt prophylaxis  ecg noted  dc ivf  cultures are all negative so far  awaiting echo  continue Enalapril and coreg  re start on procardia xl, dc norvasc

## 2022-05-11 NOTE — DISCHARGE NOTE NURSING/CASE MANAGEMENT/SOCIAL WORK - NSDCPEFALRISK_GEN_ALL_CORE
For information on Fall & Injury Prevention, visit: [email or web address] OR  [email or web address] OR  https://www.cdc.gov/steadi/patient.html

## 2022-05-11 NOTE — DISCHARGE NOTE NURSING/CASE MANAGEMENT/SOCIAL WORK - PATIENT PORTAL LINK FT
You can access the FollowMyHealth Patient Portal offered by [facility] by registering at the following website: [email or web address]. By joining [facility]’s FollowMyHealth portal, you will also be able to view your health information using other applications (apps) compatible with our system.

## 2022-05-11 NOTE — PROGRESS NOTE ADULT - ASSESSMENT
63 yo male PMH DM2, HTN, HLD, gout, asthma, schizophrenia, hx of suicide attempt in past (swallowed razor blades which required abdominal surgery >10 yrs   ago,   neurogenic bladder (has been self-cathing 3x dailyy.  s/p   severe bilateral hydronephrosis  in 2021, w chronic obstruction    Admitted with  weakness,  wbc  of  12,000, h/o  multiple uti's/  bladder  wall  thickening/  traberculation. Suspect  from bladder  dysfunction  and  from  cauti/  pt  self  catherizes  at  home. E coli  bacteremia,/  s/p  sepsis  on arrival, resolving,  had  high  ebc  and  was  hypotensive  on iv  Rocephin, urine culture negative, stop ABX.     Human metapneumo virus, no treatment needed. Likely couse of weakness.     Will  place  pryor/  on  flomax.     HTN, continue home meds coreg. procardia  and   enalapril. Stop IVF.     Schozpphrenia, continue risperidone,  paxil  at  home.     Discharge home Wednesday on oral ABX. Discussed with ID.   61 yo male PMH DM2, HTN, HLD, gout, asthma, schizophrenia, hx of suicide attempt in past (swallowed razor blades which required abdominal surgery >10 yrs   ago,   neurogenic bladder (has been self-cathing 3x dailyy.  s/p   severe bilateral hydronephrosis  in 2021, w chronic obstruction    Admitted with  weakness,  wbc  of  12,000, h/o  multiple uti's/  bladder  wall  thickening/  traberculation. Suspect  from bladder  dysfunction  and  from  cauti/  pt  self  catherizes  at  home. E coli  bacteremia,/  s/p  sepsis  on arrival, resolving,  had  high  ebc  and  was  hypotensive  on iv  Rocephin, urine culture negative, stop ABX.     Human metapneumo virus and RSV, no treatment needed. Likely couse of weakness.     Will  place  pryor/  on  flomax.     HTN, continue home meds coreg. procardia  and   enalapril. Stop IVF.     Schozpphrenia, continue risperidone,  paxil  at  home.     Discharge home Wednesday on oral ABX. Discussed with ID.     Conyit finwl input from ID.

## 2022-05-12 RX ORDER — CEFUROXIME AXETIL 250 MG
1 TABLET ORAL
Qty: 20 | Refills: 0
Start: 2022-05-12 | End: 2022-05-21

## 2022-05-14 LAB
CULTURE RESULTS: SIGNIFICANT CHANGE UP
CULTURE RESULTS: SIGNIFICANT CHANGE UP
SPECIMEN SOURCE: SIGNIFICANT CHANGE UP
SPECIMEN SOURCE: SIGNIFICANT CHANGE UP

## 2022-06-01 ENCOUNTER — APPOINTMENT (OUTPATIENT)
Dept: GASTROENTEROLOGY | Facility: AMBULATORY SURGERY CENTER | Age: 62
End: 2022-06-01

## 2022-07-05 ENCOUNTER — APPOINTMENT (OUTPATIENT)
Dept: HUMAN REPRODUCTION | Facility: CLINIC | Age: 62
End: 2022-07-05

## 2022-07-18 NOTE — BEHAVIORAL HEALTH ASSESSMENT NOTE - NSBHHPIREASON_PSY_A_CORE
[address]  [address], [address]  [address]  Phone: [phone]  Fax: [phone]        Raina Parra  : 1954  PCP: Tamie Boogie NP  2022    PROGRESS NOTE    Consent: Sydney Jacobo was evaluated through  a synchronous (real-time) audio-video encounter. The patient (or guardian if applicable) is aware that this is a billable service, which includes applicable co-pays. This Virtual Visit was conducted with patient's (and/or legal guardian's) consent. This visit was conducted pursuant to the emergency declaration under the [address] [place] [address] authority and the [place] [employer] General Act. Patient identification was verified, and a caregiver was present when appropriate. The patient was located in a state where the provider was licensed to provide care. The visit was conducted in the office with the patient being in home through a Telephone platform. Visit time start: 10:24 AM end: 10:34 AM      HISTORY OF PRESENT ILLNESS  Sydney Jacobo is a 74 y.o. female who was seen in 2016 with c/o chronic neck and low back pain. There was no radiologic evidence to explain her pain. She found significant relief with PT. She took Mobic PRN. She had a mechanical fall in which she injured her right shoulder. She was treated with a cortisone injection and PT. She returned 5/10/17 with c/o lumbar strain x 2 weeks after moving heavy tables. She found significant relief with PT and TPI. She had returned in  after another exacerbation of cervical and lumbar pain. She remained active with walking and caring for her grandson. She found significant benefit with PT. She returned 10/2020. Pt noted that she has a hernia that the surgeon does not want to repair because it is caused by adipose tissue, so she has been focusing on weight loss through diet changes.  She has been maintaining an HEP of walking. She occasionally had muscle spasms in her back, but she was maintaining well. She had a few flare ups of her low back and buttock pain to where the pain was a 10/10 and she almost went to the ER. She previously did well with PT with DN and was able to maintain with relatively no pain for 2 years, so she was interested in proceeding with PT again. She attended PT (11/23/2020; Timbo Mcdermott). She has had an exacerbation of her low back pain x  2 months where it has been locking up. She has tried Zanaflex and Ibuprofen with some benefit. She exacerbated her pain on 12/2/21 when she fell in the bathtub the day she returned from her vacation. She is feeling much better now. She was seen at [facility] ED 12/9/21 for c/o back pain from a fall a week prior. She slipped in the bathroom and landed on her bottom. She believed her neck was extended back when she fell. She had right sided neck, shoulder, upper back, and low back since the fall. Pain was not better with Lidocaine patches, Ibuprofen, heat, and Percocet. Then on 12/20/21 she tested positive for COVID, but she notes that it was not too bad. She tested negative again on 12/29/21. She continues to take Tizanidine as needed with benefit. She plans to schedule her PT today. Diego Wagner is seen virtually for f/u. She notes that she has been doing very well, and her back has not been bothering her that much. She has occasionally had to use Tizanidine for muscle spasms, but it has been rare lately. She has been walking as an HEP. She notes some weight gain and leg edema with GABAPENTIN prescribed by another provider. She reports some left lateral hip pain. She has to massage it in order to relieve pain when she is in bed. She cannot sleep on that side at night. She reports h/o bursitis in the past. She had a hernia surgery, and she is doing well.       PmHx: hypothyroidism, neuropathy, CTS, RA    ASSESSMENT  Chelsy Cherry is a 74 y.o. female with c/o exacerbation of chronic low back pain. Her symptoms likely remain myofascial in nature. She had a fall on 12/2/21, but she is doing much better now. She may have a left trochanteric bursitis. PLAN  1. She can call if she needs a refill of Tizanidine. 2. Provided trochanteric bursitis exercises to add to HEP    Pt will f/u in 6 months or sooner as needed. Diagnoses and all orders for this visit:    1. Chronic primary musculoskeletal pain    2. Mechanical low back pain    3. Myofascial pain    4. Trochanteric bursitis, left hip         PAST MEDICAL HISTORY   Past Medical History:   Diagnosis Date    Abnormal Papanicolaou smear of cervix     1988 HPV    Asthma     Bipolar 1 disorder (HCC)     Bipolar 1 disorder, depressed (HCC)     Bursitis     Carpal tunnel syndrome     Cirrhosis, hepatitis C     Connective tissue disease ([address]     DDD (degenerative disc disease), lumbosacral     Gastric ulcer     Hashimoto's disease     Hashimoto's disease     Hepatitis C     Herniated intervertebral disc of lumbar spine     Hypermobility syndrome     Liver disease     Menopause     Osteoarthritis     Plantar fasciitis, bilateral     RA (rheumatoid arthritis) ([address]        Past Surgical History:   Procedure Laterality Date    COLONOSCOPY N/A 2/9/2017     Colonoscopy w/polyp bxs x3 performed by Ruben Nation MD at [facility] ENDOSCOPY    HX ACL RECONSTRUCTION Left     HX APPENDECTOMY      HX DILATION AND CURETTAGE  1988    cryso    HX GYN      BTL    HX HEENT      malofacial surgery   . MEDICATIONS    Current Outpatient Medications   Medication Sig Dispense Refill    betamethasone valerate (VALISONE) 0.1 % ointment apply to affected area twice a day ON THE LOWER EXTREMITIES.  conjugated estrogens (Premarin) 0.625 mg/gram vaginal cream Apply 0.5 g to affected area every Monday, Wednesday, Friday.  Apply a pea-sized amount to fingertip and apply around urethral/vaginal opening three times a week. 30 g 3    gabapentin (NEURONTIN) 300 mg capsule Take 300 mg by mouth three (3) times daily.  tiZANidine (ZANAFLEX) 2 mg tablet Take 1 Tablet by mouth three (3) times daily as needed for Muscle Spasm(s). 90 Tablet 5    cholecalciferol, vitamin D3, (VITAMIN D3 PO) Take 1 Tablet by mouth daily.  b complex vitamins tablet Take 1 Tablet by mouth daily.  CALCIUM PO Take 1 Tab by mouth daily.  hydroCHLOROthiazide (HYDRODIURIL) 12.5 mg tablet Take 1 Tab by mouth daily. 90 Tab 1    levothyroxine (SYNTHROID) 125 mcg tablet take 1 tablet by mouth once daily BEFORE BREAKFAST 90 Tab 1    PROAIR HFA 90 mcg/actuation inhaler Take 2 Puffs by inhalation every six (6) hours as needed for Wheezing. 1 Inhaler 3    omeprazole (PRILOSEC) 20 mg capsule take 1 capsule by mouth once daily 90 Cap 1    multivitamin (ONE A DAY) tablet Take 1 Tab by mouth daily.  diclofenac (VOLTAREN) 1 % gel apply 2 grams to affected area four times a day  0    zolpidem (AMBIEN) 10 mg tablet Take 10 mg by mouth nightly as needed. 0    benztropine (COGENTIN) 1 mg tablet Take 1 mg by mouth three (3) times daily.  ARIPiprazole (ABILIFY) 5 mg tablet Take 15 mg by mouth daily.           ALLERGIES  Allergies   Allergen Reactions    Latex Rash    Adhesive Tape-Silicones Rash     States not allergic          SOCIAL HISTORY    Social History     Socioeconomic History    Marital status:    Tobacco Use    Smoking status: Former Smoker     Quit date: 2005     Years since quittin.0    Smokeless tobacco: Never Used   Substance and Sexual Activity    Alcohol use: No     Alcohol/week: 0.0 standard drinks    Drug use: Not Currently     Types: Marijuana    Sexual activity: Never       FAMILY HISTORY  Family History   Problem Relation Age of Onset    No Known Problems Mother     No Known Problems Father          REVIEW OF SYSTEMS  Review of Systems   Constitutional: Negative for chills, fever and weight loss. Respiratory: Negative for shortness of breath. Cardiovascular: Negative for chest pain. Gastrointestinal: Negative for constipation. Negative for fecal incontinence    Genitourinary: Negative for dysuria. Negative for urinary incontinence   Musculoskeletal: Positive for back pain and joint pain ( left lateral hip pain). Skin: Negative for rash. Neurological: Negative for dizziness, tingling, tremors, focal weakness and headaches. Endo/Heme/Allergies: Does not bruise/bleed easily. Psychiatric/Behavioral: The patient does not have insomnia. PHYSICAL EXAMINATION    Pain Assessment  1/6/2022   Location of Pain Back   Location Modifiers -   Severity of Pain 2   Quality of Pain Aching   Quality of Pain Comment -   Duration of Pain Persistent   Frequency of Pain Intermittent   Date Pain First Started -   Date Pain First Started Comment -   Aggravating Factors (No Data)   Aggravating Factors Comment lifting   Limiting Behavior Some   Relieving Factors Heat   Relieving Factors Comment bio freeze   Result of Injury -   Work-Related Injury -   Type of Injury -     The limitations of a telemedicine visit including the inability to perform a detailed physical examination may miss significant findings was presented to the patient, and the patient still elected to proceed with the telemedicine visit. RADIOGRAPHS/DATA  Lumbar Spine XR images taken on 12/9/2021 at [facility]:  AP, lateral, oblique and coned down views of the lumbosacral spine were provided. There are 5 non rib-bearing lumbar vertebral bodies. Alignment: Normal.   Fracture/spondylolisthesis: None. Pars defects: None visualized. Degenerative changes: Moderate degenerative changes of the lower lumbar spine, manifested by disc space narrowing/vacuum disc phenomenon, osteophytosis and facet arthropathy. This is most significant at L4/5 and L5/S1. Sacroiliac joints: Symmetric.    Ancillary: Calcifications in the pelvis, likely reflective of degenerative uterine fibroids. IMPRESSION     1. No acute fracture. 2. Moderate multilevel degenerative change of the lower lumbar spine.      Thoracic Spine XR images taken on 12/9/2021 at [facility]:   AP, lateral and swimmer's views of the thoracic spine were provided. Bone mineralization: Decreased. Alignment: Mildly accentuated thoracic kyphosis. Fracture/subluxation: None. Degenerative change: Mild multilevel degenerative change of the midthoracic spine. Paraspinal soft tissues: Normal.       IMPRESSION     1. No acute fracture. 2. Mild multilevel degenerative change.         Cervical Spine XR images taken on 12/9/2021 at [facility]:  AP, lateral and open mouth odontoid views were performed. Complete imaging of cervical spine: Yes. Alignment: Normal. CORWIN is normal.   Fracture/subluxation: None. Prevertebral soft tissues: Normal.   Spinolaminar line: Normal.   Degenerative changes: Mild disc space narrowing and osteophytosis is seen involving the mid/lower cervical spine. Early facet arthropathy. Ancillary: Postsurgical changes involving the bilateral mandibular angles. IMPRESSION     1. No acute cervical spine fracture. 2. Mild multilevel degenerative change of the mid/lower cervical spine.       2V Cervical XR images taken on 12/17/18 personally reviewed with patient:  Straightening of the cervical lordosis  Normal alignment. Mild facet sclerosis. No obvious compression fractures or instabilities.     2V Lumbar XR images taken on 12/17/18 personally reviewed with patient:  Facet sclerosis  Disc space narrowing at L4-5 and L5-S1  Normal alignment  No obvious compression fractures or instabilities. Lumbar MRI images taken on 6/27/16 personally reviewed with patient:  Five lumbar vertebrae are present.  Somewhat hypolordotic sagittal  alignment.   Disc desiccation from L3-4 through L5-S1 levels.  Mild L3-4 and L5-S1 disc  space narrowing.  Moderate L4-5 disc space narrowing. Edema associate with the anterior superior endplate of L5 may suggest  relative acuity.  No marrow replacing process. Mild multilevel facet osteoarthritis. The conus medullaris is at the L1-2 level.  There is normal signal  throughout the spinal cord. L1-2:No posterior disc bulge.  No foraminal or central canal stenosis. L2-3:No posterior disc bulge.  Mild hypertrophy ligamentum flavum.  Mild  facet osteoarthritis.  Mild foraminal stenosis.  No central canal stenosis. L3-4:Minimal broad based posterior disc bulge.  Mild hypertrophy  ligamentum flavum.  Mild facet osteoarthritis.  Mild foraminal stenosis. No central canal stenosis. Page 1 of 2  Performing Location:[facility]  [id number]             6/27/2016 8:53:30 AM  L4-5:Small broad based posterior disc bulge.  Moderate hypertrophy  ligamentum flavum and moderate facet osteoarthritis.  Mild foraminal  stenosis.  No central canal stenosis. L5-S1:Minimal broad based posterior disc bulge.  Mild hypertrophy  ligamentum flavum and mild facet osteoarthritis.  Moderate left and mild  right foraminal stenosis.  No central canal stenosis. IMPRESSION  Overall mild degenerative changes.  Edema associated with superior endplate  of L5 may suggest relative acuity.  Mild to moderate foraminal stenosis in  the lower lumbar spine.  reviewed    Ms. Bill Marvin has a reminder for a \"due or due soon\" health maintenance. I have asked that she contact her primary care provider for follow-up on this health maintenance. Pursuant to the emergency declaration under the [facility] waiver authority and the [place] [employer] General Act, this Virtual  Visit was conducted, with patient's consent, to reduce the patient's risk of exposure to COVID-19 and provide continuity of care for an established patient.      Services were provided through a video synchronous discussion virtually to substitute for in-person clinic visit. CPT Codes 01390-07802 for Established Patients may apply to this Telehealth Visit  Time-based coding, delete if not needed: I spent at least 10 minutes with this established patient, and >50% of the time was spent counseling and/or coordinating care. Written by Janell Foy, as dictated by Dr. Luis Brennan. Consult

## 2022-07-27 ENCOUNTER — APPOINTMENT (OUTPATIENT)
Dept: HUMAN REPRODUCTION | Facility: CLINIC | Age: 62
End: 2022-07-27

## 2022-08-04 ENCOUNTER — RESULT REVIEW (OUTPATIENT)
Age: 62
End: 2022-08-04

## 2022-08-04 ENCOUNTER — APPOINTMENT (OUTPATIENT)
Dept: GASTROENTEROLOGY | Facility: AMBULATORY SURGERY CENTER | Age: 62
End: 2022-08-04

## 2022-08-04 PROCEDURE — 43239 EGD BIOPSY SINGLE/MULTIPLE: CPT

## 2022-12-13 NOTE — STUDENT SIGN OFF DOCUMENT - COPY OF STUDENT DOCUMENT REVIEW
Dietetic intern Cosentyx Counseling:  I discussed with the patient the risks of Cosentyx including but not limited to worsening of Crohn's disease, immunosuppression, allergic reactions and infections.  The patient understands that monitoring is required including a PPD at baseline and must alert us or the primary physician if symptoms of infection or other concerning signs are noted.

## 2023-02-06 ENCOUNTER — INPATIENT (INPATIENT)
Facility: HOSPITAL | Age: 63
LOS: 4 days | Discharge: HOME HEALTH | DRG: 698 | End: 2023-02-11
Attending: INTERNAL MEDICINE | Admitting: INTERNAL MEDICINE
Payer: MEDICARE

## 2023-02-06 VITALS
DIASTOLIC BLOOD PRESSURE: 84 MMHG | SYSTOLIC BLOOD PRESSURE: 142 MMHG | OXYGEN SATURATION: 99 % | WEIGHT: 285.94 LBS | HEART RATE: 96 BPM | HEIGHT: 72 IN | RESPIRATION RATE: 22 BRPM | TEMPERATURE: 99 F

## 2023-02-06 DIAGNOSIS — N39.0 URINARY TRACT INFECTION, SITE NOT SPECIFIED: ICD-10-CM

## 2023-02-06 DIAGNOSIS — Z98.890 OTHER SPECIFIED POSTPROCEDURAL STATES: Chronic | ICD-10-CM

## 2023-02-06 LAB
ALBUMIN SERPL ELPH-MCNC: 4.2 G/DL — SIGNIFICANT CHANGE UP (ref 3.3–5)
ALP SERPL-CCNC: 68 U/L — SIGNIFICANT CHANGE UP (ref 40–120)
ALT FLD-CCNC: 20 U/L — SIGNIFICANT CHANGE UP (ref 10–45)
ANION GAP SERPL CALC-SCNC: 14 MMOL/L — SIGNIFICANT CHANGE UP (ref 5–17)
APPEARANCE UR: ABNORMAL
AST SERPL-CCNC: 35 U/L — SIGNIFICANT CHANGE UP (ref 10–40)
BACTERIA # UR AUTO: ABNORMAL
BASE EXCESS BLDV CALC-SCNC: 1 MMOL/L — SIGNIFICANT CHANGE UP (ref -2–3)
BASOPHILS # BLD AUTO: 0 K/UL — SIGNIFICANT CHANGE UP (ref 0–0.2)
BASOPHILS NFR BLD AUTO: 0 % — SIGNIFICANT CHANGE UP (ref 0–2)
BILIRUB SERPL-MCNC: 1 MG/DL — SIGNIFICANT CHANGE UP (ref 0.2–1.2)
BILIRUB UR-MCNC: NEGATIVE — SIGNIFICANT CHANGE UP
BUN SERPL-MCNC: 12 MG/DL — SIGNIFICANT CHANGE UP (ref 7–23)
CA-I SERPL-SCNC: 1.13 MMOL/L — LOW (ref 1.15–1.33)
CALCIUM SERPL-MCNC: 9.6 MG/DL — SIGNIFICANT CHANGE UP (ref 8.4–10.5)
CHLORIDE BLDV-SCNC: 92 MMOL/L — LOW (ref 96–108)
CHLORIDE SERPL-SCNC: 89 MMOL/L — LOW (ref 96–108)
CO2 BLDV-SCNC: 29 MMOL/L — HIGH (ref 22–26)
CO2 SERPL-SCNC: 25 MMOL/L — SIGNIFICANT CHANGE UP (ref 22–31)
COLOR SPEC: COLORLESS — SIGNIFICANT CHANGE UP
CREAT SERPL-MCNC: 0.97 MG/DL — SIGNIFICANT CHANGE UP (ref 0.5–1.3)
DACRYOCYTES BLD QL SMEAR: SLIGHT — SIGNIFICANT CHANGE UP
DIFF PNL FLD: ABNORMAL
EGFR: 89 ML/MIN/1.73M2 — SIGNIFICANT CHANGE UP
ELLIPTOCYTES BLD QL SMEAR: SLIGHT — SIGNIFICANT CHANGE UP
EOSINOPHIL # BLD AUTO: 0 K/UL — SIGNIFICANT CHANGE UP (ref 0–0.5)
EOSINOPHIL NFR BLD AUTO: 0 % — SIGNIFICANT CHANGE UP (ref 0–6)
EPI CELLS # UR: 0 /HPF — SIGNIFICANT CHANGE UP
FLUAV AG NPH QL: SIGNIFICANT CHANGE UP
FLUBV AG NPH QL: SIGNIFICANT CHANGE UP
GAS PNL BLDV: 125 MMOL/L — LOW (ref 136–145)
GAS PNL BLDV: SIGNIFICANT CHANGE UP
GAS PNL BLDV: SIGNIFICANT CHANGE UP
GIANT PLATELETS BLD QL SMEAR: PRESENT — SIGNIFICANT CHANGE UP
GLUCOSE BLDC GLUCOMTR-MCNC: 108 MG/DL — HIGH (ref 70–99)
GLUCOSE BLDC GLUCOMTR-MCNC: 98 MG/DL — SIGNIFICANT CHANGE UP (ref 70–99)
GLUCOSE BLDV-MCNC: 101 MG/DL — HIGH (ref 70–99)
GLUCOSE SERPL-MCNC: 105 MG/DL — HIGH (ref 70–99)
GLUCOSE UR QL: NEGATIVE — SIGNIFICANT CHANGE UP
HCO3 BLDV-SCNC: 27 MMOL/L — SIGNIFICANT CHANGE UP (ref 22–29)
HCT VFR BLD CALC: 43.2 % — SIGNIFICANT CHANGE UP (ref 39–50)
HCT VFR BLDA CALC: 41 % — SIGNIFICANT CHANGE UP (ref 39–51)
HGB BLD CALC-MCNC: 13.6 G/DL — SIGNIFICANT CHANGE UP (ref 12.6–17.4)
HGB BLD-MCNC: 14.3 G/DL — SIGNIFICANT CHANGE UP (ref 13–17)
HYALINE CASTS # UR AUTO: 0 /LPF — SIGNIFICANT CHANGE UP (ref 0–2)
KETONES UR-MCNC: NEGATIVE — SIGNIFICANT CHANGE UP
LACTATE BLDV-MCNC: 1.7 MMOL/L — SIGNIFICANT CHANGE UP (ref 0.5–2)
LEUKOCYTE ESTERASE UR-ACNC: ABNORMAL
LYMPHOCYTES # BLD AUTO: 0.4 K/UL — LOW (ref 1–3.3)
LYMPHOCYTES # BLD AUTO: 2.5 % — LOW (ref 13–44)
MANUAL SMEAR VERIFICATION: SIGNIFICANT CHANGE UP
MCHC RBC-ENTMCNC: 29.7 PG — SIGNIFICANT CHANGE UP (ref 27–34)
MCHC RBC-ENTMCNC: 33.1 GM/DL — SIGNIFICANT CHANGE UP (ref 32–36)
MCV RBC AUTO: 89.6 FL — SIGNIFICANT CHANGE UP (ref 80–100)
MONOCYTES # BLD AUTO: 2.17 K/UL — HIGH (ref 0–0.9)
MONOCYTES NFR BLD AUTO: 13.7 % — SIGNIFICANT CHANGE UP (ref 2–14)
NEUTROPHILS # BLD AUTO: 13.26 K/UL — HIGH (ref 1.8–7.4)
NEUTROPHILS NFR BLD AUTO: 79.5 % — HIGH (ref 43–77)
NEUTS BAND # BLD: 4.3 % — SIGNIFICANT CHANGE UP (ref 0–8)
NITRITE UR-MCNC: POSITIVE
PCO2 BLDV: 48 MMHG — SIGNIFICANT CHANGE UP (ref 42–55)
PH BLDV: 7.36 — SIGNIFICANT CHANGE UP (ref 7.32–7.43)
PH UR: 6 — SIGNIFICANT CHANGE UP (ref 5–8)
PLAT MORPH BLD: NORMAL — SIGNIFICANT CHANGE UP
PLATELET # BLD AUTO: 249 K/UL — SIGNIFICANT CHANGE UP (ref 150–400)
PO2 BLDV: 36 MMHG — SIGNIFICANT CHANGE UP (ref 25–45)
POIKILOCYTOSIS BLD QL AUTO: SLIGHT — SIGNIFICANT CHANGE UP
POTASSIUM BLDV-SCNC: 3.8 MMOL/L — SIGNIFICANT CHANGE UP (ref 3.5–5.1)
POTASSIUM SERPL-MCNC: 4.6 MMOL/L — SIGNIFICANT CHANGE UP (ref 3.5–5.3)
POTASSIUM SERPL-SCNC: 4.6 MMOL/L — SIGNIFICANT CHANGE UP (ref 3.5–5.3)
PROT SERPL-MCNC: 7.8 G/DL — SIGNIFICANT CHANGE UP (ref 6–8.3)
PROT UR-MCNC: ABNORMAL
RAPID RVP RESULT: DETECTED
RBC # BLD: 4.82 M/UL — SIGNIFICANT CHANGE UP (ref 4.2–5.8)
RBC # FLD: 13.2 % — SIGNIFICANT CHANGE UP (ref 10.3–14.5)
RBC BLD AUTO: ABNORMAL
RBC CASTS # UR COMP ASSIST: 3 /HPF — SIGNIFICANT CHANGE UP (ref 0–4)
RSV RNA NPH QL NAA+NON-PROBE: SIGNIFICANT CHANGE UP
RV+EV RNA SPEC QL NAA+PROBE: DETECTED
SAO2 % BLDV: 58.1 % — LOW (ref 67–88)
SARS-COV-2 RNA SPEC QL NAA+PROBE: SIGNIFICANT CHANGE UP
SARS-COV-2 RNA SPEC QL NAA+PROBE: SIGNIFICANT CHANGE UP
SODIUM SERPL-SCNC: 128 MMOL/L — LOW (ref 135–145)
SP GR SPEC: 1.01 — LOW (ref 1.01–1.02)
UROBILINOGEN FLD QL: NEGATIVE — SIGNIFICANT CHANGE UP
WBC # BLD: 15.82 K/UL — HIGH (ref 3.8–10.5)
WBC # FLD AUTO: 15.82 K/UL — HIGH (ref 3.8–10.5)
WBC UR QL: 401 /HPF — HIGH (ref 0–5)

## 2023-02-06 PROCEDURE — 99222 1ST HOSP IP/OBS MODERATE 55: CPT

## 2023-02-06 PROCEDURE — 99285 EMERGENCY DEPT VISIT HI MDM: CPT

## 2023-02-06 PROCEDURE — 71046 X-RAY EXAM CHEST 2 VIEWS: CPT | Mod: 26

## 2023-02-06 RX ORDER — DEXTROSE 50 % IN WATER 50 %
15 SYRINGE (ML) INTRAVENOUS ONCE
Refills: 0 | Status: DISCONTINUED | OUTPATIENT
Start: 2023-02-06 | End: 2023-02-11

## 2023-02-06 RX ORDER — NIFEDIPINE 30 MG
1 TABLET, EXTENDED RELEASE 24 HR ORAL
Qty: 0 | Refills: 0 | DISCHARGE

## 2023-02-06 RX ORDER — BENZTROPINE MESYLATE 1 MG
1 TABLET ORAL
Qty: 0 | Refills: 0 | DISCHARGE

## 2023-02-06 RX ORDER — CEFTRIAXONE 500 MG/1
1000 INJECTION, POWDER, FOR SOLUTION INTRAMUSCULAR; INTRAVENOUS ONCE
Refills: 0 | Status: COMPLETED | OUTPATIENT
Start: 2023-02-06 | End: 2023-02-06

## 2023-02-06 RX ORDER — SODIUM CHLORIDE 9 MG/ML
1000 INJECTION INTRAMUSCULAR; INTRAVENOUS; SUBCUTANEOUS ONCE
Refills: 0 | Status: COMPLETED | OUTPATIENT
Start: 2023-02-06 | End: 2023-02-06

## 2023-02-06 RX ORDER — HEPARIN SODIUM 5000 [USP'U]/ML
5000 INJECTION INTRAVENOUS; SUBCUTANEOUS EVERY 12 HOURS
Refills: 0 | Status: DISCONTINUED | OUTPATIENT
Start: 2023-02-06 | End: 2023-02-11

## 2023-02-06 RX ORDER — MONTELUKAST 4 MG/1
1 TABLET, CHEWABLE ORAL
Qty: 0 | Refills: 0 | DISCHARGE

## 2023-02-06 RX ORDER — BETHANECHOL CHLORIDE 25 MG
1 TABLET ORAL
Qty: 0 | Refills: 0 | DISCHARGE

## 2023-02-06 RX ORDER — GLUCAGON INJECTION, SOLUTION 0.5 MG/.1ML
1 INJECTION, SOLUTION SUBCUTANEOUS ONCE
Refills: 0 | Status: DISCONTINUED | OUTPATIENT
Start: 2023-02-06 | End: 2023-02-11

## 2023-02-06 RX ORDER — FLUOXETINE HCL 10 MG
20 CAPSULE ORAL DAILY
Refills: 0 | Status: DISCONTINUED | OUTPATIENT
Start: 2023-02-06 | End: 2023-02-11

## 2023-02-06 RX ORDER — SODIUM CHLORIDE 9 MG/ML
1000 INJECTION, SOLUTION INTRAVENOUS
Refills: 0 | Status: DISCONTINUED | OUTPATIENT
Start: 2023-02-06 | End: 2023-02-11

## 2023-02-06 RX ORDER — BETHANECHOL CHLORIDE 25 MG
50 TABLET ORAL
Refills: 0 | Status: DISCONTINUED | OUTPATIENT
Start: 2023-02-06 | End: 2023-02-11

## 2023-02-06 RX ORDER — SODIUM CHLORIDE 9 MG/ML
1000 INJECTION INTRAMUSCULAR; INTRAVENOUS; SUBCUTANEOUS
Refills: 0 | Status: COMPLETED | OUTPATIENT
Start: 2023-02-06 | End: 2023-02-07

## 2023-02-06 RX ORDER — ROSUVASTATIN CALCIUM 5 MG/1
1 TABLET ORAL
Qty: 0 | Refills: 0 | DISCHARGE

## 2023-02-06 RX ORDER — DULAGLUTIDE 4.5 MG/.5ML
1 INJECTION, SOLUTION SUBCUTANEOUS
Qty: 0 | Refills: 0 | DISCHARGE

## 2023-02-06 RX ORDER — INSULIN LISPRO 100/ML
VIAL (ML) SUBCUTANEOUS
Refills: 0 | Status: DISCONTINUED | OUTPATIENT
Start: 2023-02-06 | End: 2023-02-11

## 2023-02-06 RX ORDER — DEXTROSE 50 % IN WATER 50 %
25 SYRINGE (ML) INTRAVENOUS ONCE
Refills: 0 | Status: DISCONTINUED | OUTPATIENT
Start: 2023-02-06 | End: 2023-02-11

## 2023-02-06 RX ORDER — CARVEDILOL PHOSPHATE 80 MG/1
1 CAPSULE, EXTENDED RELEASE ORAL
Qty: 0 | Refills: 0 | DISCHARGE

## 2023-02-06 RX ORDER — BENZTROPINE MESYLATE 1 MG
1 TABLET ORAL
Refills: 0 | Status: DISCONTINUED | OUTPATIENT
Start: 2023-02-06 | End: 2023-02-11

## 2023-02-06 RX ORDER — FLUOXETINE HCL 10 MG
1 CAPSULE ORAL
Qty: 0 | Refills: 0 | DISCHARGE

## 2023-02-06 RX ORDER — RISPERIDONE 4 MG/1
2 TABLET ORAL AT BEDTIME
Refills: 0 | Status: DISCONTINUED | OUTPATIENT
Start: 2023-02-06 | End: 2023-02-11

## 2023-02-06 RX ORDER — ACETAMINOPHEN 500 MG
975 TABLET ORAL ONCE
Refills: 0 | Status: COMPLETED | OUTPATIENT
Start: 2023-02-06 | End: 2023-02-06

## 2023-02-06 RX ORDER — MONTELUKAST 4 MG/1
10 TABLET, CHEWABLE ORAL DAILY
Refills: 0 | Status: DISCONTINUED | OUTPATIENT
Start: 2023-02-06 | End: 2023-02-11

## 2023-02-06 RX ORDER — DIVALPROEX SODIUM 500 MG/1
500 TABLET, DELAYED RELEASE ORAL DAILY
Refills: 0 | Status: DISCONTINUED | OUTPATIENT
Start: 2023-02-06 | End: 2023-02-11

## 2023-02-06 RX ORDER — ALLOPURINOL 300 MG
100 TABLET ORAL
Refills: 0 | Status: DISCONTINUED | OUTPATIENT
Start: 2023-02-06 | End: 2023-02-11

## 2023-02-06 RX ORDER — CARVEDILOL PHOSPHATE 80 MG/1
12.5 CAPSULE, EXTENDED RELEASE ORAL EVERY 12 HOURS
Refills: 0 | Status: DISCONTINUED | OUTPATIENT
Start: 2023-02-06 | End: 2023-02-11

## 2023-02-06 RX ORDER — DEXTROSE 50 % IN WATER 50 %
12.5 SYRINGE (ML) INTRAVENOUS ONCE
Refills: 0 | Status: DISCONTINUED | OUTPATIENT
Start: 2023-02-06 | End: 2023-02-11

## 2023-02-06 RX ORDER — TAMSULOSIN HYDROCHLORIDE 0.4 MG/1
0.4 CAPSULE ORAL AT BEDTIME
Refills: 0 | Status: DISCONTINUED | OUTPATIENT
Start: 2023-02-06 | End: 2023-02-11

## 2023-02-06 RX ORDER — FLUTICASONE PROPIONATE 220 MCG
0 AEROSOL WITH ADAPTER (GRAM) INHALATION
Qty: 0 | Refills: 0 | DISCHARGE

## 2023-02-06 RX ORDER — NIFEDIPINE 30 MG
30 TABLET, EXTENDED RELEASE 24 HR ORAL
Refills: 0 | Status: DISCONTINUED | OUTPATIENT
Start: 2023-02-06 | End: 2023-02-11

## 2023-02-06 RX ORDER — METFORMIN HYDROCHLORIDE 850 MG/1
1 TABLET ORAL
Qty: 0 | Refills: 0 | DISCHARGE

## 2023-02-06 RX ADMIN — Medication 100 MILLIGRAM(S): at 21:41

## 2023-02-06 RX ADMIN — CEFTRIAXONE 100 MILLIGRAM(S): 500 INJECTION, POWDER, FOR SOLUTION INTRAMUSCULAR; INTRAVENOUS at 14:14

## 2023-02-06 RX ADMIN — SODIUM CHLORIDE 1000 MILLILITER(S): 9 INJECTION INTRAMUSCULAR; INTRAVENOUS; SUBCUTANEOUS at 13:28

## 2023-02-06 RX ADMIN — TAMSULOSIN HYDROCHLORIDE 0.4 MILLIGRAM(S): 0.4 CAPSULE ORAL at 21:41

## 2023-02-06 RX ADMIN — SODIUM CHLORIDE 60 MILLILITER(S): 9 INJECTION INTRAMUSCULAR; INTRAVENOUS; SUBCUTANEOUS at 21:41

## 2023-02-06 RX ADMIN — CEFTRIAXONE 1000 MILLIGRAM(S): 500 INJECTION, POWDER, FOR SOLUTION INTRAMUSCULAR; INTRAVENOUS at 14:34

## 2023-02-06 RX ADMIN — RISPERIDONE 2 MILLIGRAM(S): 4 TABLET ORAL at 21:56

## 2023-02-06 RX ADMIN — Medication 975 MILLIGRAM(S): at 15:36

## 2023-02-06 RX ADMIN — Medication 975 MILLIGRAM(S): at 18:49

## 2023-02-06 NOTE — ED ADULT NURSE NOTE - OBJECTIVE STATEMENT
Received a 62 M aaox4 ambulatory with h/o Anxiety Asthma Gout History of Schizophrenia HTN (Hypertension) Hyperlipidemia UTI, came here for medical eval secondary to foul smelling urine, subjective fever and chills generalized weakness and lightheadedness, on oral antibiotics for UTI but unable to get an appointment with Urology sooner, appointment is sometime on March. Received a 62 M aaox2 ambulatory with h/o Anxiety Asthma Gout History of Schizophrenia HTN (Hypertension) Hyperlipidemia UTI, coming from a group home and  came here for medical eval secondary to foul smelling urine, subjective fever and chills generalized weakness and lightheadedness, on oral antibiotics for UTI but unable to get an appointment with Urology sooner, appointment is sometime on March. Patient accompanied by Staff.

## 2023-02-06 NOTE — ED ADULT NURSE REASSESSMENT NOTE - NS ED NURSE REASSESS COMMENT FT1
Patient remains stable while in the ED,, admitted to medicine for UTI, febrile, MD made aware. Tylenol 975mg PO given for fever. Plan of care explained to patient and verbalized understanding.

## 2023-02-06 NOTE — ED PROVIDER NOTE - NSICDXPASTMEDICALHX_GEN_ALL_CORE_FT
Patient reports lower right back pain traveling down right leg that started 3 days ago after walking in woods. Denies recent injury.    PAST MEDICAL HISTORY:  Anxiety     Asthma     Gout     History of Schizophrenia     HTN (Hypertension)     Hyperlipidemia

## 2023-02-06 NOTE — ED PROVIDER NOTE - OBJECTIVE STATEMENT
Patient is a 62y M PMHx HTN, DM, schizophrenia (hx suicide attempt), straight caths at home for neurogenic bladder, p/w worsening lethargy. patient gets UTIs frequently, had one a few weeks ago, was treated with abx and feels like it didn't get better. Now feeling increasingly tired and weak. This is how he feels when his UTI gets bad. Denies Cp, SOb, abdominal pain, nvd.     PCP: Dr. Stewart Snell

## 2023-02-06 NOTE — H&P ADULT - ASSESSMENT
Patient is a 62y M PMHx HTN, DM, schizophrenia (hx suicide attempt), straight caths at home for neurogenic bladder, p/w worsening lethargy found to have uti  w/ elevated wbc  i d eval  abs   mild fluids  mild hyponatremia likely med related  dm  fsg riss  pt  dvt proph   f/u cultures  c/w outp tmeds

## 2023-02-06 NOTE — H&P ADULT - NSHPLABSRESULTS_GEN_ALL_CORE
14.3   15.82 )-----------( 249      ( 2023 13:30 )             43.2       02-06    128<L>  |  89<L>  |  12  ----------------------------<  105<H>  4.6   |  25  |  0.97    Ca    9.6      2023 13:30    TPro  7.8  /  Alb  4.2  /  TBili  1.0  /  DBili  x   /  AST  35  /  ALT  20  /  AlkPhos  68  02-06              Urinalysis Basic - ( 2023 13:40 )    Color: Colorless / Appearance: Slightly Turbid / S.006 / pH: x  Gluc: x / Ketone: Negative  / Bili: Negative / Urobili: Negative   Blood: x / Protein: Trace / Nitrite: Positive   Leuk Esterase: Large / RBC: 3 /hpf /  /HPF   Sq Epi: x / Non Sq Epi: 0 /hpf / Bacteria: Few            Lactate Trend            CAPILLARY BLOOD GLUCOSE

## 2023-02-06 NOTE — H&P ADULT - HISTORY OF PRESENT ILLNESS
Patient is a 62y M PMHx HTN, DM, schizophrenia (hx suicide attempt), straight caths at home for neurogenic bladder, p/w worsening lethargy. patient gets UTIs frequently, had one a few weeks ago, was treated with abx and feels like it didn't get better.   Now feeling increasingly weak.   This is how he feels when his UTI   Denies Cp, SOb, abdominal pain, nvd.

## 2023-02-06 NOTE — CONSULT NOTE ADULT - SUBJECTIVE AND OBJECTIVE BOX
Patient is a 62y old  Male who presents with a chief complaint of   HPI:   Patient is a 62y M PMHx HTN, DM, schizophrenia (hx suicide attempt), straight caths at home for neurogenic bladder, p/w worsening lethargy. patient gets UTIs frequently, had one a few weeks ago, was treated with abx and feels like it didn't get better.   Now feeling increasingly weak.   This is how he feels when his UTI   Denies Cp, SOb, abdominal pain, nvd.  (2023 17:03)    Patient seen and examined in the ED.  Overall patient appears comfortable.  Denies any new pain or discomfort.   Patient reports self cathing at home.  Patient reports she was treated with a course of antibiotics however his symptoms did not improve.  Upon admission, urinalysis consistent with infection showing pyuria, few bacteria.  Urine culture is pending.  Patient also has a positive enterovirus/rhinovirus respiratory viral panel.      prior hospital charts reviewed [x ]  primary team notes reviewed [ x]  other consultant notes reviewed [x ]    PAST MEDICAL & SURGICAL HISTORY:  History of Schizophrenia      HTN (Hypertension)      Hyperlipidemia      Gout      Anxiety      Asthma      History of Tonsillectomy      S/P Laparotomy      H/O knee surgery          Allergies  No Known Allergies    ANTIMICROBIALS (past 90 days)  MEDICATIONS  (STANDING):  cefTRIAXone   IVPB   100 mL/Hr IV Intermittent (23 @ 14:14)          MEDICATIONS  (STANDING):  allopurinol 100 two times a day  benztropine 1 two times a day  bethanechol 50 four times a day  carvedilol 12.5 every 12 hours  dextrose 50% Injectable 25 once  dextrose 50% Injectable 12.5 once  dextrose 50% Injectable 25 once  dextrose Oral Gel 15 once PRN  divalproex  daily  enalapril 20 daily  FLUoxetine 20 daily  glucagon  Injectable 1 once  heparin   Injectable 5000 every 12 hours  insulin lispro (ADMELOG) corrective regimen sliding scale  three times a day before meals  montelukast 10 daily  NIFEdipine XL 30 two times a day  risperiDONE   Tablet 2 at bedtime  tamsulosin 0.4 at bedtime    SOCIAL HISTORY:       FAMILY HISTORY:  FH: heart disease (Father)    FH: diabetes mellitus (Mother)      REVIEW OF SYSTEMS  [  ] ROS unobtainable because:    [  x All other systems negative except as noted below:	    Constitutional:  [ ] fever [ ] chills  [ ] weight loss  [ ] weakness  Skin:  [ ] rash [ ] phlebitis	  Eyes: [ ] icterus [ ] pain  [ ] discharge	  ENMT: [ ] sore throat  [ ] thrush [ ] ulcers [ ] exudates  Respiratory: [ ] dyspnea [ ] hemoptysis [ ] cough [ ] sputum	  Cardiovascular:  [ ] chest pain [ ] palpitations [ ] edema	  Gastrointestinal:  [ ] nausea [ ] vomiting [ ] diarrhea [ ] constipation [ ] pain	  Genitourinary:  [ ] dysuria [ ] frequency [ ] hematuria [ ] discharge [ ] flank pain  [ ] incontinence  Musculoskeletal:  [ ] myalgias [ ] arthralgias [ ] arthritis  [ ] back pain  Neurological:  [ ] headache [ ] seizures  [ ] confusion/altered mental status  Psychiatric:  [ ] anxiety [ ] depression	  Hematology/Lymphatics:  [ ] lymphadenopathy  Endocrine:  [ ] adrenal [ ] thyroid  Allergic/Immunologic:	 [ ] transplant [ ] seasonal    Vital Signs Last 24 Hrs  T(F): 98.6 (23 @ 20:11), Max: 100.8 (23 @ 17:14)  Vital Signs Last 24 Hrs  HR: 97 (23 @ 20:11) (96 - 103)  BP: 147/80 (23 @ 20:11) (142/84 - 163/83)  RR: 17 (23 @ 20:11)  SpO2: 100% (23 @ 20:11) (96% - 100%)  Wt(kg): --    PHYSICAL EXAM:  Constitutional: comfortable, no distress  ENT:  supple  Cardiovascular:   normal S1, S2, no murmur, no edema  Respiratory:  clear BS bilaterally, no wheezes, no rales  GI:  distended abdomen non-tender, normal bowel sounds  :  no pryor, no CVA tenderness  Musculoskeletal:  no synovitis, normal ROM  Neurologic: awake and alert, normal strength, no focal findings  Skin:  no rash, no erythema, no phlebitis  Heme/Onc: no lymphadenopathy   Psychiatric:  awake, alert, appropriate mood               14.3   15.82 )-----------( 249      ( 2023 13:30 )             43.2   02-    128<L>  |  89<L>  |  12  ----------------------------<  105<H>  4.6   |  25  |  0.97    Ca    9.6      2023 13:30    TPro  7.8  /  Alb  4.2  /  TBili  1.0  /  DBili  x   /  AST  35  /  ALT  20  /  AlkPhos  68  -    Urinalysis Basic - ( 2023 13:40 )    Color: Colorless / Appearance: Slightly Turbid / S.006 / pH: x  Gluc: x / Ketone: Negative  / Bili: Negative / Urobili: Negative   Blood: x / Protein: Trace / Nitrite: Positive   Leuk Esterase: Large / RBC: 3 /hpf /  /HPF   Sq Epi: x / Non Sq Epi: 0 /hpf / Bacteria: Few    MICROBIOLOGY:    Rapid RVP Result: Detected ( @ 16:28)      RADIOLOGY:  imaging below personally reviewed and agree with findings

## 2023-02-06 NOTE — ED PROVIDER NOTE - DIFFERENTIAL DIAGNOSIS
Differential Diagnosis Ddx includes, however, is not limited to: uti, pyelo, pna, bacteremia, metabolic d/o, other

## 2023-02-06 NOTE — ED PROVIDER NOTE - CLINICAL SUMMARY MEDICAL DECISION MAKING FREE TEXT BOX
Patient is a 62y M PMHx HTN, DM, schizophrenia (hx suicide attempt), straight caths at home for neurogenic bladder, p/w worsening lethargy. Patient likely with unresolved UTI. Will get labs, UA. Patient is a 62y M PMHx HTN, DM, schizophrenia (hx suicide attempt), straight caths at home for neurogenic bladder, p/w worsening lethargy. Patient likely with unresolved UTI. Will get labs, UA.    AR Morgan MD: 61 y/o male with PMH HTN, DM, schizophrenia, requiring straight cath daily, who p/w c/o worsening lethargy. Plan to eval for UTI, pna, other metabolic/infectious pathology, abx, ivf, tba

## 2023-02-06 NOTE — ED ADULT TRIAGE NOTE - CHIEF COMPLAINT QUOTE
dx: UTI - on abx, foul odor urine/fever/chills, unable to get appointment for urology sooner than march, c/o "generalized malaise, dizziness"

## 2023-02-06 NOTE — CONSULT NOTE ADULT - ASSESSMENT
Patient is a 62y M PMHx HTN, DM, schizophrenia (hx suicide attempt), straight caths at home for neurogenic bladder, p/w worsening lethargy    #Concern for UTI  #Positive U/A    #Leukocytosis    #Enterovirus/Rhinovirus infection    Recommendations  Can continue ceftriaxone at this time (no history of ESBl organism)  Follow pending urine culture   Follow pending blood cultures  Follow fever curve and WBC count    Fantasma Berry MD  Division of Infectious Diseases

## 2023-02-06 NOTE — ED PROVIDER NOTE - PHYSICAL EXAMINATION
GENERAL: no acute distress, non-toxic appearing  HEENT: PERRLA, EOMI, normal conjunctiva  CARDIAC: regular rate and rhythm, no appreciable murmurs  PULM: clear to ascultation bilaterally  GI: abdomen nondistended, soft, nontender, no guarding or rebound tenderness, well healed surgical scar  : no suprapubic tenderness  NEURO: alert and oriented x 3, normal speech, no gross neurologic deficit  MSK: no visible deformities  SKIN: no visible rashes, dry, well-perfused  PSYCH: appropriate mood and affect

## 2023-02-07 LAB
A1C WITH ESTIMATED AVERAGE GLUCOSE RESULT: 6.4 % — HIGH (ref 4–5.6)
ALBUMIN SERPL ELPH-MCNC: 3.6 G/DL — SIGNIFICANT CHANGE UP (ref 3.3–5)
ALP SERPL-CCNC: 64 U/L — SIGNIFICANT CHANGE UP (ref 40–120)
ALT FLD-CCNC: 16 U/L — SIGNIFICANT CHANGE UP (ref 10–45)
ANION GAP SERPL CALC-SCNC: 14 MMOL/L — SIGNIFICANT CHANGE UP (ref 5–17)
AST SERPL-CCNC: 20 U/L — SIGNIFICANT CHANGE UP (ref 10–40)
BASE EXCESS BLDV CALC-SCNC: 4.4 MMOL/L — HIGH (ref -2–3)
BILIRUB SERPL-MCNC: 0.7 MG/DL — SIGNIFICANT CHANGE UP (ref 0.2–1.2)
BUN SERPL-MCNC: 10 MG/DL — SIGNIFICANT CHANGE UP (ref 7–23)
CA-I SERPL-SCNC: 1.2 MMOL/L — SIGNIFICANT CHANGE UP (ref 1.15–1.33)
CALCIUM SERPL-MCNC: 9.1 MG/DL — SIGNIFICANT CHANGE UP (ref 8.4–10.5)
CHLORIDE BLDV-SCNC: 97 MMOL/L — SIGNIFICANT CHANGE UP (ref 96–108)
CHLORIDE SERPL-SCNC: 96 MMOL/L — SIGNIFICANT CHANGE UP (ref 96–108)
CO2 BLDV-SCNC: 32 MMOL/L — HIGH (ref 22–26)
CO2 SERPL-SCNC: 24 MMOL/L — SIGNIFICANT CHANGE UP (ref 22–31)
CREAT SERPL-MCNC: 0.9 MG/DL — SIGNIFICANT CHANGE UP (ref 0.5–1.3)
EGFR: 98 ML/MIN/1.73M2 — SIGNIFICANT CHANGE UP
ESTIMATED AVERAGE GLUCOSE: 137 MG/DL — HIGH (ref 68–114)
GAS PNL BLDA: SIGNIFICANT CHANGE UP
GAS PNL BLDV: 130 MMOL/L — LOW (ref 136–145)
GAS PNL BLDV: SIGNIFICANT CHANGE UP
GAS PNL BLDV: SIGNIFICANT CHANGE UP
GLUCOSE BLDC GLUCOMTR-MCNC: 107 MG/DL — HIGH (ref 70–99)
GLUCOSE BLDC GLUCOMTR-MCNC: 111 MG/DL — HIGH (ref 70–99)
GLUCOSE BLDC GLUCOMTR-MCNC: 124 MG/DL — HIGH (ref 70–99)
GLUCOSE BLDC GLUCOMTR-MCNC: 130 MG/DL — HIGH (ref 70–99)
GLUCOSE BLDV-MCNC: 119 MG/DL — HIGH (ref 70–99)
GLUCOSE SERPL-MCNC: 111 MG/DL — HIGH (ref 70–99)
HCO3 BLDV-SCNC: 30 MMOL/L — HIGH (ref 22–29)
HCT VFR BLD CALC: 42 % — SIGNIFICANT CHANGE UP (ref 39–50)
HCT VFR BLDA CALC: 44 % — SIGNIFICANT CHANGE UP (ref 39–51)
HGB BLD CALC-MCNC: 14.8 G/DL — SIGNIFICANT CHANGE UP (ref 12.6–17.4)
HGB BLD-MCNC: 14.2 G/DL — SIGNIFICANT CHANGE UP (ref 13–17)
LACTATE BLDV-MCNC: 1.8 MMOL/L — SIGNIFICANT CHANGE UP (ref 0.5–2)
MCHC RBC-ENTMCNC: 29.7 PG — SIGNIFICANT CHANGE UP (ref 27–34)
MCHC RBC-ENTMCNC: 33.8 GM/DL — SIGNIFICANT CHANGE UP (ref 32–36)
MCV RBC AUTO: 87.9 FL — SIGNIFICANT CHANGE UP (ref 80–100)
NRBC # BLD: 0 /100 WBCS — SIGNIFICANT CHANGE UP (ref 0–0)
PCO2 BLDV: 49 MMHG — SIGNIFICANT CHANGE UP (ref 42–55)
PH BLDV: 7.4 — SIGNIFICANT CHANGE UP (ref 7.32–7.43)
PLATELET # BLD AUTO: 211 K/UL — SIGNIFICANT CHANGE UP (ref 150–400)
PO2 BLDV: 46 MMHG — HIGH (ref 25–45)
POTASSIUM BLDV-SCNC: 4.5 MMOL/L — SIGNIFICANT CHANGE UP (ref 3.5–5.1)
POTASSIUM SERPL-MCNC: 4.5 MMOL/L — SIGNIFICANT CHANGE UP (ref 3.5–5.3)
POTASSIUM SERPL-SCNC: 4.5 MMOL/L — SIGNIFICANT CHANGE UP (ref 3.5–5.3)
PROT SERPL-MCNC: 7.1 G/DL — SIGNIFICANT CHANGE UP (ref 6–8.3)
RBC # BLD: 4.78 M/UL — SIGNIFICANT CHANGE UP (ref 4.2–5.8)
RBC # FLD: 13.2 % — SIGNIFICANT CHANGE UP (ref 10.3–14.5)
SAO2 % BLDV: 79.6 % — SIGNIFICANT CHANGE UP (ref 67–88)
SODIUM SERPL-SCNC: 134 MMOL/L — LOW (ref 135–145)
WBC # BLD: 15.64 K/UL — HIGH (ref 3.8–10.5)
WBC # FLD AUTO: 15.64 K/UL — HIGH (ref 3.8–10.5)

## 2023-02-07 PROCEDURE — 99232 SBSQ HOSP IP/OBS MODERATE 35: CPT

## 2023-02-07 RX ORDER — ACETAMINOPHEN 500 MG
1000 TABLET ORAL ONCE
Refills: 0 | Status: COMPLETED | OUTPATIENT
Start: 2023-02-07 | End: 2023-02-07

## 2023-02-07 RX ORDER — ALBUTEROL 90 UG/1
1.25 AEROSOL, METERED ORAL EVERY 6 HOURS
Refills: 0 | Status: DISCONTINUED | OUTPATIENT
Start: 2023-02-07 | End: 2023-02-11

## 2023-02-07 RX ORDER — CEFEPIME 1 G/1
2000 INJECTION, POWDER, FOR SOLUTION INTRAMUSCULAR; INTRAVENOUS ONCE
Refills: 0 | Status: COMPLETED | OUTPATIENT
Start: 2023-02-07 | End: 2023-02-07

## 2023-02-07 RX ORDER — IPRATROPIUM/ALBUTEROL SULFATE 18-103MCG
3 AEROSOL WITH ADAPTER (GRAM) INHALATION ONCE
Refills: 0 | Status: COMPLETED | OUTPATIENT
Start: 2023-02-07 | End: 2023-02-07

## 2023-02-07 RX ORDER — CEFTRIAXONE 500 MG/1
1000 INJECTION, POWDER, FOR SOLUTION INTRAMUSCULAR; INTRAVENOUS EVERY 24 HOURS
Refills: 0 | Status: DISCONTINUED | OUTPATIENT
Start: 2023-02-07 | End: 2023-02-07

## 2023-02-07 RX ORDER — ACETAMINOPHEN 500 MG
650 TABLET ORAL EVERY 6 HOURS
Refills: 0 | Status: DISCONTINUED | OUTPATIENT
Start: 2023-02-07 | End: 2023-02-11

## 2023-02-07 RX ORDER — CEFEPIME 1 G/1
2000 INJECTION, POWDER, FOR SOLUTION INTRAMUSCULAR; INTRAVENOUS EVERY 12 HOURS
Refills: 0 | Status: DISCONTINUED | OUTPATIENT
Start: 2023-02-07 | End: 2023-02-09

## 2023-02-07 RX ORDER — CEFEPIME 1 G/1
INJECTION, POWDER, FOR SOLUTION INTRAMUSCULAR; INTRAVENOUS
Refills: 0 | Status: DISCONTINUED | OUTPATIENT
Start: 2023-02-07 | End: 2023-02-09

## 2023-02-07 RX ADMIN — MONTELUKAST 10 MILLIGRAM(S): 4 TABLET, CHEWABLE ORAL at 11:51

## 2023-02-07 RX ADMIN — CARVEDILOL PHOSPHATE 12.5 MILLIGRAM(S): 80 CAPSULE, EXTENDED RELEASE ORAL at 06:10

## 2023-02-07 RX ADMIN — HEPARIN SODIUM 5000 UNIT(S): 5000 INJECTION INTRAVENOUS; SUBCUTANEOUS at 17:00

## 2023-02-07 RX ADMIN — Medication 1000 MILLIGRAM(S): at 15:32

## 2023-02-07 RX ADMIN — Medication 50 MILLIGRAM(S): at 11:52

## 2023-02-07 RX ADMIN — Medication 1 MILLIGRAM(S): at 06:11

## 2023-02-07 RX ADMIN — Medication 20 MILLIGRAM(S): at 06:09

## 2023-02-07 RX ADMIN — Medication 100 MILLIGRAM(S): at 06:10

## 2023-02-07 RX ADMIN — Medication 50 MILLIGRAM(S): at 06:11

## 2023-02-07 RX ADMIN — CEFEPIME 100 MILLIGRAM(S): 1 INJECTION, POWDER, FOR SOLUTION INTRAMUSCULAR; INTRAVENOUS at 12:14

## 2023-02-07 RX ADMIN — Medication 3 MILLILITER(S): at 12:09

## 2023-02-07 RX ADMIN — Medication 400 MILLIGRAM(S): at 23:11

## 2023-02-07 RX ADMIN — Medication 20 MILLIGRAM(S): at 11:52

## 2023-02-07 RX ADMIN — CEFEPIME 100 MILLIGRAM(S): 1 INJECTION, POWDER, FOR SOLUTION INTRAMUSCULAR; INTRAVENOUS at 19:02

## 2023-02-07 RX ADMIN — Medication 30 MILLIGRAM(S): at 06:11

## 2023-02-07 RX ADMIN — DIVALPROEX SODIUM 500 MILLIGRAM(S): 500 TABLET, DELAYED RELEASE ORAL at 11:52

## 2023-02-07 RX ADMIN — ALBUTEROL 1.25 MILLIGRAM(S): 90 AEROSOL, METERED ORAL at 23:45

## 2023-02-07 RX ADMIN — SODIUM CHLORIDE 60 MILLILITER(S): 9 INJECTION INTRAMUSCULAR; INTRAVENOUS; SUBCUTANEOUS at 19:02

## 2023-02-07 RX ADMIN — Medication 400 MILLIGRAM(S): at 15:02

## 2023-02-07 RX ADMIN — Medication 400 MILLIGRAM(S): at 02:35

## 2023-02-07 RX ADMIN — HEPARIN SODIUM 5000 UNIT(S): 5000 INJECTION INTRAVENOUS; SUBCUTANEOUS at 06:09

## 2023-02-07 RX ADMIN — CEFTRIAXONE 100 MILLIGRAM(S): 500 INJECTION, POWDER, FOR SOLUTION INTRAMUSCULAR; INTRAVENOUS at 02:58

## 2023-02-07 NOTE — PHYSICAL THERAPY INITIAL EVALUATION ADULT - NSPTDISCHREC_GEN_A_CORE
If patient goes home, will need home PT and assist in all aspects of mobility & function. DME: TBD/Sub-acute Rehab

## 2023-02-07 NOTE — PHYSICAL THERAPY INITIAL EVALUATION ADULT - ACTIVE RANGE OF MOTION EXAMINATION, REHAB EVAL
JUAN J TYLER 2/7/23: JUAN J PEREZ WFL/bilateral upper extremity Active ROM was WFL (within functional limits)/bilateral  lower extremity Active ROM was WFL (within functional limits)

## 2023-02-07 NOTE — PROGRESS NOTE ADULT - ASSESSMENT
Patient is a 62y M PMHx HTN, DM, schizophrenia (hx suicide attempt), straight caths at home for neurogenic bladder, p/w worsening lethargy found to have uti  w/ elevated wbc better  i d eval noted  viral syndrome  nebs  pulm eval  abs as per id  mild fluids  mild hyponatremia likely med related  dm  fsg riss  pt  dvt proph   f/u cultures  c/w outp tmeds

## 2023-02-07 NOTE — PHYSICAL THERAPY INITIAL EVALUATION ADULT - NAME OF CLINICIAN
covering CANDACE Eller, CANDACE santillan covering CANDACE Eller, CANDACE santillan; 2/8: CANDACE Castro

## 2023-02-07 NOTE — CHART NOTE - NSCHARTNOTEFT_GEN_A_CORE
Medicine PA Note     ALEJANDRO BENITEZ  MRN-[id number]  Allergies    No Known Allergies    Intolerances         Notified by RN, Pt lethargic and noted with fever 103.1. Pt seen and examined at bedside. Pt able to open eyes and repeat name to verbal stimuli. Pt noted to attempt to mouth year, however falling asleep during questioning. Pt nods head no when asked if in pain or if sob.    Vital Signs Last 24 Hrs  T(C): 37 (02-06-23 @ 22:10), Max: 38.2 (02-06-23 @ 17:14)  T(F): 98.6 (02-06-23 @ 22:10), Max: 100.8 (02-06-23 @ 17:14)  HR: 107 (02-06-23 @ 22:10) (96 - 107)  BP: 153/87 (02-06-23 @ 22:10) (142/84 - 163/83)  BP(mean): --  RR: 18 (02-06-23 @ 22:10) (17 - 22)  SpO2: 95% (02-06-23 @ 22:10) (95% - 100%)                        14.2   15.64 )-----------( 211      ( 07 Feb 2023 02:59 )             42.0     02-06    128<L>  |  89<L>  |  12  ----------------------------<  105<H>  4.6   |  25  |  0.97    Ca    9.6      06 Feb 2023 13:30    TPro  7.8  /  Alb  4.2  /  TBili  1.0  /  DBili  x   /  AST  35  /  ALT  20  /  AlkPhos  68  02-06          PHYSICAL EXAM:  GENERAL: NAD, lethargic  CHEST/LUNG: Clear to auscultation bilaterally; No wheezes, rhonchi or rales appreciated  HEART: Tachycardic; No murmurs, rubs, or gallops  ABDOMEN: Obese, Soft, Nontender, Nondistended; Bowel sounds present. No rebound, or guarding noted.  EXTREMITIES:  2+ Peripheral Pulses, No clubbing, cyanosis, or edema  NEUROLOGY: Only able to say name, PERRLA, CN 2-12 grossly intact, no upper extremity drift, 5/5 strength in lower extremities      Assessment/Plan: HPI:   Patient is a 62y M PMHx HTN, DM, schizophrenia (hx suicide attempt), straight caths at home for neurogenic bladder, p/w worsening lethargy. patient gets UTIs frequently, had one a few weeks ago, was treated with abx and feels like it didn't get better. Now feeling increasingly weak. This is how he feels when his UTI. Denies Cp, SOb, abdominal pain, nvd.  (06 Feb 2023 17:03)    Lethargy likely in setting of fever  >VS hemodynamically stable aside from T- 103.1F, HR-120's  >IV tylenol given STAT  >EKG done revealing RBBB with no acute changes from previous  >CTX continued, no hx of ESBL  >STAT CBC, BMP, VBG w/lactate ordered  >If Pt becomes HD unstable will consider broadening abx  >ID follow up in AM  >If Pt's lethargy does not improve once fever resolves or if new neurological deficit then will consider CTH  >Pt bladder scanned - >900; x1 straight cath ordered  > Will endorse to AM team, attending to follow    Sarah De Jesus PA-C,   Department of Medicine

## 2023-02-07 NOTE — PHYSICAL THERAPY INITIAL EVALUATION ADULT - REHAB POTENTIAL, PT EVAL
Patient:   JAMAICA SARAH            MRN: [id number]            FIN: [id number]               Age:   31 years     Sex:  Male     :  88   Associated Diagnoses:   None   Author:   Mariel Abarca      Basic Information   Time seen: Date 10/31/2017.   History source: Patient.   Arrival mode: Private vehicle.   History limitation: Language barrier.      History of Present Illness   The patient presents with right, hand pain.  The onset was just prior to arrival.  The course/duration of symptoms is constant.  Type of injury: crush injury.  Location: Right hand. The character of symptoms is pain.  The degree of pain is moderate.  The degree of swelling is moderate.  There are exacerbating factors including movement and palpation.  The relieving factor is immobilization.  Risk factors consist of none.  Prior episodes: none.  Therapy today: none.  Associated symptoms: none.        Review of Systems   Constitutional symptoms:  No fever,    Skin symptoms:  No rash,    Eye symptoms:  No recent vision problems,    ENMT symptoms:  No sore throat,    Respiratory symptoms:  No shortness of breath,    Cardiovascular symptoms:  No chest pain,    Gastrointestinal symptoms:  No abdominal pain,    Genitourinary symptoms:  No dysuria,    Musculoskeletal symptoms:  No Joint pain,    Neurologic symptoms:  No headache,    Psychiatric symptoms:  Negative except as documented in HPI.   Endocrine symptoms:  Negative except as documented in HPI.   Hematologic/Lymphatic symptoms:  Negative except as documented in HPI.   Allergy/immunologic symptoms:  Negative except as documented in HPI.      Health Status   Allergies:    Allergic Reactions (Selected)  Severity Not Documented  Seafood- No reactions were documented..   Immunizations: Up to date.      Past Medical/ Family/ Social History      Medical history   Negative.   Surgical history: Negative.   Family history: Hypertension.   Social history: Alcohol use: Occasionally, Tobacco  use: Occasionally, Drug use: Denies.      Physical Examination               Vital Signs   Vital Signs   10/31/17 19:57           Temperature Axillary      98.3 F                             Peripheral Pulse Rate     85 bpm                             Respiratory Rate          18 br/min                             Systolic Blood Pressure   132 mmHg                             Diastolic Blood Pressure  82 mmHg                             Mean Arterial Pressure    99 mmHg                             Oxygen Saturation         100 %  .               Per nurse's notes.   Pulse Ox 100% on Room Air which is normal for this patient.   Vital Signs were reviewed.   General:  Alert, no acute distress.    Skin:  Warm, dry.    Head:  Normocephalic, atraumatic.    Neck:  Supple, trachea midline.    Eye:  Pupils are equal, round and reactive to light, extraocular movements are intact.    Ears, nose, mouth and throat:  Oral mucosa moist.   Cardiovascular:  Regular rate and rhythm, S1, S2.    Respiratory:  Lungs are clear to auscultation, respirations are non-labored, Symmetrical chest wall expansion.    Gastrointestinal:  Soft, Nontender, Non distended.    Musculoskeletal:  Right hand: Mild soft tissue swelling to the mid dorsum, mild abrasions noted, no lacerations, limited range of motion due to pain, RP: 2+, M/U/are intact, cap refill less than 2 seconds, no wrist tenderness or deformity, no forearm tenderness or deformity.   Neurological:  Alert and oriented to person, place, time, and situation, No focal neurological deficit observed, CN II-XII intact, normal sensory observed, normal motor observed, normal speech observed, normal coordination observed.    Psychiatric:  Appropriate mood & affect.      Medical Decision Making   Differential Diagnosis:  Hand pain, wrist pain, finger pain, fracture, sprain.    Radiology results:  X-ray, * Final Report *    Reason For Exam  crush injury    Report  EXAMINATION: Right  hand    CLINICAL INFORMATION: crush injury, pain    COMPARISON: None    TECHNIQUE: 3 views of the right hand were obtained.    FINDINGS / IMPRESSION: There is no evidence of an acute fracture or dislocation. No significant bony abnormality is noted. No radiodense foreign body is identified.  The soft tissues are unremarkable.  .    31-year-old male presents to the emergency department with a complaint of right hand tenderness status post a piece of metal falling on his arm weighing approximately 50 pounds.  Patient denies taking any medications for symptoms.  Patient is up-to-date on immunizations.  Denies any other symptoms or complaints at this time.  Vitals and physical exam as noted above.  Patient is neurovascularly intact.  Patient given ibuprofen and ice.  X-rays noted above.  Discussed all results with patient and family. Follow-up with Orth O/primary care physician as directed.  Return for worsening symptoms.      Impression and Plan   Diagnosis   Right Hand Contusion   Plan   Condition: Improved, Stable.    Disposition: Discharged: to home.    Prescriptions: Launch prescriptions   Pharmacy:  ibuprofen 600 mg oral tablet (Prescribe): 600 mg, 1 tab(s), PO, QID, for 7 day, PRN:  for pain, 28 tab, 0 Refill(s).    Patient was given the following educational materials: Contusion, Hand(Turkish) (Custom).    Follow up with: [facility], ice, take ibuprofen as prescribed  Follow-up with ortho in 1-2 days  Return for worsening symptoms. .         fair, will monitor progress closely

## 2023-02-07 NOTE — PATIENT PROFILE ADULT - FALL HARM RISK - HARM RISK INTERVENTIONS

## 2023-02-07 NOTE — PROGRESS NOTE ADULT - ASSESSMENT
Impression :   Asthma  - not active    Recommend :  Continue treatment for UTI  Continue nebulizer treatments as needed  Continue montelukast  DVT proph      Charles Miles MD, FCCP  Emmanuel Miles/Elina/Margie  Pulmonary Medicine

## 2023-02-07 NOTE — PROGRESS NOTE ADULT - ASSESSMENT
Patient is a 62y M PMHx HTN, DM, schizophrenia (hx suicide attempt), straight caths at home for neurogenic bladder, p/w worsening lethargy    #Concern for UTI  #Positive U/A    #Leukocytosis    #Enterovirus/Rhinovirus infection    Recommendations  Can switch to cefepime given clinical status and while awaiting further culture data  Fevers may be in setting of viral illness as well  Obtain CT a/p  Follow pending urine culture   Follow pending blood cultures  Follow fever curve and WBC count    Fantasma Berry MD  Division of Infectious Diseases    Patient is a 62y M PMHx HTN, DM, schizophrenia (hx suicide attempt), straight caths at home for neurogenic bladder, p/w worsening lethargy    #Concern for UTI  #Positive U/A  #Leukocytosis  #Enterovirus/Rhinovirus infection    Recommendations  Can switch to cefepime given clinical status and while awaiting further culture data  Fevers may be in setting of viral illness as well  Obtain CT a/p  Follow pending urine culture   Follow pending blood cultures  Follow fever curve and WBC count    Fantasma Berry MD  Division of Infectious Diseases

## 2023-02-07 NOTE — PROGRESS NOTE ADULT - SUBJECTIVE AND OBJECTIVE BOX
DATE OF SERVICE: 02-07-23 @ 13:49  CHIEF COMPLAINT:Patient is a 62y old  Male who presents with a chief complaint of   	        PAST MEDICAL & SURGICAL HISTORY:  History of Schizophrenia      HTN (Hypertension)      Hyperlipidemia      Gout      Anxiety      Asthma      History of Tonsillectomy      S/P Laparotomy      H/O knee surgery              fever  NECK: No pain or stiffness  RESPIRATORY: congestion  CARDIOVASCULAR: No chest pain, palpitations, passing out,   GASTROINTESTINAL: No abdominal or epigastric pain. No nausea, vomiting, or hematemesis;  GENITOURINARY: No dysuria, frequency, hematuria,   NEUROLOGICAL: No headaches,     Medications:  MEDICATIONS  (STANDING):  albuterol   0.042% 1.25 milliGRAM(s) Nebulizer every 6 hours  allopurinol 100 milliGRAM(s) Oral two times a day  benztropine 1 milliGRAM(s) Oral two times a day  bethanechol 50 milliGRAM(s) Oral four times a day  carvedilol 12.5 milliGRAM(s) Oral every 12 hours  cefepime   IVPB      cefepime   IVPB 2000 milliGRAM(s) IV Intermittent every 12 hours  dextrose 5%. 1000 milliLiter(s) (50 mL/Hr) IV Continuous <Continuous>  dextrose 5%. 1000 milliLiter(s) (100 mL/Hr) IV Continuous <Continuous>  dextrose 50% Injectable 25 Gram(s) IV Push once  dextrose 50% Injectable 12.5 Gram(s) IV Push once  dextrose 50% Injectable 25 Gram(s) IV Push once  divalproex  milliGRAM(s) Oral daily  enalapril 20 milliGRAM(s) Oral daily  FLUoxetine 20 milliGRAM(s) Oral daily  glucagon  Injectable 1 milliGRAM(s) IntraMuscular once  heparin   Injectable 5000 Unit(s) SubCutaneous every 12 hours  insulin lispro (ADMELOG) corrective regimen sliding scale   SubCutaneous three times a day before meals  montelukast 10 milliGRAM(s) Oral daily  NIFEdipine XL 30 milliGRAM(s) Oral two times a day  risperiDONE   Tablet 2 milliGRAM(s) Oral at bedtime  sodium chloride 0.9%. 1000 milliLiter(s) (60 mL/Hr) IV Continuous <Continuous>  tamsulosin 0.4 milliGRAM(s) Oral at bedtime    MEDICATIONS  (PRN):  dextrose Oral Gel 15 Gram(s) Oral once PRN Blood Glucose LESS THAN 70 milliGRAM(s)/deciliter    	    PHYSICAL EXAM:  T(C): 38.5 (02-07-23 @ 10:40), Max: 38.5 (02-07-23 @ 10:40)  HR: 96 (02-07-23 @ 10:40) (92 - 107)  BP: 140/81 (02-07-23 @ 10:40) (135/78 - 163/83)  RR: 18 (02-07-23 @ 10:40) (17 - 18)  SpO2: 97% (02-07-23 @ 10:40) (95% - 100%)  Wt(kg): --  I&O's Summary    06 Feb 2023 07:01  -  07 Feb 2023 07:00  --------------------------------------------------------  IN: 0 mL / OUT: 2800 mL / NET: -2800 mL        Appearance: Normal	  HEENT:   Normal oral mucosa, PERRL, EOMI	    Cardiovascular: Normal S1 S2, No JVD,   Respiratory: dec bs   Gastrointestinal:  Soft, Non-tender, + BS	    Neurologic: Non-focal  Extremities: Normal range of motion,     TELEMETRY: 	    ECG:  	  RADIOLOGY:  OTHER: 	  	  LABS:	 	    CARDIAC MARKERS:                                14.2   15.64 )-----------( 211      ( 07 Feb 2023 02:59 )             42.0     02-07    134<L>  |  96  |  10  ----------------------------<  111<H>  4.5   |  24  |  0.90    Ca    9.1      07 Feb 2023 02:59    TPro  7.1  /  Alb  3.6  /  TBili  0.7  /  DBili  x   /  AST  20  /  ALT  16  /  AlkPhos  64  02-07    proBNP:   Lipid Profile:   HgA1c:   TSH:     	         Speaking Coherently

## 2023-02-07 NOTE — PHYSICAL THERAPY INITIAL EVALUATION ADULT - ORIENTATION, REHAB EVAL
+lethargic and febrile 2/7/23/person +lethargic and febrile 2/7/23; 2/8: patient alert & oriented x 4/person

## 2023-02-07 NOTE — PROGRESS NOTE ADULT - SUBJECTIVE AND OBJECTIVE BOX
Pulmonary Medicine     62 year old man with history of hypertension, DM, schizophrenia, suicide attempt and neurogenic bladder. Admitted with weakness, UTI     No cough, chest pain or shortness of breath at rest. No wheezing.       PAST MEDICAL & SURGICAL HISTORY:  History of Schizophrenia  HTN (Hypertension)  Hyperlipidemia  Gout  Asthma  History of Tonsillectomy  S/P Laparotomy  H/O knee surgery    Home Medications:  albuterol 90 mcg/inh inhalation aerosol: 1 puff(s) inhaled , As Needed (06 Feb 2023 18:02)  allopurinol 100 mg oral tablet: 1 tab(s) orally 2 times a day (06 Feb 2023 18:02)  BENZTROPINE MESYLATE  1 MG TABS: 1 tab(s) orally 2 times a day (06 Feb 2023 18:02)  bethanechol 50 mg oral tablet: 1 tab(s) orally 4 times a day (06 Feb 2023 18:02)  carvedilol 12.5 mg oral tablet: 1 tab(s) orally 2 times a day (06 Feb 2023 18:02)  Crestor 5 mg oral tablet: 1 tab(s) orally once a day (06 Feb 2023 18:02)  enalapril 20 mg oral tablet: 1 tab(s) orally once a day (06 Feb 2023 18:02)  Flovent 220 mcg/inh inhalation aerosol with adapter: inhaled 2 times a day, As Needed (06 Feb 2023 18:02)  FLUoxetine 20 mg oral tablet: 1 tab(s) orally once a day (06 Feb 2023 18:02)  metFORMIN 1000 mg oral tablet: 1 tab(s) orally 2 times a day (06 Feb 2023 18:02)  montelukast 10 mg oral tablet: 1 tab(s) orally once a day (06 Feb 2023 18:02)  NIFEdipine 30 mg oral tablet, extended release: 1 tab(s) orally 2 times a day (06 Feb 2023 18:02)  Trulicity Pen 1.5 mg/0.5 mL subcutaneous solution: 1 dose(s) subcutaneous once a week (06 Feb 2023 18:02)        Vital Signs Last 24 Hrs  T(C): 39.4 (07 Feb 2023 14:25), Max: 39.4 (07 Feb 2023 14:25)  T(F): 102.9 (07 Feb 2023 14:25), Max: 102.9 (07 Feb 2023 14:25)  HR: 102 (07 Feb 2023 14:25) (92 - 107)  BP: 152/91 (07 Feb 2023 14:25) (135/78 - 163/83)  BP(mean): --  RR: 19 (07 Feb 2023 14:25) (17 - 19)  SpO2: 95% (07 Feb 2023 14:25) (95% - 100%)  Parameters below as of 07 Feb 2023 14:25  Patient On (Oxygen Delivery Method): nasal cannula  O2 Flow (L/min): 2    Physical examination   Alert  Resting in bed in no apparent distress   Heart sounds were regular  No wheezing appreciated   Abdomen soft  - laparotomy scar  No cyanosis   Mild dependent edema noted                           14.2   15.64 )-----------( 211      ( 07 Feb 2023 02:59 )             42.0     02-07    134<L>  |  96  |  10  ----------------------------<  111<H>  4.5   |  24  |  0.90    Ca    9.1      07 Feb 2023 02:59    TPro  7.1  /  Alb  3.6  /  TBili  0.7  /  DBili  x   /  AST  20  /  ALT  16  /  AlkPhos  64  02-07      MEDICATIONS  (STANDING):  acetaminophen   IVPB .. 1000 milliGRAM(s) IV Intermittent once  albuterol   0.042% 1.25 milliGRAM(s) Nebulizer every 6 hours  allopurinol 100 milliGRAM(s) Oral two times a day  benztropine 1 milliGRAM(s) Oral two times a day  bethanechol 50 milliGRAM(s) Oral four times a day  carvedilol 12.5 milliGRAM(s) Oral every 12 hours  cefepime   IVPB      cefepime   IVPB 2000 milliGRAM(s) IV Intermittent every 12 hours  divalproex  milliGRAM(s) Oral daily  enalapril 20 milliGRAM(s) Oral daily  FLUoxetine 20 milliGRAM(s) Oral daily  glucagon  Injectable 1 milliGRAM(s) IntraMuscular once  heparin   Injectable 5000 Unit(s) SubCutaneous every 12 hours  insulin lispro (ADMELOG) corrective regimen sliding scale   SubCutaneous three times a day before meals  montelukast 10 milliGRAM(s) Oral daily  NIFEdipine XL 30 milliGRAM(s) Oral two times a day  risperiDONE   Tablet 2 milliGRAM(s) Oral at bedtime  sodium chloride 0.9%. 1000 milliLiter(s) (60 mL/Hr) IV Continuous <Continuous>  tamsulosin 0.4 milliGRAM(s) Oral at bedtime

## 2023-02-07 NOTE — PHYSICAL THERAPY INITIAL EVALUATION ADULT - MANUAL MUSCLE TESTING RESULTS, REHAB EVAL
poor assessment 2* lethargy and command follow poor assessment 2* lethargy and command follow; 2/8: muscles of BUE & BLE functionally assessed to be at least fair+

## 2023-02-07 NOTE — PHYSICAL THERAPY INITIAL EVALUATION ADULT - ADDITIONAL COMMENTS
From last PT notes from last hospital admission: Patient lives in pvt house with family  1 step to enter.  Patient ambulated with rollator independent. Pt owns w/c at home. Per pt. and HHA, assist available at home all times. From last PT notes from last hospital admission: Patient lives in pvt house with family  1 step to enter.  Patient ambulated with rollator independent. Pt owns w/c at home. Per pt. and HHA, assist available at home all times.     Pt is a poor historian at this time- +lethargic and febrile 2/7/23 2/8: Pt states he lives in a private house, 1 step to enter, no stairs inside. Pt giving conflicting information: initially states he sleeps on the recliner then ambulates to the bathroom without device and was independent in ADLs. However later in the session states that he does not ambulate and the friend who lives with him assists him in showering & dressing.    Pt is a poor historian at this time- +lethargic and febrile 2/7/23

## 2023-02-07 NOTE — PROGRESS NOTE ADULT - SUBJECTIVE AND OBJECTIVE BOX
Follow Up:  UTI    Interval History/ROS:  Overnight: Febrile.  Otherwise hemodynamically stable on room air.  Latest labs show a leukocytosis of 15.6, BMP with renal function within normal limits, hepatic function within normal limits.    Patient seen examined at bedside.  Overall appears less comfortable than yesterday however is not in distress.  Denies any new pain or discomfort.      Allergies  No Known Allergies        ANTIMICROBIALS:  cefepime   IVPB    cefepime   IVPB 2000 every 12 hours      OTHER MEDS:  MEDICATIONS  (STANDING):  allopurinol 100 two times a day  benztropine 1 two times a day  bethanechol 50 four times a day  carvedilol 12.5 every 12 hours  dextrose 50% Injectable 25 once  dextrose 50% Injectable 12.5 once  dextrose 50% Injectable 25 once  dextrose Oral Gel 15 once PRN  divalproex  daily  enalapril 20 daily  FLUoxetine 20 daily  glucagon  Injectable 1 once  heparin   Injectable 5000 every 12 hours  insulin lispro (ADMELOG) corrective regimen sliding scale  three times a day before meals  montelukast 10 daily  NIFEdipine XL 30 two times a day  risperiDONE   Tablet 2 at bedtime  tamsulosin 0.4 at bedtime      Vital Signs Last 24 Hrs  T(C): 38.5 (2023 10:40), Max: 38.5 (2023 10:40)  T(F): 101.3 (2023 10:40), Max: 101.3 (2023 10:40)  HR: 96 (2023 10:40) (92 - 107)  BP: 140/81 (2023 10:40) (135/78 - 163/83)  BP(mean): --  RR: 18 (2023 10:40) (17 - 18)  SpO2: 97% (2023 10:40) (95% - 100%)    Parameters below as of 2023 10:40  Patient On (Oxygen Delivery Method): nasal cannula  O2 Flow (L/min): 2      PHYSICAL EXAM:  Constitutional: comfortable, no distress  ENT:  supple  Cardiovascular:   normal S1, S2, no murmur, no edema  Respiratory:  clear BS bilaterally, no wheezes, no rales  GI:  distended abdomen non-tender, normal bowel sounds  :  no pryor, no CVA tenderness  Musculoskeletal:  no synovitis, normal ROM  Neurologic: awake and alert, normal strength, no focal findings  Skin:  no rash, no erythema, no phlebitis  Heme/Onc: no lymphadenopathy   Psychiatric:  awake, alert, appropriate mood               14.2   15.64 )-----------( 211      ( 2023 02:59 )             42.0       02-07    134<L>  |  96  |  10  ----------------------------<  111<H>  4.5   |  24  |  0.90    Ca    9.1      2023 02:59    TPro  7.1  /  Alb  3.6  /  TBili  0.7  /  DBili  x   /  AST  20  /  ALT  16  /  AlkPhos  64  02-07      Urinalysis Basic - ( 2023 13:40 )    Color: Colorless / Appearance: Slightly Turbid / S.006 / pH: x  Gluc: x / Ketone: Negative  / Bili: Negative / Urobili: Negative   Blood: x / Protein: Trace / Nitrite: Positive   Leuk Esterase: Large / RBC: 3 /hpf /  /HPF   Sq Epi: x / Non Sq Epi: 0 /hpf / Bacteria: Few     Follow Up:  UTI    Interval History/ROS:  Overnight: Febrile.  Otherwise hemodynamically stable on room air.  Latest labs show a leukocytosis of 15.6, BMP with renal function within normal limits, hepatic function within normal limits.    Patient seen examined at bedside.  Overall appears less comfortable than yesterday however is not in distress.  Denies any new pain or discomfort. More confused per friend who is present during encounter.      Allergies  No Known Allergies      ANTIMICROBIALS:  cefepime   IVPB    cefepime   IVPB 2000 every 12 hours      OTHER MEDS:  MEDICATIONS  (STANDING):  allopurinol 100 two times a day  benztropine 1 two times a day  bethanechol 50 four times a day  carvedilol 12.5 every 12 hours  dextrose 50% Injectable 25 once  dextrose 50% Injectable 12.5 once  dextrose 50% Injectable 25 once  dextrose Oral Gel 15 once PRN  divalproex  daily  enalapril 20 daily  FLUoxetine 20 daily  glucagon  Injectable 1 once  heparin   Injectable 5000 every 12 hours  insulin lispro (ADMELOG) corrective regimen sliding scale  three times a day before meals  montelukast 10 daily  NIFEdipine XL 30 two times a day  risperiDONE   Tablet 2 at bedtime  tamsulosin 0.4 at bedtime      Vital Signs Last 24 Hrs  T(C): 38.5 (2023 10:40), Max: 38.5 (2023 10:40)  T(F): 101.3 (2023 10:40), Max: 101.3 (2023 10:40)  HR: 96 (2023 10:40) (92 - 107)  BP: 140/81 (2023 10:40) (135/78 - 163/83)  BP(mean): --  RR: 18 (2023 10:40) (17 - 18)  SpO2: 97% (2023 10:40) (95% - 100%)    Parameters below as of 2023 10:40  Patient On (Oxygen Delivery Method): nasal cannula  O2 Flow (L/min): 2      PHYSICAL EXAM:  Constitutional: comfortable, no distress  ENT:  supple  Cardiovascular:   normal S1, S2, no murmur, no edema  Respiratory:  clear BS bilaterally, no wheezes, no rales  GI:  distended abdomen non-tender, normal bowel sounds  :  no pryor, no CVA tenderness  Musculoskeletal:  no synovitis, normal ROM  Neurologic: awake and alert, normal strength, no focal findings  Skin:  no rash, no erythema, no phlebitis  Heme/Onc: no lymphadenopathy   Psychiatric:  awake, alert, appropriate mood               14.2   15.64 )-----------( 211      ( 2023 02:59 )             42.0       02-07    134<L>  |  96  |  10  ----------------------------<  111<H>  4.5   |  24  |  0.90    Ca    9.1      2023 02:59    TPro  7.1  /  Alb  3.6  /  TBili  0.7  /  DBili  x   /  AST  20  /  ALT  16  /  AlkPhos  64  02-07      Urinalysis Basic - ( 2023 13:40 )    Color: Colorless / Appearance: Slightly Turbid / S.006 / pH: x  Gluc: x / Ketone: Negative  / Bili: Negative / Urobili: Negative   Blood: x / Protein: Trace / Nitrite: Positive   Leuk Esterase: Large / RBC: 3 /hpf /  /HPF   Sq Epi: x / Non Sq Epi: 0 /hpf / Bacteria: Few

## 2023-02-07 NOTE — CHART NOTE - NSCHARTNOTEFT_GEN_A_CORE
Medicine NP Note     ALEJANDRO BENITEZ  [id number]  Allergies    No Known Allergies    Intolerances         Called to evaluate patient for     Vital Signs Last 24 Hrs  T(C): 38.5 (02-07-23 @ 10:40), Max: 38.5 (02-07-23 @ 10:40)  T(F): 101.3 (02-07-23 @ 10:40), Max: 101.3 (02-07-23 @ 10:40)  HR: 96 (02-07-23 @ 10:40) (92 - 107)  BP: 140/81 (02-07-23 @ 10:40) (135/78 - 163/83)  BP(mean): --  RR: 18 (02-07-23 @ 10:40) (17 - 22)  SpO2: 97% (02-07-23 @ 10:40) (95% - 100%)  Daily Height in cm: 182.88 (06 Feb 2023 11:21)    Daily   I&O's Summary    06 Feb 2023 07:01  -  07 Feb 2023 07:00  --------------------------------------------------------  IN: 0 mL / OUT: 2800 mL / NET: -2800 mL      CAPILLARY BLOOD GLUCOSE                          14.2   15.64 )-----------( 211      ( 07 Feb 2023 02:59 )             42.0     02-07    134<L>  |  96  |  10  ----------------------------<  111<H>  4.5   |  24  |  0.90    Ca    9.1      07 Feb 2023 02:59    TPro  7.1  /  Alb  3.6  /  TBili  0.7  /  DBili  x   /  AST  20  /  ALT  16  /  AlkPhos  64  02-07              Radiology:    PHYSICAL EXAM:  GENERAL: NAD, well-developed  HEAD:  Atraumatic, Normocephalic  EYES: EOMI, PERRLA, conjunctiva and sclera clear  NECK: Supple, No JVD  CHEST/LUNG: Clear to auscultation bilaterally; No wheeze  HEART: Regular rate and rhythm; No murmurs, rubs, or gallops  ABDOMEN: Soft, Nontender, Nondistended; Bowel sounds present  EXTREMITIES:  2+ Peripheral Pulses, No clubbing, cyanosis, or edema  PSYCH: AAOx3  NEUROLOGY: non-focal  SKIN: No rashes or lesions    Assessment/Plan: HPI:   Patient is a 62y M PMHx HTN, DM, schizophrenia (hx suicide attempt), straight caths at home for neurogenic bladder, p/w worsening lethargy. patient gets UTIs frequently, had one a few weeks ago, was treated with abx and feels like it didn't get better.   Now feeling increasingly weak.   This is how he feels when his UTI   Denies Cp, SOb, abdominal pain, nvd.  (06 Feb 2023 17:03) Medicine NP Note     ALEJANDRO BENITEZ  [id number]  Allergies    No Known Allergies    Intolerances     Called to evaluate patient for Fever 101.3 this afternoon. Pt states he feels ok, states some trouble breathing this am and sleepy. No other complaints     Vital Signs Last 24 Hrs  T(C): 38.5 (02-07-23 @ 10:40), Max: 38.5 (02-07-23 @ 10:40)  T(F): 101.3 (02-07-23 @ 10:40), Max: 101.3 (02-07-23 @ 10:40)  HR: 96 (02-07-23 @ 10:40) (92 - 107)  BP: 140/81 (02-07-23 @ 10:40) (135/78 - 163/83)  BP(mean): --  RR: 18 (02-07-23 @ 10:40) (17 - 22)  SpO2: 97% (02-07-23 @ 10:40) (95% - 100%)  Daily Height in cm: 182.88 (06 Feb 2023 11:21)    Daily   I&O's Summary    06 Feb 2023 07:01  -  07 Feb 2023 07:00  --------------------------------------------------------  IN: 0 mL / OUT: 2800 mL / NET: -2800 mL               14.2   15.64 )-----------( 211      ( 07 Feb 2023 02:59 )             42.0     02-07    134<L>  |  96  |  10  ----------------------------<  111<H>  4.5   |  24  |  0.90    Ca    9.1      07 Feb 2023 02:59    TPro  7.1  /  Alb  3.6  /  TBili  0.7  /  DBili  x   /  AST  20  /  ALT  16  /  AlkPhos  64  02-07        radiology:ra< from: Xray Chest 2 Views PA/Lat (02.06.23 @ 14:08) >    IMPRESSION:  No focal consolidations.    < end of copied text >        PHYSICAL EXAM:  GENERAL: Sleepy, arousable and appropriate answering questions   CHEST/LUNG: bilateral inspiratory wheeze   HEART: Regular rate and rhythm;  ABDOMEN: Distended, Bowel sounds  EXTREMITIEs: No edema   PSYCH: AAOx3, lethargy   NEUROLOGY: non-focal  SKIN: No rashes or lesions    Assessment/Plan: HPI:   Patient is a 62y M PMHx HTN, DM, schizophrenia (hx suicide attempt), straight caths at home for neurogenic bladder, admitted with UTI and Lethargy, now with reoccurring fevers.    1. Fever, on ceftriaxone. Blood cultures pending. Pt remains sleepy , eyes closing during conversation. Discussed with ID who will see patient today, recommended ceftriaxone change to cefepime 2G Q 8. Duoneb x 1 for wheezing, Will check abg with lactate for lethargy ,

## 2023-02-07 NOTE — PHYSICAL THERAPY INITIAL EVALUATION ADULT - PERTINENT HX OF CURRENT PROBLEM, REHAB EVAL
Pt is a 62y M admitted to [facility] on 2/6/23 PMHx HTN, DM, schizophrenia (hx suicide attempt), straight caths at home for neurogenic bladder, p/w worsening lethargy. patient gets UTIs frequently, had one a few weeks ago, was treated with abx and feels like it didn't get better. Now feeling increasingly weak. Denies Cp, SOb, abdominal pain, nvd.  Hospital course: UTI-  CTX, Enterorhinovirus - supportive tx, hyponatremia - NS, monitor. Febrile to 103 overnight 2/7, lethargic. CXR: No focal consolidations.

## 2023-02-07 NOTE — PHYSICAL THERAPY INITIAL EVALUATION ADULT - GENERAL OBSERVATIONS, REHAB EVAL
Pt received in ED on stretcher, +lethargic, alert to self- PT assisted PCA staff with cleaning and bed mobility Pt received in ED on stretcher, +lethargic, alert to self- PT assisted PCA staff with cleaning and bed mobility; 2/8: Rec'd in bed, +O2 2L/min, +IVL, no complaints, agreeable to PT

## 2023-02-08 LAB
-  AMIKACIN: SIGNIFICANT CHANGE UP
-  AMOXICILLIN/CLAVULANIC ACID: SIGNIFICANT CHANGE UP
-  AMPICILLIN/SULBACTAM: SIGNIFICANT CHANGE UP
-  AMPICILLIN: SIGNIFICANT CHANGE UP
-  AZTREONAM: SIGNIFICANT CHANGE UP
-  CEFAZOLIN: SIGNIFICANT CHANGE UP
-  CEFEPIME: SIGNIFICANT CHANGE UP
-  CEFOXITIN: SIGNIFICANT CHANGE UP
-  CEFTRIAXONE: SIGNIFICANT CHANGE UP
-  CEFUROXIME: SIGNIFICANT CHANGE UP
-  CIPROFLOXACIN: SIGNIFICANT CHANGE UP
-  ERTAPENEM: SIGNIFICANT CHANGE UP
-  GENTAMICIN: SIGNIFICANT CHANGE UP
-  IMIPENEM: SIGNIFICANT CHANGE UP
-  LEVOFLOXACIN: SIGNIFICANT CHANGE UP
-  MEROPENEM: SIGNIFICANT CHANGE UP
-  NITROFURANTOIN: SIGNIFICANT CHANGE UP
-  PIPERACILLIN/TAZOBACTAM: SIGNIFICANT CHANGE UP
-  TOBRAMYCIN: SIGNIFICANT CHANGE UP
-  TRIMETHOPRIM/SULFAMETHOXAZOLE: SIGNIFICANT CHANGE UP
ANION GAP SERPL CALC-SCNC: 12 MMOL/L — SIGNIFICANT CHANGE UP (ref 5–17)
BUN SERPL-MCNC: 22 MG/DL — SIGNIFICANT CHANGE UP (ref 7–23)
CALCIUM SERPL-MCNC: 8.9 MG/DL — SIGNIFICANT CHANGE UP (ref 8.4–10.5)
CHLORIDE SERPL-SCNC: 99 MMOL/L — SIGNIFICANT CHANGE UP (ref 96–108)
CO2 SERPL-SCNC: 25 MMOL/L — SIGNIFICANT CHANGE UP (ref 22–31)
CREAT SERPL-MCNC: 1.19 MG/DL — SIGNIFICANT CHANGE UP (ref 0.5–1.3)
CULTURE RESULTS: SIGNIFICANT CHANGE UP
EGFR: 70 ML/MIN/1.73M2 — SIGNIFICANT CHANGE UP
GLUCOSE BLDC GLUCOMTR-MCNC: 104 MG/DL — HIGH (ref 70–99)
GLUCOSE BLDC GLUCOMTR-MCNC: 108 MG/DL — HIGH (ref 70–99)
GLUCOSE BLDC GLUCOMTR-MCNC: 115 MG/DL — HIGH (ref 70–99)
GLUCOSE BLDC GLUCOMTR-MCNC: 94 MG/DL — SIGNIFICANT CHANGE UP (ref 70–99)
GLUCOSE SERPL-MCNC: 106 MG/DL — HIGH (ref 70–99)
HCT VFR BLD CALC: 38.9 % — LOW (ref 39–50)
HGB BLD-MCNC: 12.8 G/DL — LOW (ref 13–17)
MCHC RBC-ENTMCNC: 29.7 PG — SIGNIFICANT CHANGE UP (ref 27–34)
MCHC RBC-ENTMCNC: 32.9 GM/DL — SIGNIFICANT CHANGE UP (ref 32–36)
MCV RBC AUTO: 90.3 FL — SIGNIFICANT CHANGE UP (ref 80–100)
METHOD TYPE: SIGNIFICANT CHANGE UP
NRBC # BLD: 0 /100 WBCS — SIGNIFICANT CHANGE UP (ref 0–0)
NT-PROBNP SERPL-SCNC: 517 PG/ML — HIGH (ref 0–300)
ORGANISM # SPEC MICROSCOPIC CNT: SIGNIFICANT CHANGE UP
ORGANISM # SPEC MICROSCOPIC CNT: SIGNIFICANT CHANGE UP
PLATELET # BLD AUTO: 217 K/UL — SIGNIFICANT CHANGE UP (ref 150–400)
POTASSIUM SERPL-MCNC: 4.1 MMOL/L — SIGNIFICANT CHANGE UP (ref 3.5–5.3)
POTASSIUM SERPL-SCNC: 4.1 MMOL/L — SIGNIFICANT CHANGE UP (ref 3.5–5.3)
RBC # BLD: 4.31 M/UL — SIGNIFICANT CHANGE UP (ref 4.2–5.8)
RBC # FLD: 13.3 % — SIGNIFICANT CHANGE UP (ref 10.3–14.5)
SODIUM SERPL-SCNC: 136 MMOL/L — SIGNIFICANT CHANGE UP (ref 135–145)
SPECIMEN SOURCE: SIGNIFICANT CHANGE UP
WBC # BLD: 12.29 K/UL — HIGH (ref 3.8–10.5)
WBC # FLD AUTO: 12.29 K/UL — HIGH (ref 3.8–10.5)

## 2023-02-08 PROCEDURE — 99232 SBSQ HOSP IP/OBS MODERATE 35: CPT

## 2023-02-08 RX ORDER — BUDESONIDE, MICRONIZED 100 %
0.5 POWDER (GRAM) MISCELLANEOUS
Refills: 0 | Status: DISCONTINUED | OUTPATIENT
Start: 2023-02-08 | End: 2023-02-11

## 2023-02-08 RX ADMIN — CEFEPIME 100 MILLIGRAM(S): 1 INJECTION, POWDER, FOR SOLUTION INTRAMUSCULAR; INTRAVENOUS at 17:19

## 2023-02-08 RX ADMIN — Medication 1 MILLIGRAM(S): at 17:18

## 2023-02-08 RX ADMIN — Medication 1 MILLIGRAM(S): at 08:26

## 2023-02-08 RX ADMIN — Medication 100 MILLIGRAM(S): at 08:25

## 2023-02-08 RX ADMIN — Medication 30 MILLIGRAM(S): at 17:18

## 2023-02-08 RX ADMIN — Medication 20 MILLIGRAM(S): at 11:54

## 2023-02-08 RX ADMIN — Medication 650 MILLIGRAM(S): at 15:46

## 2023-02-08 RX ADMIN — ALBUTEROL 1.25 MILLIGRAM(S): 90 AEROSOL, METERED ORAL at 05:22

## 2023-02-08 RX ADMIN — CEFEPIME 100 MILLIGRAM(S): 1 INJECTION, POWDER, FOR SOLUTION INTRAMUSCULAR; INTRAVENOUS at 05:22

## 2023-02-08 RX ADMIN — Medication 50 MILLIGRAM(S): at 14:55

## 2023-02-08 RX ADMIN — ALBUTEROL 1.25 MILLIGRAM(S): 90 AEROSOL, METERED ORAL at 15:18

## 2023-02-08 RX ADMIN — HEPARIN SODIUM 5000 UNIT(S): 5000 INJECTION INTRAVENOUS; SUBCUTANEOUS at 17:18

## 2023-02-08 RX ADMIN — Medication 1000 MILLIGRAM(S): at 01:00

## 2023-02-08 RX ADMIN — RISPERIDONE 2 MILLIGRAM(S): 4 TABLET ORAL at 22:13

## 2023-02-08 RX ADMIN — HEPARIN SODIUM 5000 UNIT(S): 5000 INJECTION INTRAVENOUS; SUBCUTANEOUS at 05:24

## 2023-02-08 RX ADMIN — Medication 50 MILLIGRAM(S): at 09:22

## 2023-02-08 RX ADMIN — Medication 650 MILLIGRAM(S): at 16:16

## 2023-02-08 RX ADMIN — Medication 30 MILLIGRAM(S): at 08:30

## 2023-02-08 RX ADMIN — TAMSULOSIN HYDROCHLORIDE 0.4 MILLIGRAM(S): 0.4 CAPSULE ORAL at 22:13

## 2023-02-08 RX ADMIN — Medication 0.5 MILLIGRAM(S): at 18:36

## 2023-02-08 RX ADMIN — DIVALPROEX SODIUM 500 MILLIGRAM(S): 500 TABLET, DELAYED RELEASE ORAL at 11:53

## 2023-02-08 RX ADMIN — ALBUTEROL 1.25 MILLIGRAM(S): 90 AEROSOL, METERED ORAL at 23:00

## 2023-02-08 RX ADMIN — Medication 100 MILLIGRAM(S): at 17:18

## 2023-02-08 RX ADMIN — CARVEDILOL PHOSPHATE 12.5 MILLIGRAM(S): 80 CAPSULE, EXTENDED RELEASE ORAL at 22:12

## 2023-02-08 RX ADMIN — CARVEDILOL PHOSPHATE 12.5 MILLIGRAM(S): 80 CAPSULE, EXTENDED RELEASE ORAL at 08:30

## 2023-02-08 RX ADMIN — MONTELUKAST 10 MILLIGRAM(S): 4 TABLET, CHEWABLE ORAL at 11:54

## 2023-02-08 RX ADMIN — Medication 50 MILLIGRAM(S): at 18:36

## 2023-02-08 NOTE — PROGRESS NOTE ADULT - SUBJECTIVE AND OBJECTIVE BOX
DATE OF SERVICE: 02-08-23 @ 12:04  CHIEF COMPLAINT:Patient is a 62y old  Male who presents with a chief complaint of   	        PAST MEDICAL & SURGICAL HISTORY:  History of Schizophrenia      HTN (Hypertension)      Hyperlipidemia      Gout      Anxiety      Asthma      DM (diabetes mellitus)      History of Tonsillectomy      S/P Laparotomy      H/O knee surgery              REVIEW OF SYSTEMS:    RESPIRATORY: No cough, wheezing, chills or hemoptysis; No Shortness of Breath  CARDIOVASCULAR: No chest pain, palpitations,   GASTROINTESTINAL: No abdominal or epigastric pain. No nausea, vomiting,  GENITOURINARY: No dysuria, frequency, hematuria, or incontinence  NEUROLOGICAL: No headaches,   Medications:  MEDICATIONS  (STANDING):  albuterol   0.042% 1.25 milliGRAM(s) Nebulizer every 6 hours  allopurinol 100 milliGRAM(s) Oral two times a day  benztropine 1 milliGRAM(s) Oral two times a day  bethanechol 50 milliGRAM(s) Oral four times a day  buDESOnide    Inhalation Suspension 0.5 milliGRAM(s) Inhalation two times a day  carvedilol 12.5 milliGRAM(s) Oral every 12 hours  cefepime   IVPB 2000 milliGRAM(s) IV Intermittent every 12 hours  cefepime   IVPB      dextrose 5%. 1000 milliLiter(s) (50 mL/Hr) IV Continuous <Continuous>  dextrose 5%. 1000 milliLiter(s) (100 mL/Hr) IV Continuous <Continuous>  dextrose 50% Injectable 25 Gram(s) IV Push once  dextrose 50% Injectable 12.5 Gram(s) IV Push once  dextrose 50% Injectable 25 Gram(s) IV Push once  divalproex  milliGRAM(s) Oral daily  enalapril 20 milliGRAM(s) Oral daily  FLUoxetine 20 milliGRAM(s) Oral daily  glucagon  Injectable 1 milliGRAM(s) IntraMuscular once  heparin   Injectable 5000 Unit(s) SubCutaneous every 12 hours  insulin lispro (ADMELOG) corrective regimen sliding scale   SubCutaneous three times a day before meals  montelukast 10 milliGRAM(s) Oral daily  NIFEdipine XL 30 milliGRAM(s) Oral two times a day  risperiDONE   Tablet 2 milliGRAM(s) Oral at bedtime  tamsulosin 0.4 milliGRAM(s) Oral at bedtime    MEDICATIONS  (PRN):  acetaminophen     Tablet .. 650 milliGRAM(s) Oral every 6 hours PRN Temp greater or equal to 38C (100.4F), Mild Pain (1 - 3)  dextrose Oral Gel 15 Gram(s) Oral once PRN Blood Glucose LESS THAN 70 milliGRAM(s)/deciliter    	    PHYSICAL EXAM:  T(C): 37.7 (02-08-23 @ 11:58), Max: 39.4 (02-07-23 @ 14:25)  HR: 81 (02-08-23 @ 11:58) (81 - 103)  BP: 111/67 (02-08-23 @ 11:58) (111/67 - 152/91)  RR: 18 (02-08-23 @ 11:58) (18 - 20)  SpO2: 97% (02-08-23 @ 11:58) (93% - 99%)  Wt(kg): --  I&O's Summary    07 Feb 2023 07:01  -  08 Feb 2023 07:00  --------------------------------------------------------  IN: 0 mL / OUT: 1350 mL / NET: -1350 mL    08 Feb 2023 07:01  -  08 Feb 2023 12:04  --------------------------------------------------------  IN: 0 mL / OUT: 850 mL / NET: -850 mL        Appearance: Normal	  HEENT:   Normal oral mucosa, PERRL, EOMI	    Cardiovascular: Normal S1 S2, No JVD,   Respiratory: Lungs clear to auscultation	  Psychiatry: A & O   Gastrointestinal:  Soft, Non-tender, + BS	    Neurologic: Non-focal  Extremities: dec rom    TELEMETRY: 	    ECG:  	  RADIOLOGY:  OTHER: 	  	  LABS:	 	    CARDIAC MARKERS:                                12.8   12.29 )-----------( 217      ( 08 Feb 2023 07:28 )             38.9     02-08    136  |  99  |  22  ----------------------------<  106<H>  4.1   |  25  |  1.19    Ca    8.9      08 Feb 2023 07:28    TPro  7.1  /  Alb  3.6  /  TBili  0.7  /  DBili  x   /  AST  20  /  ALT  16  /  AlkPhos  64  02-07    proBNP: Serum Pro-Brain Natriuretic Peptide: 517 pg/mL (02-08 @ 07:28)    Lipid Profile:   HgA1c:   TSH:

## 2023-02-08 NOTE — PROGRESS NOTE ADULT - SUBJECTIVE AND OBJECTIVE BOX
NAD. PRN cough    VS Stable Afeb    Lungs- mild exp wheezing bilat  Heart- Reg  Abd- non tender soft  Ext- chronic stasis changes    IMP: OAD hx; mild wheezing this AM  Hx of DOUG    PLAN: Add neb steroids if wheezing persists  Nocturnal PAP given DOUG hx (patient unaware of pressure; tx with CPAP 10 cm empirically)  Continue YUTI treatment    Memo Elise MD [facility]  [phone]    (Elina/Jd?brad)

## 2023-02-08 NOTE — PROGRESS NOTE ADULT - SUBJECTIVE AND OBJECTIVE BOX
Follow Up:  uti    Interval History/ROS:  Overnight: Febrile with Tmax 102.9.  Otherwise hemodynamically stable requiring 2 L nasal cannula.  Latest labs show leukocytosis 12.2, BMP with renal function within normal limits.  Blood cultures from 2023 remain negative.  Urine culture with E. coli, sensitivities pending.    Patient seen and examined at bedside.     Allergies  No Known Allergies        ANTIMICROBIALS:  cefepime   IVPB    cefepime   IVPB 2000 every 12 hours      OTHER MEDS:  MEDICATIONS  (STANDING):  acetaminophen     Tablet .. 650 every 6 hours PRN  albuterol   0.042% 1.25 every 6 hours  allopurinol 100 two times a day  benztropine 1 two times a day  bethanechol 50 four times a day  carvedilol 12.5 every 12 hours  dextrose 50% Injectable 25 once  dextrose 50% Injectable 12.5 once  dextrose 50% Injectable 25 once  dextrose Oral Gel 15 once PRN  divalproex  daily  enalapril 20 daily  FLUoxetine 20 daily  glucagon  Injectable 1 once  heparin   Injectable 5000 every 12 hours  insulin lispro (ADMELOG) corrective regimen sliding scale  three times a day before meals  montelukast 10 daily  NIFEdipine XL 30 two times a day  risperiDONE   Tablet 2 at bedtime  tamsulosin 0.4 at bedtime      Vital Signs Last 24 Hrs  T(C): 37.7 (2023 08:04), Max: 39.4 (2023 14:25)  T(F): 99.9 (2023 08:04), Max: 102.9 (2023 14:25)  HR: 89 (2023 09:42) (86 - 103)  BP: 120/68 (2023 09:42) (112/64 - 152/91)  BP(mean): --  RR: 18 (2023 09:42) (18 - 20)  SpO2: 94% (2023 09:42) (93% - 99%)    Parameters below as of 2023 09:42  Patient On (Oxygen Delivery Method): nasal cannula  O2 Flow (L/min): 2      PHYSICAL EXAM:  Constitutional: comfortable, no distress  ENT:  supple  Cardiovascular:   normal S1, S2, no murmur, no edema  Respiratory:  clear BS bilaterally, no wheezes, no rales  GI:  distended abdomen non-tender, normal bowel sounds  :  no pryor, no CVA tenderness  Musculoskeletal:  no synovitis, normal ROM  Neurologic: awake and alert, normal strength, no focal findings  Skin: no phlebitis  Psychiatric:  awake, alert, appropriate mood               12.8   12.29 )-----------( 217      ( 2023 07:28 )             38.9       02    136  |  99  |  22  ----------------------------<  106<H>  4.1   |  25  |  1.19    Ca    8.9      2023 07:28    TPro  7.1  /  Alb  3.6  /  TBili  0.7  /  DBili  x   /  AST  20  /  ALT  16  /  AlkPhos  64  07      Urinalysis Basic - ( 2023 13:40 )    Color: Colorless / Appearance: Slightly Turbid / S.006 / pH: x  Gluc: x / Ketone: Negative  / Bili: Negative / Urobili: Negative   Blood: x / Protein: Trace / Nitrite: Positive   Leuk Esterase: Large / RBC: 3 /hpf /  /HPF   Sq Epi: x / Non Sq Epi: 0 /hpf / Bacteria: Few        MICROBIOLOGY:  v    Culture - Blood (collected 2023 14:01)  Source: .Blood Blood-Venous  Preliminary Report (2023 19:02):    No growth to date.    Culture - Urine (collected 2023 13:40)  Source: Catheterized Catheterized  Preliminary Report (2023 14:44):    >100,000 CFU/ml Escherichia coli    Culture - Blood (collected 2023 13:00)  Source: .Blood Blood-Peripheral  Preliminary Report (2023 19:02):    No growth to date.              Rapid RVP Result: Detected ( @ 16:28)        RADIOLOGY:   Follow Up:  uti    Interval History/ROS:  Overnight: Febrile with Tmax 102.9.  Otherwise hemodynamically stable requiring 2 L nasal cannula.  Latest labs show leukocytosis 12.2, BMP with renal function within normal limits.  Blood cultures from 2023 remain negative.  Urine culture with E. coli, sensitivities reviwed.    Patient seen and examined at bedside. Alert and oriented x3. Reports worsening abdominal pain.    Allergies  No Known Allergies        ANTIMICROBIALS:  cefepime   IVPB    cefepime   IVPB 2000 every 12 hours      OTHER MEDS:  MEDICATIONS  (STANDING):  acetaminophen     Tablet .. 650 every 6 hours PRN  albuterol   0.042% 1.25 every 6 hours  allopurinol 100 two times a day  benztropine 1 two times a day  bethanechol 50 four times a day  carvedilol 12.5 every 12 hours  dextrose 50% Injectable 25 once  dextrose 50% Injectable 12.5 once  dextrose 50% Injectable 25 once  dextrose Oral Gel 15 once PRN  divalproex  daily  enalapril 20 daily  FLUoxetine 20 daily  glucagon  Injectable 1 once  heparin   Injectable 5000 every 12 hours  insulin lispro (ADMELOG) corrective regimen sliding scale  three times a day before meals  montelukast 10 daily  NIFEdipine XL 30 two times a day  risperiDONE   Tablet 2 at bedtime  tamsulosin 0.4 at bedtime      Vital Signs Last 24 Hrs  T(C): 37.7 (2023 08:04), Max: 39.4 (2023 14:25)  T(F): 99.9 (2023 08:04), Max: 102.9 (2023 14:25)  HR: 89 (2023 09:42) (86 - 103)  BP: 120/68 (2023 09:42) (112/64 - 152/91)  BP(mean): --  RR: 18 (2023 09:42) (18 - 20)  SpO2: 94% (2023 09:42) (93% - 99%)    Parameters below as of 2023 09:42  Patient On (Oxygen Delivery Method): nasal cannula  O2 Flow (L/min): 2      PHYSICAL EXAM:  Constitutional: comfortable, no distress  Cardiovascular:   normal S1, S2, no murmur, no edema  Respiratory:  clear BS bilaterally, no wheezes, no rales  GI:  distended abdomen  mildly tender, normal bowel sounds  Neurologic: awake and alert, normal strength, no focal findings  Skin: no phlebitis  Psychiatric:  awake, alert oriented x3               12.8   12.29 )-----------( 217      ( 2023 07:28 )             38.9       02    136  |  99  |  22  ----------------------------<  106<H>  4.1   |  25  |  1.19    Ca    8.9      2023 07:28    TPro  7.1  /  Alb  3.6  /  TBili  0.7  /  DBili  x   /  AST  20  /  ALT  16  /  AlkPhos  64  07      Urinalysis Basic - ( 2023 13:40 )    Color: Colorless / Appearance: Slightly Turbid / S.006 / pH: x  Gluc: x / Ketone: Negative  / Bili: Negative / Urobili: Negative   Blood: x / Protein: Trace / Nitrite: Positive   Leuk Esterase: Large / RBC: 3 /hpf /  /HPF   Sq Epi: x / Non Sq Epi: 0 /hpf / Bacteria: Few        MICROBIOLOGY:  v    Culture - Blood (collected 2023 14:01)  Source: .Blood Blood-Venous  Preliminary Report (2023 19:02):    No growth to date.    Culture - Urine (collected 2023 13:40)  Source: Catheterized Catheterized  Preliminary Report (2023 14:44):    >100,000 CFU/ml Escherichia coli    Culture - Blood (collected 2023 13:00)  Source: .Blood Blood-Peripheral  Preliminary Report (2023 19:02):    No growth to date.    Rapid RVP Result: Detected ( @ 16:28)

## 2023-02-08 NOTE — PROGRESS NOTE ADULT - ASSESSMENT
Patient is a 62y M PMHx HTN, DM, schizophrenia (hx suicide attempt), straight caths at home for neurogenic bladder, p/w worsening lethargy    #Concern for UTI  #Positive U/A  #Leukocytosis  #Enterovirus/Rhinovirus infection    Recommendations  Continue cefepime  Follow urine culture for sensitivities  Fevers may be in setting of viral illness as well  Obtain CT A/P  Follow pending blood cultures  Follow fever curve and WBC count    Fantasma Berry MD  Division of Infectious Diseases    Patient is a 62y M PMHx HTN, DM, schizophrenia (hx suicide attempt), straight caths at home for neurogenic bladder, p/w worsening lethargy    #Concern for UTI  #Positive U/A  #Leukocytosis  #Enterovirus/Rhinovirus infection    Recommendations  Sensitivities reviewed - would discontinue cefepime and start ceftriaxone 1g q24h  Fevers may be in setting of viral illness as well  Obtain CT C/A/P given fevers  Follow pending blood cultures  Follow fever curve and WBC count    Fantasma Berry MD  Division of Infectious Diseases

## 2023-02-08 NOTE — PROGRESS NOTE ADULT - ASSESSMENT
Patient is a 62y M PMHx HTN, DM, schizophrenia (hx suicide attempt), straight caths at home for neurogenic bladder, p/w worsening lethargy found to have uti  w/ elevated wbc better  i d eval noted  check ct a/p  viral syndrome  nebs  pulm eval noted  abs as per id  mild fluids  mild hyponatremia .better  dm  fsg riss  pt  dvt proph   c/w outp tmeds  discussed w/ pt/ wife

## 2023-02-09 LAB
ALBUMIN SERPL ELPH-MCNC: 2.9 G/DL — LOW (ref 3.3–5)
ALP SERPL-CCNC: 69 U/L — SIGNIFICANT CHANGE UP (ref 40–120)
ALT FLD-CCNC: 42 U/L — SIGNIFICANT CHANGE UP (ref 10–45)
ANION GAP SERPL CALC-SCNC: 9 MMOL/L — SIGNIFICANT CHANGE UP (ref 5–17)
ANION GAP SERPL CALC-SCNC: 9 MMOL/L — SIGNIFICANT CHANGE UP (ref 5–17)
AST SERPL-CCNC: 65 U/L — HIGH (ref 10–40)
BILIRUB SERPL-MCNC: 0.6 MG/DL — SIGNIFICANT CHANGE UP (ref 0.2–1.2)
BUN SERPL-MCNC: 17 MG/DL — SIGNIFICANT CHANGE UP (ref 7–23)
BUN SERPL-MCNC: 17 MG/DL — SIGNIFICANT CHANGE UP (ref 7–23)
CALCIUM SERPL-MCNC: 8.7 MG/DL — SIGNIFICANT CHANGE UP (ref 8.4–10.5)
CALCIUM SERPL-MCNC: 9.1 MG/DL — SIGNIFICANT CHANGE UP (ref 8.4–10.5)
CHLORIDE SERPL-SCNC: 94 MMOL/L — LOW (ref 96–108)
CHLORIDE SERPL-SCNC: 94 MMOL/L — LOW (ref 96–108)
CO2 SERPL-SCNC: 25 MMOL/L — SIGNIFICANT CHANGE UP (ref 22–31)
CO2 SERPL-SCNC: 28 MMOL/L — SIGNIFICANT CHANGE UP (ref 22–31)
CREAT SERPL-MCNC: 0.91 MG/DL — SIGNIFICANT CHANGE UP (ref 0.5–1.3)
CREAT SERPL-MCNC: 0.94 MG/DL — SIGNIFICANT CHANGE UP (ref 0.5–1.3)
EGFR: 93 ML/MIN/1.73M2 — SIGNIFICANT CHANGE UP
EGFR: 96 ML/MIN/1.73M2 — SIGNIFICANT CHANGE UP
GLUCOSE BLDC GLUCOMTR-MCNC: 102 MG/DL — HIGH (ref 70–99)
GLUCOSE BLDC GLUCOMTR-MCNC: 103 MG/DL — HIGH (ref 70–99)
GLUCOSE BLDC GLUCOMTR-MCNC: 109 MG/DL — HIGH (ref 70–99)
GLUCOSE BLDC GLUCOMTR-MCNC: 120 MG/DL — HIGH (ref 70–99)
GLUCOSE SERPL-MCNC: 113 MG/DL — HIGH (ref 70–99)
GLUCOSE SERPL-MCNC: 99 MG/DL — SIGNIFICANT CHANGE UP (ref 70–99)
HCT VFR BLD CALC: 36 % — LOW (ref 39–50)
HGB BLD-MCNC: 11.6 G/DL — LOW (ref 13–17)
MCHC RBC-ENTMCNC: 28.8 PG — SIGNIFICANT CHANGE UP (ref 27–34)
MCHC RBC-ENTMCNC: 32.2 GM/DL — SIGNIFICANT CHANGE UP (ref 32–36)
MCV RBC AUTO: 89.3 FL — SIGNIFICANT CHANGE UP (ref 80–100)
NRBC # BLD: 0 /100 WBCS — SIGNIFICANT CHANGE UP (ref 0–0)
PLATELET # BLD AUTO: 213 K/UL — SIGNIFICANT CHANGE UP (ref 150–400)
POTASSIUM SERPL-MCNC: 4.2 MMOL/L — SIGNIFICANT CHANGE UP (ref 3.5–5.3)
POTASSIUM SERPL-MCNC: 4.2 MMOL/L — SIGNIFICANT CHANGE UP (ref 3.5–5.3)
POTASSIUM SERPL-SCNC: 4.2 MMOL/L — SIGNIFICANT CHANGE UP (ref 3.5–5.3)
POTASSIUM SERPL-SCNC: 4.2 MMOL/L — SIGNIFICANT CHANGE UP (ref 3.5–5.3)
PROT SERPL-MCNC: 6.2 G/DL — SIGNIFICANT CHANGE UP (ref 6–8.3)
RBC # BLD: 4.03 M/UL — LOW (ref 4.2–5.8)
RBC # FLD: 13 % — SIGNIFICANT CHANGE UP (ref 10.3–14.5)
SODIUM SERPL-SCNC: 128 MMOL/L — LOW (ref 135–145)
SODIUM SERPL-SCNC: 131 MMOL/L — LOW (ref 135–145)
WBC # BLD: 8.34 K/UL — SIGNIFICANT CHANGE UP (ref 3.8–10.5)
WBC # FLD AUTO: 8.34 K/UL — SIGNIFICANT CHANGE UP (ref 3.8–10.5)

## 2023-02-09 PROCEDURE — 99232 SBSQ HOSP IP/OBS MODERATE 35: CPT

## 2023-02-09 PROCEDURE — 74177 CT ABD & PELVIS W/CONTRAST: CPT | Mod: 26

## 2023-02-09 RX ORDER — CEFTRIAXONE 500 MG/1
1000 INJECTION, POWDER, FOR SOLUTION INTRAMUSCULAR; INTRAVENOUS EVERY 24 HOURS
Refills: 0 | Status: DISCONTINUED | OUTPATIENT
Start: 2023-02-09 | End: 2023-02-11

## 2023-02-09 RX ADMIN — HEPARIN SODIUM 5000 UNIT(S): 5000 INJECTION INTRAVENOUS; SUBCUTANEOUS at 17:18

## 2023-02-09 RX ADMIN — Medication 1 MILLIGRAM(S): at 05:41

## 2023-02-09 RX ADMIN — CARVEDILOL PHOSPHATE 12.5 MILLIGRAM(S): 80 CAPSULE, EXTENDED RELEASE ORAL at 05:37

## 2023-02-09 RX ADMIN — CEFTRIAXONE 100 MILLIGRAM(S): 500 INJECTION, POWDER, FOR SOLUTION INTRAMUSCULAR; INTRAVENOUS at 11:15

## 2023-02-09 RX ADMIN — Medication 30 MILLIGRAM(S): at 17:08

## 2023-02-09 RX ADMIN — DIVALPROEX SODIUM 500 MILLIGRAM(S): 500 TABLET, DELAYED RELEASE ORAL at 17:09

## 2023-02-09 RX ADMIN — Medication 100 MILLIGRAM(S): at 05:38

## 2023-02-09 RX ADMIN — ALBUTEROL 1.25 MILLIGRAM(S): 90 AEROSOL, METERED ORAL at 17:04

## 2023-02-09 RX ADMIN — Medication 20 MILLIGRAM(S): at 05:37

## 2023-02-09 RX ADMIN — CARVEDILOL PHOSPHATE 12.5 MILLIGRAM(S): 80 CAPSULE, EXTENDED RELEASE ORAL at 17:09

## 2023-02-09 RX ADMIN — Medication 50 MILLIGRAM(S): at 05:39

## 2023-02-09 RX ADMIN — Medication 20 MILLIGRAM(S): at 17:09

## 2023-02-09 RX ADMIN — Medication 1 MILLIGRAM(S): at 17:05

## 2023-02-09 RX ADMIN — ALBUTEROL 1.25 MILLIGRAM(S): 90 AEROSOL, METERED ORAL at 03:32

## 2023-02-09 RX ADMIN — Medication 0.5 MILLIGRAM(S): at 05:36

## 2023-02-09 RX ADMIN — RISPERIDONE 2 MILLIGRAM(S): 4 TABLET ORAL at 22:29

## 2023-02-09 RX ADMIN — HEPARIN SODIUM 5000 UNIT(S): 5000 INJECTION INTRAVENOUS; SUBCUTANEOUS at 05:38

## 2023-02-09 RX ADMIN — MONTELUKAST 10 MILLIGRAM(S): 4 TABLET, CHEWABLE ORAL at 17:04

## 2023-02-09 RX ADMIN — ALBUTEROL 1.25 MILLIGRAM(S): 90 AEROSOL, METERED ORAL at 05:42

## 2023-02-09 RX ADMIN — Medication 30 MILLIGRAM(S): at 05:37

## 2023-02-09 RX ADMIN — Medication 50 MILLIGRAM(S): at 05:40

## 2023-02-09 RX ADMIN — Medication 100 MILLIGRAM(S): at 17:09

## 2023-02-09 RX ADMIN — TAMSULOSIN HYDROCHLORIDE 0.4 MILLIGRAM(S): 0.4 CAPSULE ORAL at 22:29

## 2023-02-09 RX ADMIN — CEFEPIME 100 MILLIGRAM(S): 1 INJECTION, POWDER, FOR SOLUTION INTRAMUSCULAR; INTRAVENOUS at 05:39

## 2023-02-09 RX ADMIN — Medication 50 MILLIGRAM(S): at 17:02

## 2023-02-09 RX ADMIN — Medication 0.5 MILLIGRAM(S): at 17:04

## 2023-02-09 NOTE — PROGRESS NOTE ADULT - SUBJECTIVE AND OBJECTIVE BOX
Follow Up:  UTI    Interval History/ROS:  Overnight: No acute events.  Patient with Tmax 100.4, overall fever curve trending better.  Otherwise hemodynamically stable requiring 2 L nasal cannula.  Latest labs show resolved leukocytosis at 8.3, CMP with renal function within normal limits, AST 65.    Patient seen examined at bedside.      Allergies  No Known Allergies        ANTIMICROBIALS:  cefTRIAXone   IVPB 1000 every 24 hours      OTHER MEDS:  MEDICATIONS  (STANDING):  acetaminophen     Tablet .. 650 every 6 hours PRN  albuterol   0.042% 1.25 every 6 hours  allopurinol 100 two times a day  benztropine 1 two times a day  bethanechol 50 four times a day  buDESOnide    Inhalation Suspension 0.5 two times a day  carvedilol 12.5 every 12 hours  dextrose 50% Injectable 25 once  dextrose 50% Injectable 12.5 once  dextrose 50% Injectable 25 once  dextrose Oral Gel 15 once PRN  divalproex  daily  enalapril 20 daily  FLUoxetine 20 daily  glucagon  Injectable 1 once  heparin   Injectable 5000 every 12 hours  insulin lispro (ADMELOG) corrective regimen sliding scale  three times a day before meals  montelukast 10 daily  NIFEdipine XL 30 two times a day  risperiDONE   Tablet 2 at bedtime  tamsulosin 0.4 at bedtime      Vital Signs Last 24 Hrs  T(C): 37.3 (09 Feb 2023 08:52), Max: 38 (08 Feb 2023 15:40)  T(F): 99.1 (09 Feb 2023 08:52), Max: 100.4 (08 Feb 2023 15:40)  HR: 77 (09 Feb 2023 08:52) (76 - 83)  BP: 116/73 (09 Feb 2023 08:52) (112/90 - 124/68)  BP(mean): --  RR: 18 (09 Feb 2023 08:52) (18 - 18)  SpO2: 97% (09 Feb 2023 08:52) (95% - 99%)    Parameters below as of 09 Feb 2023 08:52  Patient On (Oxygen Delivery Method): nasal cannula  O2 Flow (L/min): 2      PHYSICAL EXAM:  Constitutional: comfortable, no distress  Cardiovascular:   normal S1, S2, no murmur, no edema  Respiratory:  clear BS bilaterally, no wheezes, no rales  GI:  distended abdomen  mildly tender, normal bowel sounds  Neurologic: awake and alert, normal strength, no focal findings  Skin: no phlebitis  Psychiatric:  awake, alert oriented x3                                11.6   8.34  )-----------( 213      ( 09 Feb 2023 06:49 )             36.0       02-09    128<L>  |  94<L>  |  17  ----------------------------<  99  4.2   |  25  |  0.91    Ca    8.7      09 Feb 2023 06:48    TPro  6.2  /  Alb  2.9<L>  /  TBili  0.6  /  DBili  x   /  AST  65<H>  /  ALT  42  /  AlkPhos  69  02-09          MICROBIOLOGY:  v    Culture - Blood (collected 06 Feb 2023 14:01)  Source: .Blood Blood-Venous  Preliminary Report (07 Feb 2023 19:02):    No growth to date.    Culture - Urine (collected 06 Feb 2023 13:40)  Source: Catheterized Catheterized  Final Report (08 Feb 2023 11:53):    >100,000 CFU/ml Escherichia coli  Organism: Escherichia coli (08 Feb 2023 11:53)  Organism: Escherichia coli (08 Feb 2023 11:53)      -  Amikacin: S <=16      -  Amoxicillin/Clavulanic Acid: S <=8/4      -  Ampicillin: R >16 These ampicillin results predict results for amoxicillin      -  Ampicillin/Sulbactam: S 8/4 Enterobacter, Klebsiella aerogenes, Citrobacter, and Serratia may develop resistance during prolonged therapy (3-4 days)      -  Aztreonam: S <=4      -  Cefazolin: S <=2 For uncomplicated UTI with K. pneumoniae, E. coli, or P. mirablis: LINDA <=16 is sensitive and LINDA >=32 is resistant. This also predicts results for oral agents cefaclor, cefdinir, cefpodoxime, cefprozil, cefuroxime axetil, cephalexin and locarbef for uncomplicated UTI. Note that some isolates may be susceptible to these agents while testing resistant to cefazolin.      -  Cefepime: S <=2      -  Cefoxitin: S <=8      -  Ceftriaxone: S <=1 Enterobacter, Klebsiella aerogenes, Citrobacter, and Serratia may develop resistance during prolonged therapy      -  Cefuroxime: S <=4      -  Ciprofloxacin: R >2      -  Ertapenem: S <=0.5      -  Gentamicin: S <=2      -  Imipenem: S <=1      -  Levofloxacin: R >4      -  Meropenem: S <=1      -  Nitrofurantoin: S <=32 Should not be used to treat pyelonephritis      -  Piperacillin/Tazobactam: S <=8      -  Tobramycin: S <=2      -  Trimethoprim/Sulfamethoxazole: R >2/38      Method Type: LINDA    Culture - Blood (collected 06 Feb 2023 13:00)  Source: .Blood Blood-Peripheral  Preliminary Report (07 Feb 2023 19:02):    No growth to date.              Rapid RVP Result: Detected (02-06 @ 16:28)        RADIOLOGY:   Follow Up:  UTI    Interval History/ROS:  Overnight: No acute events.  Patient with Tmax 100.4, overall fever curve trending better.  on room air.  Latest labs show resolved leukocytosis at 8.3, CMP with renal function within normal limits, AST 65.    Patient seen examined at bedside. Appears more comfortable today, reports runny nose.      Allergies  No Known Allergies        ANTIMICROBIALS:  cefTRIAXone   IVPB 1000 every 24 hours      OTHER MEDS:  MEDICATIONS  (STANDING):  acetaminophen     Tablet .. 650 every 6 hours PRN  albuterol   0.042% 1.25 every 6 hours  allopurinol 100 two times a day  benztropine 1 two times a day  bethanechol 50 four times a day  buDESOnide    Inhalation Suspension 0.5 two times a day  carvedilol 12.5 every 12 hours  dextrose 50% Injectable 25 once  dextrose 50% Injectable 12.5 once  dextrose 50% Injectable 25 once  dextrose Oral Gel 15 once PRN  divalproex  daily  enalapril 20 daily  FLUoxetine 20 daily  glucagon  Injectable 1 once  heparin   Injectable 5000 every 12 hours  insulin lispro (ADMELOG) corrective regimen sliding scale  three times a day before meals  montelukast 10 daily  NIFEdipine XL 30 two times a day  risperiDONE   Tablet 2 at bedtime  tamsulosin 0.4 at bedtime      Vital Signs Last 24 Hrs  T(C): 37.3 (09 Feb 2023 08:52), Max: 38 (08 Feb 2023 15:40)  T(F): 99.1 (09 Feb 2023 08:52), Max: 100.4 (08 Feb 2023 15:40)  HR: 77 (09 Feb 2023 08:52) (76 - 83)  BP: 116/73 (09 Feb 2023 08:52) (112/90 - 124/68)  BP(mean): --  RR: 18 (09 Feb 2023 08:52) (18 - 18)  SpO2: 97% (09 Feb 2023 08:52) (95% - 99%)    Parameters below as of 09 Feb 2023 08:52  Patient On (Oxygen Delivery Method): nasal cannula  O2 Flow (L/min): 2      PHYSICAL EXAM:  Constitutional: comfortable, no distress  Cardiovascular:   normal S1, S2, no murmur, no edema  Respiratory:  clear BS bilaterally, no wheezes, no rales  GI:  distended abdomen  no tender, normal bowel sounds  Neurologic: awake and alert, normal strength, no focal findings  Skin: no phlebitis  Psychiatric:  awake, alert oriented x3                                11.6   8.34  )-----------( 213      ( 09 Feb 2023 06:49 )             36.0       02-09    128<L>  |  94<L>  |  17  ----------------------------<  99  4.2   |  25  |  0.91    Ca    8.7      09 Feb 2023 06:48    TPro  6.2  /  Alb  2.9<L>  /  TBili  0.6  /  DBili  x   /  AST  65<H>  /  ALT  42  /  AlkPhos  69  02-09          MICROBIOLOGY:  v    Culture - Blood (collected 06 Feb 2023 14:01)  Source: .Blood Blood-Venous  Preliminary Report (07 Feb 2023 19:02):    No growth to date.    Culture - Urine (collected 06 Feb 2023 13:40)  Source: Catheterized Catheterized  Final Report (08 Feb 2023 11:53):    >100,000 CFU/ml Escherichia coli  Organism: Escherichia coli (08 Feb 2023 11:53)  Organism: Escherichia coli (08 Feb 2023 11:53)      -  Amikacin: S <=16      -  Amoxicillin/Clavulanic Acid: S <=8/4      -  Ampicillin: R >16 These ampicillin results predict results for amoxicillin      -  Ampicillin/Sulbactam: S 8/4 Enterobacter, Klebsiella aerogenes, Citrobacter, and Serratia may develop resistance during prolonged therapy (3-4 days)      -  Aztreonam: S <=4      -  Cefazolin: S <=2 For uncomplicated UTI with K. pneumoniae, E. coli, or P. mirablis: LINDA <=16 is sensitive and LINDA >=32 is resistant. This also predicts results for oral agents cefaclor, cefdinir, cefpodoxime, cefprozil, cefuroxime axetil, cephalexin and locarbef for uncomplicated UTI. Note that some isolates may be susceptible to these agents while testing resistant to cefazolin.      -  Cefepime: S <=2      -  Cefoxitin: S <=8      -  Ceftriaxone: S <=1 Enterobacter, Klebsiella aerogenes, Citrobacter, and Serratia may develop resistance during prolonged therapy      -  Cefuroxime: S <=4      -  Ciprofloxacin: R >2      -  Ertapenem: S <=0.5      -  Gentamicin: S <=2      -  Imipenem: S <=1      -  Levofloxacin: R >4      -  Meropenem: S <=1      -  Nitrofurantoin: S <=32 Should not be used to treat pyelonephritis      -  Piperacillin/Tazobactam: S <=8      -  Tobramycin: S <=2      -  Trimethoprim/Sulfamethoxazole: R >2/38      Method Type: LINDA    Culture - Blood (collected 06 Feb 2023 13:00)  Source: .Blood Blood-Peripheral  Preliminary Report (07 Feb 2023 19:02):    No growth to date.    Rapid RVP Result: Detected (02-06 @ 16:28)

## 2023-02-09 NOTE — PROGRESS NOTE ADULT - ASSESSMENT
Patient is a 62y M PMHx HTN, DM, schizophrenia (hx suicide attempt), straight caths at home for neurogenic bladder, p/w worsening lethargy    #Concern for UTI  #Positive U/A  #Leukocytosis  #Enterovirus/Rhinovirus infection    Recommendations  Continue ceftriaxone 1g q24h  Fevers may be in setting of viral illness as well  Obtain CT C/A/P given fevers  Follow pending blood cultures  Follow fever curve and WBC count    Fantasma Berry MD  Division of Infectious Diseases    Patient is a 62y M PMHx HTN, DM, schizophrenia (hx suicide attempt), straight caths at home for neurogenic bladder, p/w worsening lethargy    #Concern for UTI  #Positive U/A  #Leukocytosis, resolved  #Enterovirus/Rhinovirus infection    Recommendations  Continue ceftriaxone 1g q24h, CT a/p without evidence for infectious process  Will plan for a 7 day course, plan to transition to PO cephalexin if otherwise ready for discharge  Fevers and URI symptoms likely due to viral illness  Follow fever curve and WBC count    Fantasma Berry MD  Division of Infectious Diseases

## 2023-02-09 NOTE — PROGRESS NOTE ADULT - ASSESSMENT
Patient is a 62y M PMHx HTN, DM, schizophrenia (hx suicide attempt), straight caths at home for neurogenic bladder, p/w worsening lethargy found to have uti  w/ elevated wbc better  i d eval noted  check ct a/p  viral syndrome  nebs  pulm eval noted  abs as per id  mild fluids   hyponatremia .recheck bmp stat  dm  fsg riss  pt  dvt proph   c/w outp tmeds

## 2023-02-09 NOTE — PROGRESS NOTE ADULT - ASSESSMENT
Impression :   Asthma   - adequately controlled  History of DOUG    Recommend :   Continue nebulizer treatments   Resume on CPAP  On antibiotics per ID  - awaits CT    Charles Miles MD, FCCP  Emmanuel Miles/Elina/Margie  Pulmonary Medicine

## 2023-02-09 NOTE — PROGRESS NOTE ADULT - SUBJECTIVE AND OBJECTIVE BOX
Pulmonary Medicine     No SOB at rest    Vital Signs Last 24 Hrs  T(C): 37.3 (09 Feb 2023 08:52), Max: 38 (08 Feb 2023 15:40)  T(F): 99.1 (09 Feb 2023 08:52), Max: 100.4 (08 Feb 2023 15:40)  HR: 77 (09 Feb 2023 08:52) (76 - 83)  BP: 116/73 (09 Feb 2023 08:52) (111/67 - 124/68)  BP(mean): --  RR: 18 (09 Feb 2023 08:52) (18 - 18)  SpO2: 97% (09 Feb 2023 08:52) (95% - 99%)  Parameters below as of 09 Feb 2023 08:52  Patient On (Oxygen Delivery Method): nasal cannula  O2 Flow (L/min): 2    Physical examination   Resting in bed in no apparent distress   On nasal canula  No wheezing appreciated   Heart sounds were regular  Obese abdomen   No cyanosis                           11.6   8.34  )-----------( 213      ( 09 Feb 2023 06:49 )             36.0     02-09    128<L>  |  94<L>  |  17  ----------------------------<  99  4.2   |  25  |  0.91    Ca    8.7      09 Feb 2023 06:48    TPro  6.2  /  Alb  2.9<L>  /  TBili  0.6  /  DBili  x   /  AST  65<H>  /  ALT  42  /  AlkPhos  69  02-09    MEDICATIONS  (STANDING):  albuterol   0.042% 1.25 milliGRAM(s) Nebulizer every 6 hours  allopurinol 100 milliGRAM(s) Oral two times a day  benztropine 1 milliGRAM(s) Oral two times a day  bethanechol 50 milliGRAM(s) Oral four times a day  buDESOnide    Inhalation Suspension 0.5 milliGRAM(s) Inhalation two times a day  carvedilol 12.5 milliGRAM(s) Oral every 12 hours  cefTRIAXone   IVPB 1000 milliGRAM(s) IV Intermittent every 24 hours  dextrose 5%. 1000 milliLiter(s) (50 mL/Hr) IV Continuous <Continuous>  dextrose 5%. 1000 milliLiter(s) (100 mL/Hr) IV Continuous <Continuous>  dextrose 50% Injectable 25 Gram(s) IV Push once  dextrose 50% Injectable 12.5 Gram(s) IV Push once  dextrose 50% Injectable 25 Gram(s) IV Push once  divalproex  milliGRAM(s) Oral daily  enalapril 20 milliGRAM(s) Oral daily  FLUoxetine 20 milliGRAM(s) Oral daily  glucagon  Injectable 1 milliGRAM(s) IntraMuscular once  heparin   Injectable 5000 Unit(s) SubCutaneous every 12 hours  insulin lispro (ADMELOG) corrective regimen sliding scale   SubCutaneous three times a day before meals  montelukast 10 milliGRAM(s) Oral daily  NIFEdipine XL 30 milliGRAM(s) Oral two times a day  risperiDONE   Tablet 2 milliGRAM(s) Oral at bedtime  tamsulosin 0.4 milliGRAM(s) Oral at bedtime

## 2023-02-09 NOTE — PROGRESS NOTE ADULT - SUBJECTIVE AND OBJECTIVE BOX
DATE OF SERVICE: 02-09-23 @ 12:49  CHIEF COMPLAINT:Patient is a 62y old  Male who presents with a chief complaint of   	        PAST MEDICAL & SURGICAL HISTORY:  History of Schizophrenia      HTN (Hypertension)      Hyperlipidemia      Gout      Anxiety      Asthma      DM (diabetes mellitus)      History of Tonsillectomy      S/P Laparotomy      H/O knee surgery              REVIEW OF SYSTEMS:    RESPIRATORY: No cough, wheezing, chills or hemoptysis; No Shortness of Breath  CARDIOVASCULAR: No chest pain, palpitations, passing out,   GASTROINTESTINAL: No abdominal or epigastric pain. No nausea, vomiting, or hematemesis;  GENITOURINARY: No dysuria, frequency, hematuria, or incontinence  NEUROLOGICAL: No headaches,     Medications:  MEDICATIONS  (STANDING):  albuterol   0.042% 1.25 milliGRAM(s) Nebulizer every 6 hours  allopurinol 100 milliGRAM(s) Oral two times a day  benztropine 1 milliGRAM(s) Oral two times a day  bethanechol 50 milliGRAM(s) Oral four times a day  buDESOnide    Inhalation Suspension 0.5 milliGRAM(s) Inhalation two times a day  carvedilol 12.5 milliGRAM(s) Oral every 12 hours  cefTRIAXone   IVPB 1000 milliGRAM(s) IV Intermittent every 24 hours  dextrose 5%. 1000 milliLiter(s) (50 mL/Hr) IV Continuous <Continuous>  dextrose 5%. 1000 milliLiter(s) (100 mL/Hr) IV Continuous <Continuous>  dextrose 50% Injectable 25 Gram(s) IV Push once  dextrose 50% Injectable 12.5 Gram(s) IV Push once  dextrose 50% Injectable 25 Gram(s) IV Push once  divalproex  milliGRAM(s) Oral daily  enalapril 20 milliGRAM(s) Oral daily  FLUoxetine 20 milliGRAM(s) Oral daily  glucagon  Injectable 1 milliGRAM(s) IntraMuscular once  heparin   Injectable 5000 Unit(s) SubCutaneous every 12 hours  insulin lispro (ADMELOG) corrective regimen sliding scale   SubCutaneous three times a day before meals  montelukast 10 milliGRAM(s) Oral daily  NIFEdipine XL 30 milliGRAM(s) Oral two times a day  risperiDONE   Tablet 2 milliGRAM(s) Oral at bedtime  tamsulosin 0.4 milliGRAM(s) Oral at bedtime    MEDICATIONS  (PRN):  acetaminophen     Tablet .. 650 milliGRAM(s) Oral every 6 hours PRN Temp greater or equal to 38C (100.4F), Mild Pain (1 - 3)  dextrose Oral Gel 15 Gram(s) Oral once PRN Blood Glucose LESS THAN 70 milliGRAM(s)/deciliter    	    PHYSICAL EXAM:  T(C): 37.3 (02-09-23 @ 08:52), Max: 38 (02-08-23 @ 15:40)  HR: 77 (02-09-23 @ 08:52) (76 - 83)  BP: 116/73 (02-09-23 @ 08:52) (112/90 - 124/68)  RR: 18 (02-09-23 @ 08:52) (18 - 18)  SpO2: 97% (02-09-23 @ 08:52) (95% - 99%)  Wt(kg): --  I&O's Summary    08 Feb 2023 07:01  -  09 Feb 2023 07:00  --------------------------------------------------------  IN: 400 mL / OUT: 3050 mL / NET: -2650 mL    09 Feb 2023 07:01  -  09 Feb 2023 12:49  --------------------------------------------------------  IN: 220 mL / OUT: 0 mL / NET: 220 mL          HEENT:   Normal oral mucosa,  Cardiovascular: Normal S1 S2, No JVD,   Respiratory: Lungs clear to auscultation	  Psychiatry: A & O x 3,   Gastrointestinal:  Soft, Non-tender, + BS	    Neurologic: Non-focal  Extremities: dec rom    TELEMETRY: 	    ECG:  	  RADIOLOGY:  OTHER: 	  	  LABS:	 	    CARDIAC MARKERS:                                11.6   8.34  )-----------( 213      ( 09 Feb 2023 06:49 )             36.0     02-09    128<L>  |  94<L>  |  17  ----------------------------<  99  4.2   |  25  |  0.91    Ca    8.7      09 Feb 2023 06:48    TPro  6.2  /  Alb  2.9<L>  /  TBili  0.6  /  DBili  x   /  AST  65<H>  /  ALT  42  /  AlkPhos  69  02-09    proBNP:   Lipid Profile:   HgA1c:   TSH:

## 2023-02-10 LAB
ANION GAP SERPL CALC-SCNC: 12 MMOL/L — SIGNIFICANT CHANGE UP (ref 5–17)
BUN SERPL-MCNC: 18 MG/DL — SIGNIFICANT CHANGE UP (ref 7–23)
CALCIUM SERPL-MCNC: 9.1 MG/DL — SIGNIFICANT CHANGE UP (ref 8.4–10.5)
CHLORIDE SERPL-SCNC: 93 MMOL/L — LOW (ref 96–108)
CO2 SERPL-SCNC: 24 MMOL/L — SIGNIFICANT CHANGE UP (ref 22–31)
CREAT SERPL-MCNC: 0.97 MG/DL — SIGNIFICANT CHANGE UP (ref 0.5–1.3)
EGFR: 89 ML/MIN/1.73M2 — SIGNIFICANT CHANGE UP
GLUCOSE BLDC GLUCOMTR-MCNC: 104 MG/DL — HIGH (ref 70–99)
GLUCOSE BLDC GLUCOMTR-MCNC: 110 MG/DL — HIGH (ref 70–99)
GLUCOSE BLDC GLUCOMTR-MCNC: 110 MG/DL — HIGH (ref 70–99)
GLUCOSE BLDC GLUCOMTR-MCNC: 143 MG/DL — HIGH (ref 70–99)
GLUCOSE SERPL-MCNC: 100 MG/DL — HIGH (ref 70–99)
HCT VFR BLD CALC: 37.4 % — LOW (ref 39–50)
HGB BLD-MCNC: 12 G/DL — LOW (ref 13–17)
MCHC RBC-ENTMCNC: 28.9 PG — SIGNIFICANT CHANGE UP (ref 27–34)
MCHC RBC-ENTMCNC: 32.1 GM/DL — SIGNIFICANT CHANGE UP (ref 32–36)
MCV RBC AUTO: 90.1 FL — SIGNIFICANT CHANGE UP (ref 80–100)
NRBC # BLD: 0 /100 WBCS — SIGNIFICANT CHANGE UP (ref 0–0)
PLATELET # BLD AUTO: 237 K/UL — SIGNIFICANT CHANGE UP (ref 150–400)
POTASSIUM SERPL-MCNC: 4.2 MMOL/L — SIGNIFICANT CHANGE UP (ref 3.5–5.3)
POTASSIUM SERPL-SCNC: 4.2 MMOL/L — SIGNIFICANT CHANGE UP (ref 3.5–5.3)
RBC # BLD: 4.15 M/UL — LOW (ref 4.2–5.8)
RBC # FLD: 12.6 % — SIGNIFICANT CHANGE UP (ref 10.3–14.5)
SODIUM SERPL-SCNC: 129 MMOL/L — LOW (ref 135–145)
WBC # BLD: 7.8 K/UL — SIGNIFICANT CHANGE UP (ref 3.8–10.5)
WBC # FLD AUTO: 7.8 K/UL — SIGNIFICANT CHANGE UP (ref 3.8–10.5)

## 2023-02-10 PROCEDURE — 99232 SBSQ HOSP IP/OBS MODERATE 35: CPT

## 2023-02-10 RX ORDER — ONDANSETRON 8 MG/1
4 TABLET, FILM COATED ORAL ONCE
Refills: 0 | Status: COMPLETED | OUTPATIENT
Start: 2023-02-10 | End: 2023-02-10

## 2023-02-10 RX ORDER — LACTOBACILLUS ACIDOPHILUS 100MM CELL
1 CAPSULE ORAL
Refills: 0 | Status: DISCONTINUED | OUTPATIENT
Start: 2023-02-10 | End: 2023-02-11

## 2023-02-10 RX ADMIN — Medication 100 MILLIGRAM(S): at 05:16

## 2023-02-10 RX ADMIN — Medication 50 MILLIGRAM(S): at 02:26

## 2023-02-10 RX ADMIN — Medication 1 MILLIGRAM(S): at 17:21

## 2023-02-10 RX ADMIN — Medication 1 MILLIGRAM(S): at 05:15

## 2023-02-10 RX ADMIN — TAMSULOSIN HYDROCHLORIDE 0.4 MILLIGRAM(S): 0.4 CAPSULE ORAL at 23:53

## 2023-02-10 RX ADMIN — ONDANSETRON 4 MILLIGRAM(S): 8 TABLET, FILM COATED ORAL at 22:32

## 2023-02-10 RX ADMIN — ALBUTEROL 1.25 MILLIGRAM(S): 90 AEROSOL, METERED ORAL at 12:19

## 2023-02-10 RX ADMIN — Medication 50 MILLIGRAM(S): at 17:22

## 2023-02-10 RX ADMIN — MONTELUKAST 10 MILLIGRAM(S): 4 TABLET, CHEWABLE ORAL at 12:19

## 2023-02-10 RX ADMIN — Medication 50 MILLIGRAM(S): at 05:15

## 2023-02-10 RX ADMIN — Medication 30 MILLIGRAM(S): at 05:16

## 2023-02-10 RX ADMIN — HEPARIN SODIUM 5000 UNIT(S): 5000 INJECTION INTRAVENOUS; SUBCUTANEOUS at 17:21

## 2023-02-10 RX ADMIN — ALBUTEROL 1.25 MILLIGRAM(S): 90 AEROSOL, METERED ORAL at 05:17

## 2023-02-10 RX ADMIN — CARVEDILOL PHOSPHATE 12.5 MILLIGRAM(S): 80 CAPSULE, EXTENDED RELEASE ORAL at 05:16

## 2023-02-10 RX ADMIN — RISPERIDONE 2 MILLIGRAM(S): 4 TABLET ORAL at 23:53

## 2023-02-10 RX ADMIN — ALBUTEROL 1.25 MILLIGRAM(S): 90 AEROSOL, METERED ORAL at 02:25

## 2023-02-10 RX ADMIN — Medication 30 MILLIGRAM(S): at 17:21

## 2023-02-10 RX ADMIN — Medication 20 MILLIGRAM(S): at 05:16

## 2023-02-10 RX ADMIN — DIVALPROEX SODIUM 500 MILLIGRAM(S): 500 TABLET, DELAYED RELEASE ORAL at 12:19

## 2023-02-10 RX ADMIN — Medication 20 MILLIGRAM(S): at 12:20

## 2023-02-10 RX ADMIN — ALBUTEROL 1.25 MILLIGRAM(S): 90 AEROSOL, METERED ORAL at 17:21

## 2023-02-10 RX ADMIN — Medication 0.5 MILLIGRAM(S): at 17:20

## 2023-02-10 RX ADMIN — CARVEDILOL PHOSPHATE 12.5 MILLIGRAM(S): 80 CAPSULE, EXTENDED RELEASE ORAL at 17:21

## 2023-02-10 RX ADMIN — Medication 0.5 MILLIGRAM(S): at 05:16

## 2023-02-10 RX ADMIN — CEFTRIAXONE 100 MILLIGRAM(S): 500 INJECTION, POWDER, FOR SOLUTION INTRAMUSCULAR; INTRAVENOUS at 12:23

## 2023-02-10 RX ADMIN — Medication 1 TABLET(S): at 17:20

## 2023-02-10 RX ADMIN — Medication 50 MILLIGRAM(S): at 12:19

## 2023-02-10 RX ADMIN — HEPARIN SODIUM 5000 UNIT(S): 5000 INJECTION INTRAVENOUS; SUBCUTANEOUS at 05:18

## 2023-02-10 RX ADMIN — Medication 100 MILLIGRAM(S): at 17:21

## 2023-02-10 NOTE — PROGRESS NOTE ADULT - SUBJECTIVE AND OBJECTIVE BOX
"Better" but has sputum this morning; no cough    VS Stable Afeb    Lungs- scattered rhonchi. Good air entry bilat  Heart- Reg  Abd- non tender  Ext- chronic stasis changes    IMP: UTI; on antibx; improved  Hx of asthma; rhonchi this AM  DOUG    PLAN: Complete antibx as per ID  Add neb steroids Continue montelukast and albuterol nebs  Nocturnal PAP    Memo Antoine MD [facility]  [phone]    (Elina/Jd/Margie)

## 2023-02-10 NOTE — PROVIDER CONTACT NOTE (OTHER) - SITUATION
Pt admitted for UTI on IV abx, history of neurogenic bladder.
Pt tachycardic, febrile, lethargic
pt lethargic and unable to take his oral medication. All other meds were given.
pt temp 101.9
Pt did not receive any 6pm meds (day shift meds), due to pt being lethargic. Was not sure if any providers were made aware. Made provider aware of situation

## 2023-02-10 NOTE — PROVIDER CONTACT NOTE (OTHER) - ASSESSMENT
Pt can be aroused but continues to go back to sleep. Vitals WDL.
pt denies lower abdominal pain and discomfort.
A&OX4 at baseline, pt now lethargic, able to open eyes & follow some commands, able to verbalize his name. RR 28, tachycardic, HR 120s, temp 103.1.

## 2023-02-10 NOTE — PROVIDER CONTACT NOTE (OTHER) - NAME OF MD/NP/PA/DO NOTIFIED:
[facility] from [address] [place] [address].   Plan
05/01/19 1700   Discharge disposition   Expected discharge disposition Skilled Nurs   Name of Facillity/Home Care/Hospice   ([facility])   Discharge transportation Private car
Henry Last from [facility] called to request PT notes, she is aware that pt is ready for d/c today. PT ordered, will send notes when completed. Updates sent to [facility], they are ready for pt today. Pt is a long term resident there.  Unit RN to call [phone]
Becky De Jesus NP
BRAYDEN Kent
RBAYDEN Kent
Sarah ALLEN
BRAYDEN Kent

## 2023-02-10 NOTE — PROGRESS NOTE ADULT - ASSESSMENT
Patient is a 62y M PMHx HTN, DM, schizophrenia (hx suicide attempt), straight caths at home for neurogenic bladder, p/w worsening lethargy found to have uti  w/ elevated wbc better  i d eval noted  neg  ct a/p  viral syndrome  nebs  pulm eval noted  abs as per id     hyponatremia .  dm  fsg riss  pt  dvt proph   c/w outp tmeds  nebs/as per pulm     nerogenic bladder hx  straight caths daily    d/c planning

## 2023-02-10 NOTE — PROVIDER CONTACT NOTE (OTHER) - ACTION/TREATMENT ORDERED:
provider made aware, IV tylenol will be ordered.
provider made aware. Hold oral meds, until lethargy subsides.
provider aware of situation, reschedule oral am meds and let day nurse reassess pt ability to take the medication.
NP to contact attending for concern for Barnes.   straight catheterization to be done.
Tylenol administered, IV abx, straight cath done

## 2023-02-10 NOTE — PROGRESS NOTE ADULT - SUBJECTIVE AND OBJECTIVE BOX
Follow Up:  UTI    Interval History/ROS:  Overnight: No acute events.  Patient remains afebrile, otherwise hemodynamically stable on room air.  Latest labs show no leukocytosis, BMP with renal function within normal limits.    Patient seen examined at bedside.  Appears comfortable, no distress.  Denies any new pain or discomfort.    Allergies  No Known Allergies    ANTIMICROBIALS:  cefTRIAXone   IVPB 1000 every 24 hours    OTHER MEDS:  MEDICATIONS  (STANDING):  acetaminophen     Tablet .. 650 every 6 hours PRN  albuterol   0.042% 1.25 every 6 hours  allopurinol 100 two times a day  benztropine 1 two times a day  bethanechol 50 four times a day  buDESOnide    Inhalation Suspension 0.5 two times a day  carvedilol 12.5 every 12 hours  dextrose 50% Injectable 25 once  dextrose 50% Injectable 12.5 once  dextrose 50% Injectable 25 once  dextrose Oral Gel 15 once PRN  divalproex  daily  enalapril 20 daily  FLUoxetine 20 daily  glucagon  Injectable 1 once  heparin   Injectable 5000 every 12 hours  insulin lispro (ADMELOG) corrective regimen sliding scale  three times a day before meals  montelukast 10 daily  NIFEdipine XL 30 two times a day  risperiDONE   Tablet 2 at bedtime  tamsulosin 0.4 at bedtime      Vital Signs Last 24 Hrs  T(C): 37.5 (10 Feb 2023 16:35), Max: 37.6 (09 Feb 2023 19:35)  T(F): 99.5 (10 Feb 2023 16:35), Max: 99.7 (09 Feb 2023 19:35)  HR: 82 (10 Feb 2023 16:35) (76 - 83)  BP: 143/82 (10 Feb 2023 16:35) (115/68 - 147/80)  BP(mean): --  RR: 18 (10 Feb 2023 16:35) (18 - 18)  SpO2: 95% (10 Feb 2023 16:35) (92% - 98%)    Parameters below as of 10 Feb 2023 16:35  Patient On (Oxygen Delivery Method): room air    PHYSICAL EXAM:  Constitutional: comfortable, no distress  Cardiovascular:   normal S1, S2, no murmur, no edema  Respiratory:  clear BS bilaterally, no wheezes, no rales  GI:  distended abdomen  no tender, normal bowel sounds  Neurologic: awake and alert, normal strength, no focal findings  Skin: no phlebitis  Psychiatric:  awake, alert oriented x3               12.0   7.80  )-----------( 237      ( 10 Feb 2023 07:00 )             37.4       02-10    129<L>  |  93<L>  |  18  ----------------------------<  100<H>  4.2   |  24  |  0.97    Ca    9.1      10 Feb 2023 06:57    TPro  6.2  /  Alb  2.9<L>  /  TBili  0.6  /  DBili  x   /  AST  65<H>  /  ALT  42  /  AlkPhos  69  02-09          MICROBIOLOGY:  v    Culture - Blood (collected 06 Feb 2023 14:01)  Source: .Blood Blood-Venous  Preliminary Report (07 Feb 2023 19:02):    No growth to date.    Culture - Urine (collected 06 Feb 2023 13:40)  Source: Catheterized Catheterized  Final Report (08 Feb 2023 11:53):    >100,000 CFU/ml Escherichia coli  Organism: Escherichia coli (08 Feb 2023 11:53)  Organism: Escherichia coli (08 Feb 2023 11:53)      -  Amikacin: S <=16      -  Amoxicillin/Clavulanic Acid: S <=8/4      -  Ampicillin: R >16 These ampicillin results predict results for amoxicillin      -  Ampicillin/Sulbactam: S 8/4 Enterobacter, Klebsiella aerogenes, Citrobacter, and Serratia may develop resistance during prolonged therapy (3-4 days)      -  Aztreonam: S <=4      -  Cefazolin: S <=2 For uncomplicated UTI with K. pneumoniae, E. coli, or P. mirablis: LINDA <=16 is sensitive and LINDA >=32 is resistant. This also predicts results for oral agents cefaclor, cefdinir, cefpodoxime, cefprozil, cefuroxime axetil, cephalexin and locarbef for uncomplicated UTI. Note that some isolates may be susceptible to these agents while testing resistant to cefazolin.      -  Cefepime: S <=2      -  Cefoxitin: S <=8      -  Ceftriaxone: S <=1 Enterobacter, Klebsiella aerogenes, Citrobacter, and Serratia may develop resistance during prolonged therapy      -  Cefuroxime: S <=4      -  Ciprofloxacin: R >2      -  Ertapenem: S <=0.5      -  Gentamicin: S <=2      -  Imipenem: S <=1      -  Levofloxacin: R >4      -  Meropenem: S <=1      -  Nitrofurantoin: S <=32 Should not be used to treat pyelonephritis      -  Piperacillin/Tazobactam: S <=8      -  Tobramycin: S <=2      -  Trimethoprim/Sulfamethoxazole: R >2/38      Method Type: LINDA    Culture - Blood (collected 06 Feb 2023 13:00)  Source: .Blood Blood-Peripheral  Preliminary Report (07 Feb 2023 19:02):    No growth to date.              Rapid RVP Result: Detected (02-06 @ 16:28)        RADIOLOGY:

## 2023-02-10 NOTE — PROGRESS NOTE ADULT - ASSESSMENT
Patient is a 62y M PMHx HTN, DM, schizophrenia (hx suicide attempt), straight caths at home for neurogenic bladder, p/w worsening lethargy    #Concern for UTI  #Positive U/A  #Leukocytosis, resolved  #Enterovirus/Rhinovirus infection    Recommendations  Continue ceftriaxone 1g q24h, CT a/p without evidence for infectious process  Given symptoms will plan for an extended 10 day course, plan to transition to PO cephalexin if otherwise ready for discharge  End date: 2/16/23  Fevers and URI symptoms likely viral illness contributing  Advised for close outpatient follow-up with urology  Follow-up with ID clinic after discharge as well, please place referral  Follow fever curve and WBC count    ID to sign off. Please contact as issues arise.    Fantasma Berry MD  Division of Infectious Diseases

## 2023-02-10 NOTE — PROVIDER CONTACT NOTE (OTHER) - REASON
Pt tachycardic, febrile, lethargic
pt lethargic
pt temp 101.9
pt's bladder scan: 450 history of neurogenic bladder, straight cath 3 times already.
pt lethargic

## 2023-02-10 NOTE — PROGRESS NOTE ADULT - SUBJECTIVE AND OBJECTIVE BOX
DATE OF SERVICE: 02-10-23 @ 14:21  CHIEF COMPLAINT:Patient is a 62y old  Male who presents with a chief complaint of   	        PAST MEDICAL & SURGICAL HISTORY:  History of Schizophrenia      HTN (Hypertension)      Hyperlipidemia      Gout      Anxiety      Asthma      DM (diabetes mellitus)      History of Tonsillectomy      S/P Laparotomy      H/O knee surgery              REVIEW OF SYSTEMS:  sleepy  RESPIRATORY: No cough, wheezing, chills or hemoptysis; No Shortness of Breath  CARDIOVASCULAR: No chest pain, palpitations, passing out, d  GASTROINTESTINAL: No abdominal or epigastric pain. No nausea, vomiting, or hematemesis;     NEUROLOGICAL: No headaches,     Medications:  MEDICATIONS  (STANDING):  albuterol   0.042% 1.25 milliGRAM(s) Nebulizer every 6 hours  allopurinol 100 milliGRAM(s) Oral two times a day  benztropine 1 milliGRAM(s) Oral two times a day  bethanechol 50 milliGRAM(s) Oral four times a day  buDESOnide    Inhalation Suspension 0.5 milliGRAM(s) Inhalation two times a day  carvedilol 12.5 milliGRAM(s) Oral every 12 hours  cefTRIAXone   IVPB 1000 milliGRAM(s) IV Intermittent every 24 hours  dextrose 5%. 1000 milliLiter(s) (50 mL/Hr) IV Continuous <Continuous>  dextrose 5%. 1000 milliLiter(s) (100 mL/Hr) IV Continuous <Continuous>  dextrose 50% Injectable 25 Gram(s) IV Push once  dextrose 50% Injectable 12.5 Gram(s) IV Push once  dextrose 50% Injectable 25 Gram(s) IV Push once  divalproex  milliGRAM(s) Oral daily  enalapril 20 milliGRAM(s) Oral daily  FLUoxetine 20 milliGRAM(s) Oral daily  glucagon  Injectable 1 milliGRAM(s) IntraMuscular once  heparin   Injectable 5000 Unit(s) SubCutaneous every 12 hours  insulin lispro (ADMELOG) corrective regimen sliding scale   SubCutaneous three times a day before meals  lactobacillus acidophilus 1 Tablet(s) Oral two times a day with meals  montelukast 10 milliGRAM(s) Oral daily  NIFEdipine XL 30 milliGRAM(s) Oral two times a day  risperiDONE   Tablet 2 milliGRAM(s) Oral at bedtime  tamsulosin 0.4 milliGRAM(s) Oral at bedtime    MEDICATIONS  (PRN):  acetaminophen     Tablet .. 650 milliGRAM(s) Oral every 6 hours PRN Temp greater or equal to 38C (100.4F), Mild Pain (1 - 3)  dextrose Oral Gel 15 Gram(s) Oral once PRN Blood Glucose LESS THAN 70 milliGRAM(s)/deciliter    	    PHYSICAL EXAM:  T(C): 36.7 (02-10-23 @ 10:49), Max: 37.6 (02-09-23 @ 15:16)  HR: 80 (02-10-23 @ 12:01) (75 - 83)  BP: 132/81 (02-10-23 @ 12:01) (115/68 - 147/80)  RR: 18 (02-10-23 @ 12:01) (18 - 18)  SpO2: 96% (02-10-23 @ 12:01) (92% - 98%)  Wt(kg): --  I&O's Summary    09 Feb 2023 07:01  -  10 Feb 2023 07:00  --------------------------------------------------------  IN: 340 mL / OUT: 2200 mL / NET: -1860 mL    10 Feb 2023 07:01  -  10 Feb 2023 14:21  --------------------------------------------------------  IN: 320 mL / OUT: 0 mL / NET: 320 mL        Appearance: Normal	  HEENT:   Normal oral mucosa, PERRL, EOMI	  Lymphatic: No lymphadenopathy  Cardiovascular: Normal S1 S2, No JVD,     dec bs  Psychiatry: A & O x 3,   Gastrointestinal:  Soft, Non-tender, + BS	/obese  	  Neurologic: Non-focal  Extremities: dec rom    TELEMETRY: 	    ECG:  	  RADIOLOGY:  OTHER: 	  	  LABS:	 	    CARDIAC MARKERS:                                12.0   7.80  )-----------( 237      ( 10 Feb 2023 07:00 )             37.4     02-10    129<L>  |  93<L>  |  18  ----------------------------<  100<H>  4.2   |  24  |  0.97    Ca    9.1      10 Feb 2023 06:57    TPro  6.2  /  Alb  2.9<L>  /  TBili  0.6  /  DBili  x   /  AST  65<H>  /  ALT  42  /  AlkPhos  69  02-09    proBNP:   Lipid Profile:   HgA1c:   TSH:

## 2023-02-10 NOTE — PROVIDER CONTACT NOTE (OTHER) - BACKGROUND
Pt admitted for UTI. Pt lethargic. Pt positive for enterorhinovirus.
pt admitted w/ UTI and lethargy. Pt currently very sleepy and lethargic, cannot answer questions without falling asleep.
Pt admitted w/ UTI and lethargy.
DX: UTI
pt a&ox4, no voiding.

## 2023-02-11 ENCOUNTER — TRANSCRIPTION ENCOUNTER (OUTPATIENT)
Age: 63
End: 2023-02-11

## 2023-02-11 ENCOUNTER — INPATIENT (INPATIENT)
Facility: HOSPITAL | Age: 63
LOS: 4 days | Discharge: NURSING, REHAB OR LONG-TERM CARE | DRG: 205 | End: 2023-02-16
Attending: INTERNAL MEDICINE | Admitting: INTERNAL MEDICINE
Payer: MEDICARE

## 2023-02-11 VITALS
HEART RATE: 78 BPM | RESPIRATION RATE: 20 BRPM | HEIGHT: 72 IN | DIASTOLIC BLOOD PRESSURE: 77 MMHG | SYSTOLIC BLOOD PRESSURE: 129 MMHG | OXYGEN SATURATION: 100 % | TEMPERATURE: 98 F

## 2023-02-11 VITALS
DIASTOLIC BLOOD PRESSURE: 74 MMHG | HEART RATE: 84 BPM | RESPIRATION RATE: 18 BRPM | SYSTOLIC BLOOD PRESSURE: 134 MMHG | OXYGEN SATURATION: 94 % | TEMPERATURE: 97 F

## 2023-02-11 DIAGNOSIS — Z98.890 OTHER SPECIFIED POSTPROCEDURAL STATES: Chronic | ICD-10-CM

## 2023-02-11 LAB
ANION GAP SERPL CALC-SCNC: 9 MMOL/L — SIGNIFICANT CHANGE UP (ref 5–17)
BUN SERPL-MCNC: 27 MG/DL — HIGH (ref 7–23)
CALCIUM SERPL-MCNC: 9.2 MG/DL — SIGNIFICANT CHANGE UP (ref 8.4–10.5)
CHLORIDE SERPL-SCNC: 88 MMOL/L — LOW (ref 96–108)
CO2 SERPL-SCNC: 34 MMOL/L — HIGH (ref 22–31)
CREAT SERPL-MCNC: 0.94 MG/DL — SIGNIFICANT CHANGE UP (ref 0.5–1.3)
CULTURE RESULTS: SIGNIFICANT CHANGE UP
CULTURE RESULTS: SIGNIFICANT CHANGE UP
EGFR: 93 ML/MIN/1.73M2 — SIGNIFICANT CHANGE UP
GLUCOSE BLDC GLUCOMTR-MCNC: 126 MG/DL — HIGH (ref 70–99)
GLUCOSE BLDC GLUCOMTR-MCNC: 138 MG/DL — HIGH (ref 70–99)
GLUCOSE BLDC GLUCOMTR-MCNC: 154 MG/DL — HIGH (ref 70–99)
GLUCOSE SERPL-MCNC: 150 MG/DL — HIGH (ref 70–99)
POTASSIUM SERPL-MCNC: 3.8 MMOL/L — SIGNIFICANT CHANGE UP (ref 3.5–5.3)
POTASSIUM SERPL-SCNC: 3.8 MMOL/L — SIGNIFICANT CHANGE UP (ref 3.5–5.3)
SODIUM SERPL-SCNC: 131 MMOL/L — LOW (ref 135–145)
SPECIMEN SOURCE: SIGNIFICANT CHANGE UP
SPECIMEN SOURCE: SIGNIFICANT CHANGE UP

## 2023-02-11 PROCEDURE — 99231 SBSQ HOSP IP/OBS SF/LOW 25: CPT

## 2023-02-11 PROCEDURE — 74177 CT ABD & PELVIS W/CONTRAST: CPT

## 2023-02-11 PROCEDURE — 99285 EMERGENCY DEPT VISIT HI MDM: CPT

## 2023-02-11 PROCEDURE — 71046 X-RAY EXAM CHEST 2 VIEWS: CPT

## 2023-02-11 PROCEDURE — 97112 NEUROMUSCULAR REEDUCATION: CPT

## 2023-02-11 PROCEDURE — 83880 ASSAY OF NATRIURETIC PEPTIDE: CPT

## 2023-02-11 PROCEDURE — 87186 SC STD MICRODIL/AGAR DIL: CPT

## 2023-02-11 PROCEDURE — 87040 BLOOD CULTURE FOR BACTERIA: CPT

## 2023-02-11 PROCEDURE — 97162 PT EVAL MOD COMPLEX 30 MIN: CPT

## 2023-02-11 PROCEDURE — 85025 COMPLETE CBC W/AUTO DIFF WBC: CPT

## 2023-02-11 PROCEDURE — 82565 ASSAY OF CREATININE: CPT

## 2023-02-11 PROCEDURE — 81001 URINALYSIS AUTO W/SCOPE: CPT

## 2023-02-11 PROCEDURE — 94640 AIRWAY INHALATION TREATMENT: CPT

## 2023-02-11 PROCEDURE — 96365 THER/PROPH/DIAG IV INF INIT: CPT

## 2023-02-11 PROCEDURE — 85027 COMPLETE CBC AUTOMATED: CPT

## 2023-02-11 PROCEDURE — 97530 THERAPEUTIC ACTIVITIES: CPT

## 2023-02-11 PROCEDURE — 84132 ASSAY OF SERUM POTASSIUM: CPT

## 2023-02-11 PROCEDURE — 80053 COMPREHEN METABOLIC PANEL: CPT

## 2023-02-11 PROCEDURE — 0225U NFCT DS DNA&RNA 21 SARSCOV2: CPT

## 2023-02-11 PROCEDURE — 82803 BLOOD GASES ANY COMBINATION: CPT

## 2023-02-11 PROCEDURE — 83036 HEMOGLOBIN GLYCOSYLATED A1C: CPT

## 2023-02-11 PROCEDURE — 85014 HEMATOCRIT: CPT

## 2023-02-11 PROCEDURE — 84295 ASSAY OF SERUM SODIUM: CPT

## 2023-02-11 PROCEDURE — 82962 GLUCOSE BLOOD TEST: CPT

## 2023-02-11 PROCEDURE — 36415 COLL VENOUS BLD VENIPUNCTURE: CPT

## 2023-02-11 PROCEDURE — 87637 SARSCOV2&INF A&B&RSV AMP PRB: CPT

## 2023-02-11 PROCEDURE — 85018 HEMOGLOBIN: CPT

## 2023-02-11 PROCEDURE — 82330 ASSAY OF CALCIUM: CPT

## 2023-02-11 PROCEDURE — 83605 ASSAY OF LACTIC ACID: CPT

## 2023-02-11 PROCEDURE — 80048 BASIC METABOLIC PNL TOTAL CA: CPT

## 2023-02-11 PROCEDURE — 82947 ASSAY GLUCOSE BLOOD QUANT: CPT

## 2023-02-11 PROCEDURE — 82435 ASSAY OF BLOOD CHLORIDE: CPT

## 2023-02-11 PROCEDURE — 87086 URINE CULTURE/COLONY COUNT: CPT

## 2023-02-11 RX ORDER — LACTOBACILLUS ACIDOPHILUS 100MM CELL
1 CAPSULE ORAL
Qty: 28 | Refills: 0
Start: 2023-02-11 | End: 2023-02-24

## 2023-02-11 RX ORDER — CEPHALEXIN 500 MG
1 CAPSULE ORAL
Qty: 20 | Refills: 0
Start: 2023-02-11 | End: 2023-02-15

## 2023-02-11 RX ADMIN — Medication 30 MILLIGRAM(S): at 06:25

## 2023-02-11 RX ADMIN — CEFTRIAXONE 100 MILLIGRAM(S): 500 INJECTION, POWDER, FOR SOLUTION INTRAMUSCULAR; INTRAVENOUS at 12:13

## 2023-02-11 RX ADMIN — Medication 50 MILLIGRAM(S): at 06:18

## 2023-02-11 RX ADMIN — Medication 1 TABLET(S): at 08:17

## 2023-02-11 RX ADMIN — ALBUTEROL 1.25 MILLIGRAM(S): 90 AEROSOL, METERED ORAL at 06:19

## 2023-02-11 RX ADMIN — Medication 20 MILLIGRAM(S): at 06:18

## 2023-02-11 RX ADMIN — CARVEDILOL PHOSPHATE 12.5 MILLIGRAM(S): 80 CAPSULE, EXTENDED RELEASE ORAL at 06:18

## 2023-02-11 RX ADMIN — DIVALPROEX SODIUM 500 MILLIGRAM(S): 500 TABLET, DELAYED RELEASE ORAL at 12:09

## 2023-02-11 RX ADMIN — Medication 2: at 08:17

## 2023-02-11 RX ADMIN — Medication 1 MILLIGRAM(S): at 06:17

## 2023-02-11 RX ADMIN — Medication 50 MILLIGRAM(S): at 00:56

## 2023-02-11 RX ADMIN — HEPARIN SODIUM 5000 UNIT(S): 5000 INJECTION INTRAVENOUS; SUBCUTANEOUS at 06:19

## 2023-02-11 RX ADMIN — Medication 0.5 MILLIGRAM(S): at 06:27

## 2023-02-11 RX ADMIN — MONTELUKAST 10 MILLIGRAM(S): 4 TABLET, CHEWABLE ORAL at 12:09

## 2023-02-11 RX ADMIN — Medication 20 MILLIGRAM(S): at 12:10

## 2023-02-11 RX ADMIN — Medication 100 MILLIGRAM(S): at 06:17

## 2023-02-11 RX ADMIN — Medication 50 MILLIGRAM(S): at 12:09

## 2023-02-11 RX ADMIN — ALBUTEROL 1.25 MILLIGRAM(S): 90 AEROSOL, METERED ORAL at 12:10

## 2023-02-11 RX ADMIN — ALBUTEROL 1.25 MILLIGRAM(S): 90 AEROSOL, METERED ORAL at 00:56

## 2023-02-11 NOTE — ED PROVIDER NOTE - OBJECTIVE STATEMENT
62-year-old male history of hypertension, diabetes, schizophrenia, neurogenic bladder who straight caths at home recently admitted for urinary tract infection discharged earlier today (PT recommended discharge to rehab, wife chose to take patient home) brought in now by EMS with a NPA and for decreased mental status.  Per report patient had to be carried into the house and was very weak, was then found unresponsive, no known trauma, patient nonambulatory.  Vitals stable in field, mental status improved with placement of NPA patient currently awake and talking.  States he is not sure why he is in the hospital and has no complaints.

## 2023-02-11 NOTE — PROGRESS NOTE ADULT - ASSESSMENT
Patient is a 62y M PMHx HTN, DM, schizophrenia (hx suicide attempt), straight caths at home for neurogenic bladder, p/w worsening lethargy found to have uti  w/ elevated wbc better  i d eval noted  neg  ct a/p  viral syndrome resolved  nebs  pulm eval noted  abs as per id/change to po      hyponatremia .better  dm  fsg riss  pt  dvt proph   c/w outp tmeds  nebs/as per pulm     nerogenic bladder hx  straight caths daily    d/c planning

## 2023-02-11 NOTE — ED PROVIDER NOTE - PHYSICAL EXAMINATION
Vitals: I have reviewed the patients vital signs  General: nontoxic appearing  HEENT: Atraumatic, normocephalic, airway patent  Eyes: EOMI, tracking appropriately  Neck: no tracheal deviation, no JVD  Chest/Lungs: no trauma, symmetric chest rise, speaking in complete sentences, no WOB  Heart: skin and extremities well perfused, regular rate and rhythm  Neuro: A+Ox3, LYN, ?tardive dyskinesia  MSK: ranging UE and LE without difficulty, chronic appearing ankle deformities without tenderness, chronic venous stasis, neurovasc intact 2+ pulses  Skin: no cyanosis, no jaundice, no new emergent lesions

## 2023-02-11 NOTE — ED PROVIDER NOTE - CLINICAL SUMMARY MEDICAL DECISION MAKING FREE TEXT BOX
62-year-old male hypertension diabetes neurogenic bladder with straight caths, recent admission for UTI presenting now for a period of now resolved unresponsiveness.  The patient has no complaints, his vitals are within normal limits, his exam is remarkable for evidence of tardive dyskinesia, nontender abdomen, chronic appearing lower extremity edema.  Will evaluate for metabolic/infectious etiology of his symptoms, screen for arrhythmia, will likely require admission for placement in rehab. Will hold on [facility] now as no report of trauma or fall, not on AC, not currently altered, no sz activity.

## 2023-02-11 NOTE — DISCHARGE NOTE PROVIDER - NSDCCPCAREPLAN_GEN_ALL_CORE_FT
PRINCIPAL DISCHARGE DIAGNOSIS  Diagnosis: Urinary tract infection  Assessment and Plan of Treatment: You had a bladder &/or kidney infection  Call your doctor with pain or burning with urination, frequent urination, feeling to urinate suddenly, blood in urine, fever, back pain, nausea or vomiting  You need to take and finish your antibiotic as prescribed so that the infection does not reoccur  Drink adequate fluids - there is no harm to try cranberry juice

## 2023-02-11 NOTE — ED PROVIDER NOTE - NS ED MD DISPO ADMITTING SERVICE
Assumed care on 10/26/21 @ 1900, in bed eyes closed, respirations even and
unlabored. Opens eyes to voice, oriented x4, reports on fiction book at
bedside that it is not so interesting, but is something to read. Asks about
discharge, patient is updated that SW is working on placement, patient
acknowledges that he understands that DPOA is working on placement. Will
continue to monitor for safety and comfort. Bed in low position, bed alarm
set. HRRR, Lungs CTA, ABD Nx4Q. Denies depression, anxiety and hallucinations.
reports looking forward to going home. MED

## 2023-02-11 NOTE — ED ADULT NURSE NOTE - OBJECTIVE STATEMENT
62y M A&Ox4 BIBEMS for weakness.. As per EMS, pt was DC from hospital today after being hospitalized for UTI. Pt was to be DC'd to rehab but family wanted pt to return home.  After DC, pt had to be carried into the house by family due to weakness. Pt then became very weak and unresponsive to verbal stimuli. EMS placed an NPA and NRB and transported to ED for further work up. Upon assessment, pt awake and responding to verbal stimuli. Pt denies any N/V/D/Ha/Cough/CP/SOB/Gi symptoms. PMH of DM2 and HTN. PSH of abdominal surgery. VS WNL

## 2023-02-11 NOTE — ED PROVIDER NOTE - ATTENDING CONTRIBUTION TO CARE
I, Nory Grover, performed a history and physical exam of the patient and discussed their management with the resident and /or advanced care provider. I reviewed the resident and /or ACP's note and agree with the documented findings and plan of care. I was present and available for all procedures.     62-year-old male with history of hypertension, diabetes, schizophrenia, neurogenic bladder who straight caths at home was recently admitted for urinary tract infection and discharged earlier today presenting to the ED for decreased mental status.  Patient was recommended to be discharged to rehab however wife chose to take patient home.  EMS reports that patient with generalized weakness and had to be carried into house due to weakness upon arrival home.  Family states they are having difficulty caring for him since arriving home earlier today.  EMS was called and patient was found unresponsive with no known head trauma patient nonambulatory.  Vital signs in the field initially with soft blood pressure no fluid given with improvement of blood pressure other vital signs within normal limits.  NPA was improved with improvement of mental status.  Patient arrived to the ED awake alert and oriented x3 and speaking and answering questions following commands.  Patient states he is not sure why he is in the hospital has no complaints at this time.    Patient likely deconditioned from hospital stay and infection.  Had initially been recommended rehab placement but taken home instead.  Will obtain labs repeat UA/UC, chest x-ray to evaluate for new signs of infection causing worsening weakness and readmit for placement to rehab.

## 2023-02-11 NOTE — PROGRESS NOTE ADULT - SUBJECTIVE AND OBJECTIVE BOX
Pulmonary Follow up Note       NAD    alert    denies dyspnea, chest pain    admitted iwth UTI    PMH    hypertension, DM, schizophrenia,  neurogenic bladder.        Vital Signs Last 24 Hrs  T(C): 37.3 (11 Feb 2023 09:28), Max: 37.5 (10 Feb 2023 16:35)  T(F): 99.2 (11 Feb 2023 09:28), Max: 99.5 (10 Feb 2023 16:35)  HR: 87 (11 Feb 2023 09:28) (80 - 87)  BP: 149/84 (11 Feb 2023 09:28) (119/73 - 149/84)  BP(mean): --  RR: 18 (11 Feb 2023 09:28) (18 - 18)  SpO2: 94% (11 Feb 2023 09:28) (92% - 96%)    Parameters below as of 11 Feb 2023 09:28  Patient On (Oxygen Delivery Method): room air    Lungs equal diminished unlabored  no wheeze  cor RRR  abd  nts ft  extr no edema    Impression    62 year old man with .hx asthma being tretaed for UTI    he has no dyspnea or wheeze    asthma    stable    on Pulmicort albuterol montelukast    Monitoring for any signs of asthma worsening    on ceftriaxone for UTI    will monitor    thanks      Labs, meds radiographic studies reviewed    Houston Hanson MD    PULMONARY    MD Charles Arias MD George Haralambou, MD    [phone]

## 2023-02-11 NOTE — DISCHARGE NOTE NURSING/CASE MANAGEMENT/SOCIAL WORK - PATIENT PORTAL LINK FT
You can access the FollowMyHealth Patient Portal offered by [facility] by registering at the following website: [email or web address]. By joining [facility]’s FollowMyHealth portal, you will also be able to view your health information using other applications (apps) compatible with our system.

## 2023-02-11 NOTE — DISCHARGE NOTE PROVIDER - CARE PROVIDER_API CALL
Gilbert Bruno)  Cardiovascular Disease; Internal Medicine  [address], [facility] [address]  [place] [address]  Phone: [phone]  Fax: [phone]  Follow Up Time:

## 2023-02-11 NOTE — ED PROVIDER NOTE - PROGRESS NOTE DETAILS
Ed: I spoke with the patient's wife who just arrived a, she corroborated the story from EMS essentially the patient has been admitted for the last week for complex UTI management complicated by occasional bouts of hyponatremia.  He was recommended for rehab by physical therapy due to difficulty ambulating and deconditioning.  He did not ambulate much during his hospitalization and had difficulty walking today.  After he was home for some time he became more somnolent, she noted hypoxia at home, and he was difficult to arouse.  He does have a history of DOUG that he uses CPAP for that he has not been using which did not help waking up.  Pending results of his lab work and x-ray will admit him so that he can be placed into rehab. Ed: labs and imaging reviewed, atelectasis but no sign of infection, no fever, will monitor off new abx. tba to dr base for inability to ambulate, care for himself, will need [facility] placement as recommended by PT.

## 2023-02-11 NOTE — DISCHARGE NOTE PROVIDER - NSDCMRMEDTOKEN_GEN_ALL_CORE_FT
albuterol 90 mcg/inh inhalation aerosol: 1 puff(s) inhaled , As Needed  allopurinol 100 mg oral tablet: 1 tab(s) orally 2 times a day  BENZTROPINE MESYLATE  1 MG TABS: 1 tab(s) orally 2 times a day  bethanechol 50 mg oral tablet: 1 tab(s) orally 4 times a day  carvedilol 12.5 mg oral tablet: 1 tab(s) orally 2 times a day  Crestor 5 mg oral tablet: 1 tab(s) orally once a day  divalproex sodium 500 mg oral tablet, extended release: 1 tab(s) orally once a day  enalapril 20 mg oral tablet: 1 tab(s) orally once a day  Flovent 220 mcg/inh inhalation aerosol with adapter: inhaled 2 times a day, As Needed  FLUoxetine 20 mg oral tablet: 1 tab(s) orally once a day  metFORMIN 1000 mg oral tablet: 1 tab(s) orally 2 times a day  montelukast 10 mg oral tablet: 1 tab(s) orally once a day  NIFEdipine 30 mg oral tablet, extended release: 1 tab(s) orally 2 times a day  risperiDONE 1 mg oral tablet: 2 tab(s) orally once a day (at bedtime)   tamsulosin 0.4 mg oral capsule: 1 cap(s) orally once a day (at bedtime)  Trulicity Pen 1.5 mg/0.5 mL subcutaneous solution: 1 dose(s) subcutaneous once a week   albuterol 90 mcg/inh inhalation aerosol: 1 puff(s) inhaled , As Needed  allopurinol 100 mg oral tablet: 1 tab(s) orally 2 times a day  BENZTROPINE MESYLATE  1 MG TABS: 1 tab(s) orally 2 times a day  bethanechol 50 mg oral tablet: 1 tab(s) orally 4 times a day  carvedilol 12.5 mg oral tablet: 1 tab(s) orally 2 times a day  cephalexin 500 mg oral capsule: 1 cap(s) orally every 6 hours   Crestor 5 mg oral tablet: 1 tab(s) orally once a day  divalproex sodium 500 mg oral tablet, extended release: 1 tab(s) orally once a day  enalapril 20 mg oral tablet: 1 tab(s) orally once a day  Flovent 220 mcg/inh inhalation aerosol with adapter: inhaled 2 times a day, As Needed  FLUoxetine 20 mg oral tablet: 1 tab(s) orally once a day  lactobacillus acidophilus oral capsule: 1 cap(s) orally 2 times a day (with meals)   metFORMIN 1000 mg oral tablet: 1 tab(s) orally 2 times a day  montelukast 10 mg oral tablet: 1 tab(s) orally once a day  NIFEdipine 30 mg oral tablet, extended release: 1 tab(s) orally 2 times a day  risperiDONE 1 mg oral tablet: 2 tab(s) orally once a day (at bedtime)   tamsulosin 0.4 mg oral capsule: 1 cap(s) orally once a day (at bedtime)  Trulicity Pen 1.5 mg/0.5 mL subcutaneous solution: 1 dose(s) subcutaneous once a week

## 2023-02-11 NOTE — ED ADULT TRIAGE NOTE - CHIEF COMPLAINT QUOTE
recently d/c for UTI, now c/o fever and weakness. Per EMs, pt found to be unresponsive on couch upon EMS arrival. Pt awake/alert upon arrival to ED.

## 2023-02-11 NOTE — PROGRESS NOTE ADULT - PROVIDER SPECIALTY LIST ADULT
Internal Medicine
Internal Medicine
Pulmonology
Pulmonology
Infectious Disease
Internal Medicine
Internal Medicine
Pulmonology
Pulmonology
Infectious Disease
Internal Medicine
Pulmonology

## 2023-02-11 NOTE — DISCHARGE NOTE PROVIDER - NSDCFUADDAPPT_GEN_ALL_CORE_FT
APPTS ARE READY TO BE MADE: [ ] YES    Best Family or Patient Contact (if needed):    Additional Information about above appointments (if needed):    1:   2:   3:     Other comments or requests:    APPTS ARE READY TO BE MADE: [ ] YES    Best Family or Patient Contact (if needed):    Additional Information about above appointments (if needed):    1:   2:   3:     Other comments or requests:   Patient is being discharged to rehab. Caregiver will arrange follow up.

## 2023-02-11 NOTE — DISCHARGE NOTE PROVIDER - HOSPITAL COURSE
DCP with [facility] rec discussed with Dr Palomino Patient is a 62y M PMHx HTN, DM, schizophrenia (hx suicide attempt), straight caths at home for neurogenic bladder, p/w worsening lethargy    #Concern for UTI  #Positive U/A  #Leukocytosis, resolved  #Enterovirus/Rhinovirus infection  ID consulted   Pulm consulted     Recommendations  Continue ceftriaxone 1g q24h, CT a/p without evidence for infectious process  Given symptoms will plan for an extended 10 day course, plan to transition to PO cephalexin if otherwise ready for discharge  End date: 2/16/23  Fevers and URI symptoms likely viral illness contributing  Advised for close outpatient follow-up with urology  Follow-up with ID clinic after discharge as well, please place referral  IV ABX to po Cephalaxin per ID Rec     DCP with med rec discussed with Dr Palomino PT cleared for DC to [facility] wife defer wants PT home   rec to follow up with Dr Bruno

## 2023-02-11 NOTE — PROGRESS NOTE ADULT - SUBJECTIVE AND OBJECTIVE BOX
DATE OF SERVICE: 02-11-23 @ 12:49  CHIEF COMPLAINT:Patient is a 62y old  Male who presents with a chief complaint of UTI (11 Feb 2023 09:55)    	        PAST MEDICAL & SURGICAL HISTORY:  History of Schizophrenia      HTN (Hypertension)      Hyperlipidemia      Gout      Anxiety      Asthma      DM (diabetes mellitus)      History of Tonsillectomy      S/P Laparotomy      H/O knee surgery              REVIEW OF SYSTEMS:    RESPIRATORY: No cough, wheezing, chills or hemoptysis; No Shortness of Breath  CARDIOVASCULAR: No chest pain, palpitations, passing out, dizziness,   GASTROINTESTINAL: No abdominal or epigastric pain. No nausea, vomiting,   GENITOURINARY: No dysuria, frequency, hematuria,   NEUROLOGICAL: No headaches  MUSCULOSKELETAL: No joint pain or swelling;     Medications:  MEDICATIONS  (STANDING):  albuterol   0.042% 1.25 milliGRAM(s) Nebulizer every 6 hours  allopurinol 100 milliGRAM(s) Oral two times a day  benztropine 1 milliGRAM(s) Oral two times a day  bethanechol 50 milliGRAM(s) Oral four times a day  buDESOnide    Inhalation Suspension 0.5 milliGRAM(s) Inhalation two times a day  carvedilol 12.5 milliGRAM(s) Oral every 12 hours  cefTRIAXone   IVPB 1000 milliGRAM(s) IV Intermittent every 24 hours  dextrose 5%. 1000 milliLiter(s) (100 mL/Hr) IV Continuous <Continuous>  dextrose 5%. 1000 milliLiter(s) (50 mL/Hr) IV Continuous <Continuous>  dextrose 50% Injectable 25 Gram(s) IV Push once  dextrose 50% Injectable 12.5 Gram(s) IV Push once  dextrose 50% Injectable 25 Gram(s) IV Push once  divalproex  milliGRAM(s) Oral daily  enalapril 20 milliGRAM(s) Oral daily  FLUoxetine 20 milliGRAM(s) Oral daily  glucagon  Injectable 1 milliGRAM(s) IntraMuscular once  heparin   Injectable 5000 Unit(s) SubCutaneous every 12 hours  insulin lispro (ADMELOG) corrective regimen sliding scale   SubCutaneous three times a day before meals  lactobacillus acidophilus 1 Tablet(s) Oral two times a day with meals  montelukast 10 milliGRAM(s) Oral daily  NIFEdipine XL 30 milliGRAM(s) Oral two times a day  risperiDONE   Tablet 2 milliGRAM(s) Oral at bedtime  tamsulosin 0.4 milliGRAM(s) Oral at bedtime    MEDICATIONS  (PRN):  acetaminophen     Tablet .. 650 milliGRAM(s) Oral every 6 hours PRN Temp greater or equal to 38C (100.4F), Mild Pain (1 - 3)  dextrose Oral Gel 15 Gram(s) Oral once PRN Blood Glucose LESS THAN 70 milliGRAM(s)/deciliter    	    PHYSICAL EXAM:  T(C): 37.3 (02-11-23 @ 09:28), Max: 37.5 (02-10-23 @ 16:35)  HR: 87 (02-11-23 @ 09:28) (82 - 87)  BP: 149/84 (02-11-23 @ 09:28) (143/82 - 149/84)  RR: 18 (02-11-23 @ 09:28) (18 - 18)  SpO2: 94% (02-11-23 @ 09:28) (94% - 95%)  Wt(kg): --  I&O's Summary    10 Feb 2023 07:01  -  11 Feb 2023 07:00  --------------------------------------------------------  IN: 540 mL / OUT: 700 mL / NET: -160 mL    11 Feb 2023 07:01  -  11 Feb 2023 12:49  --------------------------------------------------------  IN: 240 mL / OUT: 1400 mL / NET: -1160 mL        Appearance: Normal	  HEENT:   Normal oral mucosa,  Cardiovascular: Normal S1 S2, No JVD,   Respiratory: Lungs clear to auscultation	  Psychiatry: A & O   Gastrointestinal:  Soft, Non-tender, + BS	    Neurologic: Non-focal  Extremities: dec rom    TELEMETRY: 	    ECG:  	  RADIOLOGY:  OTHER: 	  	  LABS:	 	    CARDIAC MARKERS:                                12.0   7.80  )-----------( 237      ( 10 Feb 2023 07:00 )             37.4     02-11    131<L>  |  88<L>  |  27<H>  ----------------------------<  150<H>  3.8   |  34<H>  |  0.94    Ca    9.2      11 Feb 2023 07:11      proBNP:   Lipid Profile:   HgA1c:   TSH:

## 2023-02-12 DIAGNOSIS — R29.898 OTHER SYMPTOMS AND SIGNS INVOLVING THE MUSCULOSKELETAL SYSTEM: ICD-10-CM

## 2023-02-12 PROBLEM — E11.9 TYPE 2 DIABETES MELLITUS WITHOUT COMPLICATIONS: Chronic | Status: ACTIVE | Noted: 2023-02-07

## 2023-02-12 LAB
ALBUMIN SERPL ELPH-MCNC: 3.1 G/DL — LOW (ref 3.3–5)
ALBUMIN SERPL ELPH-MCNC: 3.2 G/DL — LOW (ref 3.3–5)
ALP SERPL-CCNC: 58 U/L — SIGNIFICANT CHANGE UP (ref 40–120)
ALP SERPL-CCNC: 61 U/L — SIGNIFICANT CHANGE UP (ref 40–120)
ALT FLD-CCNC: 34 U/L — SIGNIFICANT CHANGE UP (ref 10–45)
ALT FLD-CCNC: 35 U/L — SIGNIFICANT CHANGE UP (ref 10–45)
ANION GAP SERPL CALC-SCNC: 11 MMOL/L — SIGNIFICANT CHANGE UP (ref 5–17)
ANION GAP SERPL CALC-SCNC: 6 MMOL/L — SIGNIFICANT CHANGE UP (ref 5–17)
APPEARANCE UR: CLEAR — SIGNIFICANT CHANGE UP
APTT BLD: 28.2 SEC — SIGNIFICANT CHANGE UP (ref 27.5–35.5)
AST SERPL-CCNC: 30 U/L — SIGNIFICANT CHANGE UP (ref 10–40)
AST SERPL-CCNC: 32 U/L — SIGNIFICANT CHANGE UP (ref 10–40)
BACTERIA # UR AUTO: NEGATIVE — SIGNIFICANT CHANGE UP
BASE EXCESS BLDV CALC-SCNC: 5.9 MMOL/L — HIGH (ref -2–3)
BASOPHILS # BLD AUTO: 0 K/UL — SIGNIFICANT CHANGE UP (ref 0–0.2)
BASOPHILS NFR BLD AUTO: 0 % — SIGNIFICANT CHANGE UP (ref 0–2)
BILIRUB SERPL-MCNC: 0.3 MG/DL — SIGNIFICANT CHANGE UP (ref 0.2–1.2)
BILIRUB SERPL-MCNC: 0.4 MG/DL — SIGNIFICANT CHANGE UP (ref 0.2–1.2)
BILIRUB UR-MCNC: NEGATIVE — SIGNIFICANT CHANGE UP
BUN SERPL-MCNC: 31 MG/DL — HIGH (ref 7–23)
BUN SERPL-MCNC: 39 MG/DL — HIGH (ref 7–23)
CA-I SERPL-SCNC: 1.22 MMOL/L — SIGNIFICANT CHANGE UP (ref 1.15–1.33)
CALCIUM SERPL-MCNC: 8.7 MG/DL — SIGNIFICANT CHANGE UP (ref 8.4–10.5)
CALCIUM SERPL-MCNC: 8.9 MG/DL — SIGNIFICANT CHANGE UP (ref 8.4–10.5)
CHLORIDE BLDV-SCNC: 94 MMOL/L — LOW (ref 96–108)
CHLORIDE SERPL-SCNC: 93 MMOL/L — LOW (ref 96–108)
CHLORIDE SERPL-SCNC: 96 MMOL/L — SIGNIFICANT CHANGE UP (ref 96–108)
CO2 BLDV-SCNC: 34 MMOL/L — HIGH (ref 22–26)
CO2 SERPL-SCNC: 27 MMOL/L — SIGNIFICANT CHANGE UP (ref 22–31)
CO2 SERPL-SCNC: 30 MMOL/L — SIGNIFICANT CHANGE UP (ref 22–31)
COLOR SPEC: YELLOW — SIGNIFICANT CHANGE UP
CREAT SERPL-MCNC: 0.88 MG/DL — SIGNIFICANT CHANGE UP (ref 0.5–1.3)
CREAT SERPL-MCNC: 1.12 MG/DL — SIGNIFICANT CHANGE UP (ref 0.5–1.3)
DIFF PNL FLD: NEGATIVE — SIGNIFICANT CHANGE UP
EGFR: 75 ML/MIN/1.73M2 — SIGNIFICANT CHANGE UP
EGFR: 98 ML/MIN/1.73M2 — SIGNIFICANT CHANGE UP
ELLIPTOCYTES BLD QL SMEAR: SLIGHT — SIGNIFICANT CHANGE UP
EOSINOPHIL # BLD AUTO: 0 K/UL — SIGNIFICANT CHANGE UP (ref 0–0.5)
EOSINOPHIL NFR BLD AUTO: 0 % — SIGNIFICANT CHANGE UP (ref 0–6)
EPI CELLS # UR: 2 /HPF — SIGNIFICANT CHANGE UP
FLUAV AG NPH QL: SIGNIFICANT CHANGE UP
FLUBV AG NPH QL: SIGNIFICANT CHANGE UP
GAS PNL BLDA: SIGNIFICANT CHANGE UP
GAS PNL BLDV: 127 MMOL/L — LOW (ref 136–145)
GAS PNL BLDV: SIGNIFICANT CHANGE UP
GAS PNL BLDV: SIGNIFICANT CHANGE UP
GLUCOSE BLDC GLUCOMTR-MCNC: 114 MG/DL — HIGH (ref 70–99)
GLUCOSE BLDC GLUCOMTR-MCNC: 114 MG/DL — HIGH (ref 70–99)
GLUCOSE BLDC GLUCOMTR-MCNC: 125 MG/DL — HIGH (ref 70–99)
GLUCOSE BLDC GLUCOMTR-MCNC: 137 MG/DL — HIGH (ref 70–99)
GLUCOSE BLDV-MCNC: 155 MG/DL — HIGH (ref 70–99)
GLUCOSE SERPL-MCNC: 118 MG/DL — HIGH (ref 70–99)
GLUCOSE SERPL-MCNC: 158 MG/DL — HIGH (ref 70–99)
GLUCOSE UR QL: NEGATIVE — SIGNIFICANT CHANGE UP
HCO3 BLDV-SCNC: 32 MMOL/L — HIGH (ref 22–29)
HCT VFR BLD CALC: 34.7 % — LOW (ref 39–50)
HCT VFR BLD CALC: 35.1 % — LOW (ref 39–50)
HCT VFR BLDA CALC: 36 % — LOW (ref 39–51)
HGB BLD CALC-MCNC: 12.1 G/DL — LOW (ref 12.6–17.4)
HGB BLD-MCNC: 11.5 G/DL — LOW (ref 13–17)
HGB BLD-MCNC: 11.6 G/DL — LOW (ref 13–17)
HYALINE CASTS # UR AUTO: 6 /LPF — HIGH (ref 0–2)
INR BLD: 1.13 RATIO — SIGNIFICANT CHANGE UP (ref 0.88–1.16)
KETONES UR-MCNC: ABNORMAL
LACTATE BLDV-MCNC: 1.3 MMOL/L — SIGNIFICANT CHANGE UP (ref 0.5–2)
LEUKOCYTE ESTERASE UR-ACNC: NEGATIVE — SIGNIFICANT CHANGE UP
LYMPHOCYTES # BLD AUTO: 0.45 K/UL — LOW (ref 1–3.3)
LYMPHOCYTES # BLD AUTO: 2.6 % — LOW (ref 13–44)
MANUAL SMEAR VERIFICATION: SIGNIFICANT CHANGE UP
MCHC RBC-ENTMCNC: 29.5 PG — SIGNIFICANT CHANGE UP (ref 27–34)
MCHC RBC-ENTMCNC: 29.9 PG — SIGNIFICANT CHANGE UP (ref 27–34)
MCHC RBC-ENTMCNC: 33 GM/DL — SIGNIFICANT CHANGE UP (ref 32–36)
MCHC RBC-ENTMCNC: 33.1 GM/DL — SIGNIFICANT CHANGE UP (ref 32–36)
MCV RBC AUTO: 89.3 FL — SIGNIFICANT CHANGE UP (ref 80–100)
MCV RBC AUTO: 90.4 FL — SIGNIFICANT CHANGE UP (ref 80–100)
METAMYELOCYTES # FLD: 0.9 % — HIGH (ref 0–0)
MONOCYTES # BLD AUTO: 1.98 K/UL — HIGH (ref 0–0.9)
MONOCYTES NFR BLD AUTO: 11.5 % — SIGNIFICANT CHANGE UP (ref 2–14)
NEUTROPHILS # BLD AUTO: 14.67 K/UL — HIGH (ref 1.8–7.4)
NEUTROPHILS NFR BLD AUTO: 85 % — HIGH (ref 43–77)
NITRITE UR-MCNC: NEGATIVE — SIGNIFICANT CHANGE UP
NRBC # BLD: 0 /100 WBCS — SIGNIFICANT CHANGE UP (ref 0–0)
OVALOCYTES BLD QL SMEAR: SLIGHT — SIGNIFICANT CHANGE UP
PCO2 BLDV: 53 MMHG — SIGNIFICANT CHANGE UP (ref 42–55)
PH BLDV: 7.39 — SIGNIFICANT CHANGE UP (ref 7.32–7.43)
PH UR: 6 — SIGNIFICANT CHANGE UP (ref 5–8)
PLAT MORPH BLD: NORMAL — SIGNIFICANT CHANGE UP
PLATELET # BLD AUTO: 249 K/UL — SIGNIFICANT CHANGE UP (ref 150–400)
PLATELET # BLD AUTO: 272 K/UL — SIGNIFICANT CHANGE UP (ref 150–400)
PO2 BLDV: 55 MMHG — HIGH (ref 25–45)
POIKILOCYTOSIS BLD QL AUTO: SLIGHT — SIGNIFICANT CHANGE UP
POLYCHROMASIA BLD QL SMEAR: SLIGHT — SIGNIFICANT CHANGE UP
POTASSIUM BLDV-SCNC: 4.1 MMOL/L — SIGNIFICANT CHANGE UP (ref 3.5–5.1)
POTASSIUM SERPL-MCNC: 4.2 MMOL/L — SIGNIFICANT CHANGE UP (ref 3.5–5.3)
POTASSIUM SERPL-MCNC: 4.2 MMOL/L — SIGNIFICANT CHANGE UP (ref 3.5–5.3)
POTASSIUM SERPL-SCNC: 4.2 MMOL/L — SIGNIFICANT CHANGE UP (ref 3.5–5.3)
POTASSIUM SERPL-SCNC: 4.2 MMOL/L — SIGNIFICANT CHANGE UP (ref 3.5–5.3)
PROT SERPL-MCNC: 6.1 G/DL — SIGNIFICANT CHANGE UP (ref 6–8.3)
PROT SERPL-MCNC: 6.5 G/DL — SIGNIFICANT CHANGE UP (ref 6–8.3)
PROT UR-MCNC: ABNORMAL
PROTHROM AB SERPL-ACNC: 13.1 SEC — SIGNIFICANT CHANGE UP (ref 10.5–13.4)
RBC # BLD: 3.84 M/UL — LOW (ref 4.2–5.8)
RBC # BLD: 3.93 M/UL — LOW (ref 4.2–5.8)
RBC # FLD: 12.4 % — SIGNIFICANT CHANGE UP (ref 10.3–14.5)
RBC # FLD: 12.6 % — SIGNIFICANT CHANGE UP (ref 10.3–14.5)
RBC BLD AUTO: ABNORMAL
RBC CASTS # UR COMP ASSIST: 3 /HPF — SIGNIFICANT CHANGE UP (ref 0–4)
RSV RNA NPH QL NAA+NON-PROBE: SIGNIFICANT CHANGE UP
SAO2 % BLDV: 87.8 % — SIGNIFICANT CHANGE UP (ref 67–88)
SARS-COV-2 RNA SPEC QL NAA+PROBE: SIGNIFICANT CHANGE UP
SODIUM SERPL-SCNC: 131 MMOL/L — LOW (ref 135–145)
SODIUM SERPL-SCNC: 132 MMOL/L — LOW (ref 135–145)
SP GR SPEC: 1.01 — SIGNIFICANT CHANGE UP (ref 1.01–1.02)
UROBILINOGEN FLD QL: NEGATIVE — SIGNIFICANT CHANGE UP
WBC # BLD: 16.65 K/UL — HIGH (ref 3.8–10.5)
WBC # BLD: 17.26 K/UL — HIGH (ref 3.8–10.5)
WBC # FLD AUTO: 16.65 K/UL — HIGH (ref 3.8–10.5)
WBC # FLD AUTO: 17.26 K/UL — HIGH (ref 3.8–10.5)
WBC UR QL: 5 /HPF — SIGNIFICANT CHANGE UP (ref 0–5)

## 2023-02-12 PROCEDURE — 99223 1ST HOSP IP/OBS HIGH 75: CPT

## 2023-02-12 PROCEDURE — 71045 X-RAY EXAM CHEST 1 VIEW: CPT | Mod: 26

## 2023-02-12 RX ORDER — SODIUM CHLORIDE 9 MG/ML
1000 INJECTION, SOLUTION INTRAVENOUS
Refills: 0 | Status: DISCONTINUED | OUTPATIENT
Start: 2023-02-12 | End: 2023-02-16

## 2023-02-12 RX ORDER — BETHANECHOL CHLORIDE 25 MG
50 TABLET ORAL
Refills: 0 | Status: DISCONTINUED | OUTPATIENT
Start: 2023-02-12 | End: 2023-02-16

## 2023-02-12 RX ORDER — GLUCAGON INJECTION, SOLUTION 0.5 MG/.1ML
1 INJECTION, SOLUTION SUBCUTANEOUS ONCE
Refills: 0 | Status: DISCONTINUED | OUTPATIENT
Start: 2023-02-12 | End: 2023-02-16

## 2023-02-12 RX ORDER — CARVEDILOL PHOSPHATE 80 MG/1
12.5 CAPSULE, EXTENDED RELEASE ORAL EVERY 12 HOURS
Refills: 0 | Status: DISCONTINUED | OUTPATIENT
Start: 2023-02-12 | End: 2023-02-16

## 2023-02-12 RX ORDER — CEPHALEXIN 500 MG
500 CAPSULE ORAL
Refills: 0 | Status: DISCONTINUED | OUTPATIENT
Start: 2023-02-12 | End: 2023-02-16

## 2023-02-12 RX ORDER — DEXTROSE 50 % IN WATER 50 %
25 SYRINGE (ML) INTRAVENOUS ONCE
Refills: 0 | Status: DISCONTINUED | OUTPATIENT
Start: 2023-02-12 | End: 2023-02-16

## 2023-02-12 RX ORDER — MONTELUKAST 4 MG/1
10 TABLET, CHEWABLE ORAL DAILY
Refills: 0 | Status: DISCONTINUED | OUTPATIENT
Start: 2023-02-12 | End: 2023-02-16

## 2023-02-12 RX ORDER — ALBUTEROL 90 UG/1
1 AEROSOL, METERED ORAL
Refills: 0 | Status: DISCONTINUED | OUTPATIENT
Start: 2023-02-12 | End: 2023-02-16

## 2023-02-12 RX ORDER — INSULIN LISPRO 100/ML
VIAL (ML) SUBCUTANEOUS
Refills: 0 | Status: DISCONTINUED | OUTPATIENT
Start: 2023-02-12 | End: 2023-02-16

## 2023-02-12 RX ORDER — BENZTROPINE MESYLATE 1 MG
1 TABLET ORAL
Refills: 0 | Status: DISCONTINUED | OUTPATIENT
Start: 2023-02-12 | End: 2023-02-16

## 2023-02-12 RX ORDER — DEXTROSE 50 % IN WATER 50 %
15 SYRINGE (ML) INTRAVENOUS ONCE
Refills: 0 | Status: DISCONTINUED | OUTPATIENT
Start: 2023-02-12 | End: 2023-02-16

## 2023-02-12 RX ORDER — FLUOXETINE HCL 10 MG
20 CAPSULE ORAL DAILY
Refills: 0 | Status: DISCONTINUED | OUTPATIENT
Start: 2023-02-12 | End: 2023-02-16

## 2023-02-12 RX ORDER — INSULIN LISPRO 100/ML
VIAL (ML) SUBCUTANEOUS AT BEDTIME
Refills: 0 | Status: DISCONTINUED | OUTPATIENT
Start: 2023-02-12 | End: 2023-02-16

## 2023-02-12 RX ORDER — DIVALPROEX SODIUM 500 MG/1
500 TABLET, DELAYED RELEASE ORAL DAILY
Refills: 0 | Status: DISCONTINUED | OUTPATIENT
Start: 2023-02-12 | End: 2023-02-16

## 2023-02-12 RX ORDER — TAMSULOSIN HYDROCHLORIDE 0.4 MG/1
0.4 CAPSULE ORAL AT BEDTIME
Refills: 0 | Status: DISCONTINUED | OUTPATIENT
Start: 2023-02-12 | End: 2023-02-16

## 2023-02-12 RX ORDER — DEXTROSE 50 % IN WATER 50 %
12.5 SYRINGE (ML) INTRAVENOUS ONCE
Refills: 0 | Status: DISCONTINUED | OUTPATIENT
Start: 2023-02-12 | End: 2023-02-16

## 2023-02-12 RX ORDER — LACTOBACILLUS ACIDOPHILUS 100MM CELL
1 CAPSULE ORAL
Refills: 0 | Status: DISCONTINUED | OUTPATIENT
Start: 2023-02-12 | End: 2023-02-16

## 2023-02-12 RX ORDER — NIFEDIPINE 30 MG
30 TABLET, EXTENDED RELEASE 24 HR ORAL
Refills: 0 | Status: DISCONTINUED | OUTPATIENT
Start: 2023-02-12 | End: 2023-02-16

## 2023-02-12 RX ORDER — RISPERIDONE 4 MG/1
2 TABLET ORAL AT BEDTIME
Refills: 0 | Status: DISCONTINUED | OUTPATIENT
Start: 2023-02-12 | End: 2023-02-16

## 2023-02-12 RX ORDER — HEPARIN SODIUM 5000 [USP'U]/ML
5000 INJECTION INTRAVENOUS; SUBCUTANEOUS EVERY 12 HOURS
Refills: 0 | Status: DISCONTINUED | OUTPATIENT
Start: 2023-02-12 | End: 2023-02-16

## 2023-02-12 RX ORDER — ALLOPURINOL 300 MG
100 TABLET ORAL
Refills: 0 | Status: DISCONTINUED | OUTPATIENT
Start: 2023-02-12 | End: 2023-02-16

## 2023-02-12 RX ADMIN — TAMSULOSIN HYDROCHLORIDE 0.4 MILLIGRAM(S): 0.4 CAPSULE ORAL at 21:23

## 2023-02-12 RX ADMIN — MONTELUKAST 10 MILLIGRAM(S): 4 TABLET, CHEWABLE ORAL at 12:22

## 2023-02-12 RX ADMIN — Medication 500 MILLIGRAM(S): at 17:55

## 2023-02-12 RX ADMIN — Medication 20 MILLIGRAM(S): at 12:22

## 2023-02-12 RX ADMIN — CARVEDILOL PHOSPHATE 12.5 MILLIGRAM(S): 80 CAPSULE, EXTENDED RELEASE ORAL at 17:55

## 2023-02-12 RX ADMIN — Medication 30 MILLIGRAM(S): at 17:55

## 2023-02-12 RX ADMIN — Medication 1 MILLIGRAM(S): at 17:57

## 2023-02-12 RX ADMIN — HEPARIN SODIUM 5000 UNIT(S): 5000 INJECTION INTRAVENOUS; SUBCUTANEOUS at 17:56

## 2023-02-12 RX ADMIN — Medication 50 MILLIGRAM(S): at 12:24

## 2023-02-12 RX ADMIN — Medication 100 MILLIGRAM(S): at 17:55

## 2023-02-12 RX ADMIN — RISPERIDONE 2 MILLIGRAM(S): 4 TABLET ORAL at 21:23

## 2023-02-12 RX ADMIN — Medication 1 TABLET(S): at 08:50

## 2023-02-12 RX ADMIN — DIVALPROEX SODIUM 500 MILLIGRAM(S): 500 TABLET, DELAYED RELEASE ORAL at 12:23

## 2023-02-12 RX ADMIN — Medication 500 MILLIGRAM(S): at 12:22

## 2023-02-12 RX ADMIN — Medication 1 TABLET(S): at 17:55

## 2023-02-12 RX ADMIN — Medication 50 MILLIGRAM(S): at 17:55

## 2023-02-12 NOTE — PHYSICAL THERAPY INITIAL EVALUATION ADULT - ACTIVE RANGE OF MOTION EXAMINATION, REHAB EVAL
B/L flat feet  w/ ER at both hips/bilateral upper extremity Active ROM was WFL (within functional limits)/bilateral  lower extremity Active ROM was WFL (within functional limits)

## 2023-02-12 NOTE — PHYSICAL THERAPY INITIAL EVALUATION ADULT - PERTINENT HX OF CURRENT PROBLEM, REHAB EVAL
62-year-old male history of hypertension, diabetes, schizophrenia, neurogenic bladder who straight caths at home recently admitted for urinary tract infection discharged earlier today (PT recommended discharge to rehab, wife chose to take patient home) brought in now by EMS with a NPA and for decreased mental status.  Per report patient had to be carried into the house and was very weak, was then found unresponsive, no known trauma, patient nonambulatory.  Pt w/ low Na.

## 2023-02-12 NOTE — ED ADULT NURSE REASSESSMENT NOTE - NS ED NURSE REASSESS COMMENT FT1
Pt straight cathed using sterile technique. 2 RNs present.  Pt tolerated procedure well. Sterile specimen collected. UA and Culture sent.

## 2023-02-12 NOTE — PHYSICAL THERAPY INITIAL EVALUATION ADULT - ADDITIONAL COMMENTS
lives w/ spouse in private home on step to enter, no visual deficits, hearing good, pt is R handed no visual deficits, hearing good, pt is R handed   Pt states he lives in a private house, 1 step to enter, no stairs inside. Pt giving conflicting information: initially states he sleeps on the recliner then ambulates to the bathroom without device and was independent in ADLs. However later in the session states that he does not ambulate and the friend who lives with him assists him in showering & dressing.

## 2023-02-12 NOTE — H&P ADULT - ASSESSMENT
62-year-old male history of hypertension, diabetes, schizophrenia, neurogenic bladder who straight caths at home recently admitted for urinary tract infection discharged yesterday   (PT recommended discharge to rehab, wife chose to take patient home) brought in now by EMS with inablility to walk and family unable to care for and now if elevated wbc counts  id eval  abs as per id  cxr noted  pulm eval  c/w outp meds  dvt proph  pt   gi/ proph

## 2023-02-12 NOTE — ED ADULT NURSE REASSESSMENT NOTE - NS ED NURSE REASSESS COMMENT FT1
Report taken from CANDACE Rahman in [place], pt resting comfortable in GRN 25, no complaints from pt of pain and discomfort. Pt SPO2 100% on 2L NC

## 2023-02-12 NOTE — CONSULT NOTE ADULT - SUBJECTIVE AND OBJECTIVE BOX
Pulmonary Consult Note       Pt was readmitted after discharge due to lethargy at home    he is alert now    He has been intermittently lethargic per report, with snoring    He denies dyspnea, chest pain    Initially admitted due to UTI    He reports history of DOUG, not using CPAP considtently    PMH    hypertension, DM, schizophrenia,  neurogenic bladder.        Vital Signs Last 24 Hrs  T(C): 36.3 (12 Feb 2023 09:53), Max: 37.2 (11 Feb 2023 23:56)  T(F): 97.4 (12 Feb 2023 09:53), Max: 98.9 (11 Feb 2023 23:56)  HR: 70 (12 Feb 2023 10:27) (70 - 95)  BP: 149/79 (12 Feb 2023 10:27) (105/69 - 153/88)  BP(mean): --  RR: 18 (12 Feb 2023 10:27) (17 - 20)  SpO2: 96% (12 Feb 2023 10:27) (88% - 100%)    Parameters below as of 12 Feb 2023 10:27  Patient On (Oxygen Delivery Method): nasal cannula  O2 Flow (L/min): 4      Lungs equal diminished unlabored  no wheeze  cor RRR  abd  nts ft  extr no edema    Impression    62 year old man with history of  asthma being treated for UTI    he has no dyspnea or wheeze    asthma is stable    on Pulmicort albuterol montelukast    Monitoring for any signs of asthma worsening    he has history of DOUG, I suspect lethargy may be relate to this    consider obesity hypoventilation    RECOMMEND    start on BIPAP 10-12 / 5 and adjust to comfort    May obtain ABG on room air to check for hypercapnia    monitor mental status    If possible obtain his CPAP settings from PMD      Labs, meds radiographic studies reviewed    Houston Hanson MD    PULMONARY    MD Charles Arias MD George Haralambou, MD    [phone]      Pulmonary Consult Note       Pt was readmitted after discharge due to lethargy at home    he is alert now    He has been intermittently lethargic per report, with snoring    He denies dyspnea, chest pain    Initially admitted due to UTI    He reports history of DOUG, not using CPAP considtently    PMH    hypertension, DM, schizophrenia,  neurogenic bladder.        Vital Signs Last 24 Hrs  T(C): 36.3 (12 Feb 2023 09:53), Max: 37.2 (11 Feb 2023 23:56)  T(F): 97.4 (12 Feb 2023 09:53), Max: 98.9 (11 Feb 2023 23:56)  HR: 70 (12 Feb 2023 10:27) (70 - 95)  BP: 149/79 (12 Feb 2023 10:27) (105/69 - 153/88)  BP(mean): --  RR: 18 (12 Feb 2023 10:27) (17 - 20)  SpO2: 96% (12 Feb 2023 10:27) (88% - 100%)    Parameters below as of 12 Feb 2023 10:27  Patient On (Oxygen Delivery Method): nasal cannula  O2 Flow (L/min): 4      Lungs equal diminished unlabored  no wheeze  cor RRR  abd  nts ft  extr no edema    Impression    62 year old man with history of  asthma being treated for UTI    he has no dyspnea or wheeze    asthma is stable    on Pulmicort albuterol montelukast    Monitoring for any signs of asthma worsening    he has history of DOUG, I suspect lethargy may be relate to this    consider obesity hypoventilation    RECOMMEND    start on BIPAP 10-12 / 5 and adjust to comfort    Alternatively may use AVAPS or autoPAP  if available    May obtain ABG on room air to check for hypercapnia    monitor mental status    If possible obtain his CPAP settings from PMD      Labs, meds radiographic studies reviewed    Houston Hanson MD    PULMONARY    MD Charles Arias MD George Haralambou, MD    [phone]

## 2023-02-12 NOTE — PHYSICAL THERAPY INITIAL EVALUATION ADULT - MANUAL MUSCLE TESTING RESULTS, REHAB EVAL
Received request via: Patient    Was the patient seen in the last year in this department? Yes    Does the patient have an active prescription (recently filled or refills available) for medication(s) requested? No   3-/5 grossly UE's and LES/grossly assessed due to

## 2023-02-12 NOTE — PHYSICAL THERAPY INITIAL EVALUATION ADULT - LEVEL OF INDEPENDENCE: GAIT, REHAB EVAL
worked on wt shifting in nonmechanical lift and pt cannot wt to R side and lift LLE/unable to perform

## 2023-02-12 NOTE — H&P ADULT - HISTORY OF PRESENT ILLNESS
62-year-old male history of hypertension, diabetes, schizophrenia, neurogenic bladder who straight caths at home recently admitted for urinary tract infection discharged yesterday   (PT recommended discharge to rehab, wife chose to take patient home) brought in now by EMS with inablility to walk and family unable to care for     Per report patient had to be carried into the house and was very weak, w no known trauma, patient nonambulatory.     mental status  at baseline

## 2023-02-12 NOTE — H&P ADULT - NSICDXPASTMEDICALHX_GEN_ALL_CORE_FT
PAST MEDICAL HISTORY:  Anxiety     Asthma     DM (diabetes mellitus)     Gout     History of Schizophrenia     HTN (Hypertension)     Hyperlipidemia

## 2023-02-12 NOTE — CONSULT NOTE ADULT - ATTENDING COMMENTS
62 year old male PMH hypertension, diabetes, schizophrenia, neurogenic bladder (straight caths at home), and recently admitted for urinary tract infection now presenting with encephalopathy  Encephalopathy resolved without antibiotic adjustment  Leukocytosis is noted  Unclear etiology  Would continue Keflex for now  Would follow blood and urine cultures  Would obtain abdominal US    I will continue to follow. Please feel free to contact me with any further questions.    Pawel Soliman M.D.  [facility] Division of Infectious Disease  8AM-5PM Monday - Friday: Available on [employer] Teams  After Hours and Holidays (or if no response on [employer] Teams): Please contact the Infectious Diseases Office at [phone]

## 2023-02-12 NOTE — PHYSICAL THERAPY INITIAL EVALUATION ADULT - ASSISTIVE DEVICE FOR TRANSFER: BED/CHAIR, REHAB EVAL
Pulmonary & Critical Care & Sleep Medicine    Patient: Abida Roper               Sex: female           MRN: [id number]       YOB: 1943      Age:  79 year old       Assessment & RECOMMENDATIONS:   · Chronic sepsis from multiple sources. Abx per ID recommendations  · End stage MS  · Decubitus  · Femoral fracture  · Anaemia    Visit Vitals  /68 (BP Location: RFA - Right forearm, Patient Position: Supine)   Pulse 89   Temp 97.7 °F (36.5 °C) (Oral)   Resp 18   Ht 5' 5\" (1.651 m)   Wt 74.9 kg (165 lb 2 oz)   SpO2 96%   BMI 27.48 kg/m²      Physical Exam:   Oxygen Therapy  O2 Device: None/Room air  HEENT: No DC or bleeding  Neck: supple  Chest: Decreased right sided air entry without wheezing and with rales  HR: PMI normal, regular rate and rhythm and S1 and S2 normal   ABD: Abdomen soft  EXTR: No DVT signs. Edema is moderate  NEURO: no new focal changes  MS: alert    Lab Results   Component Value Date    RAPH 7.38 08/12/2022    RAPCO2 37 08/12/2022    RAPO2 62 (L) 08/12/2022    RAHCO3 22 08/12/2022       Lab Results   Component Value Date    DDIMER 11.74 (H) 07/28/2022       Recent Labs   Lab 08/25/22  0539 08/23/22  0536 08/22/22  0650 08/21/22  0710 08/20/22  0815 08/19/22  0654   SODIUM 135 136 137 136 140 136   POTASSIUM 3.5 4.0 4.3 3.7 4.1 4.3   CHLORIDE 97 98 99 99 102 100   CO2 30 32 29 29 30 29   BUN 22* 19 19 20 19 16   CREATININE 0.50* 0.60 0.59 0.63 0.63 0.60   GLUCOSE 131* 120* 116* 126* 128* 112*   ALBUMIN  --  2.3*  --   --   --   --    AST  --  22  --   --   --   --    GPT  --  12  --   --   --   --    ALKPT  --  250*  --   --   --   --    BILIRUBIN  --  0.4  --   --   --   --    MG  --   --   --  1.8 1.8 1.8       Recent Labs     08/23/22  0536 08/24/22  1831 08/25/22  0539   WBC 17.0* 15.3* 13.2*   * 521* 479*   RBC 3.28* 3.12* 3.08*   HGB 9.1* 8.6* 8.1*   HCT 29.0* 27.6* 26.9*   MCV 88.4 88.5 87.3   MCH 27.7 27.6 26.3   MCHC 31.4* 31.2* 30.1*      nonmechanical lift

## 2023-02-12 NOTE — CONSULT NOTE ADULT - SUBJECTIVE AND OBJECTIVE BOX
Patient is a 62y old  Male who presents with a chief complaint of AMS    HPI:  61 y/o M PMHx hypertension, diabetes, schizophrenia, neurogenic bladder who straight caths at home recently admitted for urinary tract infection discharged earlier today (PT recommended discharge to rehab, wife chose to take patient home) brought in now by EMS with a NPA and for decreased mental status.  Per report patient had to be carried into the house and was very weak, was then found unresponsive, no known trauma, patient nonambulatory.  Vitals stable in field, mental status improved with placement of NPA patient currently awake and talking.  States he is not sure why he is in the hospital and has no complaints.    ID consulted for leukocytosis. Currently mentating at baseline. No confusion, denies any complaints    REVIEW OF SYSTEMS  [  ] ROS unobtainable because:    [ x ] All other systems negative except as noted below    Constitutional:  [ ] fever [ ] chills  [ ] weight loss  [ ]night sweat  [ ]poor appetite/PO intake [ ]fatigue   Skin:  [ ] rash [ ] phlebitis	  Eyes: [ ] icterus [ ] pain  [ ] discharge	  ENMT: [ ] sore throat  [ ] thrush [ ] ulcers [ ] exudates [ ]anosmia  Respiratory: [ ] dyspnea [ ] hemoptysis [ ] cough [ ] sputum	  Cardiovascular:  [ ] chest pain [ ] palpitations [ ] edema	  Gastrointestinal:  [ ] nausea [ ] vomiting [ ] diarrhea [ ] constipation [ ] pain	  Genitourinary:  [ ] dysuria [ ] frequency [ ] hematuria [ ] discharge [ ] flank pain  [ ] incontinence  Musculoskeletal:  [ ] myalgias [ ] arthralgias [ ] arthritis  [ ] back pain  Neurological:  [ ] headache [ ] weakness [ ] seizures  [ ] confusion/altered mental status    prior hospital charts reviewed [V]  primary team notes reviewed [V]  other consultant notes reviewed [V]    PAST MEDICAL & SURGICAL HISTORY:  History of Schizophrenia    HTN (Hypertension)    Hyperlipidemia    Gout    Anxiety    Asthma    DM (diabetes mellitus)    History of Tonsillectomy    S/P Laparotomy    H/O knee surgery      FAMILY HISTORY:  FH: heart disease (Father)  FH: diabetes mellitus (Mother)      SOCIAL HISTORY:  Denied etoh or IVDU    Allergies  No Known Allergies      ANTIMICROBIALS:  cephalexin 500 four times a day    ANTIMICROBIALS (past 90 days):   MEDICATIONS  (STANDING):  cephalexin   500 milliGRAM(s) Oral (23 @ 12:22)      MEDICATIONS  (STANDING):  albuterol    90 MICROgram(s) HFA Inhaler 1 four times a day PRN  allopurinol 100 two times a day  benztropine 1 two times a day  bethanechol 50 four times a day  carvedilol 12.5 every 12 hours  dextrose 50% Injectable 25 once  dextrose 50% Injectable 12.5 once  dextrose 50% Injectable 25 once  dextrose Oral Gel 15 once PRN  divalproex  daily  enalapril 20 daily  FLUoxetine 20 daily  glucagon  Injectable 1 once  heparin   Injectable 5000 every 12 hours  insulin lispro (ADMELOG) corrective regimen sliding scale  three times a day before meals  montelukast 10 daily  NIFEdipine XL 30 two times a day  risperiDONE   Tablet 2 at bedtime  tamsulosin 0.4 at bedtime      VITALS:  Vital Signs Last 24 Hrs  T(F): 97.4 (23 @ 09:53), Max: 102.9 (23 @ 14:25)  Vital Signs Last 24 Hrs  HR: 70 (23 @ 10:27) (70 - 95)  BP: 149/79 (23 @ 10:27) (105/69 - 153/88)  RR: 18 (23 @ 10:27)  SpO2: 96% (23 @ 10:27) (88% - 100%)  Wt(kg): --    PHYSICAL EXAM:  Constitutional: non-toxic, no distress  HEAD/EYES: anicteric, no conjunctival injection  ENT:  supple, no thrush  Cardiovascular:   +S1/S2  Respiratory:  +BS bilaterally  GI:  soft, non-tender, +bowel sounds  :  no pryor, no CVA tenderness  Musculoskeletal:  no synovitis, normal ROM  Neurologic: awake and alert,  no focal findings  Skin:  no rash, no erythema, no phlebitis  Heme/Onc: no cervical lymphadenopathy   Psychiatric:  awake, alert, appropriate mood      Labs:                        11.5   16.65 )-----------( 249      ( 2023 09:13 )             34.7     02-12    132<L>  |  96  |  31<H>  ----------------------------<  118<H>  4.2   |  30  |  0.88    Ca    8.7      2023 09:13    TPro  6.1  /  Alb  3.2<L>  /  TBili  0.3  /  DBili  x   /  AST  32  /  ALT  35  /  AlkPhos  58        WBC Trend:  WBC Count: 16.65 (23 @ 09:13)  WBC Count: 17.26 (23 @ 00:19)  WBC Count: 7.80 (02-10-23 @ 07:00)  WBC Count: 8.34 (23 @ 06:49)    Auto Neutrophil #: 14.67 K/uL (23 @ 00:19)  Auto Neutrophil #: 13.26 K/uL (23 @ 13:30)  Band Neutrophils %: 4.3 % (23 @ 13:30)  Auto Neutrophil #: 7.56 K/uL (22 @ 07:09)  Auto Neutrophil #: 11.56 K/uL (22 @ 00:27)    Auto Eosinophil %: 0.0 % (23 @ 00:19)    Urinalysis Basic - ( 2023 00:30 )    Color: Yellow / Appearance: Clear / S.015 / pH: x  Gluc: x / Ketone: Small  / Bili: Negative / Urobili: Negative   Blood: x / Protein: Trace / Nitrite: Negative   Leuk Esterase: Negative / RBC: 3 /hpf / WBC 5 /HPF   Sq Epi: x / Non Sq Epi: 2 /hpf / Bacteria: Negative    MICROBIOLOGY:  Culture - Blood (collected 2023 14:01)  Source: .Blood Blood-Venous  Final Report:    No Growth Final    Culture - Urine (collected 2023 13:40)  Source: Catheterized Catheterized  Final Report:    >100,000 CFU/ml Escherichia coli  Organism: Escherichia coli  Organism: Escherichia coli    Sensitivities:      -  Amikacin: S <=16      -  Amoxicillin/Clavulanic Acid: S <=8/4      -  Ampicillin: R >16 These ampicillin results predict results for amoxicillin      -  Ampicillin/Sulbactam: S 8/4 Enterobacter, Klebsiella aerogenes, Citrobacter, and Serratia may develop resistance during prolonged therapy (3-4 days)      -  Aztreonam: S <=4      -  Cefazolin: S <=2 For uncomplicated UTI with K. pneumoniae, E. coli, or P. mirablis: LINDA <=16 is sensitive and LINDA >=32 is resistant. This also predicts results for oral agents cefaclor, cefdinir, cefpodoxime, cefprozil, cefuroxime axetil, cephalexin and locarbef for uncomplicated UTI. Note that some isolates may be susceptible to these agents while testing resistant to cefazolin.      -  Cefepime: S <=2      -  Cefoxitin: S <=8      -  Ceftriaxone: S <=1 Enterobacter, Klebsiella aerogenes, Citrobacter, and Serratia may develop resistance during prolonged therapy      -  Cefuroxime: S <=4      -  Ciprofloxacin: R >2      -  Ertapenem: S <=0.5      -  Gentamicin: S <=2      -  Imipenem: S <=1      -  Levofloxacin: R >4      -  Meropenem: S <=1      -  Nitrofurantoin: S <=32 Should not be used to treat pyelonephritis      -  Piperacillin/Tazobactam: S <=8      -  Tobramycin: S <=2      -  Trimethoprim/Sulfamethoxazole: R >2/38      Method Type: LINDA    Culture - Blood (collected 2023 13:00)  Source: .Blood Blood-Peripheral  Final Report:    No Growth Final    Culture - Blood (collected 08 May 2022 22:51)  Source: .Blood Blood  Final Report:    No Growth Final    Culture - Blood (collected 08 May 2022 22:50)  Source: .Blood Blood  Final Report:    No Growth Final    Culture - Urine (collected 08 May 2022 01:49)  Source: Clean Catch Clean Catch (Midstream)  Final Report:    <10,000 CFU/mL Normal Urogenital Danielle    Culture - Blood (collected 08 May 2022 00:20)  Source: .Blood Blood-Peripheral  Final Report:    Growth in anaerobic bottle: Escherichia coli    See previous culture [id number]    Culture - Blood (collected 08 May 2022 00:00)  Source: .Blood Blood-Peripheral  Final Report:    Growth in aerobic and anaerobic bottles: Escherichia coli    ***Blood Panel PCR results on this specimen are available    approximately 3 hours after the Gram stain result.***    Gram stain, PCR, and/or culture results may not always    correspond due to difference in methodologies.    ************************************************************    This PCR assay was performed by multiplex PCR. This    Assay tests for 66 bacterial and resistance gene targets.    Please refer to the [facility] test directory    at [email or web address] for details.  Organism: Blood Culture PCR  Escherichia coli  Organism: Escherichia coli    Sensitivities:      -  Amikacin: S <=16      -  Ampicillin: R >16 These ampicillin results predict results for amoxicillin      -  Ampicillin/Sulbactam: S 8/4 Enterobacter, Klebsiella aerogenes, Citrobacter, and Serratia may develop resistance during prolonged therapy (3-4 days)      -  Aztreonam: S <=4      -  Cefazolin: S <=2 Enterobacter, Klebsiella aerogenes, Citrobacter, and Serratia may develop resistance during prolonged therapy (3-4 days)      -  Cefepime: S <=2      -  Cefoxitin: S <=8      -  Ceftriaxone: S <=1 Enterobacter, Klebsiella aerogenes, Citrobacter, and Serratia may develop resistance during prolonged therapy      -  Ciprofloxacin: R 1      -  Ertapenem: S <=0.5      -  Gentamicin: S <=2      -  Imipenem: S <=1      -  Levofloxacin: S <=0.5      -  Meropenem: S <=1      -  Piperacillin/Tazobactam: S <=8      -  Tobramycin: S <=2      -  Trimethoprim/Sulfamethoxazole: R >2/38      Method Type: LINDA  Organism: Blood Culture PCR    Sensitivities:      -  Escherichia coli: Detec      Method Type: PCR    Culture - Urine (collected 2021 18:57)  Source: .Urine Clean Catch (Midstream)  Final Report:    >100,000 CFU/ml Escherichia coli  Organism: Escherichia coli  Organism: Escherichia coli    Sensitivities:      -  Amikacin: S <=16      -  Amoxicillin/Clavulanic Acid: S <=8/4      -  Ampicillin: S <=8 These ampicillin results predict results for amoxicillin      -  Ampicillin/Sulbactam: S <=4/2 Enterobacter, Citrobacter, and Serratia may develop resistance during prolonged therapy (3-4 days)      -  Aztreonam: S <=4      -  Cefazolin: S <=2 (MIC_CL_COM_ENTERIC_CEFAZU) For uncomplicated UTI with K. pneumoniae, E. coli, or P. mirablis: LINDA <=16 is sensitive and LINDA >=32 is resistant. This also predicts results for oral agents cefaclor, cefdinir, cefpodoxime, cefprozil, cefuroxime axetil, cephalexin and locarbef for uncomplicated UTI. Note that some isolates may be susceptible to these agents while testing resistant to cefazolin.      -  Cefepime: S <=2      -  Cefoxitin: S <=8      -  Ceftriaxone: S <=1 Enterobacter, Citrobacter, and Serratia may develop resistance during prolonged therapy      -  Ciprofloxacin: S <=0.25      -  Ertapenem: S <=0.5      -  Gentamicin: S <=2      -  Imipenem: S <=1      -  Levofloxacin: S <=0.5      -  Meropenem: S <=1      -  Nitrofurantoin: S <=32 Should not be used to treat pyelonephritis      -  Piperacillin/Tazobactam: S <=8      -  Tigecycline: S <=2      -  Tobramycin: S <=2      -  Trimethoprim/Sulfamethoxazole: S <=0.5/9.5      Method Type: LINDA    Culture - Urine (collected 18 May 2021 14:26)  Source: .Urine Catheterized  Final Report:    No growth    Rapid RVP Result: Detected ( @ 16:28)    RADIOLOGY:  imaging below personally reviewed    < from: Xray Chest 1 View- PORTABLE-Urgent (23 @ 00:49) >  IMPRESSION:  Small left pleural effusion and/or left basilar atelectasis. An   underlying left basilar pneumonia cannot be excluded.  < end of copied text >    < from: CT Abdomen and Pelvis w/ Oral Cont and w/ IV Cont (23 @ 14:09) >  IMPRESSION:  No CT evidence of occult intra-abdominal abscess.  < end of copied text >

## 2023-02-12 NOTE — PATIENT PROFILE ADULT - FALL HARM RISK - HARM RISK INTERVENTIONS

## 2023-02-13 LAB
ANION GAP SERPL CALC-SCNC: 6 MMOL/L — SIGNIFICANT CHANGE UP (ref 5–17)
BUN SERPL-MCNC: 16 MG/DL — SIGNIFICANT CHANGE UP (ref 7–23)
CALCIUM SERPL-MCNC: 9 MG/DL — SIGNIFICANT CHANGE UP (ref 8.4–10.5)
CHLORIDE SERPL-SCNC: 98 MMOL/L — SIGNIFICANT CHANGE UP (ref 96–108)
CO2 SERPL-SCNC: 32 MMOL/L — HIGH (ref 22–31)
CREAT SERPL-MCNC: 0.72 MG/DL — SIGNIFICANT CHANGE UP (ref 0.5–1.3)
CULTURE RESULTS: SIGNIFICANT CHANGE UP
EGFR: 105 ML/MIN/1.73M2 — SIGNIFICANT CHANGE UP
GLUCOSE BLDC GLUCOMTR-MCNC: 109 MG/DL — HIGH (ref 70–99)
GLUCOSE BLDC GLUCOMTR-MCNC: 128 MG/DL — HIGH (ref 70–99)
GLUCOSE BLDC GLUCOMTR-MCNC: 159 MG/DL — HIGH (ref 70–99)
GLUCOSE BLDC GLUCOMTR-MCNC: 162 MG/DL — HIGH (ref 70–99)
GLUCOSE SERPL-MCNC: 113 MG/DL — HIGH (ref 70–99)
HCT VFR BLD CALC: 35 % — LOW (ref 39–50)
HGB BLD-MCNC: 11.4 G/DL — LOW (ref 13–17)
MCHC RBC-ENTMCNC: 29.5 PG — SIGNIFICANT CHANGE UP (ref 27–34)
MCHC RBC-ENTMCNC: 32.6 GM/DL — SIGNIFICANT CHANGE UP (ref 32–36)
MCV RBC AUTO: 90.7 FL — SIGNIFICANT CHANGE UP (ref 80–100)
NRBC # BLD: 0 /100 WBCS — SIGNIFICANT CHANGE UP (ref 0–0)
PLATELET # BLD AUTO: 259 K/UL — SIGNIFICANT CHANGE UP (ref 150–400)
POTASSIUM SERPL-MCNC: 4 MMOL/L — SIGNIFICANT CHANGE UP (ref 3.5–5.3)
POTASSIUM SERPL-SCNC: 4 MMOL/L — SIGNIFICANT CHANGE UP (ref 3.5–5.3)
RBC # BLD: 3.86 M/UL — LOW (ref 4.2–5.8)
RBC # FLD: 12.5 % — SIGNIFICANT CHANGE UP (ref 10.3–14.5)
SODIUM SERPL-SCNC: 136 MMOL/L — SIGNIFICANT CHANGE UP (ref 135–145)
SPECIMEN SOURCE: SIGNIFICANT CHANGE UP
WBC # BLD: 11.34 K/UL — HIGH (ref 3.8–10.5)
WBC # FLD AUTO: 11.34 K/UL — HIGH (ref 3.8–10.5)

## 2023-02-13 PROCEDURE — 76700 US EXAM ABDOM COMPLETE: CPT | Mod: 26

## 2023-02-13 PROCEDURE — 99233 SBSQ HOSP IP/OBS HIGH 50: CPT

## 2023-02-13 RX ADMIN — Medication 50 MILLIGRAM(S): at 05:36

## 2023-02-13 RX ADMIN — Medication 50 MILLIGRAM(S): at 17:58

## 2023-02-13 RX ADMIN — Medication 500 MILLIGRAM(S): at 23:29

## 2023-02-13 RX ADMIN — Medication 50 MILLIGRAM(S): at 12:46

## 2023-02-13 RX ADMIN — MONTELUKAST 10 MILLIGRAM(S): 4 TABLET, CHEWABLE ORAL at 12:46

## 2023-02-13 RX ADMIN — DIVALPROEX SODIUM 500 MILLIGRAM(S): 500 TABLET, DELAYED RELEASE ORAL at 12:46

## 2023-02-13 RX ADMIN — CARVEDILOL PHOSPHATE 12.5 MILLIGRAM(S): 80 CAPSULE, EXTENDED RELEASE ORAL at 17:58

## 2023-02-13 RX ADMIN — Medication 100 MILLIGRAM(S): at 17:56

## 2023-02-13 RX ADMIN — RISPERIDONE 2 MILLIGRAM(S): 4 TABLET ORAL at 21:37

## 2023-02-13 RX ADMIN — Medication 50 MILLIGRAM(S): at 00:12

## 2023-02-13 RX ADMIN — HEPARIN SODIUM 5000 UNIT(S): 5000 INJECTION INTRAVENOUS; SUBCUTANEOUS at 05:34

## 2023-02-13 RX ADMIN — Medication 50 MILLIGRAM(S): at 23:29

## 2023-02-13 RX ADMIN — Medication 1 MILLIGRAM(S): at 17:56

## 2023-02-13 RX ADMIN — Medication 500 MILLIGRAM(S): at 12:46

## 2023-02-13 RX ADMIN — HEPARIN SODIUM 5000 UNIT(S): 5000 INJECTION INTRAVENOUS; SUBCUTANEOUS at 17:58

## 2023-02-13 RX ADMIN — Medication 1 MILLIGRAM(S): at 05:35

## 2023-02-13 RX ADMIN — Medication 30 MILLIGRAM(S): at 05:35

## 2023-02-13 RX ADMIN — Medication 500 MILLIGRAM(S): at 05:35

## 2023-02-13 RX ADMIN — Medication 1 TABLET(S): at 17:57

## 2023-02-13 RX ADMIN — Medication 20 MILLIGRAM(S): at 05:35

## 2023-02-13 RX ADMIN — Medication 2: at 13:42

## 2023-02-13 RX ADMIN — Medication 500 MILLIGRAM(S): at 00:12

## 2023-02-13 RX ADMIN — Medication 100 MILLIGRAM(S): at 05:35

## 2023-02-13 RX ADMIN — TAMSULOSIN HYDROCHLORIDE 0.4 MILLIGRAM(S): 0.4 CAPSULE ORAL at 21:37

## 2023-02-13 RX ADMIN — Medication 20 MILLIGRAM(S): at 12:46

## 2023-02-13 RX ADMIN — Medication 30 MILLIGRAM(S): at 19:55

## 2023-02-13 RX ADMIN — CARVEDILOL PHOSPHATE 12.5 MILLIGRAM(S): 80 CAPSULE, EXTENDED RELEASE ORAL at 05:35

## 2023-02-13 RX ADMIN — Medication 500 MILLIGRAM(S): at 17:56

## 2023-02-13 NOTE — PROGRESS NOTE ADULT - ASSESSMENT
62-year-old male history of hypertension, diabetes, schizophrenia, neurogenic bladder who straight caths at home recently admitted for urinary tract infection discharged yesterday   (PT recommended discharge to rehab, wife chose to take patient home) brought in now by EMS with inablility to walk and family unable to care for and now if elevated wbc counts  id eval noted  leukocytosis btter  abs as per id  cxr noted  pulm eval noted  c/w outp meds  dvt proph  pt   gi/ proph  rehab planning

## 2023-02-13 NOTE — PROGRESS NOTE ADULT - SUBJECTIVE AND OBJECTIVE BOX
DATE OF SERVICE: 02-13-23 @ 14:38  CHIEF COMPLAINT:Patient is a 62y old  Male who presents with a chief complaint of obtundation (12 Feb 2023 13:26)    	        PAST MEDICAL & SURGICAL HISTORY:  History of Schizophrenia      HTN (Hypertension)      Hyperlipidemia      Gout      Anxiety      Asthma      DM (diabetes mellitus)      History of Tonsillectomy      S/P Laparotomy      H/O knee surgery              REVIEW OF SYSTEMS:    NECK: No pain or stiffness  RESPIRATORY: No cough, wheezing, chills or hemoptysis; No Shortness of Breath  CARDIOVASCULAR: No chest pain, palpitations, passing out,   GASTROINTESTINAL: No abdominal or epigastric pain. No nausea, vomiting    NEUROLOGICAL: No headaches,   Medications:  MEDICATIONS  (STANDING):  allopurinol 100 milliGRAM(s) Oral two times a day  benztropine 1 milliGRAM(s) Oral two times a day  bethanechol 50 milliGRAM(s) Oral four times a day  carvedilol 12.5 milliGRAM(s) Oral every 12 hours  cephalexin 500 milliGRAM(s) Oral four times a day  dextrose 5%. 1000 milliLiter(s) (50 mL/Hr) IV Continuous <Continuous>  dextrose 5%. 1000 milliLiter(s) (100 mL/Hr) IV Continuous <Continuous>  dextrose 50% Injectable 25 Gram(s) IV Push once  dextrose 50% Injectable 12.5 Gram(s) IV Push once  dextrose 50% Injectable 25 Gram(s) IV Push once  divalproex  milliGRAM(s) Oral daily  enalapril 20 milliGRAM(s) Oral daily  FLUoxetine 20 milliGRAM(s) Oral daily  glucagon  Injectable 1 milliGRAM(s) IntraMuscular once  heparin   Injectable 5000 Unit(s) SubCutaneous every 12 hours  insulin lispro (ADMELOG) corrective regimen sliding scale   SubCutaneous three times a day before meals  insulin lispro (ADMELOG) corrective regimen sliding scale   SubCutaneous at bedtime  lactobacillus acidophilus 1 Tablet(s) Oral two times a day with meals  montelukast 10 milliGRAM(s) Oral daily  NIFEdipine XL 30 milliGRAM(s) Oral two times a day  risperiDONE   Tablet 2 milliGRAM(s) Oral at bedtime  tamsulosin 0.4 milliGRAM(s) Oral at bedtime    MEDICATIONS  (PRN):  albuterol    90 MICROgram(s) HFA Inhaler 1 Puff(s) Inhalation four times a day PRN Shortness of Breath and/or Wheezing  dextrose Oral Gel 15 Gram(s) Oral once PRN Blood Glucose LESS THAN 70 milliGRAM(s)/deciliter    	    PHYSICAL EXAM:  T(C): 37.3 (02-13-23 @ 13:46), Max: 37.3 (02-13-23 @ 13:46)  HR: 78 (02-13-23 @ 13:46) (62 - 99)  BP: 127/72 (02-13-23 @ 13:46) (126/75 - 153/92)  RR: 20 (02-13-23 @ 13:46) (18 - 20)  SpO2: 97% (02-13-23 @ 13:46) (92% - 100%)  Wt(kg): --  I&O's Summary    12 Feb 2023 07:01  -  13 Feb 2023 07:00  --------------------------------------------------------  IN: 840 mL / OUT: 4500 mL / NET: -3660 mL    13 Feb 2023 07:01  -  13 Feb 2023 14:38  --------------------------------------------------------  IN: 360 mL / OUT: 1000 mL / NET: -640 mL        Appearance: Normal	  HEENT:   Normal oral mucosa, PERRL, EOMI	  Lymphatic: No lymphadenopathy  Cardiovascular: Normal S1 S2, No JVD, No murmurs, No edema  Respiratory: Lungs clear to auscultation	  Psychiatry: A & O x 3, Mood & affect appropriate  Gastrointestinal:  Soft, Non-tender, + BS	  Skin: No rashes, No ecchymoses, No cyanosis	  Neurologic: Non-focal  Extremities: Normal range of motion, No clubbing, cyanosis or edema  Vascular: Peripheral pulses palpable 2+ bilaterally    TELEMETRY: 	    ECG:  	  RADIOLOGY:  OTHER: 	  	  LABS:	 	    CARDIAC MARKERS:                                11.4   11.34 )-----------( 259      ( 13 Feb 2023 10:05 )             35.0     02-13    136  |  98  |  16  ----------------------------<  113<H>  4.0   |  32<H>  |  0.72    Ca    9.0      13 Feb 2023 10:05    TPro  6.1  /  Alb  3.2<L>  /  TBili  0.3  /  DBili  x   /  AST  32  /  ALT  35  /  AlkPhos  58  02-12    proBNP:   Lipid Profile:   HgA1c:   TSH:

## 2023-02-13 NOTE — PROGRESS NOTE ADULT - SUBJECTIVE AND OBJECTIVE BOX
A&O x 3    NAD    CS Stable Afeb    Lungs- clear  Heart- Reg  Abd- obese. Non tender  Ext- chronic stasis changes    IMP: Transient change in mental status; acute on chronic hypercapnic resp failure likely; patient not compliant with home PAP  Presently appears to be at baseline    PLAN: Nocturnal PAP  PT for evaluation as planned; STR?

## 2023-02-14 ENCOUNTER — TRANSCRIPTION ENCOUNTER (OUTPATIENT)
Age: 63
End: 2023-02-14

## 2023-02-14 LAB
ANION GAP SERPL CALC-SCNC: 11 MMOL/L — SIGNIFICANT CHANGE UP (ref 5–17)
BUN SERPL-MCNC: 17 MG/DL — SIGNIFICANT CHANGE UP (ref 7–23)
CALCIUM SERPL-MCNC: 8.6 MG/DL — SIGNIFICANT CHANGE UP (ref 8.4–10.5)
CHLORIDE SERPL-SCNC: 96 MMOL/L — SIGNIFICANT CHANGE UP (ref 96–108)
CO2 SERPL-SCNC: 26 MMOL/L — SIGNIFICANT CHANGE UP (ref 22–31)
CREAT SERPL-MCNC: 0.93 MG/DL — SIGNIFICANT CHANGE UP (ref 0.5–1.3)
EGFR: 94 ML/MIN/1.73M2 — SIGNIFICANT CHANGE UP
GLUCOSE BLDC GLUCOMTR-MCNC: 120 MG/DL — HIGH (ref 70–99)
GLUCOSE BLDC GLUCOMTR-MCNC: 134 MG/DL — HIGH (ref 70–99)
GLUCOSE BLDC GLUCOMTR-MCNC: 137 MG/DL — HIGH (ref 70–99)
GLUCOSE BLDC GLUCOMTR-MCNC: 152 MG/DL — HIGH (ref 70–99)
GLUCOSE SERPL-MCNC: 120 MG/DL — HIGH (ref 70–99)
HCT VFR BLD CALC: 33.3 % — LOW (ref 39–50)
HGB BLD-MCNC: 10.9 G/DL — LOW (ref 13–17)
MCHC RBC-ENTMCNC: 29.6 PG — SIGNIFICANT CHANGE UP (ref 27–34)
MCHC RBC-ENTMCNC: 32.7 GM/DL — SIGNIFICANT CHANGE UP (ref 32–36)
MCV RBC AUTO: 90.5 FL — SIGNIFICANT CHANGE UP (ref 80–100)
NRBC # BLD: 0 /100 WBCS — SIGNIFICANT CHANGE UP (ref 0–0)
PLATELET # BLD AUTO: 286 K/UL — SIGNIFICANT CHANGE UP (ref 150–400)
POTASSIUM SERPL-MCNC: 3.7 MMOL/L — SIGNIFICANT CHANGE UP (ref 3.5–5.3)
POTASSIUM SERPL-SCNC: 3.7 MMOL/L — SIGNIFICANT CHANGE UP (ref 3.5–5.3)
RBC # BLD: 3.68 M/UL — LOW (ref 4.2–5.8)
RBC # FLD: 12.7 % — SIGNIFICANT CHANGE UP (ref 10.3–14.5)
SARS-COV-2 RNA SPEC QL NAA+PROBE: SIGNIFICANT CHANGE UP
SODIUM SERPL-SCNC: 133 MMOL/L — LOW (ref 135–145)
WBC # BLD: 11.36 K/UL — HIGH (ref 3.8–10.5)
WBC # FLD AUTO: 11.36 K/UL — HIGH (ref 3.8–10.5)

## 2023-02-14 PROCEDURE — 99232 SBSQ HOSP IP/OBS MODERATE 35: CPT

## 2023-02-14 RX ADMIN — MONTELUKAST 10 MILLIGRAM(S): 4 TABLET, CHEWABLE ORAL at 12:24

## 2023-02-14 RX ADMIN — HEPARIN SODIUM 5000 UNIT(S): 5000 INJECTION INTRAVENOUS; SUBCUTANEOUS at 17:52

## 2023-02-14 RX ADMIN — Medication 1 TABLET(S): at 17:50

## 2023-02-14 RX ADMIN — Medication 30 MILLIGRAM(S): at 05:35

## 2023-02-14 RX ADMIN — Medication 100 MILLIGRAM(S): at 05:36

## 2023-02-14 RX ADMIN — Medication 100 MILLIGRAM(S): at 17:51

## 2023-02-14 RX ADMIN — Medication 500 MILLIGRAM(S): at 05:36

## 2023-02-14 RX ADMIN — Medication 1 MILLIGRAM(S): at 17:51

## 2023-02-14 RX ADMIN — Medication 500 MILLIGRAM(S): at 23:05

## 2023-02-14 RX ADMIN — Medication 50 MILLIGRAM(S): at 17:51

## 2023-02-14 RX ADMIN — Medication 50 MILLIGRAM(S): at 05:36

## 2023-02-14 RX ADMIN — Medication 20 MILLIGRAM(S): at 12:25

## 2023-02-14 RX ADMIN — RISPERIDONE 2 MILLIGRAM(S): 4 TABLET ORAL at 22:26

## 2023-02-14 RX ADMIN — DIVALPROEX SODIUM 500 MILLIGRAM(S): 500 TABLET, DELAYED RELEASE ORAL at 12:24

## 2023-02-14 RX ADMIN — Medication 50 MILLIGRAM(S): at 23:05

## 2023-02-14 RX ADMIN — CARVEDILOL PHOSPHATE 12.5 MILLIGRAM(S): 80 CAPSULE, EXTENDED RELEASE ORAL at 05:36

## 2023-02-14 RX ADMIN — Medication 1 TABLET(S): at 09:04

## 2023-02-14 RX ADMIN — Medication 500 MILLIGRAM(S): at 17:51

## 2023-02-14 RX ADMIN — Medication 2: at 14:25

## 2023-02-14 RX ADMIN — Medication 30 MILLIGRAM(S): at 18:02

## 2023-02-14 RX ADMIN — HEPARIN SODIUM 5000 UNIT(S): 5000 INJECTION INTRAVENOUS; SUBCUTANEOUS at 05:35

## 2023-02-14 RX ADMIN — Medication 20 MILLIGRAM(S): at 05:36

## 2023-02-14 RX ADMIN — TAMSULOSIN HYDROCHLORIDE 0.4 MILLIGRAM(S): 0.4 CAPSULE ORAL at 21:37

## 2023-02-14 RX ADMIN — Medication 1 MILLIGRAM(S): at 05:36

## 2023-02-14 RX ADMIN — Medication 500 MILLIGRAM(S): at 12:24

## 2023-02-14 RX ADMIN — Medication 50 MILLIGRAM(S): at 12:23

## 2023-02-14 RX ADMIN — CARVEDILOL PHOSPHATE 12.5 MILLIGRAM(S): 80 CAPSULE, EXTENDED RELEASE ORAL at 17:50

## 2023-02-14 NOTE — DISCHARGE NOTE PROVIDER - NSDCFUADDAPPT_GEN_ALL_CORE_FT
APPTS ARE READY TO BE MADE: [x ] YES    Best Family or Patient Contact (if needed):    Additional Information about above appointments (if needed):    1:   2:   3:     Other comments or requests:    APPTS ARE READY TO BE MADE: [x ] YES    Best Family or Patient Contact (if needed):    Additional Information about above appointments (if needed):    1:   2:   3:     Other comments or requests:   Patient is being discharged to Rehab.

## 2023-02-14 NOTE — DISCHARGE NOTE PROVIDER - NSDCMRMEDTOKEN_GEN_ALL_CORE_FT
albuterol 90 mcg/inh inhalation aerosol: 1 puff(s) inhaled , As Needed  allopurinol 100 mg oral tablet: 1 tab(s) orally 2 times a day  BENZTROPINE MESYLATE  1 MG TABS: 1 tab(s) orally 2 times a day  bethanechol 50 mg oral tablet: 1 tab(s) orally 4 times a day  carvedilol 12.5 mg oral tablet: 1 tab(s) orally 2 times a day  cephalexin 500 mg oral capsule: 1 cap(s) orally every 6 hours   Crestor 5 mg oral tablet: 1 tab(s) orally once a day  divalproex sodium 500 mg oral tablet, extended release: 1 tab(s) orally once a day  enalapril 20 mg oral tablet: 1 tab(s) orally once a day  Flovent 220 mcg/inh inhalation aerosol with adapter: inhaled 2 times a day, As Needed  FLUoxetine 20 mg oral tablet: 1 tab(s) orally once a day  lactobacillus acidophilus oral capsule: 1 cap(s) orally 2 times a day (with meals)   metFORMIN 1000 mg oral tablet: 1 tab(s) orally 2 times a day  montelukast 10 mg oral tablet: 1 tab(s) orally once a day  NIFEdipine 30 mg oral tablet, extended release: 1 tab(s) orally 2 times a day  risperiDONE 1 mg oral tablet: 2 tab(s) orally once a day (at bedtime)   tamsulosin 0.4 mg oral capsule: 1 cap(s) orally once a day (at bedtime)  Trulicity Pen 1.5 mg/0.5 mL subcutaneous solution: 1 dose(s) subcutaneous once a week   albuterol 90 mcg/inh inhalation aerosol: 1 puff(s) inhaled 4 to 6 times a day, As Needed  BENZTROPINE MESYLATE  1 MG TABS: 1 tab(s) orally 2 times a day  bethanechol 50 mg oral tablet: 1 tab(s) orally 4 times a day  carvedilol 12.5 mg oral tablet: 1 tab(s) orally 2 times a day  cephalexin 500 mg oral capsule: 1 cap(s) orally every 6 hours   Crestor 5 mg oral tablet: 1 tab(s) orally once a day  divalproex sodium 500 mg oral tablet, extended release: 1 tab(s) orally once a day  enalapril 20 mg oral tablet: 1 tab(s) orally once a day  Flovent 220 mcg/inh inhalation aerosol with adapter: inhaled 2 times a day, As Needed  FLUoxetine 20 mg oral tablet: 1 tab(s) orally once a day  lactobacillus acidophilus oral capsule: 1 cap(s) orally 2 times a day (with meals)   metFORMIN 1000 mg oral tablet: 1 tab(s) orally 2 times a day  montelukast 10 mg oral tablet: 1 tab(s) orally once a day  NIFEdipine 30 mg oral tablet, extended release: 1 tab(s) orally 2 times a day  risperiDONE 1 mg oral tablet: 2 tab(s) orally once a day (at bedtime)   tamsulosin 0.4 mg oral capsule: 1 cap(s) orally once a day (at bedtime)  Trulicity Pen 1.5 mg/0.5 mL subcutaneous solution: 1 dose(s) subcutaneous once a week

## 2023-02-14 NOTE — DISCHARGE NOTE PROVIDER - CARE PROVIDER_API CALL
PREETHI HAHN  Family Practice  [address] [place] [address]  Phone: [phone]  Fax: [phone]  Follow Up Time:

## 2023-02-14 NOTE — PROGRESS NOTE ADULT - ASSESSMENT
62-year-old male history of hypertension, diabetes, schizophrenia, neurogenic bladder who straight caths at home recently admitted for urinary tract infection discharged yesterday   (PT recommended discharge to rehab, wife chose to take patient home) brought in now by EMS with inablility to walk and family unable to care for and now if elevated wbc counts  id eval noted  leukocytosis resolved  abs as per id  cxr noted  pulm eval noted  c/w outp meds  dvt proph  pt   gi/ proph  rehab planning

## 2023-02-14 NOTE — PROGRESS NOTE ADULT - SUBJECTIVE AND OBJECTIVE BOX
Follow Up:  Leukocytosis    Interval History: afebrile. SOB improved. no urinary symptoms.     REVIEW OF SYSTEMS  [  ] ROS unobtainable because:    [ x ] All other systems negative except as noted below    Constitutional:  [ ] fever [ ] chills  [ ] weight loss  [ ] weakness  Skin:  [ ] rash [ ] phlebitis	  Eyes: [ ] icterus [ ] pain  [ ] discharge	  ENMT: [ ] sore throat  [ ] thrush [ ] ulcers [ ] exudates  Respiratory: [ ] dyspnea [ ] hemoptysis [ ] cough [ ] sputum	  Cardiovascular:  [ ] chest pain [ ] palpitations [ ] edema	  Gastrointestinal:  [ ] nausea [ ] vomiting [ ] diarrhea [ ] constipation [ ] pain	  Genitourinary:  [ ] dysuria [ ] frequency [ ] hematuria [ ] discharge [ ] flank pain  [ ] incontinence  Musculoskeletal:  [ ] myalgias [ ] arthralgias [ ] arthritis  [ ] back pain  Neurological:  [ ] headache [ ] seizures  [ ] confusion/altered mental status    Allergies  No Known Allergies        ANTIMICROBIALS:  cephalexin 500 four times a day      OTHER MEDS:  MEDICATIONS  (STANDING):  albuterol    90 MICROgram(s) HFA Inhaler 1 four times a day PRN  allopurinol 100 two times a day  benztropine 1 two times a day  bethanechol 50 four times a day  carvedilol 12.5 every 12 hours  dextrose 50% Injectable 25 once  dextrose 50% Injectable 12.5 once  dextrose 50% Injectable 25 once  dextrose Oral Gel 15 once PRN  divalproex  daily  enalapril 20 daily  FLUoxetine 20 daily  glucagon  Injectable 1 once  heparin   Injectable 5000 every 12 hours  insulin lispro (ADMELOG) corrective regimen sliding scale  three times a day before meals  insulin lispro (ADMELOG) corrective regimen sliding scale  at bedtime  montelukast 10 daily  NIFEdipine XL 30 two times a day  risperiDONE   Tablet 2 at bedtime  tamsulosin 0.4 at bedtime      Vital Signs Last 24 Hrs  T(C): 37.1 (14 Feb 2023 16:41), Max: 37.3 (13 Feb 2023 20:48)  T(F): 98.7 (14 Feb 2023 16:41), Max: 99.1 (13 Feb 2023 20:48)  HR: 82 (14 Feb 2023 16:41) (66 - 82)  BP: 127/75 (14 Feb 2023 16:41) (107/64 - 147/88)  BP(mean): --  RR: 18 (14 Feb 2023 16:41) (18 - 20)  SpO2: 95% (14 Feb 2023 16:41) (94% - 100%)    Parameters below as of 14 Feb 2023 16:41  Patient On (Oxygen Delivery Method): room air    PHYSICAL EXAMINATION:  General: Alert and Awake, NAD  HEENT: Normocephalic / Atraumatic  Resp: No accessory muscles of respiration utilized  Abdomen: Not distended.  MSK: No LE edema.   : No pryor  Skin: No rashes or lesions.    Neuro: Alert and Awake. CN 2-12 Grossly intact. Moves all four extremities spontaneously.  Psych: Calm, Pleasant, Cooperative                          10.9   11.36 )-----------( 286      ( 14 Feb 2023 07:12 )             33.3       02-14    133<L>  |  96  |  17  ----------------------------<  120<H>  3.7   |  26  |  0.93    Ca    8.6      14 Feb 2023 07:12            MICROBIOLOGY:  v  Clean Catch Clean Catch (Midstream)  02-12-23   <10,000 CFU/mL Normal Urogenital Danielle  --  --      .Blood Blood-Peripheral  02-11-23   No growth to date.  --  --      .Blood Blood-Peripheral  02-11-23   No growth to date.  --  --      .Blood Blood-Venous  02-06-23   No Growth Final  --  --      Catheterized Catheterized  02-06-23   >100,000 CFU/ml Escherichia coli  --  Escherichia coli      .Blood Blood-Peripheral  02-06-23   No Growth Final  --  --    RADIOLOGY:    <The imaging below has been reviewed and visualized by me independently. Findings as detailed in report below>    < from: US Abdomen Complete (US Abdomen Complete .) (02.13.23 @ 10:30) >  IMPRESSION:  Distended bladder with wall thickening. This may be related to reported   history of neurogenic bladder. Correlate with urinalysis for urinary   tract infection.  Mild bilateral hydronephrosis.    Mild hepatic steatosis and cholelithiasis.    < end of copied text >

## 2023-02-14 NOTE — DISCHARGE NOTE PROVIDER - HOSPITAL COURSE
61 y/o M PMHx hypertension, diabetes, schizophrenia, neurogenic bladder (straight caths at home), and recently admitted for urinary tract infection, now brought in by EMS for AMS. Now back to baseline. Found to have leukocytosis. ID consulted for further management. Afebrile, wbc 16.7. UA with 5 wbc. Chest Xray with L atelectasis with pleural effusion.     #Leukocytosis - unclear etiology, ?stress reaction. No localizing findings for infection  #AMS - unclear etiology of episode of unresponsiveness, but now back to baseline  #Recent UTI - diagnosed last admission, UCx grew e.coli. Currently on cephalexin through 2/16  Seen by ID:  - c/w cephalexin to complete 10 day course on 2/16, as previously outlined  - check complete abdominal US to exclude hydronephrosis, interval perinephric/nephric fluid collection----  - follow up blood cultures and urine culture sent this admission  - trend leukocytosis  - monitor temp     61 y/o M PMHx hypertension, diabetes, schizophrenia, neurogenic bladder (straight caths at home), and recently admitted for urinary tract infection, now brought in by EMS for AMS. Now back to baseline. Found to have leukocytosis. ID consulted for further management. Afebrile, wbc 16.7. UA with 5 wbc. Chest Xray with L atelectasis with pleural effusion.     #Leukocytosis - unclear etiology, ?stress reaction. No localizing findings for infection, wbc now 11.36.  #AMS - unclear etiology of episode of unresponsiveness, but now back to baseline  #Recent UTI - diagnosed last admission, UCx grew e.coli. Currently on cephalexin through 2/16  Seen by ID:  - c/w cephalexin to complete 10 day course on 2/16, as previously outlined  - check complete abdominal US to exclude hydronephrosis, interval perinephric/nephric fluid collection-B/l mild hydro  - follow up blood cultures and urine culture sent this admission-No growth.  - trend leukocytosis-improved  - monitor temp    Discharge/dispo/Med rec [place] [facility]_____________.     63 y/o M PMHx hypertension, diabetes, schizophrenia, neurogenic bladder (straight caths at home), and recently admitted for urinary tract infection, now brought in by EMS for AMS. Now back to baseline. Found to have leukocytosis. ID consulted for further management. Afebrile, wbc 16.7. UA with 5 wbc. Chest Xray with L atelectasis with pleural effusion.     #Leukocytosis - unclear etiology, ?stress reaction. No localizing findings for infection, wbc now 11.36.  #AMS - unclear etiology of episode of unresponsiveness, but now back to baseline  #Recent UTI - diagnosed last admission, UCx grew e.coli. Currently on cephalexin through 2/16  Seen by ID:  - c/w cephalexin to complete 10 day course on 2/16, as previously outlined  - check complete abdominal US to exclude hydronephrosis, interval perinephric/nephric fluid collection-B/l mild hydro  - follow up blood cultures and urine culture sent this admission-No growth.  - trend leukocytosis-improved  - monitor temp    Discharge/dispo/Med rec [facility]     63 y/o M PMHx hypertension, diabetes, schizophrenia, neurogenic bladder (straight caths at home), and recently admitted for urinary tract infection, now brought in by EMS for AMS. Now back to baseline. Found to have leukocytosis. ID consulted for further management. Afebrile, wbc 16.7. UA with 5 wbc. Chest Xray with L atelectasis with pleural effusion.     #Leukocytosis - unclear etiology, ?stress reaction. No localizing findings for infection, wbc now 11.36.  #AMS - unclear etiology of episode of unresponsiveness, but now back to baseline  #Recent UTI - diagnosed last admission, UCx grew e.coli. Currently on cephalexin through 2/16  Seen by ID:  - c/w cephalexin to complete 10 day course on 2/16, as previously outlined  - check complete abdominal US to exclude hydronephrosis, interval perinephric/nephric fluid collection-B/l mild hydro  - follow up blood cultures and urine culture sent this admission-No growth.  - trend leukocytosis-improved  - monitor temp  - Pt uses CPAP at night PRN    Discharge/dispo/Med rec DW Dr. Palomino

## 2023-02-14 NOTE — PROGRESS NOTE ADULT - SUBJECTIVE AND OBJECTIVE BOX
DATE OF SERVICE: 02-14-23 @ 14:46  CHIEF COMPLAINT:Patient is a 62y old  Male who presents with a chief complaint of obtundation (12 Feb 2023 13:26)    	        PAST MEDICAL & SURGICAL HISTORY:  History of Schizophrenia      HTN (Hypertension)      Hyperlipidemia      Gout      Anxiety      Asthma      DM (diabetes mellitus)      History of Tonsillectomy      S/P Laparotomy      H/O knee surgery              REVIEW OF SYSTEMS:  CONSTITUTIONAL: No fever, weight loss, or fatigue  EYES: No eye pain, visual disturbances, or discharge  NECK: No pain or stiffness  RESPIRATORY: No cough, wheezing, chills or hemoptysis; No Shortness of Breath  CARDIOVASCULAR: No chest pain, palpitations, passing out, dizziness, or leg swelling  GASTROINTESTINAL: No abdominal or epigastric pain. No nausea, vomiting, or hematemesis;  GENITOURINARY: No dysuria, frequency, hematuria, or incontinence  NEUROLOGICAL: No headaches,    Medications:  MEDICATIONS  (STANDING):  allopurinol 100 milliGRAM(s) Oral two times a day  benztropine 1 milliGRAM(s) Oral two times a day  bethanechol 50 milliGRAM(s) Oral four times a day  carvedilol 12.5 milliGRAM(s) Oral every 12 hours  cephalexin 500 milliGRAM(s) Oral four times a day  dextrose 5%. 1000 milliLiter(s) (50 mL/Hr) IV Continuous <Continuous>  dextrose 5%. 1000 milliLiter(s) (100 mL/Hr) IV Continuous <Continuous>  dextrose 50% Injectable 25 Gram(s) IV Push once  dextrose 50% Injectable 12.5 Gram(s) IV Push once  dextrose 50% Injectable 25 Gram(s) IV Push once  divalproex  milliGRAM(s) Oral daily  enalapril 20 milliGRAM(s) Oral daily  FLUoxetine 20 milliGRAM(s) Oral daily  glucagon  Injectable 1 milliGRAM(s) IntraMuscular once  heparin   Injectable 5000 Unit(s) SubCutaneous every 12 hours  insulin lispro (ADMELOG) corrective regimen sliding scale   SubCutaneous three times a day before meals  insulin lispro (ADMELOG) corrective regimen sliding scale   SubCutaneous at bedtime  lactobacillus acidophilus 1 Tablet(s) Oral two times a day with meals  montelukast 10 milliGRAM(s) Oral daily  NIFEdipine XL 30 milliGRAM(s) Oral two times a day  risperiDONE   Tablet 2 milliGRAM(s) Oral at bedtime  tamsulosin 0.4 milliGRAM(s) Oral at bedtime    MEDICATIONS  (PRN):  albuterol    90 MICROgram(s) HFA Inhaler 1 Puff(s) Inhalation four times a day PRN Shortness of Breath and/or Wheezing  dextrose Oral Gel 15 Gram(s) Oral once PRN Blood Glucose LESS THAN 70 milliGRAM(s)/deciliter    	    PHYSICAL EXAM:  T(C): 37 (02-14-23 @ 13:44), Max: 37.3 (02-13-23 @ 20:48)  HR: 72 (02-14-23 @ 13:44) (66 - 79)  BP: 126/66 (02-14-23 @ 13:44) (102/66 - 147/88)  RR: 20 (02-14-23 @ 13:44) (20 - 20)  SpO2: 95% (02-14-23 @ 13:44) (94% - 100%)  Wt(kg): --  I&O's Summary    13 Feb 2023 07:01  -  14 Feb 2023 07:00  --------------------------------------------------------  IN: 1960 mL / OUT: 4350 mL / NET: -2390 mL    14 Feb 2023 07:01  -  14 Feb 2023 14:46  --------------------------------------------------------  IN: 840 mL / OUT: 1850 mL / NET: -1010 mL        Appearance: Normal	  HEENT:   Normal oral mucosa, PERRL, EOMI	    Cardiovascular: Normal S1 S2, No JVD,   Respiratory: Lungs clear to auscultation	  Psychiatry: A & O x 3,   Gastrointestinal:  Soft, Non-tender, + BS	/obese  Skin: No rashes, No ecchymoses, No cyanosis	  Neurologic: Non-focal  Extremities: dec rom    TELEMETRY: 	    ECG:  	  RADIOLOGY:  OTHER: 	  	  LABS:	 	    CARDIAC MARKERS:                                10.9   11.36 )-----------( 286      ( 14 Feb 2023 07:12 )             33.3     02-14    133<L>  |  96  |  17  ----------------------------<  120<H>  3.7   |  26  |  0.93    Ca    8.6      14 Feb 2023 07:12      proBNP:   Lipid Profile:   HgA1c:   TSH:

## 2023-02-14 NOTE — DISCHARGE NOTE PROVIDER - NSDCCPCAREPLAN_GEN_ALL_CORE_FT
PRINCIPAL DISCHARGE DIAGNOSIS  Diagnosis: AMS (altered mental status)  Assessment and Plan of Treatment:   #AMS - unclear etiology of episode of unresponsiveness, but now back to baseline        SECONDARY DISCHARGE DIAGNOSES  Diagnosis: Leukocytosis (leucocytosis)  Assessment and Plan of Treatment: Leukocytosis - unclear etiology, ?stress reaction. No localizing findings for infection, wbc now 11.36.      Diagnosis: UTI (urinary tract infection)  Assessment and Plan of Treatment:   #Recent UTI - diagnosed last admission, UCx grew e.coli. Currently on cephalexin through 2/16  Seen by ID:  - c/w cephalexin to complete 10 day course on 2/16, as previously outlined  -  abdominal US to exclude hydronephrosis, interval perinephric/nephric fluid collection shows B/l mild hydronephrosis  - follow up blood cultures and urine culture sent this admission-No growth.  - trend leukocytosis-improved      Diagnosis: Diabetes mellitus  Assessment and Plan of Treatment:   Make sure you get your HgA1c checked every three months.  If you take oral diabetes medications, check your blood glucose two times a day.  If you take insulin, check your blood glucose before meals and at bedtime.  It's important not to skip any meals.  Keep a log of your blood glucose results and always take it with you to your doctor appointments.  Keep a list of your current medications including injectables and over the counter medications and bring this medication list with you to all your doctor appointments.  If you have not seen your ophthalmologist this year call for appointment.  Check your feet daily for redness, sores, or openings. Do not self treat. If no improvement in two days call your primary care physician for an appointment.  Low blood sugar (hypoglycemia) is a blood sugar below 70mg/dl. Check your blood sugar if you feel signs/symptoms of hypoglycemia. If your blood sugar is below 70 take 15 grams of carbohydrates (ex 4 oz of apple juice, 3-4 glucose tablets, or 4-6 oz of regular soda) wait 15 minutes and repeat blood sugar to make sure it comes up above 70.  If your blood sugar is above 70 and you are due for a meal, have a meal.  If you are not due for a meal have a snack.  This snack helps keeps your blood sugar at a safe range.

## 2023-02-14 NOTE — PROGRESS NOTE ADULT - ASSESSMENT
63 y/o M PMHx hypertension, diabetes, schizophrenia, neurogenic bladder (straight caths at home), and recently admitted for urinary tract infection, now brought in by EMS for AMS. Now back to baseline. Found to have leukocytosis. ID consulted for further management.    Afebrile, HD stable  WBC 16.7K  Urinalysis 5 WBCs  CXR L atelectasis, pleural effusion    Impression:  #Leukocytosis - unclear etiology, ?stress reaction. No localizing findings for infection  #AMS - unclear etiology of episode of unresponsiveness, but now back to baseline  #Recent UTI - diagnosed last admission, UCx grew e.coli. Currently on cephalexin through 2/16    Recs:  - c/w cephalexin to complete 10 day course on 2/16, as previously outlined  - Abdominal US with mild bilateral hydro and neurogenic bladder - consider urology input  - follow up blood cultures and urine culture sent this admission  - trend leukocytosis  - monitor temp       63 y/o M PMHx hypertension, diabetes, schizophrenia, neurogenic bladder (straight caths at home), and recently admitted for urinary tract infection, now brought in by EMS for AMS. Now back to baseline. Found to have leukocytosis. ID consulted for further management.    Afebrile, HD stable  WBC 16.7K  Urinalysis 5 WBCs  CXR L atelectasis, pleural effusion    Impression:  #Leukocytosis - unclear etiology, ?stress reaction. No localizing findings for infection  #AMS - unclear etiology of episode of unresponsiveness, but now back to baseline  #Recent UTI - diagnosed last admission, UCx grew e.coli. Currently on cephalexin through 2/16    Recs:  - c/w cephalexin to complete 10 day course on 2/16, as previously outlined  - Abdominal US with mild bilateral hydro and neurogenic bladder - consider urology input  - follow up blood cultures and urine culture sent this admission  - trend leukocytosis  - monitor temp    I will be away tomorrow. Please contact the Infectious Diseases Office to contact the covering Infectious Diseases Attending.     Pawel Soliman M.D.  [facility] Division of Infectious Disease  8AM-5PM Monday - Friday: Available on [employer] Teams  After Hours and Holidays (or if no response on [employer] Teams): Please contact the Infectious Diseases Office at [phone]

## 2023-02-15 LAB
GLUCOSE BLDC GLUCOMTR-MCNC: 116 MG/DL — HIGH (ref 70–99)
GLUCOSE BLDC GLUCOMTR-MCNC: 127 MG/DL — HIGH (ref 70–99)
GLUCOSE BLDC GLUCOMTR-MCNC: 129 MG/DL — HIGH (ref 70–99)
GLUCOSE BLDC GLUCOMTR-MCNC: 195 MG/DL — HIGH (ref 70–99)

## 2023-02-15 PROCEDURE — 99232 SBSQ HOSP IP/OBS MODERATE 35: CPT

## 2023-02-15 RX ORDER — CEPHALEXIN 500 MG
1 CAPSULE ORAL
Qty: 4 | Refills: 0
Start: 2023-02-15 | End: 2023-02-15

## 2023-02-15 RX ORDER — ALBUTEROL 90 UG/1
1 AEROSOL, METERED ORAL
Qty: 0 | Refills: 0 | DISCHARGE

## 2023-02-15 RX ORDER — ALLOPURINOL 300 MG
1 TABLET ORAL
Qty: 0 | Refills: 0 | DISCHARGE

## 2023-02-15 RX ADMIN — Medication 1 MILLIGRAM(S): at 17:07

## 2023-02-15 RX ADMIN — Medication 30 MILLIGRAM(S): at 06:29

## 2023-02-15 RX ADMIN — Medication 50 MILLIGRAM(S): at 17:06

## 2023-02-15 RX ADMIN — Medication 500 MILLIGRAM(S): at 13:19

## 2023-02-15 RX ADMIN — Medication 50 MILLIGRAM(S): at 06:30

## 2023-02-15 RX ADMIN — HEPARIN SODIUM 5000 UNIT(S): 5000 INJECTION INTRAVENOUS; SUBCUTANEOUS at 06:31

## 2023-02-15 RX ADMIN — Medication 1 TABLET(S): at 17:06

## 2023-02-15 RX ADMIN — Medication 50 MILLIGRAM(S): at 13:18

## 2023-02-15 RX ADMIN — CARVEDILOL PHOSPHATE 12.5 MILLIGRAM(S): 80 CAPSULE, EXTENDED RELEASE ORAL at 17:07

## 2023-02-15 RX ADMIN — HEPARIN SODIUM 5000 UNIT(S): 5000 INJECTION INTRAVENOUS; SUBCUTANEOUS at 17:06

## 2023-02-15 RX ADMIN — Medication 500 MILLIGRAM(S): at 06:29

## 2023-02-15 RX ADMIN — Medication 20 MILLIGRAM(S): at 06:30

## 2023-02-15 RX ADMIN — DIVALPROEX SODIUM 500 MILLIGRAM(S): 500 TABLET, DELAYED RELEASE ORAL at 13:21

## 2023-02-15 RX ADMIN — Medication 0: at 13:22

## 2023-02-15 RX ADMIN — Medication 100 MILLIGRAM(S): at 06:29

## 2023-02-15 RX ADMIN — MONTELUKAST 10 MILLIGRAM(S): 4 TABLET, CHEWABLE ORAL at 13:20

## 2023-02-15 RX ADMIN — Medication 1 MILLIGRAM(S): at 06:30

## 2023-02-15 RX ADMIN — Medication 2: at 18:18

## 2023-02-15 RX ADMIN — Medication 30 MILLIGRAM(S): at 17:07

## 2023-02-15 RX ADMIN — TAMSULOSIN HYDROCHLORIDE 0.4 MILLIGRAM(S): 0.4 CAPSULE ORAL at 22:02

## 2023-02-15 RX ADMIN — Medication 500 MILLIGRAM(S): at 17:06

## 2023-02-15 RX ADMIN — Medication 20 MILLIGRAM(S): at 13:19

## 2023-02-15 RX ADMIN — RISPERIDONE 2 MILLIGRAM(S): 4 TABLET ORAL at 22:02

## 2023-02-15 RX ADMIN — CARVEDILOL PHOSPHATE 12.5 MILLIGRAM(S): 80 CAPSULE, EXTENDED RELEASE ORAL at 06:30

## 2023-02-15 RX ADMIN — Medication 1 TABLET(S): at 08:21

## 2023-02-15 RX ADMIN — Medication 100 MILLIGRAM(S): at 17:07

## 2023-02-15 NOTE — PROGRESS NOTE ADULT - SUBJECTIVE AND OBJECTIVE BOX
Follow Up:  Leukocytosis    Interval History/ROS:  Overnight: No acute events.  Patient remains afebrile, otherwise hemodynamically stable on room air.  Latest labs show improved leukocytosis to 11.3, BMP with renal function within normal limits.    Patient seen examined at bedside.  Denies any new pain or discomfort.  Alert oriented x3, has insight to medical status.    Allergies  No Known Allergies        ANTIMICROBIALS:  cephalexin 500 four times a day      OTHER MEDS:  MEDICATIONS  (STANDING):  albuterol    90 MICROgram(s) HFA Inhaler 1 four times a day PRN  allopurinol 100 two times a day  benztropine 1 two times a day  bethanechol 50 four times a day  carvedilol 12.5 every 12 hours  dextrose 50% Injectable 25 once  dextrose 50% Injectable 12.5 once  dextrose 50% Injectable 25 once  dextrose Oral Gel 15 once PRN  divalproex  daily  enalapril 20 daily  FLUoxetine 20 daily  glucagon  Injectable 1 once  heparin   Injectable 5000 every 12 hours  insulin lispro (ADMELOG) corrective regimen sliding scale  three times a day before meals  insulin lispro (ADMELOG) corrective regimen sliding scale  at bedtime  montelukast 10 daily  NIFEdipine XL 30 two times a day  risperiDONE   Tablet 2 at bedtime  tamsulosin 0.4 at bedtime      Vital Signs Last 24 Hrs  T(C): 36.7 (15 Feb 2023 09:32), Max: 37.2 (14 Feb 2023 20:11)  T(F): 98 (15 Feb 2023 09:32), Max: 99 (14 Feb 2023 20:11)  HR: 69 (15 Feb 2023 10:39) (65 - 87)  BP: 111/62 (15 Feb 2023 09:32) (107/64 - 127/75)  BP(mean): --  RR: 18 (15 Feb 2023 09:32) (18 - 20)  SpO2: 98% (15 Feb 2023 10:39) (95% - 98%)    Parameters below as of 15 Feb 2023 09:32  Patient On (Oxygen Delivery Method): room air        PHYSICAL EXAM:  General: Alert and Awake, NAD  HEENT: Normocephalic / Atraumatic  Resp: No accessory muscles of respiration utilized  Abdomen: Not distended.  MSK: No LE edema.   : No pryor  Skin: No rashes or lesions.    Neuro: Alert and Awake. CN 2-12 Grossly intact. Moves all four extremities spontaneously.  Psych: Calm, Pleasant, Cooperative                        10.9   11.36 )-----------( 286      ( 14 Feb 2023 07:12 )             33.3       02-14    133<L>  |  96  |  17  ----------------------------<  120<H>  3.7   |  26  |  0.93    Ca    8.6      14 Feb 2023 07:12            MICROBIOLOGY:  v    Culture - Urine (collected 12 Feb 2023 00:30)  Source: Clean Catch Clean Catch (Midstream)  Final Report (13 Feb 2023 12:55):    <10,000 CFU/mL Normal Urogenital Danielle    Culture - Blood (collected 11 Feb 2023 23:41)  Source: .Blood Blood-Peripheral  Preliminary Report (13 Feb 2023 06:01):    No growth to date.    Culture - Blood (collected 11 Feb 2023 23:40)  Source: .Blood Blood-Peripheral  Preliminary Report (13 Feb 2023 06:01):    No growth to date.         Follow Up:  Leukocytosis    Interval History/ROS:  Overnight: No acute events.  Patient remains afebrile, otherwise hemodynamically stable on room air.  Latest labs show improved leukocytosis to 11.3, BMP with renal function within normal limits.    Patient seen examined at bedside.  Denies any new pain or discomfort.  Alert oriented x3, has insight to medical status.    Allergies  No Known Allergies    ANTIMICROBIALS:  cephalexin 500 four times a day      OTHER MEDS:  MEDICATIONS  (STANDING):  albuterol    90 MICROgram(s) HFA Inhaler 1 four times a day PRN  allopurinol 100 two times a day  benztropine 1 two times a day  bethanechol 50 four times a day  carvedilol 12.5 every 12 hours  dextrose 50% Injectable 25 once  dextrose 50% Injectable 12.5 once  dextrose 50% Injectable 25 once  dextrose Oral Gel 15 once PRN  divalproex  daily  enalapril 20 daily  FLUoxetine 20 daily  glucagon  Injectable 1 once  heparin   Injectable 5000 every 12 hours  insulin lispro (ADMELOG) corrective regimen sliding scale  three times a day before meals  insulin lispro (ADMELOG) corrective regimen sliding scale  at bedtime  montelukast 10 daily  NIFEdipine XL 30 two times a day  risperiDONE   Tablet 2 at bedtime  tamsulosin 0.4 at bedtime      Vital Signs Last 24 Hrs  T(C): 36.7 (15 Feb 2023 09:32), Max: 37.2 (14 Feb 2023 20:11)  T(F): 98 (15 Feb 2023 09:32), Max: 99 (14 Feb 2023 20:11)  HR: 69 (15 Feb 2023 10:39) (65 - 87)  BP: 111/62 (15 Feb 2023 09:32) (107/64 - 127/75)  BP(mean): --  RR: 18 (15 Feb 2023 09:32) (18 - 20)  SpO2: 98% (15 Feb 2023 10:39) (95% - 98%)    Parameters below as of 15 Feb 2023 09:32  Patient On (Oxygen Delivery Method): room air        PHYSICAL EXAM:  General: Alert and Awake, NAD  HEENT: Normocephalic / Atraumatic  Resp: No accessory muscles of respiration utilized  Abdomen: Not distended.  MSK: No LE edema.   : No pryor  Skin: No rashes or lesions.    Neuro: Alert and Awake. CN 2-12 Grossly intact. Moves all four extremities spontaneously.  Psych: Calm, Pleasant, Cooperative                        10.9   11.36 )-----------( 286      ( 14 Feb 2023 07:12 )             33.3       02-14    133<L>  |  96  |  17  ----------------------------<  120<H>  3.7   |  26  |  0.93    Ca    8.6      14 Feb 2023 07:12      MICROBIOLOGY:  v    Culture - Urine (collected 12 Feb 2023 00:30)  Source: Clean Catch Clean Catch (Midstream)  Final Report (13 Feb 2023 12:55):    <10,000 CFU/mL Normal Urogenital Danielle    Culture - Blood (collected 11 Feb 2023 23:41)  Source: .Blood Blood-Peripheral  Preliminary Report (13 Feb 2023 06:01):    No growth to date.    Culture - Blood (collected 11 Feb 2023 23:40)  Source: .Blood Blood-Peripheral  Preliminary Report (13 Feb 2023 06:01):    No growth to date.

## 2023-02-15 NOTE — PROGRESS NOTE ADULT - ASSESSMENT
61 y/o M PMHx hypertension, diabetes, schizophrenia, neurogenic bladder (straight caths at home), and recently admitted for urinary tract infection, now brought in by EMS for AMS. Now back to baseline. Found to have leukocytosis. ID consulted for further management.    Afebrile, HD stable  WBC 11.3  Urinalysis 5 WBCs  CXR L atelectasis, pleural effusion    Impression:  #Leukocytosis - unclear etiology, ?stress reaction. No localizing findings for infection  #AMS - unclear etiology of episode of unresponsiveness, but now back to baseline, evaluated by pulmonology, OHS possible  #Recent UTI - diagnosed last admission, UCx grew e.coli. Currently on cephalexin through 2/16    Recs:  - c/w cephalexin to complete 10 day course on 2/16, as previously outlined  - Abdominal US with mild bilateral hydro and neurogenic bladder - consider urology input  - trend leukocytosis  - monitor temp    Fantasma Berry MD  [facility] Division of Infectious Disease  8AM-5PM Monday - Friday: Available on [employer] Teams  After Hours and Holidays (or if no response on [employer] Teams): Please contact the Infectious Diseases Office at [phone]      63 y/o M PMHx hypertension, diabetes, schizophrenia, neurogenic bladder (straight caths at home), and recently admitted for urinary tract infection, now brought in by EMS for AMS. Now back to baseline. Found to have leukocytosis. ID consulted for further management.    Afebrile, HD stable  WBC 11.3  Urinalysis 5 WBCs  CXR L atelectasis, pleural effusion    Impression:  #Leukocytosis - unclear etiology, ?stress reaction. No localizing findings for infection  #AMS - unclear etiology of episode of unresponsiveness, but now back to baseline, evaluated by pulmonology, OHS possible  #Recent UTI - diagnosed last admission, UCx grew e.coli. Currently on cephalexin through 2/16    Recs:  - c/w cephalexin to complete 10 day course on 2/16  - Abdominal US with mild bilateral hydro and neurogenic bladder - consider urology input  - trend leukocytosis  - monitor temp  - advised for outpatient urology follow-up    Fantasma Berry MD  [facility] Division of Infectious Disease  8AM-5PM Monday - Friday: Available on [employer] Teams  After Hours and Holidays (or if no response on [employer] Teams): Please contact the Infectious Diseases Office at [phone]

## 2023-02-15 NOTE — PROGRESS NOTE ADULT - SUBJECTIVE AND OBJECTIVE BOX
DATE OF SERVICE: 02-15-23 @ 14:53  CHIEF COMPLAINT:Patient is a 62y old  Male who presents with a chief complaint of obtundation (12 Feb 2023 13:26)    	        PAST MEDICAL & SURGICAL HISTORY:  History of Schizophrenia      HTN (Hypertension)      Hyperlipidemia      Gout      Anxiety      Asthma      DM (diabetes mellitus)      History of Tonsillectomy      S/P Laparotomy      H/O knee surgery              REVIEW OF SYSTEMS:  CONSTITUTIONAL: No fever, weight loss, or fatigue  EYES: No eye pain, visual disturbances, or discharge  NECK: No pain or stiffness  RESPIRATORY: No cough, wheezing, chills or hemoptysis; No Shortness of Breath  CARDIOVASCULAR: No chest pain, palpitations, passing out, dizziness, or leg swelling  GASTROINTESTINAL: No abdominal or epigastric pain. No nausea, vomiting, or hematemesis; No diarrhea or constipation. No melena or hematochezia.  GENITOURINARY: No dysuria, frequency, hematuria, or incontinence  NEUROLOGICAL: No headaches, memory loss, loss of strength, numbness, or tremors  SKIN: No itching, burning, rashes, or lesions   LYMPH Nodes: No enlarged glands  ENDOCRINE: No heat or cold intolerance; No hair loss  MUSCULOSKELETAL: No joint pain or swelling; No muscle, back, or extremity pain    Medications:  MEDICATIONS  (STANDING):  allopurinol 100 milliGRAM(s) Oral two times a day  benztropine 1 milliGRAM(s) Oral two times a day  bethanechol 50 milliGRAM(s) Oral four times a day  carvedilol 12.5 milliGRAM(s) Oral every 12 hours  cephalexin 500 milliGRAM(s) Oral four times a day  dextrose 5%. 1000 milliLiter(s) (50 mL/Hr) IV Continuous <Continuous>  dextrose 5%. 1000 milliLiter(s) (100 mL/Hr) IV Continuous <Continuous>  dextrose 50% Injectable 25 Gram(s) IV Push once  dextrose 50% Injectable 12.5 Gram(s) IV Push once  dextrose 50% Injectable 25 Gram(s) IV Push once  divalproex  milliGRAM(s) Oral daily  enalapril 20 milliGRAM(s) Oral daily  FLUoxetine 20 milliGRAM(s) Oral daily  glucagon  Injectable 1 milliGRAM(s) IntraMuscular once  heparin   Injectable 5000 Unit(s) SubCutaneous every 12 hours  insulin lispro (ADMELOG) corrective regimen sliding scale   SubCutaneous three times a day before meals  insulin lispro (ADMELOG) corrective regimen sliding scale   SubCutaneous at bedtime  lactobacillus acidophilus 1 Tablet(s) Oral two times a day with meals  montelukast 10 milliGRAM(s) Oral daily  NIFEdipine XL 30 milliGRAM(s) Oral two times a day  risperiDONE   Tablet 2 milliGRAM(s) Oral at bedtime  tamsulosin 0.4 milliGRAM(s) Oral at bedtime    MEDICATIONS  (PRN):  albuterol    90 MICROgram(s) HFA Inhaler 1 Puff(s) Inhalation four times a day PRN Shortness of Breath and/or Wheezing  dextrose Oral Gel 15 Gram(s) Oral once PRN Blood Glucose LESS THAN 70 milliGRAM(s)/deciliter    	    PHYSICAL EXAM:  T(C): 37.2 (02-15-23 @ 13:34), Max: 37.2 (02-14-23 @ 20:11)  HR: 67 (02-15-23 @ 13:34) (65 - 87)  BP: 127/71 (02-15-23 @ 13:34) (111/62 - 127/75)  RR: 18 (02-15-23 @ 13:34) (18 - 18)  SpO2: 95% (02-15-23 @ 13:34) (95% - 98%)  Wt(kg): --  I&O's Summary    14 Feb 2023 07:01  -  15 Feb 2023 07:00  --------------------------------------------------------  IN: 1840 mL / OUT: 7050 mL / NET: -5210 mL    15 Feb 2023 07:01  -  15 Feb 2023 14:53  --------------------------------------------------------  IN: 1120 mL / OUT: 1000 mL / NET: 120 mL        Appearance: Normal	  HEENT:   Normal oral mucosa, PERRL, EOMI	  Lymphatic: No lymphadenopathy  Cardiovascular: Normal S1 S2, No JVD, No murmurs, No edema  Respiratory: Lungs clear to auscultation	  Psychiatry: A & O x 3, Mood & affect appropriate  Gastrointestinal:  Soft, Non-tender, + BS	  Skin: No rashes, No ecchymoses, No cyanosis	  Neurologic: Non-focal  Extremities: Normal range of motion, No clubbing, cyanosis or edema  Vascular: Peripheral pulses palpable 2+ bilaterally    TELEMETRY: 	    ECG:  	  RADIOLOGY:  OTHER: 	  	  LABS:	 	    CARDIAC MARKERS:                                10.9   11.36 )-----------( 286      ( 14 Feb 2023 07:12 )             33.3     02-14    133<L>  |  96  |  17  ----------------------------<  120<H>  3.7   |  26  |  0.93    Ca    8.6      14 Feb 2023 07:12      proBNP:   Lipid Profile:   HgA1c:   TSH:

## 2023-02-16 ENCOUNTER — TRANSCRIPTION ENCOUNTER (OUTPATIENT)
Age: 63
End: 2023-02-16

## 2023-02-16 VITALS — HEART RATE: 70 BPM | OXYGEN SATURATION: 97 %

## 2023-02-16 LAB
GLUCOSE BLDC GLUCOMTR-MCNC: 119 MG/DL — HIGH (ref 70–99)
GLUCOSE BLDC GLUCOMTR-MCNC: 123 MG/DL — HIGH (ref 70–99)

## 2023-02-16 PROCEDURE — 99233 SBSQ HOSP IP/OBS HIGH 50: CPT

## 2023-02-16 PROCEDURE — 87086 URINE CULTURE/COLONY COUNT: CPT

## 2023-02-16 PROCEDURE — 99285 EMERGENCY DEPT VISIT HI MDM: CPT

## 2023-02-16 PROCEDURE — 82962 GLUCOSE BLOOD TEST: CPT

## 2023-02-16 PROCEDURE — U0003: CPT

## 2023-02-16 PROCEDURE — 80053 COMPREHEN METABOLIC PANEL: CPT

## 2023-02-16 PROCEDURE — U0005: CPT

## 2023-02-16 PROCEDURE — 87637 SARSCOV2&INF A&B&RSV AMP PRB: CPT

## 2023-02-16 PROCEDURE — 85025 COMPLETE CBC W/AUTO DIFF WBC: CPT

## 2023-02-16 PROCEDURE — 97116 GAIT TRAINING THERAPY: CPT

## 2023-02-16 PROCEDURE — 85027 COMPLETE CBC AUTOMATED: CPT

## 2023-02-16 PROCEDURE — 84132 ASSAY OF SERUM POTASSIUM: CPT

## 2023-02-16 PROCEDURE — 82435 ASSAY OF BLOOD CHLORIDE: CPT

## 2023-02-16 PROCEDURE — 84295 ASSAY OF SERUM SODIUM: CPT

## 2023-02-16 PROCEDURE — 80048 BASIC METABOLIC PNL TOTAL CA: CPT

## 2023-02-16 PROCEDURE — 85730 THROMBOPLASTIN TIME PARTIAL: CPT

## 2023-02-16 PROCEDURE — 81001 URINALYSIS AUTO W/SCOPE: CPT

## 2023-02-16 PROCEDURE — 85018 HEMOGLOBIN: CPT

## 2023-02-16 PROCEDURE — 82803 BLOOD GASES ANY COMBINATION: CPT

## 2023-02-16 PROCEDURE — 94660 CPAP INITIATION&MGMT: CPT

## 2023-02-16 PROCEDURE — 76700 US EXAM ABDOM COMPLETE: CPT

## 2023-02-16 PROCEDURE — 97110 THERAPEUTIC EXERCISES: CPT

## 2023-02-16 PROCEDURE — 71045 X-RAY EXAM CHEST 1 VIEW: CPT

## 2023-02-16 PROCEDURE — 83605 ASSAY OF LACTIC ACID: CPT

## 2023-02-16 PROCEDURE — 85014 HEMATOCRIT: CPT

## 2023-02-16 PROCEDURE — 85610 PROTHROMBIN TIME: CPT

## 2023-02-16 PROCEDURE — 87040 BLOOD CULTURE FOR BACTERIA: CPT

## 2023-02-16 PROCEDURE — 97162 PT EVAL MOD COMPLEX 30 MIN: CPT

## 2023-02-16 PROCEDURE — 36415 COLL VENOUS BLD VENIPUNCTURE: CPT

## 2023-02-16 PROCEDURE — 82330 ASSAY OF CALCIUM: CPT

## 2023-02-16 PROCEDURE — 82947 ASSAY GLUCOSE BLOOD QUANT: CPT

## 2023-02-16 RX ADMIN — DIVALPROEX SODIUM 500 MILLIGRAM(S): 500 TABLET, DELAYED RELEASE ORAL at 12:41

## 2023-02-16 RX ADMIN — Medication 500 MILLIGRAM(S): at 06:12

## 2023-02-16 RX ADMIN — Medication 500 MILLIGRAM(S): at 12:40

## 2023-02-16 RX ADMIN — Medication 50 MILLIGRAM(S): at 12:41

## 2023-02-16 RX ADMIN — Medication 50 MILLIGRAM(S): at 06:12

## 2023-02-16 RX ADMIN — Medication 30 MILLIGRAM(S): at 06:11

## 2023-02-16 RX ADMIN — Medication 20 MILLIGRAM(S): at 12:41

## 2023-02-16 RX ADMIN — Medication 1 TABLET(S): at 12:40

## 2023-02-16 RX ADMIN — CARVEDILOL PHOSPHATE 12.5 MILLIGRAM(S): 80 CAPSULE, EXTENDED RELEASE ORAL at 06:12

## 2023-02-16 RX ADMIN — Medication 20 MILLIGRAM(S): at 06:22

## 2023-02-16 RX ADMIN — Medication 500 MILLIGRAM(S): at 00:01

## 2023-02-16 RX ADMIN — Medication 1 MILLIGRAM(S): at 06:11

## 2023-02-16 RX ADMIN — MONTELUKAST 10 MILLIGRAM(S): 4 TABLET, CHEWABLE ORAL at 12:42

## 2023-02-16 RX ADMIN — Medication 50 MILLIGRAM(S): at 00:01

## 2023-02-16 RX ADMIN — HEPARIN SODIUM 5000 UNIT(S): 5000 INJECTION INTRAVENOUS; SUBCUTANEOUS at 06:11

## 2023-02-16 RX ADMIN — Medication 100 MILLIGRAM(S): at 06:23

## 2023-02-16 NOTE — DISCHARGE NOTE NURSING/CASE MANAGEMENT/SOCIAL WORK - PATIENT PORTAL LINK FT
You can access the FollowMyHealth Patient Portal offered by [facility] by registering at the following website: [email or web address]. By joining [employer]’s FollowMyHealth portal, you will also be able to view your health information using other applications (apps) compatible with our system.

## 2023-02-16 NOTE — DISCHARGE NOTE NURSING/CASE MANAGEMENT/SOCIAL WORK - NSDCPEFALRISK_GEN_ALL_CORE
For information on Fall & Injury Prevention, visit: [email or web address] OR  [email or web address] OR  https://www.cdc.gov/steadi/patient.html

## 2023-02-16 NOTE — PROGRESS NOTE ADULT - SUBJECTIVE AND OBJECTIVE BOX
DATE OF SERVICE: 02-16-23 @ 14:23  CHIEF COMPLAINT:Patient is a 62y old  Male who presents with a chief complaint of obtundation (12 Feb 2023 13:26)    	        PAST MEDICAL & SURGICAL HISTORY:  History of Schizophrenia      HTN (Hypertension)      Hyperlipidemia      Gout      Anxiety      Asthma      DM (diabetes mellitus)      History of Tonsillectomy      S/P Laparotomy      H/O knee surgery                NECK: No pain or stiffness  RESPIRATORY: No cough, wheezing, chills or hemoptysis; No Shortness of Breath  CARDIOVASCULAR: No chest pain, palpitations, passing out, dizziness,  GASTROINTESTINAL: No abdominal or epigastric pain. No nausea, vomiting, or hematemesis;   GENITOURINARY: No dysuria, frequency, hematuria, or incontinence  NEUROLOGICAL: No headaches,     Medications:  MEDICATIONS  (STANDING):  allopurinol 100 milliGRAM(s) Oral two times a day  benztropine 1 milliGRAM(s) Oral two times a day  bethanechol 50 milliGRAM(s) Oral four times a day  carvedilol 12.5 milliGRAM(s) Oral every 12 hours  cephalexin 500 milliGRAM(s) Oral four times a day  dextrose 5%. 1000 milliLiter(s) (50 mL/Hr) IV Continuous <Continuous>  dextrose 5%. 1000 milliLiter(s) (100 mL/Hr) IV Continuous <Continuous>  dextrose 50% Injectable 25 Gram(s) IV Push once  dextrose 50% Injectable 12.5 Gram(s) IV Push once  dextrose 50% Injectable 25 Gram(s) IV Push once  divalproex  milliGRAM(s) Oral daily  enalapril 20 milliGRAM(s) Oral daily  FLUoxetine 20 milliGRAM(s) Oral daily  glucagon  Injectable 1 milliGRAM(s) IntraMuscular once  heparin   Injectable 5000 Unit(s) SubCutaneous every 12 hours  insulin lispro (ADMELOG) corrective regimen sliding scale   SubCutaneous three times a day before meals  insulin lispro (ADMELOG) corrective regimen sliding scale   SubCutaneous at bedtime  lactobacillus acidophilus 1 Tablet(s) Oral two times a day with meals  montelukast 10 milliGRAM(s) Oral daily  NIFEdipine XL 30 milliGRAM(s) Oral two times a day  risperiDONE   Tablet 2 milliGRAM(s) Oral at bedtime  tamsulosin 0.4 milliGRAM(s) Oral at bedtime    MEDICATIONS  (PRN):  albuterol    90 MICROgram(s) HFA Inhaler 1 Puff(s) Inhalation four times a day PRN Shortness of Breath and/or Wheezing  dextrose Oral Gel 15 Gram(s) Oral once PRN Blood Glucose LESS THAN 70 milliGRAM(s)/deciliter    	    PHYSICAL EXAM:  T(C): 36.7 (02-16-23 @ 12:12), Max: 37.2 (02-16-23 @ 00:30)  HR: 75 (02-16-23 @ 12:12) (67 - 84)  BP: 130/87 (02-16-23 @ 12:12) (124/75 - 139/66)  RR: 18 (02-16-23 @ 12:12) (18 - 18)  SpO2: 97% (02-16-23 @ 12:12) (95% - 99%)  Wt(kg): --  I&O's Summary    15 Feb 2023 07:01  -  16 Feb 2023 07:00  --------------------------------------------------------  IN: 1120 mL / OUT: 8950 mL / NET: -7830 mL    16 Feb 2023 07:01  -  16 Feb 2023 14:23  --------------------------------------------------------  IN: 480 mL / OUT: 2950 mL / NET: -2470 mL        Appearance: Normal	  HEENT:   Normal oral mucosa, PERRL, EOMI	  Lymphatic: No lymphadenopathy  Cardiovascular: Normal S1 S2, No JVD, No murmurs, No edema  Respiratory:   Gastrointestinal:  Soft, Non-tender, + BS	    Neurologic: Non-focal  Extremities: Normal range of motion, No clubbing, cyanosis or edema  Vascular: Peripheral pulses palpable 2+ bilaterally    TELEMETRY: 	    ECG:  	  RADIOLOGY:  OTHER: 	  	  LABS:	 	    CARDIAC MARKERS:                  proBNP:   Lipid Profile:   HgA1c:   TSH:

## 2023-02-16 NOTE — PROGRESS NOTE ADULT - SUBJECTIVE AND OBJECTIVE BOX
Pulmonary Follow up Note       Pt was readmitted after discharge due to lethargy at home    He has been intermittently lethargic per report, with snoring    He denies dyspnea, chest pain    Initially admitted due to UTI    he has been on antibiotics     He reports history of DOUG, not using CPAP consistently    he has been started on PAP therapy in hospital    status improved    PMH    hypertension, DM, schizophrenia,  neurogenic bladder.      Vital Signs Last 24 Hrs  T(C): 36.7 (2023 12:12), Max: 37.2 (2023 00:30)  T(F): 98.1 (2023 12:12), Max: 98.9 (2023 00:30)  HR: 75 (2023 12:12) (67 - 84)  BP: 130/87 (2023 12:12) (124/75 - 139/66)  BP(mean): --  RR: 18 (2023 12:12) (18 - 18)  SpO2: 97% (2023 12:12) (95% - 99%)    Parameters below as of 2023 12:12  Patient On (Oxygen Delivery Method): room air          Lungs equal diminished unlabored  no wheeze  cor RRR  abd  nts ft  extr no edema    Impression    62 year old man with history of  asthma being treated for UTI    he has no dyspnea or wheeze    asthma is stable    on Pulmicort albuterol montelukast    Monitoring for any signs of asthma worsening    he has history of DOUG, I suspect lethargy may have been relate to this    consider obesity hypoventilation     AB.43/47 68     RECOMMEND    Continue on  BIPAP 10- and adjust to comfort    Alternatively may use AVAPS or autoPAP  if available    monitor mental status    If possible obtain his CPAP settings from PMD    Please arrange for BIPAP upon discharge        Labs, meds radiographic studies reviewed    Houston Hanson MD    PULMONARY    MD Charles Arias MD George Haralambou, MD    [phone]

## 2023-02-16 NOTE — CONSULT NOTE ADULT - ASSESSMENT
63 y/o M PMHx hypertension, diabetes, schizophrenia, neurogenic bladder presented with AMS after recent admission  - unclear if this episode was urinary tract related as cultures negative while on keflex  - complete course of abx per ID  - Sono this admission showed distended bladder with mild bilateral hydro. CT last week showed no hydro or  abnormalities  - patient likely develops hydro when blader not draining - needs to be on q6 straight cath schedule for adequate bladder drainage  - outpatient  fu    Eduardo Wooten MD  [facility] [id number]  [phone]

## 2023-02-16 NOTE — CONSULT NOTE ADULT - SUBJECTIVE AND OBJECTIVE BOX
Urology Consult Note    Chief Complaint: ams      History of Present Illness: 62-year-old male history of hypertension, diabetes, schizophrenia, neurogenic bladder who straight caths at home recently admitted for urinary tract infection discharged home who was brought in now by EMS with inablility to walk and family unable to care for and leukocytosis. Patient has history of recurrent UTIs. WBC now normal and urine culture negative (culture last admission with E. Coli). CT frmo 2/9 shows thick walled bladder without hydronephrosis or stones. Sono then showed mild bilateral hydro. mental status improved      PAST MEDICAL & SURGICAL HISTORY:  History of Schizophrenia      HTN (Hypertension)      Hyperlipidemia      Gout      Anxiety      Asthma      DM (diabetes mellitus)      History of Tonsillectomy      S/P Laparotomy      H/O knee surgery          FAMILY HISTORY:  FH: heart disease (Father)    FH: diabetes mellitus (Mother)        Allergies    No Known Allergies    Intolerances        Social History:  Denies tobacco or alcohol use    Review of Systems:   Constitutional: No weight loss, no weakness  HEENT: No visual loss, no hearing loss, no sneezing  Skin: No rash or itching  CV: No chest pain, no chest pressure  Pulm: No shortness of breath, cough, or sputum  GI: No melena, no constipation  : Per HPI  Neuro: No headache, dizziness  MSK: No muscle pain, no joint pain  Heme: No anemia, bruising, or bleeding  Lymphatics: No enlarged nodes, no history of splenectomy  Psych: No depression of anxiety  Endo: No cold or heat intolerance  Allergies: No asthma, hives    Physical Exam:  Vital signs  T(C): 36.8 (02-16-23 @ 05:58), Max: 37.2 (02-15-23 @ 13:34)  HR: 67 (02-16-23 @ 05:58)  BP: 137/87 (02-16-23 @ 05:58)  SpO2: 96% (02-16-23 @ 05:58)  Wt(kg): --    Gen: No acute distress. Normal mood  HEENT: Normocephalic, neck supple  CV: Hemodynamically stable  Pulm: No increased work of breathing  Abd: soft, non-tender, non-distended  Back: No CVA tenderness, no midline pain  Extremities: No significant deformity or joint abnormality  Neuro: Alert & oriented x3, No focal deficits  Skin: Skin normal color, normal texture  Psych: Normal affect, normal behavior  : Nonpalpable bladder no SP tenderness      Labs:

## 2023-02-16 NOTE — DISCHARGE NOTE NURSING/CASE MANAGEMENT/SOCIAL WORK - NSDCFUADDAPPT_GEN_ALL_CORE_FT
APPTS ARE READY TO BE MADE: [x ] YES    Best Family or Patient Contact (if needed):    Additional Information about above appointments (if needed):    1:   2:   3:     Other comments or requests:   Patient is being discharged to Rehab.

## 2023-03-06 ENCOUNTER — APPOINTMENT (OUTPATIENT)
Dept: INFECTIOUS DISEASE | Facility: CLINIC | Age: 63
End: 2023-03-06
Payer: MEDICARE

## 2023-03-06 VITALS
HEART RATE: 102 BPM | TEMPERATURE: 99.4 F | SYSTOLIC BLOOD PRESSURE: 110 MMHG | WEIGHT: 273 LBS | HEIGHT: 72 IN | DIASTOLIC BLOOD PRESSURE: 74 MMHG | BODY MASS INDEX: 36.98 KG/M2 | OXYGEN SATURATION: 96 %

## 2023-03-06 PROCEDURE — 99213 OFFICE O/P EST LOW 20 MIN: CPT

## 2023-03-06 NOTE — ASSESSMENT
[FreeTextEntry1] : Mr. Mckinley Vela Is a 62-year-old male who is being seen in ID clinic as a posthospitalization follow-up for UTI.\par \par #UTI\par No dysuric, abdominal or systemic symptoms during today's appointment\par Patient has follow-up scheduled with urology\par Patient and wife asking about further treatment for UTI as reportedly has cloudy urine\par Dicussed at length rationale to limit treated to symptomatic cases and that treating asymptomatic bacteruria puts patient at risk for increased resistance and other side effects of antibiotics\par Rationale accepted by patient and family\par Agreed that if patient develops symptoms for UTI, he will call clinic and will obtain U/A and Ucx for further evaluation and potential treatment\par Advised for urology follow-up\par Return to ID clinic as needed\par \par Fantasma Berry MD\par Division of Infectious Diseases

## 2023-03-06 NOTE — HISTORY OF PRESENT ILLNESS
[FreeTextEntry1] : Mr. Mckinley Vela Is a 62-year-old male who is following up in ID clinic today for UTI after recent hospitalization.\par \par Patient was recently discharged from [facility] after being hospitalized for UTI.  Patient developed UTI secondary to urinary retention in setting of intermittent straight cath.  Patient ultimately completed therapy with cephalexin.  Completed an empiric course of 10 days.\par \par Following discharge, is now at rehab facility.  Today, accompanied by wife.  Patient denies any fever, chills, chest pain, shortness of breath, abdominal pain, dysuria, flank pain.  Patient does report generalized weakness however reports he has not been physically active as of late.\par \par Patient has planned follow-up with urology for further management urine retention.  Patient overall reports frequent straight caths.\par \par

## 2023-03-06 NOTE — PHYSICAL EXAM
[General Appearance - Alert] : alert [General Appearance - In No Acute Distress] : in no acute distress [Outer Ear] : the ears and nose were normal in appearance [Oropharynx] : the oropharynx was normal with no thrush [Neck Appearance] : the appearance of the neck was normal [Neck Cervical Mass (___cm)] : no neck mass was observed [Jugular Venous Distention Increased] : there was no jugular-venous distention [Thyroid Diffuse Enlargement] : the thyroid was not enlarged [Heart Rate And Rhythm] : heart rate was normal and rhythm regular [Auscultation Breath Sounds / Voice Sounds] : lungs were clear to auscultation bilaterally [Heart Sounds] : normal S1 and S2 [Heart Sounds Gallop] : no gallops [Murmurs] : no murmurs [Heart Sounds Pericardial Friction Rub] : no pericardial rub [Bowel Sounds] : normal bowel sounds [Abdomen Soft] : soft [Abdomen Tenderness] : non-tender [] : no hepato-splenomegaly [Abdomen Mass (___ Cm)] : no abdominal mass palpated [Costovertebral Angle Tenderness] : no CVA tenderness [Nail Clubbing] : no clubbing  or cyanosis of the fingernails [Musculoskeletal - Swelling] : no joint swelling [Motor Tone] : muscle strength and tone were normal [Deep Tendon Reflexes (DTR)] : deep tendon reflexes were 2+ and symmetric [Sensation] : the sensory exam was normal to light touch and pinprick [No Focal Deficits] : no focal deficits [Oriented To Time, Place, And Person] : oriented to person, place, and time [Affect] : the affect was normal

## 2023-03-14 NOTE — DISCHARGE NOTE NURSING/CASE MANAGEMENT/SOCIAL WORK - DATE OF LAST VACCINATION
Patient would like to move surgery to 3/16/23.  She is willing to cancel her upcoming vacation.  Called Dr. Elizabeth's office to update that patient would like to move surgery up.   03-May-2021

## 2023-05-22 NOTE — PROGRESS NOTE ADULT - PROVIDER SPECIALTY LIST ADULT
Internal Medicine
Pulmonology
Infectious Disease
Internal Medicine
Internal Medicine
Pulmonology
Infectious Disease
Internal Medicine
Niacinamide Pregnancy And Lactation Text: These medications are considered safe during pregnancy.

## 2023-05-31 NOTE — PATIENT PROFILE ADULT - FALL HARM RISK - FACTORS
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  She has a soft tissue mass at the umbilicus which is increasing in size as well as peritoneal carcinomatosis.  She has extensive retroperitoneal pelvic adenopathy.  Discussed with the patient and her family that the threshold to treat is diminishing.  She is interested in chemotherapy.    - Ca 125 elevated 331, CA 19-9 504  - s/p port placement,inguinal bx 5/23  - C1d1 taxol 5/18/23, week 2 held for acute issues  - CT 5/27/23 shows mixed response with increase in liver mets but decrease in ovarian mass and LAD; I explained that the progression in liver does not necessarily indicate failure of chemo since she only had one treatment, but confirms the aggressive nature of her disease   - Week 3 chemo would be due 6/1/23, patient aware that her clinical status may prevent chemo as scheduled; there is concern that her disease will progress in that time and we may miss the window to effectively treat her disease     2.  Urinary tract infection with hematuria  3.  Leukocytosis  This is secondary to tumor burden blood cultures are negative to date  Received ceftriaxone, switched to empiric Zosyn 5/10 through 5/14  Appreciate infectious disease     4- Anemia related to the above  -Iron panel c/w chronic disease  -S/p 1 unit PRBC 5/28/23 with improvement in hgb     5- Acute hypoxemic respiratory failure   - has right pleural effusion, s/p thoracentesis 5/28 with removal 400cc fluid, follow up path  - respiratory status better  - pulmonary on consult,  bronchodilatory therapy, BIPAP as needed     6- Toxic metabolic encephalopathy  - ammonia elevated, better with lactulose, will decrease from BID to once daily given diarrhea  - on rifaximin     7- Goals of care  - Patient had a goals of care discussion.  - Met with palliative care, declined hospice at this time  - Now DNR      Prognosis poor  Discussed goals of care with patient, she will think about whether she wants to continue with attempts at treatment or focus on comfort measures     Subjective   Subjective  Patient comfortable no acute distress stable vitals no fever.  Denies any nausea or vomiting mild upper abdominal pain.  Patient been evaluated by GI for PEG tube placement.    Objective   Vital signs in last 24 hours:  Temp:  [97.3 °F (36.3 °C)-99.3 °F (37.4 °C)] 98.2 °F (36.8 °C)  Heart Rate:  [106-123] 108  Resp:  [16-17] 16  BP: (126-149)/(75-84) 137/79    Intake/Output last 3 shifts:  I/O last 3 completed shifts:  In: 30 [NG/GT:30]  Out: -     Intake/Output this shift:  No intake/output data recorded.     Objective    Medications:  Current Facility-Administered Medications   Medication Dose Route Frequency Provider Last Rate Last Admin   • potassium CHLORIDE (KLOR-CON M) cecilia ER tablet 40 mEq  40 mEq Oral Once Leilani Rosales MD       • lactulose (CHRONULAC) 10 GM/15ML solution 10 g  10 g Oral Daily Jarrett Marques MD       • potassium CHLORIDE (KLOR-CON M) cecilia ER tablet 40 mEq  40 mEq Oral Once Winter Armando MD       • potassium phosphate/sodium phosphate (K PHOS NEUTRAL) tablet 250 mg  1 tablet Oral BID Winter Armando MD   250 mg at 05/31/23 0842   • potassium CHLORIDE 20 mEq/100mL IVPB premix  20 mEq Intravenous Once Winter Armando MD       • mupirocin (BACTROBAN) 2 % ointment   Topical BID Brenda Vazquez CNP   Given at 05/31/23 0836   • furosemide (LASIX) tablet 20 mg  20 mg Oral Daily Shanna Remy NP   20 mg at 05/31/23 0842   • QUEtiapine (SEROquel)  tablet 12.5 mg  12.5 mg Oral Nightly Jon Madera MD   12.5 mg at 05/27/23 2128   • mirtazapine (REMERON) tablet 30 mg  30 mg Per NG Tube Nightly Jon Madera MD   30 mg at 05/28/23 2157   • potassium & sodium phosphates (PHOS-NAK) 280-160-250 MG powder packet 1 packet  1 packet Per G Tube Once Winter Armando MD       • metoPROLOL tartrate 10 MG/ML (compounded) suspension 25 mg  25 mg Per NG Tube 2 times per day Winter Armando MD   25 mg at 05/31/23 0842   • [Held by provider] metoPROLOL (LOPRESSOR) injection 5 mg  5 mg Intravenous 4 times per day Winter Armando MD   5 mg at 05/24/23 1050   • dronabinol (MARINOL) capsule 15 mg  15 mg Oral BID Winter Armando MD       • gabapentin (NEURONTIN) 250 MG/5ML solution 300 mg  300 mg Per NG Tube 3 times per day Winter Armando MD   300 mg at 05/31/23 0535   • tamsulosin (FLOMAX) capsule 0.4 mg  0.4 mg Per NG Tube Daily PC Winter Armando MD   0.4 mg at 05/31/23 0842   • rifAXIMin (XIFAXAN) tablet 550 mg  550 mg Per NG Tube 2 times per day Winter Armando MD   550 mg at 05/31/23 0842   • Potassium Replacement (Levels 3.6 - 4)   Does not apply See Admin Instructions Winter Armando MD       • Phosphorus Standard Replacement Protocol   Does not apply See Admin Instructions Winter Armando MD       • morphine SR (MS CONTIN) tablet 15 mg  15 mg Oral 3 times per day Eunice Last CNP   15 mg at 05/31/23 0535   • ondansetron (ZOFRAN ODT) disintegrating tablet 8 mg  8 mg Oral BID Beatriz Kumari MD   8 mg at 05/31/23 0842   • lidocaine (LIDOCARE) 4 % patch 2 patch  2 patch Transdermal Daily Beatriz Kumari MD   2 patch at 05/26/23 1105   • polyethylene glycol (MIRALAX) packet 17 g  17 g Oral Daily Beatriz Kumari MD   17 g at 05/22/23 0817   • docusate sodium-sennosides (SENOKOT S) 50-8.6 MG 2 tablet  2 tablet Oral Nightly Beatriz Kumari MD   2 tablet at 05/27/23 2127   • folic acid (FOLATE) tablet 1 mg  1 mg Oral Daily Beatriz Kumari MD   1 mg at  05/31/23 0842   • sodium chloride (PF) 0.9 % injection 2 mL  2 mL Intracatheter 2 times per day Sadi Palm MD   2 mL at 05/31/23 0840   • heparin (porcine) injection 5,000 Units  5,000 Units Subcutaneous 3 times per day Sadi Palm MD   5,000 Units at 05/31/23 0536   • Potassium Standard Replacement Protocol (Levels 3.5 and lower)   Does not apply See Admin Instructions Sadi Palm MD       • Magnesium Standard Replacement Protocol   Does not apply See Admin Instructions Sadi Palm MD         Current Facility-Administered Medications   Medication Dose Route Frequency Provider Last Rate Last Admin   • lactated ringers infusion   Intravenous Continuous Leilani Rosales MD 75 mL/hr at 05/31/23 0827 New Bag at 05/31/23 0827   • sodium chloride 0.9% infusion   Intravenous Continuous PRN Jarrett Marques MD         Current Facility-Administered Medications   Medication Dose Route Frequency Provider Last Rate Last Admin   • sodium chloride 0.9% infusion   Intravenous Continuous PRN Jarrett Marques MD       • albuterol inhaler 2 puff  2 puff Inhalation Q4H Resp PRN Shanna Remy NP       • loperamide (IMODIUM) capsule 2 mg  2 mg Oral PRN Winter Armando MD       • HYDROmorphone (DILAUDID) injection 0.1 mg  0.1 mg Intravenous Q6H PRN Winter Armando MD   0.1 mg at 05/29/23 2015   • acetaminophen (TYLENOL) 160 MG/5ML solution 650 mg  650 mg Oral Q4H PRN Winter Armando MD       • morphine 10 MG/5ML solution 15 mg  15 mg Per NG Tube Q4H PRN Winter Armando MD   15 mg at 05/27/23 1835   • sodium chloride 0.9 % flush bag 250 mL  250 mL Intravenous Once PRN Melani Rodriguez MD       • prochlorperazine (COMPAZINE) tablet 10 mg  10 mg Oral Q6H PRN Melani Rodriguez MD       • bisacodyl (DULCOLAX) suppository 10 mg  10 mg Rectal Daily PRN Winter Armando MD       • magnesium hydroxide (MILK OF MAGNESIA) 400 MG/5ML suspension 30 mL  30 mL Oral Daily PRN Winter Armando,  MD       • naLOXone (NARCAN) injection 0.4 mg  0.4 mg Intravenous PRN Eunice Last CNP       • diphenhydrAMINE (BENADRYL) capsule 25 mg  25 mg Oral PRN Lyndsay Pitts MD       • HYDROcodone-acetaminophen (NORCO)  MG per tablet 1 tablet  1 tablet Oral Q6H PRN Beatriz Kumari MD   1 tablet at 05/28/23 1531   • cyclobenzaprine (FLEXERIL) tablet 5 mg  5 mg Oral TID PRN Beatriz Kumari MD   5 mg at 05/11/23 1321   • sodium chloride 0.9 % flush bag 25 mL  25 mL Intravenous PRN Sadi Palm MD       • sodium chloride 0.9 % flush bag 25 mL  25 mL Intravenous PRN Sadi Palm MD       • ondansetron (ZOFRAN) injection 4 mg  4 mg Intravenous BID PRN Sadi Palm MD   4 mg at 05/24/23 0551        Laboratory:  Lab Results   Component Value Date    WBC 16.9 (H) 05/31/2023    HGB 7.8 (L) 05/31/2023    HCT 24.2 (L) 05/31/2023     (H) 05/31/2023     Lab Results   Component Value Date    CO2 27 05/31/2023    BUN 7 05/31/2023    CREATININE 0.46 (L) 05/31/2023    GLUCOSE 106 (H) 05/31/2023     Lab Results   Component Value Date    CALCIUM 8.4 05/31/2023    MG 1.9 05/31/2023    MG 2.1 05/31/2023    PHOS 3.1 05/31/2023    PHOS 3.0 05/31/2023     Lab Results   Component Value Date    AST 23 05/31/2023    ALBUMIN 1.3 (L) 05/31/2023     Lab Results   Component Value Date    PTT 33 (H) 05/26/2023    INR 1.1 05/26/2023     No results found for: COLORU, CLARITYU, KETONESU, UROBILINOGEN  No results found for: TSH        Hiren Mckenna MD, PhD, FACP   Weakness

## 2023-07-03 NOTE — BEHAVIORAL HEALTH ASSESSMENT NOTE - RELATEDNESS
Left a detailed message with his nurse to call back or send to my email address.       ----- Message from Janna Bermudez MD sent at 7/1/2023  5:45 PM CDT -----  Can we get records regarding his care with Dr Royal? I heard he was told he no longer needed an oncologist. If I do put in a referral it needs to go to Dr Royal          ----- Message -----  From: Reva Acosta MA  Sent: 6/30/2023  11:26 AM CDT  To: Janna Bermudez MD      ----- Message -----  From: Liz Gay  Sent: 6/30/2023   8:27 AM CDT  To: Aidan Saldivar Staff    Type:  Needs Medical Advice    Who Called: Alma levin/ [facility]   Symptoms (please be specific): -   How long has patient had these symptoms:  -  Pharmacy name and phone #:  -  Would the patient rather a call back or a response via [facility]?    Best Call Back Number: [phone]  Additional Information:  needs another referral to [facility]/ please call pt              Good

## 2023-07-26 ENCOUNTER — EMERGENCY (EMERGENCY)
Facility: HOSPITAL | Age: 63
LOS: 1 days | Discharge: HOME | End: 2023-07-26
Attending: EMERGENCY MEDICINE | Admitting: EMERGENCY MEDICINE
Payer: MEDICARE

## 2023-07-26 VITALS
OXYGEN SATURATION: 96 % | RESPIRATION RATE: 16 BRPM | TEMPERATURE: 99 F | DIASTOLIC BLOOD PRESSURE: 74 MMHG | HEART RATE: 89 BPM | SYSTOLIC BLOOD PRESSURE: 129 MMHG

## 2023-07-26 DIAGNOSIS — Z98.890 OTHER SPECIFIED POSTPROCEDURAL STATES: Chronic | ICD-10-CM

## 2023-07-26 PROCEDURE — 99284 EMERGENCY DEPT VISIT MOD MDM: CPT

## 2023-07-26 NOTE — ED ADULT TRIAGE NOTE - CHIEF COMPLAINT QUOTE
Pt c/o witnessed fall by family member earlier today. Pt states he was leaning on his friend when friend moved over and he slipped. Pt states " I think I have another UTI". Endorsing hematuria. Pt denies LOC, hitting head, dizziness, n/v/d, or pain. Pt well appearing. Pt c/o witnessed fall by family member earlier today. Pt states he was leaning on his friend when friend moved over and he slipped. Pt states " I think I have another UTI". Endorsing hematuria. Pt denies LOC, hitting head, dizziness, n/v/d, blood thinner use or pain. Pt well appearing. Pt c/o witnessed fall by family member earlier today. Pt states he was leaning on his friend when friend moved over and he slipped. Pt states " I think I have another UTI". Endorsing hematuria. Pt denies LOC, hitting head, dizziness, n/v, diarrhea, blood thinner use or pain. Pt well appearing. Pt c/o witnessed fall by family member earlier today. Pt states he was leaning on his friend when friend moved over and he slipped. Pt states " I think I have another UTI". Endorsing hematuria. Pt denies chest pain, SOB, LOC, hitting head, dizziness, n/v, diarrhea, blood thinner use or pain. Pt well appearing.

## 2023-07-27 VITALS
HEART RATE: 92 BPM | RESPIRATION RATE: 17 BRPM | OXYGEN SATURATION: 95 % | SYSTOLIC BLOOD PRESSURE: 155 MMHG | TEMPERATURE: 100 F | DIASTOLIC BLOOD PRESSURE: 86 MMHG

## 2023-07-27 LAB
ALBUMIN SERPL ELPH-MCNC: 3.7 G/DL — SIGNIFICANT CHANGE UP (ref 3.3–5)
ALP SERPL-CCNC: 59 U/L — SIGNIFICANT CHANGE UP (ref 40–120)
ALT FLD-CCNC: 20 U/L — SIGNIFICANT CHANGE UP (ref 4–41)
ANION GAP SERPL CALC-SCNC: 13 MMOL/L — SIGNIFICANT CHANGE UP (ref 7–14)
APPEARANCE UR: ABNORMAL
AST SERPL-CCNC: 26 U/L — SIGNIFICANT CHANGE UP (ref 4–40)
BACTERIA # UR AUTO: ABNORMAL /HPF
BILIRUB SERPL-MCNC: 1.2 MG/DL — SIGNIFICANT CHANGE UP (ref 0.2–1.2)
BILIRUB UR-MCNC: NEGATIVE — SIGNIFICANT CHANGE UP
BUN SERPL-MCNC: 13 MG/DL — SIGNIFICANT CHANGE UP (ref 7–23)
CALCIUM SERPL-MCNC: 9 MG/DL — SIGNIFICANT CHANGE UP (ref 8.4–10.5)
CAST: 1 /LPF — SIGNIFICANT CHANGE UP (ref 0–4)
CHLORIDE SERPL-SCNC: 94 MMOL/L — LOW (ref 98–107)
CO2 SERPL-SCNC: 21 MMOL/L — LOW (ref 22–31)
COLOR SPEC: YELLOW — SIGNIFICANT CHANGE UP
CREAT SERPL-MCNC: 0.96 MG/DL — SIGNIFICANT CHANGE UP (ref 0.5–1.3)
DIFF PNL FLD: ABNORMAL
EGFR: 90 ML/MIN/1.73M2 — SIGNIFICANT CHANGE UP
GLUCOSE SERPL-MCNC: 110 MG/DL — HIGH (ref 70–99)
GLUCOSE UR QL: NEGATIVE MG/DL — SIGNIFICANT CHANGE UP
HCT VFR BLD CALC: 39.6 % — SIGNIFICANT CHANGE UP (ref 39–50)
HGB BLD-MCNC: 13.5 G/DL — SIGNIFICANT CHANGE UP (ref 13–17)
IANC: 16.76 K/UL — HIGH (ref 1.8–7.4)
KETONES UR-MCNC: NEGATIVE MG/DL — SIGNIFICANT CHANGE UP
LACTATE SERPL-SCNC: 1.6 MMOL/L — SIGNIFICANT CHANGE UP (ref 0.5–2)
LEUKOCYTE ESTERASE UR-ACNC: ABNORMAL
LYMPHOCYTES # BLD AUTO: 2.6 % — LOW (ref 13–44)
MCHC RBC-ENTMCNC: 29.9 PG — SIGNIFICANT CHANGE UP (ref 27–34)
MCHC RBC-ENTMCNC: 34.1 GM/DL — SIGNIFICANT CHANGE UP (ref 32–36)
MCV RBC AUTO: 87.6 FL — SIGNIFICANT CHANGE UP (ref 80–100)
MONOCYTES NFR BLD AUTO: 10.4 % — SIGNIFICANT CHANGE UP (ref 2–14)
NEUTROPHILS NFR BLD AUTO: 85.2 % — HIGH (ref 43–77)
NITRITE UR-MCNC: NEGATIVE — SIGNIFICANT CHANGE UP
PH UR: 6.5 — SIGNIFICANT CHANGE UP (ref 5–8)
PLAT MORPH BLD: NORMAL — SIGNIFICANT CHANGE UP
PLATELET # BLD AUTO: 184 K/UL — SIGNIFICANT CHANGE UP (ref 150–400)
POTASSIUM SERPL-MCNC: 4.2 MMOL/L — SIGNIFICANT CHANGE UP (ref 3.5–5.3)
POTASSIUM SERPL-SCNC: 4.2 MMOL/L — SIGNIFICANT CHANGE UP (ref 3.5–5.3)
PROT SERPL-MCNC: 7 G/DL — SIGNIFICANT CHANGE UP (ref 6–8.3)
PROT UR-MCNC: 300 MG/DL
RBC # BLD: 4.52 M/UL — SIGNIFICANT CHANGE UP (ref 4.2–5.8)
RBC # FLD: 14 % — SIGNIFICANT CHANGE UP (ref 10.3–14.5)
RBC BLD AUTO: ABNORMAL
RBC CASTS # UR COMP ASSIST: 9 /HPF — HIGH (ref 0–4)
SODIUM SERPL-SCNC: 128 MMOL/L — LOW (ref 135–145)
SP GR SPEC: 1.01 — SIGNIFICANT CHANGE UP (ref 1–1.03)
SQUAMOUS # UR AUTO: 0 /HPF — SIGNIFICANT CHANGE UP (ref 0–5)
UROBILINOGEN FLD QL: 0.2 MG/DL — SIGNIFICANT CHANGE UP (ref 0.2–1)
WBC # BLD: 19.79 K/UL — HIGH (ref 3.8–10.5)
WBC # FLD AUTO: 19.79 K/UL — HIGH (ref 3.8–10.5)
WBC UR QL: 892 /HPF — HIGH (ref 0–5)

## 2023-07-27 RX ORDER — CEFTRIAXONE 500 MG/1
1000 INJECTION, POWDER, FOR SOLUTION INTRAMUSCULAR; INTRAVENOUS ONCE
Refills: 0 | Status: COMPLETED | OUTPATIENT
Start: 2023-07-27 | End: 2023-07-27

## 2023-07-27 RX ORDER — CEFPODOXIME PROXETIL 100 MG
1 TABLET ORAL
Qty: 20 | Refills: 0
Start: 2023-07-27 | End: 2023-08-05

## 2023-07-27 RX ADMIN — CEFTRIAXONE 100 MILLIGRAM(S): 500 INJECTION, POWDER, FOR SOLUTION INTRAMUSCULAR; INTRAVENOUS at 04:05

## 2023-07-27 NOTE — ED ADULT NURSE NOTE - CHIEF COMPLAINT QUOTE
Walk in Pt c/o witnessed fall by family member earlier today. Pt states he was leaning on his friend when friend moved over and he slipped. Pt states " I think I have another UTI". Endorsing hematuria. Pt denies chest pain, SOB, LOC, hitting head, dizziness, n/v, diarrhea, blood thinner use or pain. Pt well appearing.

## 2023-07-27 NOTE — ED PROVIDER NOTE - PHYSICAL EXAMINATION
Gen: NAD  HEENT: mucous membranes moist  CV: RRR, +S1/S2, no M/R/G, 2+ radial pulses b/l  Resp: CTAB, no W/R/R, no accessory muscle use, no increased work of breathing  GI: Abdomen soft NTTP  MSK: No midline spinal tenderness to palpation. No pelvic tenderness, pelvis stable. No chest wall tenderness. No sacral tenderness. No open wounds, no bruising, no LE edema  Neuro: A&Ox4, following commands, moving all four extremities spontaneously

## 2023-07-27 NOTE — ED PROVIDER NOTE - PROGRESS NOTE DETAILS
Daron Mcclellan MD, PGY2  Pt with UTI and elevated wbc count. afebrile. at baseline. normal lactate. blood cultures sent. Offered admission to patient for further management. Pt would like to be dcd home with oral abx. Will give 1 round IV ceftriaxone in ED and send home with cefpodoxime. Pt in agreement with plan. Will follow up with his urologist as he had pryor placed today. Usually self caths at home. Pt in agreement with plan. answered all questions and gave return precautions. STACY Vasquez - Patient presented due to mechanical fall and +UTI, neurogenic bladder with self-caths, able to ambulate, will be discharged by night team. Wife and patient want to go home.

## 2023-07-27 NOTE — ED PROVIDER NOTE - PATIENT PORTAL LINK FT
You can access the FollowMyHealth Patient Portal offered by [facility] by registering at the following website: [email or web address]. By joining [email or web address]’s FollowMyHealth portal, you will also be able to view your health information using other applications (apps) compatible with our system.

## 2023-07-27 NOTE — ED PROVIDER NOTE - ATTENDING CONTRIBUTION TO CARE
63-year-old man from private home with history of neurogenic bladder for which he performs self-catheterization regularly, schizophrenia, diabetes, hypertension, presenting to ER for mechanical fall with family with no head trauma, landed on buttocks, also noting to have cloudy urine with occasional blood over the last couple of days on self-catheterization.  Denies any fevers at home, cough, shortness of breath, back pain, vomiting.  Patient was able to walk normally after fall without pain.  Patient currently with no complaints.    Exam  Temp 99.1 F orally on initial exam  No tachycardia  Lungs clear bilaterally  No CVA tenderness bilaterally  Abdomen soft nontender nondistended  Moving all extremities with normal range of motion to bilateral hips and knees  No tenderness to palpation of hips or lower extremities    Exam  Mechanical fall, no physical signs of trauma  Possible UTI  We will get screening labs, blood cultures due to borderline temp, UA with culture  Dispo pending results

## 2023-07-27 NOTE — ED PROVIDER NOTE - CLINICAL SUMMARY MEDICAL DECISION MAKING FREE TEXT BOX
Daron Mcclellan MD, PGY2  63-year-old male with past medical history hypertension, diabetes, schizophrenia, neurogenic bladder who self caths at home presenting to emergency department status post mechanical fall at home.  Patient also states he has noticed cloudiness to urine and believes he has a urinary tract infection.  The patient states he was at home, leaning emergency a chair which slipped from under him causing him to fall on his rear end, no head trauma, no loss of consciousness, no anticoagulation.  Fall was witnessed by family.  Not currently endorsing any extremity pain, headache.  States he feels fine other than the fact that his urine has been cloudy. patient has frequent UTIs and self catheterizes himself due to neurogenic bladder.  Denies fever, chest pain, shortness of breath, abdominal pain, extremity edema, nausea, vomiting, diarrhea.    In the emergency department vitals stable, afebrile.  Exam unremarkable, full range of motion of all extremities, pelvis stable, no midline spinal tenderness.  No visible head trauma.  Will check basic labs and urine to assess for urinary tract infection.  Will reassess.

## 2023-07-27 NOTE — ED ADULT NURSE NOTE - NSFALLRISKINTERV_ED_ALL_ED
Assistance OOB with selected safe patient handling equipment if applicable/Assistance with ambulation/Communicate fall risk and risk factors to all staff, patient, and family/Monitor gait and stability/Provide visual cue: yellow wristband, yellow gown, etc/Reinforce activity limits and safety measures with patient and family/Call bell, personal items and telephone in reach/Instruct patient to call for assistance before getting out of bed/chair/stretcher/Non-slip footwear applied when patient is off stretcher/[place] to call system/Physically safe environment - no spills, clutter or unnecessary equipment/Purposeful Proactive Rounding/Room/bathroom lighting operational, light cord in reach

## 2023-07-27 NOTE — ED ADULT NURSE NOTE - OBJECTIVE STATEMENT
as per triage rn, "Pt c/o witnessed fall by family member earlier today. Pt states he was leaning on his friend when friend moved over and he slipped. Pt states " I think I have another UTI". Endorsing hematuria. Pt denies chest pain, SOB, LOC, hitting head, dizziness, n/v, diarrhea, blood thinner use or pain. Pt well appearing.

## 2023-07-27 NOTE — ED ADULT NURSE REASSESSMENT NOTE - NS ED NURSE REASSESS COMMENT FT1
Break RN: Report received from CANDACE Wolf. Pt at baseline mental status, breathing even and unlabored in bed. Pt denies chest pain, SOB, dizziness, headache, blurry vision, chills. Bed in lowest position, call bell within reach. Antibiotics finished and removed from pt IV.

## 2023-07-30 LAB
-  AMIKACIN: SIGNIFICANT CHANGE UP
-  AMOXICILLIN/CLAVULANIC ACID: SIGNIFICANT CHANGE UP
-  AMPICILLIN/SULBACTAM: SIGNIFICANT CHANGE UP
-  AMPICILLIN: SIGNIFICANT CHANGE UP
-  AZTREONAM: SIGNIFICANT CHANGE UP
-  CEFAZOLIN: SIGNIFICANT CHANGE UP
-  CEFEPIME: SIGNIFICANT CHANGE UP
-  CEFOXITIN: SIGNIFICANT CHANGE UP
-  CEFTRIAXONE: SIGNIFICANT CHANGE UP
-  CIPROFLOXACIN: SIGNIFICANT CHANGE UP
-  ERTAPENEM: SIGNIFICANT CHANGE UP
-  GENTAMICIN: SIGNIFICANT CHANGE UP
-  IMIPENEM: SIGNIFICANT CHANGE UP
-  LEVOFLOXACIN: SIGNIFICANT CHANGE UP
-  MEROPENEM: SIGNIFICANT CHANGE UP
-  NITROFURANTOIN: SIGNIFICANT CHANGE UP
-  PIPERACILLIN/TAZOBACTAM: SIGNIFICANT CHANGE UP
-  TOBRAMYCIN: SIGNIFICANT CHANGE UP
-  TRIMETHOPRIM/SULFAMETHOXAZOLE: SIGNIFICANT CHANGE UP
CULTURE RESULTS: SIGNIFICANT CHANGE UP
METHOD TYPE: SIGNIFICANT CHANGE UP
ORGANISM # SPEC MICROSCOPIC CNT: SIGNIFICANT CHANGE UP
ORGANISM # SPEC MICROSCOPIC CNT: SIGNIFICANT CHANGE UP
SPECIMEN SOURCE: SIGNIFICANT CHANGE UP

## 2023-07-31 NOTE — ED POST DISCHARGE NOTE - RESULT SUMMARY
UCX: enterobacter colace > 100,000 Patient discharged home with a prescription for CEfpodoxime S/R profile sensitive to Ceftriaxone but resistant to cefazolin. Patient contacted feeling good Urine is clear. Patient followed up with NP in Urology this morning and was told urine looked good. Discussed with patient to follow up with MD for repeat UA/UCX. Discussed with patient need to return to ED if symptoms don't continue to improve or recur or develops any new or worsening symptoms that are of concern.

## 2023-08-03 ENCOUNTER — APPOINTMENT (OUTPATIENT)
Dept: UROLOGY | Facility: CLINIC | Age: 63
End: 2023-08-03
Payer: MEDICARE

## 2023-08-03 VITALS
DIASTOLIC BLOOD PRESSURE: 69 MMHG | SYSTOLIC BLOOD PRESSURE: 109 MMHG | HEIGHT: 72 IN | WEIGHT: 280 LBS | HEART RATE: 90 BPM | BODY MASS INDEX: 37.93 KG/M2

## 2023-08-03 DIAGNOSIS — Z78.9 OTHER SPECIFIED HEALTH STATUS: ICD-10-CM

## 2023-08-03 LAB
ANION GAP SERPL CALC-SCNC: 15 MMOL/L
BUN SERPL-MCNC: 12 MG/DL
CALCIUM SERPL-MCNC: 9.3 MG/DL
CHLORIDE SERPL-SCNC: 96 MMOL/L
CO2 SERPL-SCNC: 22 MMOL/L
CREAT SERPL-MCNC: 0.96 MG/DL
EGFR: 90 ML/MIN/1.73M2
GLUCOSE SERPL-MCNC: 139 MG/DL
POTASSIUM SERPL-SCNC: 4.3 MMOL/L
PSA FREE FLD-MCNC: 7 %
PSA FREE SERPL-MCNC: 0.11 NG/ML
PSA SERPL-MCNC: 1.62 NG/ML
SODIUM SERPL-SCNC: 134 MMOL/L

## 2023-08-03 PROCEDURE — 99204 OFFICE O/P NEW MOD 45 MIN: CPT

## 2023-08-03 RX ORDER — ENALAPRIL MALEATE 2.5 MG/1
2.5 TABLET ORAL
Refills: 0 | Status: ACTIVE | COMMUNITY

## 2023-08-03 RX ORDER — MONTELUKAST 10 MG/1
10 TABLET, FILM COATED ORAL
Refills: 0 | Status: ACTIVE | COMMUNITY

## 2023-08-03 RX ORDER — ALLOPURINOL 100 MG/1
100 TABLET ORAL
Refills: 0 | Status: ACTIVE | COMMUNITY

## 2023-08-03 RX ORDER — CIPROFLOXACIN HYDROCHLORIDE 500 MG/1
500 TABLET, FILM COATED ORAL
Qty: 10 | Refills: 0 | Status: ACTIVE | COMMUNITY
Start: 2023-08-03 | End: 1900-01-01

## 2023-08-03 RX ORDER — RISPERIDONE 4 MG/1
4 TABLET, FILM COATED ORAL
Refills: 0 | Status: ACTIVE | COMMUNITY

## 2023-08-03 RX ORDER — METFORMIN HYDROCHLORIDE 1000 MG/1
1000 TABLET, COATED ORAL
Refills: 0 | Status: ACTIVE | COMMUNITY

## 2023-08-03 RX ORDER — TAMSULOSIN HYDROCHLORIDE 0.4 MG/1
CAPSULE ORAL
Refills: 0 | Status: ACTIVE | COMMUNITY

## 2023-08-03 RX ORDER — BENZTROPINE MESYLATE 1 MG/ML
INJECTION INTRAMUSCULAR; INTRAVENOUS
Refills: 0 | Status: ACTIVE | COMMUNITY

## 2023-08-03 RX ORDER — CARVEDILOL 6.25 MG/1
6.25 TABLET, FILM COATED ORAL
Refills: 0 | Status: ACTIVE | COMMUNITY

## 2023-08-05 LAB
APPEARANCE: ABNORMAL
BACTERIA: ABNORMAL /HPF
BILIRUBIN URINE: NEGATIVE
BLOOD URINE: ABNORMAL
CAST: 2 /LPF
COLOR: YELLOW
EPITHELIAL CELLS: 1 /HPF
GLUCOSE QUALITATIVE U: NEGATIVE MG/DL
KETONES URINE: NEGATIVE MG/DL
LEUKOCYTE ESTERASE URINE: ABNORMAL
MICROSCOPIC-UA: NORMAL
NITRITE URINE: NEGATIVE
PH URINE: 6
PROTEIN URINE: NORMAL MG/DL
RED BLOOD CELLS URINE: 0 /HPF
REVIEW: NORMAL
SPECIFIC GRAVITY URINE: 1.01
UROBILINOGEN URINE: 0.2 MG/DL
WHITE BLOOD CELLS URINE: 834 /HPF

## 2023-08-08 ENCOUNTER — NON-APPOINTMENT (OUTPATIENT)
Age: 63
End: 2023-08-08

## 2023-08-08 LAB — BACTERIA UR CULT: ABNORMAL

## 2023-08-08 RX ORDER — CIPROFLOXACIN HYDROCHLORIDE 500 MG/1
500 TABLET, FILM COATED ORAL
Qty: 10 | Refills: 0 | Status: ACTIVE | COMMUNITY
Start: 2023-08-08 | End: 1900-01-01

## 2023-08-11 ENCOUNTER — APPOINTMENT (OUTPATIENT)
Dept: CT IMAGING | Facility: CLINIC | Age: 63
End: 2023-08-11

## 2023-08-12 ENCOUNTER — APPOINTMENT (OUTPATIENT)
Dept: CT IMAGING | Facility: CLINIC | Age: 63
End: 2023-08-12
Payer: MEDICARE

## 2023-08-12 ENCOUNTER — OUTPATIENT (OUTPATIENT)
Dept: OUTPATIENT SERVICES | Facility: HOSPITAL | Age: 63
LOS: 1 days | End: 2023-08-12
Payer: MEDICARE

## 2023-08-12 DIAGNOSIS — Z98.890 OTHER SPECIFIED POSTPROCEDURAL STATES: Chronic | ICD-10-CM

## 2023-08-12 DIAGNOSIS — N31.9 NEUROMUSCULAR DYSFUNCTION OF BLADDER, UNSPECIFIED: ICD-10-CM

## 2023-08-12 PROCEDURE — 74176 CT ABD & PELVIS W/O CONTRAST: CPT | Mod: 26

## 2023-08-12 PROCEDURE — 74176 CT ABD & PELVIS W/O CONTRAST: CPT

## 2023-08-16 ENCOUNTER — NON-APPOINTMENT (OUTPATIENT)
Age: 63
End: 2023-08-16

## 2023-08-16 NOTE — HISTORY OF PRESENT ILLNESS
[FreeTextEntry1] : Patient chronic neurogenic bladder.  He performs CIC 3 times a day.  PVR today was 1000 cc.  Encouraged to perform CIC at a greater frequency to reduce PVR to 400 cc.  Patient has recurrent UTI.  He is on methenamine and vitamin C. CT scan.  Urine culture.  Cipro.  Please refer to URO Consult note

## 2023-08-16 NOTE — ADDENDUM
[FreeTextEntry1] : Entered by Mariaa Whitfield, acting as scribe for Dr. Dixon Gr. The documentation recorded by the scribe accurately reflects the service I personally performed and the decisions made by me.

## 2023-08-16 NOTE — LETTER BODY
[FreeTextEntry1] : Gilbert Bruno [address] [place] [address],  [place], [place], [address] [phone]  Dear Dr. Bruno,  Reason for Visit: Chronic neurogenic bladder. recurrent UTI  This is a 63 year-old gentleman with chronic neurogenic bladder and recurrent UTI. Patient is referred for evaluation of his condition. He performs CIC 3 times a day. PVR today was 1000 cc. Patient reports of recurrent UTI. He is on methenamine and vitamin C. Patient denies any gross hematuria or dysuria or urinary incontinence. The patient denies any aggravating or relieving factors. The patient denies any interference of function. The patient is entirely asymptomatic. All other review of systems are negative. He has no cancer in his family medical history. He has no previous surgical history. Past medical history, family history and social history were inquired and were noncontributory to current condition. The patient does not use tobacco or drink alcohol. Medications and allergies were reviewed. He has no known allergies to medication.  On examination, the patient is a healthy-appearing gentleman in no acute distress. He is alert and oriented follows commands. He has normal mood and affect. He is normocephalic. Oral no thrush. Neck is supple. Respirations are unlabored. His abdomen is soft and nontender. Liver is nonpalpable. Bladder is nonpalpable. No CVA tenderness. Neurologically he is grossly intact. No peripheral edema. Skin without gross abnormality. He has normal male external genitalia. Normal meatus. Bilateral testes are descended intrascrotally and normal to palpation. On rectal examination, there is normal sphincter tone. The prostate is clinically benign without focal induration or nodularity.  Post-void residual on bladder scan today was 1000 cc.   Assessment: Chronic neurogenic bladder. recurrent UTI  I counseled the patient. I discussed the various etiologies of his symptoms. I encouraged patient to perform CIC at a greater frequency to reduce PVR to 400 cc. In terms of recurrent UTI, he will obtain CT scan. I counseled the patient regarding the procedure. The risks and benefits were discussed. Alternatives were given. I answered the patient questions. The patient will take the necessary preparations for the procedure. He will repeat urinalysis and urine culture to look for infections. The patient will follow-up as directed and will contact me with any questions or concerns. Thank you for the opportunity to participate in the care of this patient. I'll keep you updated on his progress.  Plan: Trial of Cipro. Urinalysis. Urine Culture. CT scan. Follow up as directed.

## 2023-09-07 ENCOUNTER — APPOINTMENT (OUTPATIENT)
Dept: UROLOGY | Facility: CLINIC | Age: 63
End: 2023-09-07
Payer: MEDICARE

## 2023-09-07 DIAGNOSIS — Z12.11 ENCOUNTER FOR SCREENING FOR MALIGNANT NEOPLASM OF COLON: ICD-10-CM

## 2023-09-07 LAB
APPEARANCE: ABNORMAL
BACTERIA: NEGATIVE /HPF
BILIRUBIN URINE: NEGATIVE
BLOOD URINE: ABNORMAL
CAST: NORMAL /LPF
COLOR: YELLOW
EPITHELIAL CELLS: 0 /HPF
GLUCOSE QUALITATIVE U: NEGATIVE MG/DL
KETONES URINE: NEGATIVE MG/DL
LEUKOCYTE ESTERASE URINE: ABNORMAL
MICROSCOPIC-UA: NORMAL
NITRITE URINE: NEGATIVE
PH URINE: 6.5
PROTEIN URINE: 30 MG/DL
RED BLOOD CELLS URINE: NORMAL /HPF
REVIEW: NORMAL
SPECIFIC GRAVITY URINE: 1.01
UROBILINOGEN URINE: 0.2 MG/DL
WHITE BLOOD CELLS URINE: 233 /HPF

## 2023-09-07 PROCEDURE — 99214 OFFICE O/P EST MOD 30 MIN: CPT

## 2023-09-07 RX ORDER — SULFAMETHOXAZOLE AND TRIMETHOPRIM 800; 160 MG/1; MG/1
800-160 TABLET ORAL TWICE DAILY
Qty: 10 | Refills: 0 | Status: ACTIVE | COMMUNITY
Start: 2023-09-07 | End: 1900-01-01

## 2023-09-08 NOTE — HISTORY OF PRESENT ILLNESS
[FreeTextEntry1] : Patient has chronic neurogenic bladder.  He has been self catheterizing for over 10 years.  He returns today for follow-up.  He has since the ureter.  Urine culture positive for UTI.  Plan Bactrim.  He denies any sulfa drug allergies.  CT scan demonstrates bile hydronephrosis.  His today with stable creatinine.  Encouraged to continue CIC 6 times a day will consider Barnes catheter placement.  Patient declines Barnes catheter and will increase CIC to 6 times a day.  He also start methenamine vitamin C after completing Bactrim.  Follow-up 1 month.

## 2023-09-08 NOTE — LETTER BODY
[FreeTextEntry1] : Gilbert Bruno [address] [place] [address], [place], [place], [address] [phone]  Dear Dr. Bruno,  Reason for Visit: Chronic neurogenic bladder. recurrent UTI  This is a 63 year-old gentleman with chronic neurogenic bladder and recurrent UTI. He has been self catheterizing for over 10 years. He performs CIC 3 times a day. He returns for follow-up. Urine culture positive for UTI. CT scan demonstrates mild hydronephrosis. His creatinine today was stable. He is on methenamine and vitamin C. Patient denies any gross hematuria or dysuria or urinary incontinence. The patient denies any aggravating or relieving factors. The patient denies any interference of function. The patient is entirely asymptomatic. All other review of systems are negative. He has no cancer in his family medical history. He has no previous surgical history. Past medical history, family history and social history were inquired and were noncontributory to current condition. The patient does not use tobacco or drink alcohol. Medications and allergies were reviewed. He has no known allergies to medication.  On examination, the patient is a healthy-appearing gentleman in no acute distress. He is alert and oriented follows commands. He has normal mood and affect. He is normocephalic. Oral no thrush. Neck is supple. Respirations are unlabored. His abdomen is soft and nontender. Liver is nonpalpable. Bladder is nonpalpable. No CVA tenderness. Neurologically he is grossly intact. No peripheral edema. Skin without gross abnormality. He has normal male external genitalia. Normal meatus. Bilateral testes are descended intrascrotally and normal to palpation. On rectal examination, there is normal sphincter tone. The prostate is clinically benign without focal induration or nodularity.  Post-void residual on bladder scan today was 1000 cc.  Assessment: Chronic neurogenic bladder. recurrent UTI  I counseled the patient.His urine culture was positive for UTI. I recommended he starts trial of bactrim. He denies any sulfa allergies. I discussed the potential side effects of the medication. I counseled the patient on its use and side effects. If the patient develops any side effects, the patient will discontinue the mediation and contact me. CT scan demonstrates mild hydronephrosis. His creatinine today was stable.  I encouraged him to continue CIC 6 times a day will consider Barnes catheter placement. Patient declines Barnes catheter and will increase CIC to 6 times a day. He will also start methenamine vitamin C after completing Bactrim. He will repeat urinalysis and urine culture to look for infections. The patient will follow-up as directed and will contact me with any questions or concerns. Thank you for the opportunity to participate in the care of this patient. I'll keep you updated on his progress.  Plan: Trial of Bactrim. Urinalysis. Urine Culture. Increase CIC to 6 times a day. Follow up in 1 month.

## 2023-09-10 DIAGNOSIS — N39.0 URINARY TRACT INFECTION, SITE NOT SPECIFIED: ICD-10-CM

## 2023-09-10 LAB — BACTERIA UR CULT: ABNORMAL

## 2023-09-10 RX ORDER — CEPHALEXIN 500 MG/1
500 CAPSULE ORAL
Qty: 15 | Refills: 0 | Status: ACTIVE | COMMUNITY
Start: 2023-09-10 | End: 1900-01-01

## 2023-10-31 ENCOUNTER — EMERGENCY (EMERGENCY)
Facility: HOSPITAL | Age: 63
LOS: 1 days | Discharge: HOME | End: 2023-10-31
Attending: EMERGENCY MEDICINE
Payer: MEDICARE

## 2023-10-31 VITALS
OXYGEN SATURATION: 99 % | RESPIRATION RATE: 17 BRPM | HEART RATE: 90 BPM | DIASTOLIC BLOOD PRESSURE: 92 MMHG | TEMPERATURE: 98 F | SYSTOLIC BLOOD PRESSURE: 127 MMHG

## 2023-10-31 VITALS
OXYGEN SATURATION: 97 % | HEIGHT: 72 IN | SYSTOLIC BLOOD PRESSURE: 177 MMHG | DIASTOLIC BLOOD PRESSURE: 81 MMHG | WEIGHT: 289.91 LBS | HEART RATE: 90 BPM | RESPIRATION RATE: 16 BRPM | TEMPERATURE: 97 F

## 2023-10-31 DIAGNOSIS — Z98.890 OTHER SPECIFIED POSTPROCEDURAL STATES: Chronic | ICD-10-CM

## 2023-10-31 PROCEDURE — 99291 CRITICAL CARE FIRST HOUR: CPT | Mod: 25

## 2023-10-31 PROCEDURE — 96372 THER/PROPH/DIAG INJ SC/IM: CPT | Mod: XU

## 2023-10-31 PROCEDURE — 99291 CRITICAL CARE FIRST HOUR: CPT

## 2023-10-31 PROCEDURE — 96375 TX/PRO/DX INJ NEW DRUG ADDON: CPT

## 2023-10-31 PROCEDURE — 96374 THER/PROPH/DIAG INJ IV PUSH: CPT

## 2023-10-31 RX ORDER — EPINEPHRINE 0.3 MG/.3ML
0.3 INJECTION INTRAMUSCULAR; SUBCUTANEOUS ONCE
Refills: 0 | Status: COMPLETED | OUTPATIENT
Start: 2023-10-31 | End: 2023-10-31

## 2023-10-31 RX ORDER — EPINEPHRINE 0.3 MG/.3ML
0.3 INJECTION INTRAMUSCULAR; SUBCUTANEOUS
Qty: 1 | Refills: 0
Start: 2023-10-31 | End: 2023-10-31

## 2023-10-31 RX ORDER — FAMOTIDINE 10 MG/ML
20 INJECTION INTRAVENOUS ONCE
Refills: 0 | Status: COMPLETED | OUTPATIENT
Start: 2023-10-31 | End: 2023-10-31

## 2023-10-31 RX ORDER — DIPHENHYDRAMINE HCL 50 MG
50 CAPSULE ORAL ONCE
Refills: 0 | Status: COMPLETED | OUTPATIENT
Start: 2023-10-31 | End: 2023-10-31

## 2023-10-31 RX ADMIN — Medication 125 MILLIGRAM(S): at 02:31

## 2023-10-31 RX ADMIN — EPINEPHRINE 0.3 MILLIGRAM(S): 0.3 INJECTION INTRAMUSCULAR; SUBCUTANEOUS at 02:31

## 2023-10-31 RX ADMIN — Medication 50 MILLIGRAM(S): at 02:31

## 2023-10-31 RX ADMIN — FAMOTIDINE 20 MILLIGRAM(S): 10 INJECTION INTRAVENOUS at 02:31

## 2023-10-31 NOTE — ED PROVIDER NOTE - PATIENT PORTAL LINK FT
You can access the FollowMyHealth Patient Portal offered by [facility] by registering at the following website: [email or web address]. By joining "[employer]’s FollowMyHealth portal, you will also be able to view your health information using other applications (apps) compatible with our system.

## 2023-10-31 NOTE — ED PROVIDER NOTE - ATTENDING CONTRIBUTION TO CARE
Patient presents with progression of symptoms including urticarial rash for a few days, and now facial swelling and oropharyngeal edema (mild).  Patient is breathing well, without wheezing, good air movement, no stridor, denies chest pain, nausea, vomiting.  Given multiple system involvement, this technically qualifies as anaphylaxis of unclear etiology.  Patient received epinephrine, antihistamines, steroids in the ED and will be observed for at least 4 hours to ensure no progression of symptoms.  Symptoms improved, he should be stable for discharge with an EpiPen, allergy follow-up, PCP follow-up.

## 2023-10-31 NOTE — ED PROVIDER NOTE - CLINICAL SUMMARY MEDICAL DECISION MAKING FREE TEXT BOX
63-year-old male with past medical history hypertension, diabetes, schizophrenia, neurogenic bladder who self caths at home presenting to ED with concerns of allergic reaction to unknown agent. Meets criteria for anaphylaxis from unknown source given 2+ systems in respiratory and skin. No acute airway concerns, moving air b/l. Will give epi, antihistamines and methylpred. Will reassess for symptomatic improvement. Obs for 4 hours. Dispo likely home.

## 2023-10-31 NOTE — ED PROVIDER NOTE - PHYSICAL EXAMINATION
GEN: Patient awake and alert. No acute distress.  Head: normocephalic, atraumatic. Facial edema.  Neck: Nontender, full ROM. No LAD.  Eyes: PERRLA b/l. EOMI, no scleral icterus, no conjunctival injection. Moist mucous membranes.  ENT: Marked oropharyngeal angioedema including tongue, uvula.   CARDIAC: RRR. Normal S1, S2. No murmur, rubs, or gallops. No peripheral edema noted.  PULM: CTA B/L no wheeze, rales or rhonchi. No signs of respiratory distress, no accessory muscle usage or nasal flaring.  ABD: Soft, nontender, nondistended. No rebound, no involuntary guarding. BS x 4, no auscultated bruit. No HSM appreciated.  MSK: Moving all extremities spontaneously. 5/5 strength and full ROM in all extremities. No obvious deformity.  NEURO: A&Ox3, no focal neurological deficits.  SKIN: Warm, dry, no rash, no lesions, no open wounds. No urticaria.

## 2023-10-31 NOTE — ED PROVIDER NOTE - NSFOLLOWUPINSTRUCTIONS_ED_ALL_ED_FT
You were seen in the ED for acute allergic reaction/anaphylaxis. Your symptoms improved after epinephrine, antihistamines and steroids.     A prescription for an epipen was sent to your pharmacy. Please monitor for signs of anaphylaxis and administer as indicated.     Stop taking the suspected medication and consult with your PCP.    Continue taking the famotidine you were prescribed for treatment of your swelling.    Follow up with your primary physician in 1-2 days. If needed call [phone] to find a primary care physician or call  [phone] to schedule an appointment with the general medicine.    1. TAKE ALL MEDICATIONS AS DIRECTED.    2. FOR PAIN OR FEVER YOU CAN TAKE IBUPROFEN (MOTRIN, ADVIL) OR ACETAMINOPHEN (TYLENOL) AS NEEDED, AS DIRECTED ON PACKAGING.  3. FOLLOW UP WITH YOUR PRIMARY DOCTOR WITHIN 5 DAYS AS DIRECTED.  4. IF YOU HAD LABS OR IMAGING DONE, YOU WERE GIVEN COPIES OF ALL LABS AND/OR IMAGING RESULTS FROM YOUR ER VISIT--PLEASE TAKE THEM WITH YOU TO YOUR FOLLOW UP APPOINTMENTS.  5. RETURN TO THE ER FOR ANY WORSENING SYMPTOMS OR CONCERNS.  ----------------------------------------------------------------------------------------------------------------  Anaphylactic Reaction, Adult  An anaphylactic reaction (anaphylaxis) is a sudden, severe allergic reaction by the body's disease-fighting system (immune system). Anaphylaxis can be life-threatening. This condition must be treated right away. Sometimes a person may need to be treated in the hospital.    What are the causes?  This condition is caused by exposure to a substance that you are allergic to (allergen). In response to this exposure, the body releases proteins (antibodies) and other compounds, such as histamine, into the bloodstream. This causes swelling in certain tissues and loss of blood pressure to important areas, such as the heart and lungs.    Common allergens that can cause anaphylaxis include:  Foods, especially peanuts, wheat, shellfish, milk, and eggs.  Medicines.  Insect bites or stings.  Blood or parts of blood received for treatment (transfusions).  Chemicals, such as dyes, latex, and contrast material that is used for medical tests.  What increases the risk?  This condition is more likely to occur in people who:  Have allergies.  Have had anaphylaxis before.  Have a family history of anaphylaxis.  Have certain medical conditions, including asthma and eczema.  What are the signs or symptoms?  Symptoms of anaphylaxis may include:  Feeling warm in the face (flushed). This may include redness.  Itchy, red, swollen areas of skin (hives).  Swelling of the eyes, lips, face, mouth, tongue, or throat.  Difficulty breathing, speaking, or swallowing.  Dizziness, light-headedness, or fainting.  Pain or cramping in the abdomen.  Vomiting or diarrhea.  How is this diagnosed?  This condition is diagnosed based on:  Your symptoms.  A physical exam.  Blood tests.  Recent history of exposure to allergens.  How is this treated?  A person using an epinephrine auto-injector in the thigh.  If you think you are having an anaphylactic reaction, you should do the following right away:  Give yourself an epinephrine injection using what is commonly called an auto-injector "pen" (pre-filled automatic epinephrine injection device). Your health care provider will teach you how to use an auto-injector pen.  Call for emergency help. If you use a pen, you must still get emergency medical treatment in the hospital. Treatment in the hospital may include:  Medicines to help:  Tighten your blood vessels (epinephrine).  Relieve itching and hives (antihistamines).  Reduce swelling (corticosteroids).  Oxygen therapy to help you breathe.  IV fluids to keep you hydrated.  Follow these instructions at home:  Safety    Always keep an auto-injector pen near you. This can be lifesaving if you have a severe anaphylactic reaction. Use your auto-injector pen as told by your health care provider.  Do not drive after an anaphylactic reaction until your health care provider approves.  Make sure that you, the members of your household, and your employer know:  What you are allergic to, so it can be avoided.  How to use an auto-injector pen to give you an epinephrine injection.  Replace your epinephrine immediately after you use your auto-injector pen. This is important if you have another reaction. If possible, carry two epinephrine auto-injector pens.  If told by your health care provider, wear a medical alert bracelet or necklace that states your allergy.  Learn the signs of anaphylaxis so that you can recognize and treat it right away.  Work with your health care providers to make an anaphylaxis plan. Preparation is important.  General instructions    Tell all your health care providers that you have an allergy.  If you have hives or rash:  Use an over-the-counter antihistamine as told by your health care provider.  Apply cold, wet cloths (cold compresses) to your skin or take baths or showers in cool water. Avoid hot water.  Take over-the-counter and prescription medicines only as told by your health care provider.  Keep all follow-up visits as told by your health care provider. This is important.  How is this prevented?  Avoid allergens that have caused an anaphylactic reaction in the past.  When you are at a restaurant, tell your  that you have an allergy. If you are not sure whether a menu item contains an ingredient that you are allergic to, ask your .  Where to find more information  American Academy of Allergy, Asthma and Immunology: aaaai.org  American Academy of Pediatrics: healthychildren.org    Get help right away if:  You develop symptoms of an allergic reaction. You may notice them soon after you are exposed to a substance.  You used epinephrine. You need more medical care even if the medicine seems to be working. This is important because anaphylaxis may happen again within 72 hours (rebound anaphylaxis). You also may need more doses of epinephrine.  These symptoms may represent a serious problem that is an emergency. Do not wait to see if the symptoms will go away. Do the following right away:  Use the auto-injector pen as you have been instructed.  Get medical help. Call your local emergency services (911 in the [place] Do not drive yourself to the hospital.    Summary  An anaphylactic reaction (anaphylaxis) is a sudden, severe allergic reaction by the body's disease-fighting system (immune system).  This condition can be life-threatening. If you have an anaphylactic reaction, get medical help right away.  Your health care provider may teach you how to use an auto-injector "pen" (pre-filled automatic epinephrine injection device) to give yourself a shot.  Always keep an auto-injector pen with you. This could save your life. Use it as told by your health care provider.  If you use epinephrine, you must still get emergency medical treatment, even if the medicine seems to be working.

## 2023-10-31 NOTE — ED PROVIDER NOTE - OBJECTIVE STATEMENT
63-year-old male with past medical history hypertension, diabetes, schizophrenia, neurogenic bladder who self caths at home presenting to ED with concerns of allergic reaction to unknown agent. Patient was in normal state of health 63-year-old male with past medical history hypertension, diabetes, schizophrenia, neurogenic bladder who self caths at home presenting to ED with concerns of allergic reaction to unknown agent. Patient started with urticarial rash, most notable on his lower back and abdomen, for a few days after starting a new medication (unsure of name). Now presenting to ED with acute facial swelling and oropharyngeal edema. Patient reports no difficulties with moving air or dyspnea. Denies throat closing sensation. Denies GI symptoms including nausea, vomiting, diarrhea. No chest pain.

## 2023-10-31 NOTE — ED ADULT TRIAGE NOTE - AS PAIN REST
0 (no pain/absence of nonverbal indicators of pain) Orbicularis Oris Muscle Flap Text: The defect edges were debeveled with a #15 scalpel blade.  Given that the defect affected the competency of the oral sphincter an orbicularis oris muscle flap was deemed most appropriate to restore this competency and normal muscle function.  Using a sterile surgical marker, an appropriate flap was drawn incorporating the defect. The area thus outlined was incised with a #15 scalpel blade. Following this, the designed flap was placed into the primary defect and sutured into place.

## 2023-10-31 NOTE — ED PROVIDER NOTE - PROGRESS NOTE DETAILS
Kendall Kirby MD PGY1: Patient reassessed, stable. Marked improvement in facial angioedema. Mild residual swelling. No airway concerns. Lungs remain clear. Will d/c with epipen and antihistamine instructions.

## 2023-10-31 NOTE — ED ADULT NURSE NOTE - OBJECTIVE STATEMENT
63 y.o M AAO4 ambulatory presets to the ED c.o facial swelling and upper respiratory wheezing for the past couple of hours. Pt states he started a new medication for TD last week and a couple of days ago noticed

## 2023-10-31 NOTE — ED ADULT NURSE NOTE - SUICIDE SCREENING QUESTION 1
Renée presents today for her OB visit with biophysical profile.    Patient reports:  - no issues or concerns    Patient would like communication of their results via:   Claudio    Patient's current [facility] status: Active.     Chaperone needed:  No    Baby Scripts - Patient is on [employer] Optimization Scheduling and is recording her vital signs in the erik and they are within normal limits.             No

## 2023-12-14 NOTE — PHYSICAL THERAPY INITIAL EVALUATION ADULT - STRENGTHENING, PT EVAL
14-Dec-2023 07:59 Goal: Pt will increase strength >half a grade  t/o extremities to improve safety of transfers and ambulation in 2 weeks.

## 2023-12-21 NOTE — ED ADULT NURSE NOTE - BEFAST SPEECH SLURRED
Jupts request    If you are a smoker, it is important for your health to stop smoking. Please be aware that second hand smoke is also harmful. No

## 2024-01-03 ENCOUNTER — OUTPATIENT (OUTPATIENT)
Dept: OUTPATIENT SERVICES | Facility: HOSPITAL | Age: 64
LOS: 1 days | End: 2024-01-03
Payer: MEDICARE

## 2024-01-03 ENCOUNTER — APPOINTMENT (OUTPATIENT)
Dept: RADIOLOGY | Facility: CLINIC | Age: 64
End: 2024-01-03
Payer: MEDICARE

## 2024-01-03 DIAGNOSIS — Z98.890 OTHER SPECIFIED POSTPROCEDURAL STATES: Chronic | ICD-10-CM

## 2024-01-03 DIAGNOSIS — M79.89 OTHER SPECIFIED SOFT TISSUE DISORDERS: ICD-10-CM

## 2024-01-03 PROCEDURE — 73630 X-RAY EXAM OF FOOT: CPT

## 2024-01-03 PROCEDURE — 73630 X-RAY EXAM OF FOOT: CPT | Mod: 26,50

## 2024-01-20 ENCOUNTER — APPOINTMENT (OUTPATIENT)
Dept: RADIOLOGY | Facility: CLINIC | Age: 64
End: 2024-01-20
Payer: MEDICARE

## 2024-01-20 ENCOUNTER — OUTPATIENT (OUTPATIENT)
Dept: OUTPATIENT SERVICES | Facility: HOSPITAL | Age: 64
LOS: 1 days | End: 2024-01-20
Payer: MEDICARE

## 2024-01-20 DIAGNOSIS — Z00.8 ENCOUNTER FOR OTHER GENERAL EXAMINATION: ICD-10-CM

## 2024-01-20 DIAGNOSIS — Z98.890 OTHER SPECIFIED POSTPROCEDURAL STATES: Chronic | ICD-10-CM

## 2024-01-20 DIAGNOSIS — M25.569 PAIN IN UNSPECIFIED KNEE: ICD-10-CM

## 2024-01-20 PROCEDURE — 73562 X-RAY EXAM OF KNEE 3: CPT | Mod: 26,RT

## 2024-01-20 PROCEDURE — 73562 X-RAY EXAM OF KNEE 3: CPT

## 2024-02-06 ENCOUNTER — APPOINTMENT (OUTPATIENT)
Age: 64
End: 2024-02-06

## 2024-03-07 ENCOUNTER — APPOINTMENT (OUTPATIENT)
Dept: UROLOGY | Facility: CLINIC | Age: 64
End: 2024-03-07

## 2024-03-07 NOTE — PATIENT PROFILE ADULT - LIVING ENVIRONMENT
Phylicia is a 17 year old female here with mother, Temi for 17 year old well child examination.  Child is interviewed with parent and privately.    Concerns expressed today or noted on the adolescent questionnaire: none. The pre-participation history form was reviewed and discussed, with particular attention to cardiac risk factors and concussion history.  Body habitus, nutrition, vitamin D and calcium intake were addressed.    Pre-participation form concerns - passed out recently.  History of COVID?  [x] yes  [] No  Anxiety 0/6, Depression 0/6    DIET:  Child is taking 2% milk, 0-1 servings per day, eats cheese, yogurt.  Child is not drinking excessive sugared beverages. Child is drinking caffeinated beverages, occasional energy drink.  She is eating three meals per day.  Family's water source is city.    SLEEP:  Child is sleeping 8 hours at night.  She is not having sleep difficulties.    PAST MEDICAL HISTORY:  Past Medical History:   Diagnosis Date    Concussion without loss of consciousness 12/2021    Constipation - functional 01/2015    normal AXR KUB 1.7.2015. resolved 1.2016    COVID-19 11/2021    GERD (gastroesophageal reflux disease)     resolved     Heart murmur 08/13/2014    innocent vs functional. first time heard 8.2014.     Mononucleosis 08/27/2022    [facility] - pos monospot    Nummular eczema 01/2018    PID (acute pelvic inflammatory disease) 01/18/2024    Primary nocturnal enuresis 01/2015    Right otitis media 08/13/2014    Seasonal allergies      History reviewed. No pertinent surgical history.  Significant or recent illnesses:   mono Aug 2022  Significant injuries:   concussion 2021    REVIEW OF SYSTEMS:   Constitutional: episode of syncope, within the past 2 months, happened at work, typically on her feet whole time.  LOC about 10 seconds, nurses at work responded.  Did not hit her head.  ENT/Dental:  Negative.  Hearing/Vision:  Negative.  Dermatologic:  Negative.  Cardiovascular:   Negative.  Respiratory:  Negative.  Gastrointestinal:  Negative.   Genitourinary:  Negative.  Onset of menarche:  11-12 years.  Menses are regular on OCPs.  Seen by GYN in January for PID, testing negative.  Musculoskeletal:  Negative.  Neurologic:  Negative.  Behavioral/Psychological:     Review Flowsheet  More data exists         3/7/2024   PHQ 2/9 Score   Peds PHQ 2 Score 0   Peds PHQ 2 Interpretation No further screening needed   Feeling down, depressed, irritable or hopeless? Not at all   Little interest or pleasure in activity? Not at all   Peds PHQ 9 Score 1   Peds PHQ 9 Interpretation Minimal Depression   Trouble falling or staying asleep or sleeping too much? Several days   Poor appetite, weight loss, or overeating? Not at all   Feeling tired or having little energy? Not at all   Feeling bad about yourself or that you are a failure or have let yourself or family down? Not at all   Trouble concentrating on things such as school work, reading, or watching TV? Not at all   Moving or speaking slowly that other people have noticed or the opposite - being so fidgety or restless that you have been moving around a lot more than usual? Not at all   Thoughts that you would be better off dead or of hurting yourself in some way? Not at all       DEPRESSION ASSESSMENT/PLAN:  Mild symptoms, will monitor and reevaluate.     Social History     Social History Narrative    Parents not together.  Mom /step Dad and step sister.     3/1/23 - lives with mom, step-sister, step-brother.         Child attends 9th Grade at [employer].  School concerns: none  Activities/Interests:  tennis  Exercise:  works out at home sometimes  Employment:  works at nursing home, 9 hours per week  Sexual activity:  discussed    SCREENING:  Child consistently uses a seatbelt in the car. The child wears a helmet when bicycling/riding scooter/etc.  Smoke detectors are present in the home and are functional.  Smoke exposure: none.   Tobacco use:   reports that she has never smoked. She has never used smokeless tobacco..  Alcohol use:  no.  Other drug use:  no.  There are not risk factors for tuberculosis.  There are pets in the home.  The child does see a dentist regularly.      No results found.    FAMILY HISTORY:  family history includes Hypertension in her maternal grandfather; Migraine in her mother.    MEDICATIONS:    Outpatient Medications Marked as Taking for the 3/7/24 encounter (Office Visit) with Trish Arenas MD   Medication Sig Dispense Refill    drospirenone-ethinyl estradiol (QUIANA) 3-0.02 MG per tablet Take 1 tablet by mouth daily. 84 tablet 4       ALLERGIES:   is allergic to seasonal.     PHYSICAL EXAM:  Visit Vitals  BP (!) 88/52   Pulse 92   Temp 97.6 °F (36.4 °C) (Tympanic)   Resp 18   Ht 5' 5\" (1.651 m)   Wt 54.1 kg (119 lb 4.3 oz)   LMP 12/08/2023 (Exact Date)   BMI 19.85 kg/m²       Blood pressure reading is in the normal blood pressure range based on the 2017 AAP Clinical Practice Guideline.    68 %ile (Z= 0.47) based on [facility] (Girls, 2-20 Years) Stature-for-age data based on Stature recorded on 3/7/2024.  57 %ile (Z= 0.17) based on CDC (Girls, 2-20 Years) weight-for-age data using vitals from 3/7/2024.  47.6 %ile (Z= -0.06) based on CDC (Girls, 2-20 Years) BMI-for-age based on BMI available as of 3/7/2024.    GENERAL:  The child is alert, well-nourished and in no distress.    HEENT:  Normocephalic.  Neck has full range of motion.  PERRLA (Pupils equal, round and reactive to light and accommodation).   Conjunctivae are without erythema.   The ear canals and tympanic membranes are normal.  The nares are patent.  The nasal mucosa is normal.  There is no nasal congestion or discharge.  The oropharynx is clear.  Normal dentition.    NECK:  No thyromegaly or masses.    LUNGS:  Lungs are clear, there is good air exchange with no increased work of breathing.    CARDIOVASCULAR:  The heart is regular rate and rhythm.  There is  no murmur.    ABDOMEN:  The abdomen has normal bowel sounds, is soft and nontender, no hepatosplenomegaly or masses.   GENITOURINARY:   Deferred.  BACK/EXTREMITIES:  Back is straight.  Two minute orthopedic exam is normal.  SKIN:  No rashes.      NEUROLOGIC:   Normal mental status.  Cranial nerves 2 through 12 intact.  Reflexes are normal and symmetric.  Normal gait.      ASSESSMENT:  Healthy 17 year old female with normal growth and development.  Recent vasovagal episode.    PLAN:  Routine anticipatory guidance regarding safety and development of a 17 year old was discussed.  Patient WIR reviewed.    Handouts given:  teen.  Advised call clinic if has further syncopal episodes.    Sports clearance unrestricted.  Follow up in one year, sooner if concerns.                   no

## 2024-03-17 ENCOUNTER — RX RENEWAL (OUTPATIENT)
Age: 64
End: 2024-03-17

## 2024-03-19 ENCOUNTER — EMERGENCY (EMERGENCY)
Facility: HOSPITAL | Age: 64
LOS: 1 days | Discharge: HOME | End: 2024-03-19
Attending: EMERGENCY MEDICINE
Payer: MEDICARE

## 2024-03-19 VITALS
DIASTOLIC BLOOD PRESSURE: 84 MMHG | SYSTOLIC BLOOD PRESSURE: 139 MMHG | OXYGEN SATURATION: 96 % | TEMPERATURE: 98 F | HEART RATE: 77 BPM | RESPIRATION RATE: 18 BRPM

## 2024-03-19 VITALS
WEIGHT: 294.98 LBS | TEMPERATURE: 99 F | SYSTOLIC BLOOD PRESSURE: 160 MMHG | HEIGHT: 72 IN | RESPIRATION RATE: 18 BRPM | DIASTOLIC BLOOD PRESSURE: 93 MMHG | OXYGEN SATURATION: 96 % | HEART RATE: 77 BPM

## 2024-03-19 DIAGNOSIS — Z98.890 OTHER SPECIFIED POSTPROCEDURAL STATES: Chronic | ICD-10-CM

## 2024-03-19 LAB
ALBUMIN SERPL ELPH-MCNC: 3.7 G/DL — SIGNIFICANT CHANGE UP (ref 3.3–5)
ALP SERPL-CCNC: 82 U/L — SIGNIFICANT CHANGE UP (ref 40–120)
ALT FLD-CCNC: 21 U/L — SIGNIFICANT CHANGE UP (ref 10–45)
ANION GAP SERPL CALC-SCNC: 13 MMOL/L — SIGNIFICANT CHANGE UP (ref 5–17)
APTT BLD: 32 SEC — SIGNIFICANT CHANGE UP (ref 24.5–35.6)
AST SERPL-CCNC: 27 U/L — SIGNIFICANT CHANGE UP (ref 10–40)
BASOPHILS # BLD AUTO: 0 K/UL — SIGNIFICANT CHANGE UP (ref 0–0.2)
BASOPHILS NFR BLD AUTO: 0 % — SIGNIFICANT CHANGE UP (ref 0–2)
BILIRUB SERPL-MCNC: 0.8 MG/DL — SIGNIFICANT CHANGE UP (ref 0.2–1.2)
BUN SERPL-MCNC: 9 MG/DL — SIGNIFICANT CHANGE UP (ref 7–23)
CALCIUM SERPL-MCNC: 9.1 MG/DL — SIGNIFICANT CHANGE UP (ref 8.4–10.5)
CHLORIDE SERPL-SCNC: 89 MMOL/L — LOW (ref 96–108)
CO2 SERPL-SCNC: 25 MMOL/L — SIGNIFICANT CHANGE UP (ref 22–31)
CREAT SERPL-MCNC: 0.76 MG/DL — SIGNIFICANT CHANGE UP (ref 0.5–1.3)
EGFR: 102 ML/MIN/1.73M2 — SIGNIFICANT CHANGE UP
EOSINOPHIL # BLD AUTO: 0.27 K/UL — SIGNIFICANT CHANGE UP (ref 0–0.5)
EOSINOPHIL NFR BLD AUTO: 1.7 % — SIGNIFICANT CHANGE UP (ref 0–6)
GLUCOSE SERPL-MCNC: 111 MG/DL — HIGH (ref 70–99)
HCT VFR BLD CALC: 41.3 % — SIGNIFICANT CHANGE UP (ref 39–50)
HGB BLD-MCNC: 14.2 G/DL — SIGNIFICANT CHANGE UP (ref 13–17)
INR BLD: 1.16 RATIO — SIGNIFICANT CHANGE UP (ref 0.85–1.18)
LYMPHOCYTES # BLD AUTO: 1.63 K/UL — SIGNIFICANT CHANGE UP (ref 1–3.3)
LYMPHOCYTES # BLD AUTO: 10.4 % — LOW (ref 13–44)
MANUAL SMEAR VERIFICATION: SIGNIFICANT CHANGE UP
MCHC RBC-ENTMCNC: 29.1 PG — SIGNIFICANT CHANGE UP (ref 27–34)
MCHC RBC-ENTMCNC: 34.4 GM/DL — SIGNIFICANT CHANGE UP (ref 32–36)
MCV RBC AUTO: 84.6 FL — SIGNIFICANT CHANGE UP (ref 80–100)
MONOCYTES # BLD AUTO: 1.51 K/UL — HIGH (ref 0–0.9)
MONOCYTES NFR BLD AUTO: 9.6 % — SIGNIFICANT CHANGE UP (ref 2–14)
NEUTROPHILS # BLD AUTO: 12.31 K/UL — HIGH (ref 1.8–7.4)
NEUTROPHILS NFR BLD AUTO: 78.3 % — HIGH (ref 43–77)
PLAT MORPH BLD: NORMAL — SIGNIFICANT CHANGE UP
PLATELET # BLD AUTO: 232 K/UL — SIGNIFICANT CHANGE UP (ref 150–400)
POTASSIUM SERPL-MCNC: 3.3 MMOL/L — LOW (ref 3.5–5.3)
POTASSIUM SERPL-SCNC: 3.3 MMOL/L — LOW (ref 3.5–5.3)
PROT SERPL-MCNC: 7.6 G/DL — SIGNIFICANT CHANGE UP (ref 6–8.3)
PROTHROM AB SERPL-ACNC: 12.7 SEC — SIGNIFICANT CHANGE UP (ref 9.5–13)
RBC # BLD: 4.88 M/UL — SIGNIFICANT CHANGE UP (ref 4.2–5.8)
RBC # FLD: 13.3 % — SIGNIFICANT CHANGE UP (ref 10.3–14.5)
RBC BLD AUTO: SIGNIFICANT CHANGE UP
SODIUM SERPL-SCNC: 127 MMOL/L — LOW (ref 135–145)
WBC # BLD: 15.72 K/UL — HIGH (ref 3.8–10.5)
WBC # FLD AUTO: 15.72 K/UL — HIGH (ref 3.8–10.5)

## 2024-03-19 PROCEDURE — 99285 EMERGENCY DEPT VISIT HI MDM: CPT

## 2024-03-19 RX ORDER — ACETAMINOPHEN 500 MG
1000 TABLET ORAL ONCE
Refills: 0 | Status: COMPLETED | OUTPATIENT
Start: 2024-03-19 | End: 2024-03-19

## 2024-03-19 RX ADMIN — Medication 400 MILLIGRAM(S): at 22:50

## 2024-03-19 NOTE — ED PROVIDER NOTE - ATTENDING APP SHARED VISIT CONTRIBUTION OF CARE
right facial pain and swelling  Attending Emergency Medicine Physician- Pedro Taylor MD, FACEP: In this physician's medical judgement based on clinical history and physical exam the patient's signs and symptoms lead to differential diagnoses which includes but is not limited to: sialoadenitis, parotiditis   Historical features, symptoms, and clinical exam not consistent with: cva  Labs were ordered and independently reviewed by me.  Imaging was ordered and reviewed by me.   Appropriate medications for the patient's presenting complaints were ordered, and effects were reassessed.   Will follow up on labs, therapeutics, imaging, reassess and disposition as clinically indicated.  *The above represents an initial assessment/impression. Please refer to my progress notes below for potential changes in patient clinical course*  Escalation to admission/observation was considered. However, patient better served with outpatient primary medical doctor and/or specialist and given strict return precautions and expectant management.   Pedro Taylor MD FACEP note of transfer at the usual time of sign out: Receiving team will follow up on labs, analgesia, any clinical imaging, reassess and disposition as clinically indicated.  Details of patient and plan conveyed to receiving physician and conveyed back for understanding.  There were no questions at this time about the patient's status, disposition, and plan. Patient's care to be taken over by receiving physician at this time, all decisions regarding the progression of care will be made at their discretion.     Portions of this note have been documented using voice recognition software. As a result, errors may occur in the transcription process. Effort has been made to correct all grammatical and transcription error, but some may have been missed which may produce sporadic inaccurate transcription or nonsensical phrases.

## 2024-03-19 NOTE — ED ADULT NURSE NOTE - OBJECTIVE STATEMENT
64yo M w/ PMH DM, HTN, HLD, asthma, schizophrenia, presents to ED c/o right sided facial swelling. Pt states this has been going on for 3 days worsening over the last 3 days, saw his PCP 2 days ago and was prescribed amoxicillin and has been taking as prescribed. Endorsing no relief of symptoms. Also notes having a CT scheduled in 2 days but could not wait. Pt tolerating PO but having pain when chewing. Denies difficulty swallowing, N/V, fevers/chills, ear pain. A&Ox3. Strong peripheral pulses. Neurologically intact and follows commands. Right sided facial swelling noted, warm to touch.

## 2024-03-19 NOTE — ED PROVIDER NOTE - PHYSICAL EXAMINATION
CONSTITUTIONAL: In no apparent distress.  ENT: Airway patent, moist mucous membranes.  +R sided facial swelling noted, area of fluctuance appreciated over right cheek with mild erythema and some warmth to touch. No mastoid TTP. No oral lesions noted. Poor dentition.   EYES: Pupils equal, round and reactive to light. EOMI. Conjunctiva normal appearing.   CARDIAC: Normal rate, regular rhythm.  Heart sounds S1, S2.    RESPIRATORY: Breath sounds clear and equal bilaterally.   NEUROLOGICAL: Alert and oriented x3, grossly normal.

## 2024-03-19 NOTE — ED PROVIDER NOTE - CLINICAL SUMMARY MEDICAL DECISION MAKING FREE TEXT BOX
62 yo M with a PMH of DM , HTN, HLD, asthma, schizophrenia p/w right sided facial swelling and pain x 3 days. Pt states sxs worsening since onset. Went to his PMD 2 days ago and was prescribed Amox which he has been taking w/o relief. Was scheduled for a CT in 2 days from now but could not wait any longer. Has never had this before. Tolerating PO but reports pain in his cheek with chewing. Denies nausea, vomiting, fever, chills, sore throat, ear pain/discharge/hearing loss, neck pain or stiffness. Plan for labs- cbc/cmp/coags, CT maxfac w/ IV contrast, pain control and reassess. Kristian Cm PA-C

## 2024-03-19 NOTE — ED PROVIDER NOTE - PATIENT PORTAL LINK FT
You can access the FollowMyHealth Patient Portal offered by [facility] by registering at the following website: [email or web address]. By joining [employer]’s FollowMyHealth portal, you will also be able to view your health information using other applications (apps) compatible with our system.

## 2024-03-19 NOTE — ED PROVIDER NOTE - PROGRESS NOTE DETAILS
Nubia Lunsford MD (PGY3): CT w/ Parotiditis w/o stone. Patient to continue course of abx. discussed hard candy usage. Confirmed w/ patient  that he has all of his childhood vaccines. Will give ENT f/u. Patient verbalized understanding.

## 2024-03-19 NOTE — ED ADULT TRIAGE NOTE - CHIEF COMPLAINT QUOTE
Swollen right sided cheek/neck swelling and pain x 1 week. Pt reports he cannot chew on the right side. Went to his PCP and started amoxicillin today. Denies diff swallowing, diff breathing, ear pain, fevers/chills

## 2024-03-19 NOTE — ED PROVIDER NOTE - PRO INTERPRETER NEED 2
Thoracentesis   WHAT YOU NEED TO KNOW:   A thoracentesis is a procedure to remove extra fluid or air from between your lungs and your inner chest wall  Air or fluid buildup may make it hard for you to breathe  A thoracentesis allows your lungs to expand fully so you can breathe more easily  DISCHARGE INSTRUCTIONS:     Small amount of shoulder pain and bloody sputum is normal after a Thoracentesis  Rest:  Rest when you feel it is needed  Slowly start to do more each day  Return to your daily activities as directed  Resume your normal diet  Small sips of flat soda will help mild nausea  Do not smoke: If you smoke, it is never too late to quit  Ask for information about how to stop smoking if you need help  Contact Interventional Radiology at [phone] Kathy PATIENTS: Contact Interventional Radiology at [phone]) Kateryna Wick PATIENTS: Contact Interventional Radiology at [phone]) if:   · You have a fever  · Your puncture site is red, warm, swollen, or draining pus  · You have questions or concerns about your procedure, medicine, or care  Seek care immediately or call 911 if:   · Severe chest pain with inspiration and shortness of breath    · Large amounts of blood in your sputum    Follow up with your healthcare provider as directed  English

## 2024-03-19 NOTE — ED ADULT NURSE NOTE - NSICDXPASTMEDICALHX_GEN_ALL_CORE_FT
PAST MEDICAL HISTORY:  Anxiety     Asthma     DM (diabetes mellitus)     Gout     History of Schizophrenia     HTN (Hypertension)     Hyperlipidemia      Complex Repair And Flap Additional Text (Will Appearing After The Standard Complex Repair Text): The complex repair was not sufficient to completely close the primary defect. The remaining additional defect was repaired with the flap mentioned below.

## 2024-03-19 NOTE — ED PROVIDER NOTE - NSFOLLOWUPINSTRUCTIONS_ED_ALL_ED_FT
You will receive a call to make an appointment with a ENT. Please continue the course of Amoxicillin you are currently taking.       Parotitis means that you have irritation and swelling (inflammation) in one or both of your parotid glands. These glands make saliva. They are found on each side of your face, below and in front of your earlobes. You may or may not have pain with this condition.    What are the causes?  This condition may be caused by:    Infections from germs (bacteria or viruses).  Something blocking the flow of saliva through the parotid glands. This can be a stone, scar tissue, or a tumor.  Diseases that cause your body's defense system (immune system) to attack healthy cells in your salivary glands. These are called autoimmune diseases.    What increases the risk?  You are more likely to get this condition if:    You are 50 years old or older.   You do not drink enough fluids (are dehydrated).   You drink too much alcohol.   You have:    A dry mouth.   Diabetes.   Gout.  A long-term illness.   You do not take good care of your mouth and teeth (poor dental hygiene).  You have had radiation treatments to the head and neck.   You take certain medicines.    What are the signs or symptoms?  Symptoms of this condition depend on the cause. They may include:    Swelling under and in front of the ear. This may get worse after you eat.  Redness of the skin over the parotid gland.   Pain and tenderness over the parotid gland. This may get worse after you eat.  Fever or chills.   Pus coming from the ducts inside the mouth.   Dry mouth.   A bad taste in the mouth.    How is this treated?  Treatment for this condition depends on the cause. Treatment may include:    Antibiotic medicine for an infection from bacteria.  Drinking more fluids.   Removing a stone or obstruction.   Treating a disease that is causing parotitis.  Surgery to drain an infection, remove a growth, or remove the whole gland.    Treatment may not be needed if the swelling goes away with home care.    Follow these instructions at home:      Medicines     Take over-the-counter and prescription medicines only as told by your doctor.  If you were prescribed an antibiotic medicine, take it as told by your doctor. Do not stop taking the antibiotic even if you start to feel better.        Managing pain and swelling    If told, put heat on the affected area. Do this as often as told by your doctor. Use the heat source that your doctor recommends, such as a moist heat pack or a heating pad.    Place a towel between your skin and the heat source.   Leave the heat on for 20–30 minutes.   Remove the heat if your skin turns bright red. This is very important if you are unable to feel pain, heat, or cold. You may have a greater risk of getting burned.  Gargle with salt water 3–4 times a day or as needed. To make salt water, dissolve ½–1 tsp (3–6 g) of salt in 1 cup (237 mL) of warm water.  Gently rub your parotid glands as told by your doctor.        General instructions     Drink enough fluid to keep your pee (urine) pale yellow.  Keep your mouth clean and moist.   Suck on sour candy. This may help to:    Make your mouth less dry.  Make more saliva.  Take good care of your mouth:     Brush your teeth at least two times a day.  Floss your teeth every day.   See your dentist regularly.   Do not use any products that contain nicotine or tobacco. These include cigarettes, e-cigarettes, and chewing tobacco. If you need help quitting, ask your doctor.  Do not drink alcohol.  Keep all follow-up visits as told by your doctor. This is important.    Contact a doctor if:  You have a fever or chills.  You have new symptoms.  Your symptoms get worse.  Your symptoms do not get better with treatment.    Get help right away if:  You have trouble breathing or swallowing.    Summary  Parotitis means that you have irritation and swelling (inflammation) in one or both of your parotid glands.  Symptoms include pain and swelling under and in front of the ear.  Treatment for parotitis depends on the cause. In some cases, the condition may go away on its own with home care.  You should drink plenty of fluids, take good care of your mouth, and avoid tobacco products.    ADDITIONAL NOTES AND INSTRUCTIONS    Please follow up with your Primary MD in 24-48 hr.  Seek immediate medical care for any new/worsening signs or symptoms.

## 2024-03-20 PROCEDURE — 70491 CT SOFT TISSUE NECK W/DYE: CPT | Mod: MC

## 2024-03-20 PROCEDURE — 85610 PROTHROMBIN TIME: CPT

## 2024-03-20 PROCEDURE — 96374 THER/PROPH/DIAG INJ IV PUSH: CPT | Mod: XU

## 2024-03-20 PROCEDURE — 85730 THROMBOPLASTIN TIME PARTIAL: CPT

## 2024-03-20 PROCEDURE — 70491 CT SOFT TISSUE NECK W/DYE: CPT | Mod: 26,MC

## 2024-03-20 PROCEDURE — 99284 EMERGENCY DEPT VISIT MOD MDM: CPT | Mod: 25

## 2024-03-20 PROCEDURE — 85025 COMPLETE CBC W/AUTO DIFF WBC: CPT

## 2024-03-20 PROCEDURE — 80053 COMPREHEN METABOLIC PANEL: CPT

## 2024-03-21 ENCOUNTER — OUTPATIENT (OUTPATIENT)
Dept: OUTPATIENT SERVICES | Facility: HOSPITAL | Age: 64
LOS: 1 days | End: 2024-03-21
Payer: MEDICARE

## 2024-03-21 ENCOUNTER — APPOINTMENT (OUTPATIENT)
Dept: ULTRASOUND IMAGING | Facility: CLINIC | Age: 64
End: 2024-03-21
Payer: MEDICARE

## 2024-03-21 DIAGNOSIS — R22.0 LOCALIZED SWELLING, MASS AND LUMP, HEAD: ICD-10-CM

## 2024-03-21 DIAGNOSIS — Z98.890 OTHER SPECIFIED POSTPROCEDURAL STATES: Chronic | ICD-10-CM

## 2024-03-21 PROCEDURE — 76536 US EXAM OF HEAD AND NECK: CPT | Mod: 26

## 2024-03-21 PROCEDURE — 76536 US EXAM OF HEAD AND NECK: CPT

## 2024-04-15 NOTE — PROGRESS NOTE ADULT - ASSESSMENT
Spoke with patient to relay MD message below; Patient verbalized understanding. Pt did not have any questions or concerns at this time.      62 year old male w/ PMHx of DM2, HTN, and HLD w/ intermittent straight catheterizations at home due to BPH who presented from home due to weakness and difficulty ambulating  Leukocytosis, Fever  RVP hMPV  CT dilation of ureters, mild L hydro  CXR clear  New BCX now with E coli no ESBL (possible UTI source?)  Overall,  1) Viral URI  - +hMPV  - Supportive care  2) E coli bacteremia  - Likely related to UTI given history of self Cath; ESBL tag negative  - Ceftriaxone 1g q 24  - F/U BCXs for clear  - Anticipate would select PO options when DC planning (would not plan for extended IV therapy presently)  3) Fever/Leukocytosis  - F/U BCXs  - Trend to normal  - Monitor for alternate sources    Barrett Glez MD  Contact on TEAMS messaging from 9am - 5pm  From 5pm-9am, and on weekends call [phone]

## 2024-05-22 ENCOUNTER — OUTPATIENT (OUTPATIENT)
Dept: OUTPATIENT SERVICES | Facility: HOSPITAL | Age: 64
LOS: 1 days | End: 2024-05-22
Payer: MEDICARE

## 2024-05-22 ENCOUNTER — APPOINTMENT (OUTPATIENT)
Dept: WOUND CARE | Facility: HOSPITAL | Age: 64
End: 2024-05-22
Payer: MEDICARE

## 2024-05-22 ENCOUNTER — APPOINTMENT (OUTPATIENT)
Dept: WOUND CARE | Facility: CLINIC | Age: 64
End: 2024-05-22
Payer: MEDICARE

## 2024-05-22 VITALS
OXYGEN SATURATION: 94 % | HEART RATE: 78 BPM | RESPIRATION RATE: 18 BRPM | SYSTOLIC BLOOD PRESSURE: 183 MMHG | TEMPERATURE: 98.1 F | DIASTOLIC BLOOD PRESSURE: 109 MMHG

## 2024-05-22 VITALS — SYSTOLIC BLOOD PRESSURE: 165 MMHG | DIASTOLIC BLOOD PRESSURE: 104 MMHG

## 2024-05-22 DIAGNOSIS — S81.802A UNSPECIFIED OPEN WOUND, LEFT LOWER LEG, INITIAL ENCOUNTER: ICD-10-CM

## 2024-05-22 DIAGNOSIS — S81.801A UNSPECIFIED OPEN WOUND, RIGHT LOWER LEG, INITIAL ENCOUNTER: ICD-10-CM

## 2024-05-22 DIAGNOSIS — I10 ESSENTIAL (PRIMARY) HYPERTENSION: ICD-10-CM

## 2024-05-22 DIAGNOSIS — L03.115 CELLULITIS OF RIGHT LOWER LIMB: ICD-10-CM

## 2024-05-22 PROCEDURE — G0463: CPT

## 2024-05-22 PROCEDURE — 11042 DBRDMT SUBQ TIS 1ST 20SQCM/<: CPT

## 2024-05-22 PROCEDURE — 93970 EXTREMITY STUDY: CPT | Mod: 26

## 2024-05-22 PROCEDURE — 99205 OFFICE O/P NEW HI 60 MIN: CPT | Mod: 25

## 2024-05-22 NOTE — PHYSICAL EXAM
[2+] : left 2+ [Ankle Swelling (On Exam)] : present [Ankle Swelling Bilaterally] : bilaterally  [Skin Ulcer] : ulcer [Alert] : alert [Oriented to Person] : oriented to person [Oriented to Place] : oriented to place [Oriented to Time] : oriented to time [Calm] : calm [Please See PDF for Tissue Analytics] : Please See PDF for [employer] Analytics. [Ankle Swelling On The Left] : moderate [de-identified] : NAD [de-identified] : atramuatic, uncontrolled facial movements [de-identified] : supple [de-identified] : breathing comfortably [de-identified] : soft, non-tender [de-identified] : Bilateral feet with swelling and Charcot deformity [de-identified] : BLE anterior wounds [de-identified] : uncontrolled facial movements consistent with tardive dyskinesia

## 2024-05-22 NOTE — HISTORY OF PRESENT ILLNESS
[FreeTextEntry1] : Mr. ALEJANDRO BENITEZ is a 64 year male who presents to the office with a wound for 3-4 weeks duration. The wound is located on the bilateral anterior shins. Patient reports his legs were very itchy and he scratched them causing the wounds. He initially cleaned them with a spray antibacterial, but has not been dressing them otherwise. Denies pain in the wounds. No recent fevers or chills. Reports his last HbA1c is 6.4. Lives at home with wife, child, and family friend.

## 2024-05-22 NOTE — PLAN
[FreeTextEntry1] : 5/22/24 Plan: BLE: Wash with soap and water, pat dry with clean towel. Apply Medihoney to wound bed, cover with non-adhesive foam, secure with Eliza. No compression currently as has active cellulitis Change daily Supplies ordered, instructions provided f/u with Dr. Peck to schedule venous procedure Pt provided with script with instructions for ED--admit, iv abx, id consult, eval and control bp (copied for chart) and directed to the ED due to elevated BP, as well as evidence of cellulitis on the RLE. Pt voiced understanding and ambulated out of office unassisted. Pt stated he will have his wife bring him to [facility]. f/u after hospital

## 2024-05-22 NOTE — ASSESSMENT
[FreeTextEntry1] : 5/22/24 Pt here for evaluation of BLE wounds. Does not have home nursing, has not been dressing wounds. No fevers, no chills. Pt had venous reflux studies today.  Dr. Peck discussed results with patient.  On exam: RLE with two wounds, one with significant yellow slough. Surrounding erythema tracking down to the medial ankle (?lymphangitic streaking) Erythematous portion also warmer than surrounding skin. Excisional debridement performed. LLE with superficial wounds covered with adherent scabs. Excisional debridement performed. No surrounding erythema or evidence of infection on left leg. ****Pt BP significantly elevated in both arms, with DBP > 100. Patient denies any chest pressure, pain, shortness of breath, headache, or other symptoms. Reports he did take his morning blood pressure meds. Recommended he present to ER due to elevated BP and concern for cellulitis. Patient voiced understanding. At end of visit, patient ambulated out of office unassisted with no symptoms.

## 2024-05-22 NOTE — REVIEW OF SYSTEMS
[Lower Ext Edema] : lower extremity edema [Joint Swelling] : joint swelling [Skin Lesions] : skin lesion [Skin Wound] : skin wound [Itching] : itching [Negative] : Psychiatric [de-identified] : uncontrolled facial movements

## 2024-05-23 ENCOUNTER — NON-APPOINTMENT (OUTPATIENT)
Age: 64
End: 2024-05-23

## 2024-05-29 ENCOUNTER — APPOINTMENT (OUTPATIENT)
Age: 64
End: 2024-05-29
Payer: MEDICARE

## 2024-05-29 PROCEDURE — 99204 OFFICE O/P NEW MOD 45 MIN: CPT
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
## 2024-05-30 DIAGNOSIS — S81.802A UNSPECIFIED OPEN WOUND, LEFT LOWER LEG, INITIAL ENCOUNTER: ICD-10-CM

## 2024-05-30 DIAGNOSIS — I10 ESSENTIAL (PRIMARY) HYPERTENSION: ICD-10-CM

## 2024-05-30 DIAGNOSIS — S81.801A UNSPECIFIED OPEN WOUND, RIGHT LOWER LEG, INITIAL ENCOUNTER: ICD-10-CM

## 2024-05-30 DIAGNOSIS — L03.115 CELLULITIS OF RIGHT LOWER LIMB: ICD-10-CM

## 2024-06-04 LAB
APPEARANCE: ABNORMAL
BACTERIA: NEGATIVE /HPF
BILIRUBIN URINE: NEGATIVE
BLOOD URINE: ABNORMAL
CAST: 0 /LPF
COLOR: YELLOW
EPITHELIAL CELLS: 0 /HPF
GLUCOSE QUALITATIVE U: NEGATIVE MG/DL
KETONES URINE: NEGATIVE MG/DL
LEUKOCYTE ESTERASE URINE: ABNORMAL
MICROSCOPIC-UA: NORMAL
NITRITE URINE: NEGATIVE
PH URINE: 7
PROTEIN URINE: NORMAL MG/DL
RED BLOOD CELLS URINE: 0 /HPF
SPECIFIC GRAVITY URINE: 1.01
UROBILINOGEN URINE: 0.2 MG/DL
WHITE BLOOD CELLS URINE: 150 /HPF

## 2024-06-05 LAB — BACTERIA UR CULT: NORMAL

## 2024-06-06 ENCOUNTER — EMERGENCY (EMERGENCY)
Facility: HOSPITAL | Age: 64
LOS: 1 days | Discharge: HOME | End: 2024-06-06
Attending: STUDENT IN AN ORGANIZED HEALTH CARE EDUCATION/TRAINING PROGRAM
Payer: MEDICARE

## 2024-06-06 VITALS
SYSTOLIC BLOOD PRESSURE: 175 MMHG | TEMPERATURE: 99 F | WEIGHT: 285.06 LBS | OXYGEN SATURATION: 96 % | HEART RATE: 71 BPM | HEIGHT: 72 IN | DIASTOLIC BLOOD PRESSURE: 100 MMHG | RESPIRATION RATE: 20 BRPM

## 2024-06-06 VITALS
DIASTOLIC BLOOD PRESSURE: 86 MMHG | OXYGEN SATURATION: 94 % | SYSTOLIC BLOOD PRESSURE: 148 MMHG | TEMPERATURE: 98 F | HEART RATE: 70 BPM | RESPIRATION RATE: 18 BRPM

## 2024-06-06 DIAGNOSIS — Z98.890 OTHER SPECIFIED POSTPROCEDURAL STATES: Chronic | ICD-10-CM

## 2024-06-06 PROCEDURE — 99283 EMERGENCY DEPT VISIT LOW MDM: CPT

## 2024-06-06 PROCEDURE — 82962 GLUCOSE BLOOD TEST: CPT

## 2024-06-06 PROCEDURE — 99282 EMERGENCY DEPT VISIT SF MDM: CPT

## 2024-06-06 NOTE — ED ADULT NURSE NOTE - CHIEF COMPLAINT QUOTE
R knee wound s/p fall onto knee x 1 wk ago, seen by wound care at [facility] and urgent care, no abx started

## 2024-06-06 NOTE — ED PROVIDER NOTE - SKIN, MLM
brawny discoloration to b/l shins.  Right shin with 3 cm scan wihtout slucutance or indurartion surrounding.  Right knee with scab well healing

## 2024-06-06 NOTE — ED PROVIDER NOTE - NEUROLOGICAL, MLM
January 19, 2022     Patient: Radha Thompson   YOB: 2003   Date of Visit: 1/19/2022       To Whom it May Concern:    Radha Thompson is under my professional care  He was seen in my office on 1/19/2022  He may return to gym class or sports on 1/19/2022 without restrictions       If you have any questions or concerns, please don't hesitate to call           Sincerely,          Cherelle Torres MD        CC: No Recipients
Alert and oriented, no focal deficits, no motor or sensory deficits.

## 2024-06-06 NOTE — ED ADULT NURSE NOTE - OBJECTIVE STATEMENT
64y m pt arrived with daughter at bedside; daughter became concerned due to pinkish exudate from chronic wound to right shin; pt has PAD and has chronic LE wounds; pt had mech fall x 2 days ago on right knee; wound is scabbed with no surrounding redness; pt has appt with vascular md within next 2 weeks; pt able to ambulate on own; pt sometimes uses a rollator when leaving house in case gets tired; md and pa at bedside

## 2024-06-06 NOTE — ED PROVIDER NOTE - PATIENT PORTAL LINK FT
You can access the FollowMyHealth Patient Portal offered by [facility] by registering at the following website: [email or web address]. By joining [employer]’s FollowMyHealth portal, you will also be able to view your health information using other applications (apps) compatible with our system.

## 2024-06-06 NOTE — ED ADULT NURSE NOTE - NSFALLHARMRISKINTERV_ED_ALL_ED

## 2024-06-06 NOTE — ED ADULT TRIAGE NOTE - CHIEF COMPLAINT QUOTE
R knee wound s/p fall onto knee x 1 wk ago, seen by wound care at [facility] and urgent care, no abx started

## 2024-06-06 NOTE — ED PROVIDER NOTE - NSFOLLOWUPCLINICS_GEN_ALL_ED_FT
[facility] General Internal Medicine  General Internal Medicine  [address] [place] [address]  Phone: [phone]  Fax:

## 2024-06-06 NOTE — ED PROVIDER NOTE - ATTENDING APP SHARED VISIT CONTRIBUTION OF CARE
I, Filiberto Lyons, have performed a history and physical exam on this patient, and discussed their management with the SHERLEY. I have fully participated in the care of this patient. I agree with the history, physical exam, and plan as documented by the SHERLEY

## 2024-06-06 NOTE — ED PROVIDER NOTE - CLINICAL SUMMARY MEDICAL DECISION MAKING FREE TEXT BOX
63 yo M hx of DM , HTN, HLD, asthma, schizophrenia, presenting for R knee wound x 2 days - mech fall w/ isolated injury to R knee. Now healing with scab, no signs of infection. No bony ttp to knee, or remainder of RLE, and ROM intact throughout w/o pain. Also has chronic appropriately healing wound with scab to R anterior lower leg. Ambulatory w/o pain. Distal DP and PT pulse, and sensation intact. Presentation not consistent w/ infection. Consistent w/ healing superficial abrasion to R anterior knee, consider contusion. No findings to suggest bony injury or fx. Will check glucose, last a1c 6.3 per pt. Denies fevers or other systemic infectious symptoms. VSS in triage, HTN. Chronic venous stasis changes to skin of bilat LE, no sig LE edema on exam. Pt otherwise well appearing, NAD, alert and Ox3. Ambulatory. If BG wnl, plan for f/u outpt w/ primary provider, pt and family at bedside agreeable to plan.

## 2024-06-06 NOTE — ED PROVIDER NOTE - OBJECTIVE STATEMENT
63-year-old male history of diabetes hypertension hyperlipidemia asthma schizophrenia presenting to the emergency department with a scab to the right knee and right shin.  Scab to the right knee is after mechanical fall 2 days ago but is mainly here for the scab over the right shin which has been chronic.  Patient has peripheral vascular disease has chronic wounds on the lower extremities were actually improving.  Daughter became concerned when she saw pink exudate coming out of the scab on the right shin.  No fevers no chills no worsening redness of the lower extremity.  Patient is able to ambulate without issue.

## 2024-06-06 NOTE — ED PROVIDER NOTE - NSFOLLOWUPINSTRUCTIONS_ED_ALL_ED_FT
1- Follow up with your doctor to discuss vein procedure you have scheduled  2- Any redness, pus, fevers, white drainage, or any new or worsening complaints come back to the ER immediately    3- Tylenol as needed for pain

## 2024-06-06 NOTE — DISCHARGE NOTE NURSING/CASE MANAGEMENT/SOCIAL WORK - NSDCPEFALRISK_GEN_ALL_CORE
Name:Juana Uribe  :1943    Date of Service : 2024    CC: FL urethrocystogram voiding ;       DX: Garzon catheter placement ;  history of frequent UTI,     At 14 30 discussed the need for the garzon for the exam as well as potential risks and benefits,described the process of inserting the garzon catheter and the subsequent removal post the exam; answered all questions posed by patient regarding garzon insertion.  Confirmed name, birth date, MRN and CSN number prior to inserting /placing of 16f Garzon at 1445; utilizing aseptic technique,per hospital policy assisted/observed by RT Geetha Pedraza , urine noted in drainage system.   Pt tolerated garzon insertion well.    Total visit time = 15 minutes ; > 50% spent face to face counseling/coordinating care approximately 15/15  Minutes total time spent counseling/coordinating care; documentation, discussed the need for the Garzon for the exam as well as potential risks and benefits. Described the process of inserting the Garzon catheter and the subsequent removal post the exam; answered all questions posed by patient regarding Garzon insertion; inserted Garzon without incident.      For information on Fall & Injury Prevention, visit: [email or web address] OR  [email or web address] OR  https://www.cdc.gov/steadi/patient.html

## 2024-06-07 ENCOUNTER — NON-APPOINTMENT (OUTPATIENT)
Age: 64
End: 2024-06-07

## 2024-06-21 RX ORDER — SODIUM CHLORIDE 9 G/ML
0.9 INJECTION, SOLUTION INTRAVENOUS
Qty: 1 | Refills: 0 | Status: ACTIVE | COMMUNITY
Start: 2024-06-21 | End: 1900-01-01

## 2024-06-21 RX ORDER — SODIUM BICARBONATE 84 MG/ML
8.4 INJECTION, SOLUTION INTRAVENOUS
Qty: 1 | Refills: 0 | Status: ACTIVE | COMMUNITY
Start: 2024-06-21 | End: 1900-01-01

## 2024-06-21 RX ORDER — LIDOCAINE HYDROCHLORIDE 10 MG/ML
1 INJECTION, SOLUTION INFILTRATION; PERINEURAL
Qty: 1 | Refills: 0 | Status: ACTIVE | COMMUNITY
Start: 2024-06-21 | End: 1900-01-01

## 2024-06-25 ENCOUNTER — APPOINTMENT (OUTPATIENT)
Dept: VASCULAR SURGERY | Facility: CLINIC | Age: 64
End: 2024-06-25
Payer: MEDICARE

## 2024-06-25 DIAGNOSIS — I83.893 VARICOSE VEINS OF BILATERAL LOWER EXTREMITIES WITH OTHER COMPLICATIONS: ICD-10-CM

## 2024-06-25 PROCEDURE — 36475 ENDOVENOUS RF 1ST VEIN: CPT | Mod: RT

## 2024-06-26 DIAGNOSIS — N31.9 NEUROMUSCULAR DYSFUNCTION OF BLADDER, UNSPECIFIED: ICD-10-CM

## 2024-06-27 ENCOUNTER — OUTPATIENT (OUTPATIENT)
Dept: OUTPATIENT SERVICES | Facility: HOSPITAL | Age: 64
LOS: 1 days | End: 2024-06-27
Payer: MEDICARE

## 2024-06-27 ENCOUNTER — APPOINTMENT (OUTPATIENT)
Dept: UROLOGY | Facility: CLINIC | Age: 64
End: 2024-06-27
Payer: MEDICARE

## 2024-06-27 DIAGNOSIS — Z98.890 OTHER SPECIFIED POSTPROCEDURAL STATES: Chronic | ICD-10-CM

## 2024-06-27 PROCEDURE — 99214 OFFICE O/P EST MOD 30 MIN: CPT | Mod: 25

## 2024-06-27 PROCEDURE — 51701 INSERT BLADDER CATHETER: CPT

## 2024-06-28 LAB
ANION GAP SERPL CALC-SCNC: 15 MMOL/L
APPEARANCE: CLEAR
BACTERIA UR CULT: NORMAL
BACTERIA: NEGATIVE /HPF
BILIRUBIN URINE: NEGATIVE
BLOOD URINE: NEGATIVE
BUN SERPL-MCNC: 7 MG/DL
CALCIUM SERPL-MCNC: 9.2 MG/DL
CAST: 0 /LPF
CHLORIDE SERPL-SCNC: 93 MMOL/L
CO2 SERPL-SCNC: 25 MMOL/L
COLOR: YELLOW
CREAT SERPL-MCNC: 0.92 MG/DL
EGFR: 94 ML/MIN/1.73M2
EPITHELIAL CELLS: 0 /HPF
GLUCOSE QUALITATIVE U: NEGATIVE MG/DL
GLUCOSE SERPL-MCNC: 106 MG/DL
KETONES URINE: NEGATIVE MG/DL
LEUKOCYTE ESTERASE URINE: NEGATIVE
MICROSCOPIC-UA: NORMAL
NITRITE URINE: NEGATIVE
PH URINE: 7
POTASSIUM SERPL-SCNC: 3.9 MMOL/L
PROTEIN URINE: NORMAL MG/DL
PSA FREE FLD-MCNC: 16 %
PSA FREE SERPL-MCNC: 0.16 NG/ML
PSA SERPL-MCNC: 1.02 NG/ML
RED BLOOD CELLS URINE: 0 /HPF
SODIUM SERPL-SCNC: 133 MMOL/L
SPECIFIC GRAVITY URINE: 1
UROBILINOGEN URINE: 0.2 MG/DL
WHITE BLOOD CELLS URINE: 0 /HPF

## 2024-07-01 ENCOUNTER — APPOINTMENT (OUTPATIENT)
Dept: WOUND CARE | Facility: CLINIC | Age: 64
End: 2024-07-01

## 2024-07-01 DIAGNOSIS — R35.0 FREQUENCY OF MICTURITION: ICD-10-CM

## 2024-07-02 ENCOUNTER — APPOINTMENT (OUTPATIENT)
Dept: VASCULAR SURGERY | Facility: CLINIC | Age: 64
End: 2024-07-02

## 2024-07-02 PROCEDURE — 93971 EXTREMITY STUDY: CPT | Mod: RT

## 2024-07-09 ENCOUNTER — NON-APPOINTMENT (OUTPATIENT)
Age: 64
End: 2024-07-09

## 2024-07-11 DIAGNOSIS — N31.9 NEUROMUSCULAR DYSFUNCTION OF BLADDER, UNSPECIFIED: ICD-10-CM

## 2024-07-11 DIAGNOSIS — N39.0 URINARY TRACT INFECTION, SITE NOT SPECIFIED: ICD-10-CM

## 2024-07-26 ENCOUNTER — APPOINTMENT (OUTPATIENT)
Dept: VASCULAR SURGERY | Facility: CLINIC | Age: 64
End: 2024-07-26
Payer: MEDICARE

## 2024-07-26 DIAGNOSIS — I83.893 VARICOSE VEINS OF BILATERAL LOWER EXTREMITIES WITH OTHER COMPLICATIONS: ICD-10-CM

## 2024-07-26 PROCEDURE — 36465Z: CUSTOM | Mod: RT

## 2024-07-26 NOTE — PROCEDURE
[FreeTextEntry1] : right distal GSV varithena  [FreeTextEntry3] : Procedural safety checklist and time out completed: Confirmed patient identification (Patient Name, , and/or medical record number including when possible affirmation by patient or parent/family/other). Confirmed procedure with the patient. Consent present, accurate and signed. Confirmed special equipment and supplies are present. Sterility confirmed. Position verified. Site/ side is marked and visible and confirmed. Procedure confirmed by consent. Accurate consent including side and site. Review of medical records, including venous ultrasound, noting correct procedure including site and side. MD/PA verifies presence and review of imaging studies and or written report of imaging studies. Agreement on the procedure to be performed Time out completed. All of the above has been confirmed by the team. All patient-specific concerns have been addressed.   Indication: right lower extremity varicose veins with ulcer, inflammation, leg pain, leg swelling, and leg cramping.  Venous insufficiency/ reflux.   Procedure: Ultrasound guided microfoam chemical ablation with Varithena of the right distal  great saphenous vein   Mr. ALEJANDRO BENITEZ is a 64 year old M with a history of right lower extremity varicose veins and venous insufficiency previously seen in the office.  Ultrasound examination demonstrated venous insufficiency. A trial of compression stockings, exercise, elevation, and pain medication was attempted without relief and definitive treatment with radiofrequency ablation was offered.   The patient has come for ultrasound guided microfoam chemical ablation with Varithena of the right distal great saphenous vein I have discussed the risks of the procedure at length with the patient. The risks discussed were inclusive of but not limited to infection, irritation at the site of infiltration of local anesthesia, and also rare risk of deep venous thrombosis and pulmonary emboli. The patient agrees to proceed with the procedure. The patient was escorted into the procedure room and a time out called.   The patient was placed on the procedure room table and was evaluated in reverse Trendelenburg position. The leg was prepped and sterile drapes applied. 1.0% lidocaine was administered at the chosen access site for local anesthesia. The vein was accessed using a 4F micro-puncture needle followed by a guide wire through the punctured needle and dilator sheath, standard IV catheters, or butterfly needle under advanced ultrasound guidance. Vein access was established and confirmed by aspiration of dark low pressure blood. After gaining access at medial distal calf, the leg was positioned at 45 degrees of elevation in relationship to the torso. The Varithena canister was primed and purged as required by the instructions. A 1 mL aliquot was drawn into a sterile syringe then attached to the access device.   Varithena was administered at 1.0 cc per second with close observation under ultrasound in its course of the GSV distal to proximal calf. The vein was observed for spasm under ultrasound, once vein was in full spasm the administration of Varithena was stopped   After the administration of Varithena the patient was asked to dorsiflex the ankle to limit flow of foam into perforating veins. Once appropriate spasm had been confirmed in the treated veins, the access device was removed from the leg and light pressure was applied to the puncture site for hemostasis.   The common femoral and deep superficial veins were then evaluated for flow and compressibility prior to dressing placement. The lower extremity was kept elevated at 45 degree angle and a multilayer dressing was applied including an under layer, compression pad, and thigh high 20-30 mmHg compression stocking to the patient. The leg was lowered only after compression had been applied.   The patient ambulated 10 minutes under supervision and was without concerns at time of release. The patient was instructed to take an anti-inflammatory medicine as needed and to follow up for duplex scan of treated vein.   Patient tolerated procedure, was given post-procedure instructions and a follow-up appt scheduled. Post-care instructions include walking, wearing compression during the day, taking off only to shower and then reapplying for two weeks, advising patient to keep post-treatment bandages in place and dry for 48 hours, avoid extended periods of inactivity, avoid heavy exercise for one week, to walk daily for 10 minutes over the next month. The patient was instructed to take an anti-inflammatory medicine as needed. Serial # [id number] exp 2025

## 2024-08-05 ENCOUNTER — OUTPATIENT (OUTPATIENT)
Dept: OUTPATIENT SERVICES | Facility: HOSPITAL | Age: 64
LOS: 1 days | End: 2024-08-05
Payer: MEDICARE

## 2024-08-05 ENCOUNTER — APPOINTMENT (OUTPATIENT)
Dept: WOUND CARE | Facility: CLINIC | Age: 64
End: 2024-08-05

## 2024-08-05 DIAGNOSIS — Z98.890 OTHER SPECIFIED POSTPROCEDURAL STATES: Chronic | ICD-10-CM

## 2024-08-05 PROBLEM — Z87.828 HEALED WOUND: Status: ACTIVE | Noted: 2024-08-05

## 2024-08-05 PROCEDURE — G0463: CPT

## 2024-08-05 PROCEDURE — 93971 EXTREMITY STUDY: CPT | Mod: 26

## 2024-08-05 PROCEDURE — 99213 OFFICE O/P EST LOW 20 MIN: CPT

## 2024-08-05 NOTE — PHYSICAL EXAM
[2+] : left 2+ [Ankle Swelling (On Exam)] : present [Ankle Swelling Bilaterally] : bilaterally  [Ankle Swelling On The Left] : moderate [Skin Ulcer] : ulcer [Alert] : alert [Oriented to Person] : oriented to person [Oriented to Place] : oriented to place [Oriented to Time] : oriented to time [Calm] : calm [Please See PDF for Tissue Analytics] : Please See PDF for [employer]. [de-identified] : NAD [de-identified] : atramuatic, uncontrolled facial movements [de-identified] : supple [de-identified] : breathing comfortably [de-identified] : soft, non-tender [de-identified] : Bilateral feet with swelling and Charcot deformity [de-identified] : BLE anterior wounds [de-identified] : uncontrolled facial movements consistent with tardive dyskinesia

## 2024-08-05 NOTE — PLAN
[FreeTextEntry1] : 5/22/24 Plan: BLE: Wash with soap and water, pat dry with clean towel. Apply Medihoney to wound bed, cover with non-adhesive foam, secure with Eliza. No compression currently as has active cellulitis Change daily Supplies ordered, instructions provided f/u with Dr. Peck to schedule venous procedure Pt provided with script with instructions for ED--admit, iv abx, id consult, eval and control bp (copied for chart) and directed to the ED due to elevated BP, as well as evidence of cellulitis on the RLE. Pt voiced understanding and ambulated out of office unassisted. Pt stated he will have his wife bring him to [facility]. f/u after hospital 8/5/24 No wounds Leg swelling decreased patient pleased with results no clinical sign of infection

## 2024-08-05 NOTE — HISTORY OF PRESENT ILLNESS
[FreeTextEntry1] : Mr. ALEJANDRO BENITEZ is a 64 year male who presents to the office with a wound for 3-4 weeks duration. The wound is located on the bilateral anterior shins. Patient reports his legs were very itchy and he scratched them causing the wounds. He initially cleaned them with a spray antibacterial, but has not been dressing them otherwise. Denies pain in the wounds. No recent fevers or chills. Reports his last HbA1c is 6.4. Lives at home with wife, child, and family friend.  8/5/24 s/p successful ablation no complaints

## 2024-08-05 NOTE — REVIEW OF SYSTEMS
[Lower Ext Edema] : lower extremity edema [Joint Swelling] : joint swelling [Skin Lesions] : skin lesion [Skin Wound] : skin wound [Itching] : itching [Negative] : Psychiatric [de-identified] : uncontrolled facial movements

## 2024-08-09 ENCOUNTER — APPOINTMENT (OUTPATIENT)
Dept: GASTROENTEROLOGY | Facility: CLINIC | Age: 64
End: 2024-08-09

## 2024-08-13 ENCOUNTER — TRANSCRIPTION ENCOUNTER (OUTPATIENT)
Age: 64
End: 2024-08-13

## 2024-08-13 ENCOUNTER — NON-APPOINTMENT (OUTPATIENT)
Age: 64
End: 2024-08-13

## 2024-08-14 ENCOUNTER — NON-APPOINTMENT (OUTPATIENT)
Age: 64
End: 2024-08-14

## 2024-08-14 RX ORDER — CIPROFLOXACIN HYDROCHLORIDE 500 MG/1
500 TABLET, FILM COATED ORAL
Refills: 0 | Status: COMPLETED | OUTPATIENT
Start: 2024-08-14

## 2024-08-15 ENCOUNTER — INPATIENT (INPATIENT)
Facility: HOSPITAL | Age: 64
LOS: 5 days | Discharge: NURSING, REHAB OR LONG-TERM CARE | DRG: 690 | End: 2024-08-21
Attending: STUDENT IN AN ORGANIZED HEALTH CARE EDUCATION/TRAINING PROGRAM | Admitting: STUDENT IN AN ORGANIZED HEALTH CARE EDUCATION/TRAINING PROGRAM
Payer: MEDICARE

## 2024-08-15 VITALS
DIASTOLIC BLOOD PRESSURE: 66 MMHG | RESPIRATION RATE: 20 BRPM | SYSTOLIC BLOOD PRESSURE: 108 MMHG | WEIGHT: 279.99 LBS | TEMPERATURE: 98 F | HEIGHT: 72 IN | HEART RATE: 69 BPM | OXYGEN SATURATION: 96 %

## 2024-08-15 DIAGNOSIS — I10 ESSENTIAL (PRIMARY) HYPERTENSION: ICD-10-CM

## 2024-08-15 DIAGNOSIS — M10.9 GOUT, UNSPECIFIED: ICD-10-CM

## 2024-08-15 DIAGNOSIS — J45.909 UNSPECIFIED ASTHMA, UNCOMPLICATED: ICD-10-CM

## 2024-08-15 DIAGNOSIS — E11.9 TYPE 2 DIABETES MELLITUS WITHOUT COMPLICATIONS: ICD-10-CM

## 2024-08-15 DIAGNOSIS — Z79.899 OTHER LONG TERM (CURRENT) DRUG THERAPY: ICD-10-CM

## 2024-08-15 DIAGNOSIS — N39.0 URINARY TRACT INFECTION, SITE NOT SPECIFIED: ICD-10-CM

## 2024-08-15 DIAGNOSIS — N17.9 ACUTE KIDNEY FAILURE, UNSPECIFIED: ICD-10-CM

## 2024-08-15 DIAGNOSIS — E78.5 HYPERLIPIDEMIA, UNSPECIFIED: ICD-10-CM

## 2024-08-15 DIAGNOSIS — Z98.890 OTHER SPECIFIED POSTPROCEDURAL STATES: Chronic | ICD-10-CM

## 2024-08-15 DIAGNOSIS — F20.9 SCHIZOPHRENIA, UNSPECIFIED: ICD-10-CM

## 2024-08-15 LAB
ALBUMIN SERPL ELPH-MCNC: 3.4 G/DL — SIGNIFICANT CHANGE UP (ref 3.3–5)
ALBUMIN SERPL ELPH-MCNC: 3.6 G/DL — SIGNIFICANT CHANGE UP (ref 3.3–5)
ALP SERPL-CCNC: 67 U/L — SIGNIFICANT CHANGE UP (ref 40–120)
ALP SERPL-CCNC: 73 U/L — SIGNIFICANT CHANGE UP (ref 40–120)
ALT FLD-CCNC: 12 U/L — SIGNIFICANT CHANGE UP (ref 10–45)
ALT FLD-CCNC: 13 U/L — SIGNIFICANT CHANGE UP (ref 10–45)
ANION GAP SERPL CALC-SCNC: 14 MMOL/L — SIGNIFICANT CHANGE UP (ref 5–17)
ANION GAP SERPL CALC-SCNC: 15 MMOL/L — SIGNIFICANT CHANGE UP (ref 5–17)
APPEARANCE UR: ABNORMAL
APPEARANCE UR: ABNORMAL
AST SERPL-CCNC: 13 U/L — SIGNIFICANT CHANGE UP (ref 10–40)
AST SERPL-CCNC: 19 U/L — SIGNIFICANT CHANGE UP (ref 10–40)
BACTERIA # UR AUTO: ABNORMAL /HPF
BACTERIA # UR AUTO: NEGATIVE /HPF — SIGNIFICANT CHANGE UP
BILIRUB SERPL-MCNC: 1.1 MG/DL — SIGNIFICANT CHANGE UP (ref 0.2–1.2)
BILIRUB SERPL-MCNC: 1.4 MG/DL — HIGH (ref 0.2–1.2)
BILIRUB UR-MCNC: NEGATIVE — SIGNIFICANT CHANGE UP
BILIRUB UR-MCNC: NEGATIVE — SIGNIFICANT CHANGE UP
BUN SERPL-MCNC: 14 MG/DL — SIGNIFICANT CHANGE UP (ref 7–23)
BUN SERPL-MCNC: 15 MG/DL — SIGNIFICANT CHANGE UP (ref 7–23)
CALCIUM SERPL-MCNC: 9 MG/DL — SIGNIFICANT CHANGE UP (ref 8.4–10.5)
CALCIUM SERPL-MCNC: 9.3 MG/DL — SIGNIFICANT CHANGE UP (ref 8.4–10.5)
CAST: 11 /LPF — HIGH (ref 0–4)
CAST: 17 /LPF — HIGH (ref 0–4)
CHLORIDE SERPL-SCNC: 92 MMOL/L — LOW (ref 96–108)
CHLORIDE SERPL-SCNC: 95 MMOL/L — LOW (ref 96–108)
CO2 SERPL-SCNC: 19 MMOL/L — LOW (ref 22–31)
CO2 SERPL-SCNC: 20 MMOL/L — LOW (ref 22–31)
COLOR SPEC: SIGNIFICANT CHANGE UP
COLOR SPEC: YELLOW — SIGNIFICANT CHANGE UP
CREAT ?TM UR-MCNC: 32 MG/DL — SIGNIFICANT CHANGE UP
CREAT SERPL-MCNC: 1.55 MG/DL — HIGH (ref 0.5–1.3)
CREAT SERPL-MCNC: 1.71 MG/DL — HIGH (ref 0.5–1.3)
DIFF PNL FLD: ABNORMAL
DIFF PNL FLD: ABNORMAL
EGFR: 45 ML/MIN/1.73M2 — LOW
EGFR: 50 ML/MIN/1.73M2 — LOW
GAS PNL BLDV: SIGNIFICANT CHANGE UP
GLUCOSE BLDC GLUCOMTR-MCNC: 106 MG/DL — HIGH (ref 70–99)
GLUCOSE SERPL-MCNC: 94 MG/DL — SIGNIFICANT CHANGE UP (ref 70–99)
GLUCOSE SERPL-MCNC: 97 MG/DL — SIGNIFICANT CHANGE UP (ref 70–99)
GLUCOSE UR QL: NEGATIVE MG/DL — SIGNIFICANT CHANGE UP
GLUCOSE UR QL: NEGATIVE MG/DL — SIGNIFICANT CHANGE UP
HCT VFR BLD CALC: 42.2 % — SIGNIFICANT CHANGE UP (ref 39–50)
HGB BLD-MCNC: 14.2 G/DL — SIGNIFICANT CHANGE UP (ref 13–17)
KETONES UR-MCNC: NEGATIVE MG/DL — SIGNIFICANT CHANGE UP
KETONES UR-MCNC: NEGATIVE MG/DL — SIGNIFICANT CHANGE UP
LEUKOCYTE ESTERASE UR-ACNC: ABNORMAL
LEUKOCYTE ESTERASE UR-ACNC: ABNORMAL
MCHC RBC-ENTMCNC: 29.6 PG — SIGNIFICANT CHANGE UP (ref 27–34)
MCHC RBC-ENTMCNC: 33.6 GM/DL — SIGNIFICANT CHANGE UP (ref 32–36)
MCV RBC AUTO: 87.9 FL — SIGNIFICANT CHANGE UP (ref 80–100)
NITRITE UR-MCNC: NEGATIVE — SIGNIFICANT CHANGE UP
NITRITE UR-MCNC: POSITIVE
NRBC # BLD: 0 /100 WBCS — SIGNIFICANT CHANGE UP (ref 0–0)
OSMOLALITY UR: 121 MOS/KG — LOW (ref 300–900)
PH UR: 6 — SIGNIFICANT CHANGE UP (ref 5–8)
PH UR: 6 — SIGNIFICANT CHANGE UP (ref 5–8)
PLATELET # BLD AUTO: 222 K/UL — SIGNIFICANT CHANGE UP (ref 150–400)
POTASSIUM SERPL-MCNC: 4.1 MMOL/L — SIGNIFICANT CHANGE UP (ref 3.5–5.3)
POTASSIUM SERPL-MCNC: 4.5 MMOL/L — SIGNIFICANT CHANGE UP (ref 3.5–5.3)
POTASSIUM SERPL-SCNC: 4.1 MMOL/L — SIGNIFICANT CHANGE UP (ref 3.5–5.3)
POTASSIUM SERPL-SCNC: 4.5 MMOL/L — SIGNIFICANT CHANGE UP (ref 3.5–5.3)
POTASSIUM UR-SCNC: 16 MMOL/L — SIGNIFICANT CHANGE UP
PROT ?TM UR-MCNC: 70 MG/DL — HIGH (ref 0–12)
PROT SERPL-MCNC: 6.8 G/DL — SIGNIFICANT CHANGE UP (ref 6–8.3)
PROT SERPL-MCNC: 7.2 G/DL — SIGNIFICANT CHANGE UP (ref 6–8.3)
PROT UR-MCNC: 100 MG/DL
PROT UR-MCNC: 300 MG/DL
PROT/CREAT UR-RTO: 2.2 RATIO — HIGH (ref 0–0.2)
RBC # BLD: 4.8 M/UL — SIGNIFICANT CHANGE UP (ref 4.2–5.8)
RBC # FLD: 13.5 % — SIGNIFICANT CHANGE UP (ref 10.3–14.5)
RBC CASTS # UR COMP ASSIST: 2 /HPF — SIGNIFICANT CHANGE UP (ref 0–4)
RBC CASTS # UR COMP ASSIST: 376 /HPF — HIGH (ref 0–4)
REVIEW: SIGNIFICANT CHANGE UP
REVIEW: SIGNIFICANT CHANGE UP
SODIUM SERPL-SCNC: 126 MMOL/L — LOW (ref 135–145)
SODIUM SERPL-SCNC: 129 MMOL/L — LOW (ref 135–145)
SODIUM UR-SCNC: 20 MMOL/L — SIGNIFICANT CHANGE UP
SP GR SPEC: 1.01 — SIGNIFICANT CHANGE UP (ref 1–1.03)
SP GR SPEC: <1.005 — LOW (ref 1–1.03)
SQUAMOUS # UR AUTO: 19 /HPF — HIGH (ref 0–5)
SQUAMOUS # UR AUTO: 4 /HPF — SIGNIFICANT CHANGE UP (ref 0–5)
UROBILINOGEN FLD QL: 0.2 MG/DL — SIGNIFICANT CHANGE UP (ref 0.2–1)
UROBILINOGEN FLD QL: 0.2 MG/DL — SIGNIFICANT CHANGE UP (ref 0.2–1)
WBC # BLD: 17.1 K/UL — HIGH (ref 3.8–10.5)
WBC # FLD AUTO: 17.1 K/UL — HIGH (ref 3.8–10.5)
WBC UR QL: >998 /HPF — HIGH (ref 0–5)
WBC UR QL: >998 /HPF — HIGH (ref 0–5)

## 2024-08-15 PROCEDURE — 99285 EMERGENCY DEPT VISIT HI MDM: CPT

## 2024-08-15 PROCEDURE — 99223 1ST HOSP IP/OBS HIGH 75: CPT

## 2024-08-15 RX ORDER — FLUOXETINE HCL 20 MG/5 ML
20 SOLUTION, ORAL ORAL DAILY
Refills: 0 | Status: DISCONTINUED | OUTPATIENT
Start: 2024-08-15 | End: 2024-08-21

## 2024-08-15 RX ORDER — NIFEDIPINE 60 MG/1
30 TABLET, FILM COATED, EXTENDED RELEASE ORAL
Refills: 0 | Status: DISCONTINUED | OUTPATIENT
Start: 2024-08-15 | End: 2024-08-21

## 2024-08-15 RX ORDER — DEXTROSE 15 G/33 G
15 GEL IN PACKET (GRAM) ORAL ONCE
Refills: 0 | Status: DISCONTINUED | OUTPATIENT
Start: 2024-08-15 | End: 2024-08-21

## 2024-08-15 RX ORDER — MOMETASONE FUROATE 220 UG/1
2 INHALANT RESPIRATORY (INHALATION) DAILY
Refills: 0 | Status: DISCONTINUED | OUTPATIENT
Start: 2024-08-15 | End: 2024-08-21

## 2024-08-15 RX ORDER — DEXTROSE 15 G/33 G
25 GEL IN PACKET (GRAM) ORAL ONCE
Refills: 0 | Status: DISCONTINUED | OUTPATIENT
Start: 2024-08-15 | End: 2024-08-21

## 2024-08-15 RX ORDER — ALLOPURINOL 300 MG
100 TABLET ORAL
Refills: 0 | Status: DISCONTINUED | OUTPATIENT
Start: 2024-08-15 | End: 2024-08-21

## 2024-08-15 RX ORDER — HEPARIN SODIUM,BOVINE 1000/ML
5000 VIAL (ML) INJECTION EVERY 8 HOURS
Refills: 0 | Status: DISCONTINUED | OUTPATIENT
Start: 2024-08-15 | End: 2024-08-21

## 2024-08-15 RX ORDER — GLUCAGON INJECTION, SOLUTION 1 MG/.2ML
1 INJECTION, SOLUTION SUBCUTANEOUS ONCE
Refills: 0 | Status: DISCONTINUED | OUTPATIENT
Start: 2024-08-15 | End: 2024-08-21

## 2024-08-15 RX ORDER — BETHANECHOL CHLORIDE 10 MG
50 TABLET ORAL
Refills: 0 | Status: DISCONTINUED | OUTPATIENT
Start: 2024-08-15 | End: 2024-08-21

## 2024-08-15 RX ORDER — ONDANSETRON 2 MG/ML
4 INJECTION, SOLUTION INTRAMUSCULAR; INTRAVENOUS EVERY 8 HOURS
Refills: 0 | Status: DISCONTINUED | OUTPATIENT
Start: 2024-08-15 | End: 2024-08-21

## 2024-08-15 RX ORDER — RISPERIDONE 0.25 MG/1
4 TABLET, FILM COATED ORAL DAILY
Refills: 0 | Status: DISCONTINUED | OUTPATIENT
Start: 2024-08-15 | End: 2024-08-21

## 2024-08-15 RX ORDER — BENZTROPINE MESYLATE 2 MG/1
1 TABLET ORAL
Refills: 0 | Status: DISCONTINUED | OUTPATIENT
Start: 2024-08-15 | End: 2024-08-21

## 2024-08-15 RX ORDER — MAGNESIUM, ALUMINUM HYDROXIDE 200-225/5
30 SUSPENSION, ORAL (FINAL DOSE FORM) ORAL EVERY 4 HOURS
Refills: 0 | Status: DISCONTINUED | OUTPATIENT
Start: 2024-08-15 | End: 2024-08-21

## 2024-08-15 RX ORDER — TAMSULOSIN HYDROCHLORIDE 0.4 MG/1
0.4 CAPSULE ORAL AT BEDTIME
Refills: 0 | Status: DISCONTINUED | OUTPATIENT
Start: 2024-08-15 | End: 2024-08-21

## 2024-08-15 RX ORDER — DIVALPROEX SODIUM 125 MG/1
500 CAPSULE, DELAYED RELEASE ORAL
Refills: 0 | Status: DISCONTINUED | OUTPATIENT
Start: 2024-08-15 | End: 2024-08-21

## 2024-08-15 RX ORDER — SODIUM CHLORIDE 9 MG/ML
1000 INJECTION INTRAMUSCULAR; INTRAVENOUS; SUBCUTANEOUS ONCE
Refills: 0 | Status: COMPLETED | OUTPATIENT
Start: 2024-08-15 | End: 2024-08-15

## 2024-08-15 RX ORDER — ACETAMINOPHEN 325 MG/1
650 TABLET ORAL EVERY 6 HOURS
Refills: 0 | Status: DISCONTINUED | OUTPATIENT
Start: 2024-08-15 | End: 2024-08-21

## 2024-08-15 RX ORDER — CARVEDILOL 6.25 MG/1
12.5 TABLET ORAL EVERY 12 HOURS
Refills: 0 | Status: DISCONTINUED | OUTPATIENT
Start: 2024-08-15 | End: 2024-08-21

## 2024-08-15 RX ORDER — MONTELUKAST SODIUM 5 MG/1
10 TABLET, CHEWABLE ORAL AT BEDTIME
Refills: 0 | Status: DISCONTINUED | OUTPATIENT
Start: 2024-08-15 | End: 2024-08-21

## 2024-08-15 RX ORDER — DEXTROSE 15 G/33 G
12.5 GEL IN PACKET (GRAM) ORAL ONCE
Refills: 0 | Status: DISCONTINUED | OUTPATIENT
Start: 2024-08-15 | End: 2024-08-21

## 2024-08-15 RX ORDER — CEFPODOXIME PROXETIL 100 MG/5ML
1 GRANULE, FOR SUSPENSION ORAL
Qty: 14 | Refills: 0
Start: 2024-08-15 | End: 2024-08-21

## 2024-08-15 RX ADMIN — ACETAMINOPHEN 650 MILLIGRAM(S): 325 TABLET ORAL at 20:26

## 2024-08-15 RX ADMIN — MONTELUKAST SODIUM 10 MILLIGRAM(S): 5 TABLET, CHEWABLE ORAL at 22:34

## 2024-08-15 RX ADMIN — Medication 50 MILLIGRAM(S): at 23:03

## 2024-08-15 RX ADMIN — Medication 5000 UNIT(S): at 22:35

## 2024-08-15 RX ADMIN — Medication 20 MILLIGRAM(S): at 22:34

## 2024-08-15 RX ADMIN — SODIUM CHLORIDE 1000 MILLILITER(S): 9 INJECTION INTRAMUSCULAR; INTRAVENOUS; SUBCUTANEOUS at 14:19

## 2024-08-15 RX ADMIN — Medication 100 MILLIGRAM(S): at 14:19

## 2024-08-15 RX ADMIN — Medication 1000 MILLILITER(S): at 15:17

## 2024-08-15 RX ADMIN — TAMSULOSIN HYDROCHLORIDE 0.4 MILLIGRAM(S): 0.4 CAPSULE ORAL at 22:35

## 2024-08-15 NOTE — H&P ADULT - HISTORY OF PRESENT ILLNESS
63yo m w pmh obesity, asthma, htn, hld, dm, gout, schizophrenia, sbo, chronic urinary retention (self catheterizes), dysphagia 2/2 tardive dyskinesia, p/w foul smelling urine; of note, patient recently hospitalized 8/8/24-8/13/24 for sbo, conservatively managed w bowel rest and ngt. since discharge, patient and family has noticed foul smelling urine. in addition, reportedly, family has also noticed that since being discharged, patient has been, "unable to walk and care for himself at home." family grew concerned, so had patient sent to [facility] er for further evaluation. er, c/f uti; admit to medicine for further mgmt

## 2024-08-15 NOTE — ED PROVIDER NOTE - CLINICAL SUMMARY MEDICAL DECISION MAKING FREE TEXT BOX
Attending note.  Patient was seen in room #26 to the right.  Patient was brought by EMS from home.  Patient has a history of neurogenic bladder and catheterizes himself several times daily reports cloudy foul-smelling urine today.  He was discharged 2 days ago.  Patient was admitted for intestinal obstruction which was treated nonoperatively with NG tube.  Patient had a Barnes catheter for urinary retention and hydronephrosis during admission.  Patient has a history of schizophrenia, diabetes, hypertension, exploratory laparotomy after he swallowed a razor blade previously.  Denies any SI, HI.  He denies any abdominal pain, back pain, flank pain, nausea, vomiting, fevers, chills, rigors.      ROS-as above, otherwise negative.  PE-patient is alert in no acute distress.  Patient is oriented x 2 and appears confused.  He has very dry mucous membranes.  Lungs are clear and equal bilaterally.  Heart is regular rate and rhythm.  Abdomen is markedly obese with midline surgical scar.  Abdomen is soft, nontender nondistended.  There is no CVA tenderness.  There is no suprapubic tenderness.  Patient has pitting edema of the ankles bilaterally.  Neurologic examination is grossly intact.      A/P-patient with history of schizophrenia and neurogenic bladder who states when he self catheterized himself this morning the urine was cloudy and foul-smelling.  Patient was recently discharged after admission for bowel obstruction which was treated with NG tube.  He has a history of UTIs in the past but is unable to identify last episode.  Urinalysis, culture.  Patient is likely colonized.  Labs, reassess, will attempt to get supporting information from wife or son.

## 2024-08-15 NOTE — ED PROVIDER NOTE - PROGRESS NOTE DETAILS
Went over urine results of a positive UTI with the patient understands has had them before in the past.  Asking that we send the prescription to [facility] pharmacy explained that culture will come back in the next 72 hours that will determine whether or not he has a UTI and we have chosen the right prescription.  Patient understands and will be going home to [address] [place] [address] in [facility].  Rony Pt Cre trending down from 1.71 to now being 1.55 after some gentle hydration, pt appeared dry at presentation.  Discussed result with patient and need for close follow up and repeat blood wok this week.  Rony Creatinine had improved discussed the possibility going home with patient asked that I speak with his wife.  Called his wife went over case she is very concerned that the infection may have affected his kidney function also reports that since he has been discharged from the surgical service that he is unable to walk and care for himself at home.  Patient does not feel comfortable with patient coming home at this time due to inability to care for himself and her not being able to care for him.  Will offer admission to the hospital we will continue to monitor creatinine treat UTI and consult physical therapy.  Rony

## 2024-08-15 NOTE — H&P ADULT - PROBLEM SELECTOR PLAN 4
hold home regimen  follow up a1c   trend fingerstick  low dose correctional lispro  adjust to maintain goal bg 100-180  carb consistent diet

## 2024-08-15 NOTE — H&P ADULT - NSHPPHYSICALEXAM_GEN_ALL_CORE
T(C): 38.8 (08-15-24 @ 20:00), Max: 38.8 (08-15-24 @ 20:00)  HR: 83 (08-15-24 @ 20:00) (69 - 87)  BP: 146/94 (08-15-24 @ 20:00) (108/66 - 153/78)  RR: 16 (08-15-24 @ 20:00) (16 - 20)  SpO2: 97% (08-15-24 @ 20:00) (94% - 99%)  GENERAL: NAD, lying in bed comfortably  EYES: EOMI, PERRLA; conjunctiva and sclera clear  ENMT: Moist oral mucosa, no pharyngeal injection or exudates   NECK: Supple, no palpable masses; no JVD  RESPIRATORY: Normal respiratory effort; lungs are clear to auscultation bilaterally  CARDIOVASCULAR: Regular rate and rhythm, normal S1 and S2, no murmur/rub/gallop; No lower extremity edema; Peripheral pulses are 2+ bilaterally  ABDOMEN: Nontender to palpation, normoactive bowel sounds, no rebound/guarding;    MUSCULOSKELETAL:  no joint swelling or tenderness to palpation  PSYCH: A+O to person, place, and time; affect appropriate  NEUROLOGY: CN 2-12 are intact and symmetric; no gross motor or sensory deficits   SKIN: No rashes; no palpable lesions

## 2024-08-15 NOTE — H&P ADULT - PROBLEM SELECTOR PLAN 2
a/w hypona + hypocl  baseline cr wnl  clinically appears to be eu/hypovolemic  suspect multifactorial (dehydration +/- obstructive)  s/p 2 l ivf in er  follow up urine studies, kidney/bladder us; bladder scans as needed   Monitor I/Os, daily weights, UO, BUN/Cr, volume status, acid-base balance, lytes  trend na q4-6h until wnl; goal rate of correction ~8 meq/l/day  avoid nephrotoxic agents; appropriate dose adjustments for all renally cleared medications  encourage po intake; judicious IVF + electrolyte supplementation as needed

## 2024-08-15 NOTE — ED PROVIDER NOTE - ED STEMI HIDDEN
IV removed  AVS printed and reviewed with patient  No questions or concerns  Discharged patient  hide

## 2024-08-15 NOTE — ED ADULT NURSE REASSESSMENT NOTE - NS ED NURSE REASSESS COMMENT FT1
Bladder scan 423cc of urine. MD Alexander made aware, indwelling catheter ordered. 16f indwelling urinary catheter placed, ED tech at bedside to maintain sterility. Approximately 500cc of cloudy yellow urine drained.

## 2024-08-15 NOTE — H&P ADULT - PROBLEM SELECTOR PLAN 8
a/w tardive dyskinesia => dysphagia  prior slp eval recs reviewed  cont home risperidone, fluoxetine, valproate, benztropine  pureed diet w thin liquids    according to er, "patient's wife does not feel comfortable with patient coming home at this time due to inability to care for himself and her not being able to care for him. Will offer admission to the hospital we will continue to monitor creatinine treat UTI and consult physical therapy"  patient evaluated by pt in recent hospitalization; according to documentation, "Pt evaluated by PT while admitted who recommend subacute rehab however pt wishes to go home with home PT, home agencies declined so patient was discharged home with outpatient PT."  pt/ot eval + sw/cm consult in am for disposition

## 2024-08-15 NOTE — H&P ADULT - NSHPREVIEWOFSYSTEMS_GEN_ALL_CORE
CONSTITUTIONAL: No fever. no weakness  ENMT:  No sinus or throat pain  RESPIRATORY: No cough, wheezing, chills or hemoptysis; No shortness of breath  CARDIOVASCULAR: No chest pain, palpitations, dizziness, or leg swelling  GASTROINTESTINAL: No abdominal or epigastric pain. No nausea, vomiting, or hematemesis; No diarrhea or constipation. No melena or hematochezia.  GENITOURINARY: No dysuria or incontinence; + foul smelling urine  NEUROLOGICAL: No headaches, memory loss, loss of strength, numbness, or tremors  SKIN: No rashes,  No hives or eczema  ENDOCRINE: No heat or cold intolerance; No hair loss  MUSCULOSKELETAL: No joint pain or swelling; No muscle, back, or extremity pain  PSYCHIATRIC: No depression, anxiety, mood swings, or difficulty sleeping  HEME/LYMPH: No easy bruising, or bleeding gums; no enlarged LN

## 2024-08-15 NOTE — ED PROVIDER NOTE - IV ALTEPLASE DOOR HIDDEN
show
PAST SURGICAL HISTORY:  H/O melanoma excision     History of cholecystectomy     History of right hip replacement

## 2024-08-15 NOTE — ED ADULT NURSE NOTE - OBJECTIVE STATEMENT
64 y male aaox3 presents with comlaints of urinary symptoms x today s /p dc from [facility] for UTI x 1 Day ago. PMH neurogenic bladder and catheterizes himself several times daily reports cloudy foul-smelling urine today. Pt Denies headache, dizziness, vision changes, chest pain, shortness of breath, abdominal pain, nausea, vomiting, diarrhea, fevers, chills, hematuria, recent illness travel or fall.

## 2024-08-15 NOTE — ED PROVIDER NOTE - CARE PLAN
Principal Discharge DX:	Acute UTI   1 Principal Discharge DX:	Acute UTI  Secondary Diagnosis:	LONNIE (acute kidney injury)  Secondary Diagnosis:	Adult failure to thrive

## 2024-08-15 NOTE — ED PROVIDER NOTE - OBJECTIVE STATEMENT
history of neurogenic bladder and catheterizes himself several times daily reports cloudy foul-smelling urine today.  He was discharged 2 days ago.  Patient was admitted for intestinal obstruction which was treated nonoperatively with NG tube.  Patient had a Barnes catheter for urinary retention and hydronephrosis during admission.  Pt denies fevers and chills.

## 2024-08-15 NOTE — ED PROVIDER NOTE - NSFOLLOWUPINSTRUCTIONS_ED_ALL_ED_FT
Stay hydrated  Cefpodoxime 200 mg every 12 hours for the next 7 days  Follow-up with your doctor or medicine clinic  If you have any new or worsening complaints including back pain fevers chills or any new or worsening symptoms please come back to the emergency room immediately

## 2024-08-15 NOTE — H&P ADULT - PROBLEM SELECTOR PLAN 1
h/o chronic urinary retention 2/2 neurogenic bladder, self catheterizes  prior urology documentation reviewed  leukocytosis; afebrile and hds otherwise  Ua with +epithelial cells; nitrites, few bacteria + pyuria with leukocyte esterase; rbcs and blood  s/p rocephin  follow up urine culture; renal/bladder us   monitor for fever, changes in white count  cont home bethanechol + tamsulosin  cont empirical ceftriaxone  cont to allow for self catheterization; otherwise, insert pryor h/o chronic urinary retention 2/2 neurogenic bladder, self catheterizes  prior urology documentation reviewed, "...on CIC 3-4x/day performed by his wife with outputs 700-1000 cc though not always measured. Also appears that pt's compliance with CIC is at times questionable.  Pt's wife states that he has UTIs approx 3-4x/year though it has recently improved after starting methenamine...pt does void somewhat in between caths....B/L Hydroneprhosis, right greater than left. Neurogenic bladder, on CIC...Review of imaging shows that Hyrdo improved compared to 1 yr ago...Increase CIC to 6x/day  currently, with leukocytosis; afebrile and hds otherwise  Ua with +epithelial cells; nitrites, few bacteria + pyuria with leukocyte esterase; rbcs and blood  s/p rocephin  follow up urine culture; renal/bladder us   monitor for fever, changes in white count  cont home bethanechol + tamsulosin  cont empirical ceftriaxone  cont to allow for self catheterization; otherwise, insert pryor

## 2024-08-15 NOTE — ED ADULT NURSE NOTE - NSFALLUNIVINTERV_ED_ALL_ED
Bed/Stretcher in lowest position, wheels locked, appropriate side rails in place/Call bell, personal items and telephone in reach/Instruct patient to call for assistance before getting out of bed/chair/stretcher/Non-slip footwear applied when patient is off stretcher/[place] to call system/Physically safe environment - no spills, clutter or unnecessary equipment/Purposeful proactive rounding/Room/bathroom lighting operational, light cord in reach

## 2024-08-15 NOTE — PATIENT PROFILE ADULT - ARE SIGNIFICANT INDICATORS COMPLETE.
Quality 110: Preventive Care And Screening: Influenza Immunization: Influenza Immunization Ordered or Recommended, but not Administered
Quality 111:Pneumonia Vaccination Status For Older Adults: Pneumococcal Vaccination not Administered or Previously Received, Reason not Otherwise Specified
Quality 226: Preventive Care And Screening: Tobacco Use: Screening And Cessation Intervention: Patient screened for tobacco and never smoked
Detail Level: Detailed
Yes

## 2024-08-15 NOTE — PATIENT PROFILE ADULT - FALL HARM RISK - HARM RISK INTERVENTIONS

## 2024-08-16 DIAGNOSIS — E87.1 HYPO-OSMOLALITY AND HYPONATREMIA: ICD-10-CM

## 2024-08-16 DIAGNOSIS — Z87.828 PERSONAL HISTORY OF OTHER (HEALED) PHYSICAL INJURY AND TRAUMA: ICD-10-CM

## 2024-08-16 LAB
A1C WITH ESTIMATED AVERAGE GLUCOSE RESULT: 6.1 % — HIGH (ref 4–5.6)
ALBUMIN SERPL ELPH-MCNC: 3.2 G/DL — LOW (ref 3.3–5)
ALP SERPL-CCNC: 63 U/L — SIGNIFICANT CHANGE UP (ref 40–120)
ALT FLD-CCNC: 9 U/L — LOW (ref 10–45)
ANION GAP SERPL CALC-SCNC: 14 MMOL/L — SIGNIFICANT CHANGE UP (ref 5–17)
APTT BLD: 33.9 SEC — SIGNIFICANT CHANGE UP (ref 24.5–35.6)
AST SERPL-CCNC: 11 U/L — SIGNIFICANT CHANGE UP (ref 10–40)
BASOPHILS # BLD AUTO: 0.02 K/UL — SIGNIFICANT CHANGE UP (ref 0–0.2)
BASOPHILS NFR BLD AUTO: 0.2 % — SIGNIFICANT CHANGE UP (ref 0–2)
BILIRUB SERPL-MCNC: 1 MG/DL — SIGNIFICANT CHANGE UP (ref 0.2–1.2)
BLD GP AB SCN SERPL QL: NEGATIVE — SIGNIFICANT CHANGE UP
BUN SERPL-MCNC: 17 MG/DL — SIGNIFICANT CHANGE UP (ref 7–23)
CALCIUM SERPL-MCNC: 9 MG/DL — SIGNIFICANT CHANGE UP (ref 8.4–10.5)
CHLORIDE SERPL-SCNC: 96 MMOL/L — SIGNIFICANT CHANGE UP (ref 96–108)
CHOLEST SERPL-MCNC: 97 MG/DL — SIGNIFICANT CHANGE UP
CO2 SERPL-SCNC: 22 MMOL/L — SIGNIFICANT CHANGE UP (ref 22–31)
CREAT SERPL-MCNC: 1.53 MG/DL — HIGH (ref 0.5–1.3)
EGFR: 51 ML/MIN/1.73M2 — LOW
EOSINOPHIL # BLD AUTO: 0.12 K/UL — SIGNIFICANT CHANGE UP (ref 0–0.5)
EOSINOPHIL NFR BLD AUTO: 0.9 % — SIGNIFICANT CHANGE UP (ref 0–6)
ESTIMATED AVERAGE GLUCOSE: 128 MG/DL — HIGH (ref 68–114)
GLUCOSE BLDC GLUCOMTR-MCNC: 105 MG/DL — HIGH (ref 70–99)
GLUCOSE BLDC GLUCOMTR-MCNC: 105 MG/DL — HIGH (ref 70–99)
GLUCOSE BLDC GLUCOMTR-MCNC: 116 MG/DL — HIGH (ref 70–99)
GLUCOSE BLDC GLUCOMTR-MCNC: 95 MG/DL — SIGNIFICANT CHANGE UP (ref 70–99)
GLUCOSE SERPL-MCNC: 107 MG/DL — HIGH (ref 70–99)
HCT VFR BLD CALC: 37.2 % — LOW (ref 39–50)
HCT VFR BLD CALC: 37.5 % — LOW (ref 39–50)
HDLC SERPL-MCNC: 36 MG/DL — LOW
HGB BLD-MCNC: 12.5 G/DL — LOW (ref 13–17)
HGB BLD-MCNC: 12.6 G/DL — LOW (ref 13–17)
IMM GRANULOCYTES NFR BLD AUTO: 0.5 % — SIGNIFICANT CHANGE UP (ref 0–0.9)
INR BLD: 1.33 RATIO — HIGH (ref 0.85–1.18)
LIPID PNL WITH DIRECT LDL SERPL: 40 MG/DL — SIGNIFICANT CHANGE UP
LYMPHOCYTES # BLD AUTO: 0.58 K/UL — LOW (ref 1–3.3)
LYMPHOCYTES # BLD AUTO: 4.4 % — LOW (ref 13–44)
MCHC RBC-ENTMCNC: 29.3 PG — SIGNIFICANT CHANGE UP (ref 27–34)
MCHC RBC-ENTMCNC: 30.1 PG — SIGNIFICANT CHANGE UP (ref 27–34)
MCHC RBC-ENTMCNC: 33.3 GM/DL — SIGNIFICANT CHANGE UP (ref 32–36)
MCHC RBC-ENTMCNC: 33.9 GM/DL — SIGNIFICANT CHANGE UP (ref 32–36)
MCV RBC AUTO: 87.8 FL — SIGNIFICANT CHANGE UP (ref 80–100)
MCV RBC AUTO: 88.8 FL — SIGNIFICANT CHANGE UP (ref 80–100)
MONOCYTES # BLD AUTO: 1.3 K/UL — HIGH (ref 0–0.9)
MONOCYTES NFR BLD AUTO: 9.9 % — SIGNIFICANT CHANGE UP (ref 2–14)
NEUTROPHILS # BLD AUTO: 11.1 K/UL — HIGH (ref 1.8–7.4)
NEUTROPHILS NFR BLD AUTO: 84.1 % — HIGH (ref 43–77)
NON HDL CHOLESTEROL: 61 MG/DL — SIGNIFICANT CHANGE UP
NRBC # BLD: 0 /100 WBCS — SIGNIFICANT CHANGE UP (ref 0–0)
NRBC # BLD: 0 /100 WBCS — SIGNIFICANT CHANGE UP (ref 0–0)
OSMOLALITY SERPL: 274 MOSMOL/KG — LOW (ref 280–301)
OSMOLALITY UR: 277 MOS/KG — LOW (ref 300–900)
PLATELET # BLD AUTO: 211 K/UL — SIGNIFICANT CHANGE UP (ref 150–400)
PLATELET # BLD AUTO: 215 K/UL — SIGNIFICANT CHANGE UP (ref 150–400)
POTASSIUM SERPL-MCNC: 3.8 MMOL/L — SIGNIFICANT CHANGE UP (ref 3.5–5.3)
POTASSIUM SERPL-SCNC: 3.8 MMOL/L — SIGNIFICANT CHANGE UP (ref 3.5–5.3)
PROT SERPL-MCNC: 6.4 G/DL — SIGNIFICANT CHANGE UP (ref 6–8.3)
PROTHROM AB SERPL-ACNC: 14.5 SEC — HIGH (ref 9.5–13)
RBC # BLD: 4.19 M/UL — LOW (ref 4.2–5.8)
RBC # BLD: 4.27 M/UL — SIGNIFICANT CHANGE UP (ref 4.2–5.8)
RBC # FLD: 13.7 % — SIGNIFICANT CHANGE UP (ref 10.3–14.5)
RBC # FLD: 13.9 % — SIGNIFICANT CHANGE UP (ref 10.3–14.5)
RH IG SCN BLD-IMP: POSITIVE — SIGNIFICANT CHANGE UP
SODIUM SERPL-SCNC: 132 MMOL/L — LOW (ref 135–145)
TRIGL SERPL-MCNC: 119 MG/DL — SIGNIFICANT CHANGE UP
UUN UR-MCNC: 102 MG/DL — SIGNIFICANT CHANGE UP
WBC # BLD: 12.73 K/UL — HIGH (ref 3.8–10.5)
WBC # BLD: 13.19 K/UL — HIGH (ref 3.8–10.5)
WBC # FLD AUTO: 12.73 K/UL — HIGH (ref 3.8–10.5)
WBC # FLD AUTO: 13.19 K/UL — HIGH (ref 3.8–10.5)

## 2024-08-16 PROCEDURE — 99233 SBSQ HOSP IP/OBS HIGH 50: CPT

## 2024-08-16 PROCEDURE — 76770 US EXAM ABDO BACK WALL COMP: CPT | Mod: 26

## 2024-08-16 RX ADMIN — NIFEDIPINE 30 MILLIGRAM(S): 60 TABLET, FILM COATED, EXTENDED RELEASE ORAL at 05:10

## 2024-08-16 RX ADMIN — RISPERIDONE 4 MILLIGRAM(S): 0.25 TABLET, FILM COATED ORAL at 15:42

## 2024-08-16 RX ADMIN — Medication 100 MILLIGRAM(S): at 21:42

## 2024-08-16 RX ADMIN — NIFEDIPINE 30 MILLIGRAM(S): 60 TABLET, FILM COATED, EXTENDED RELEASE ORAL at 17:31

## 2024-08-16 RX ADMIN — Medication 50 MILLIGRAM(S): at 15:22

## 2024-08-16 RX ADMIN — DIVALPROEX SODIUM 500 MILLIGRAM(S): 125 CAPSULE, DELAYED RELEASE ORAL at 17:31

## 2024-08-16 RX ADMIN — BENZTROPINE MESYLATE 1 MILLIGRAM(S): 2 TABLET ORAL at 05:10

## 2024-08-16 RX ADMIN — MOMETASONE FUROATE 2 PUFF(S): 220 INHALANT RESPIRATORY (INHALATION) at 15:43

## 2024-08-16 RX ADMIN — Medication 5000 UNIT(S): at 15:22

## 2024-08-16 RX ADMIN — BENZTROPINE MESYLATE 1 MILLIGRAM(S): 2 TABLET ORAL at 17:31

## 2024-08-16 RX ADMIN — Medication 50 MILLIGRAM(S): at 21:42

## 2024-08-16 RX ADMIN — Medication 5000 UNIT(S): at 05:10

## 2024-08-16 RX ADMIN — MONTELUKAST SODIUM 10 MILLIGRAM(S): 5 TABLET, CHEWABLE ORAL at 21:43

## 2024-08-16 RX ADMIN — Medication 100 MILLIGRAM(S): at 05:10

## 2024-08-16 RX ADMIN — CARVEDILOL 12.5 MILLIGRAM(S): 6.25 TABLET ORAL at 17:32

## 2024-08-16 RX ADMIN — Medication 20 MILLIGRAM(S): at 21:42

## 2024-08-16 RX ADMIN — Medication 5000 UNIT(S): at 21:43

## 2024-08-16 RX ADMIN — Medication 20 MILLIGRAM(S): at 15:43

## 2024-08-16 RX ADMIN — DIVALPROEX SODIUM 500 MILLIGRAM(S): 125 CAPSULE, DELAYED RELEASE ORAL at 05:10

## 2024-08-16 RX ADMIN — Medication 75 MILLILITER(S): at 22:18

## 2024-08-16 RX ADMIN — CARVEDILOL 12.5 MILLIGRAM(S): 6.25 TABLET ORAL at 05:10

## 2024-08-16 RX ADMIN — Medication 50 MILLIGRAM(S): at 05:11

## 2024-08-16 RX ADMIN — TAMSULOSIN HYDROCHLORIDE 0.4 MILLIGRAM(S): 0.4 CAPSULE ORAL at 21:43

## 2024-08-16 RX ADMIN — Medication 100 MILLIGRAM(S): at 17:31

## 2024-08-16 NOTE — OCCUPATIONAL THERAPY INITIAL EVALUATION ADULT - STRENGTHENING, PT EVAL
[Routine On-Treatment] : a routine on-treatment visit for [Brain Tumor] : brain tumor [Friend] : friend GOAL: Pt will improve gross strength by half a muscle grade in order to improve ADL skills in 4 weeks.

## 2024-08-16 NOTE — PHYSICAL THERAPY INITIAL EVALUATION ADULT - NSPTDMEREC_GEN_A_CORE
tbd at [facility], pt owns  shower chair, rollator, RW, WC, and commode per notes

## 2024-08-16 NOTE — PHYSICAL THERAPY INITIAL EVALUATION ADULT - ADDITIONAL COMMENTS
Pt lives with wife, children, and a friend in a private ranch home +1 step to enter and bedroom/bathroom on first floor. Pt has a walk in shower with shower chair, rollator, RW, WC, and commode. PTA pt was independent with ADLs and only used device for community mobility. Wife assists pt with catheter management. However, as per friend in room pt requiring increased assist recently and has recurrent falls (~3-5 this year), and assisting patient has become difficult. Pt recently just returned home from Sub-acute rehab.

## 2024-08-16 NOTE — PHYSICAL THERAPY INITIAL EVALUATION ADULT - GENERAL OBSERVATIONS, REHAB EVAL
Pt rec'd semifowlers in bed +IV +pryor. pt in NAD, asleep but awoken to verbal and light tactile cues.

## 2024-08-16 NOTE — PHYSICAL THERAPY INITIAL EVALUATION ADULT - LEVEL OF CONSCIOUSNESS, REHAB EVAL
pt asleep, awaken with verbal and light tactile cues, generally fatigued throughout session/lethargic/somnolent

## 2024-08-16 NOTE — OCCUPATIONAL THERAPY INITIAL EVALUATION ADULT - PERTINENT HX OF CURRENT PROBLEM, REHAB EVAL
64 y/oM admitted with UTI. PMH of obesity, asthma, htn, hld, dm, gout, schizophrenia, sbo, chronic urinary retention (self catheterizes), dysphagia 2/2 tardive dyskinesia, p/w foul smelling urine; of note, patient recently hospitalized 8/8/24-8/13/24 for sbo, conservatively managed w bowel rest and ngt. since discharge, patient and family has noticed foul smelling urine. in addition, reportedly, family has also noticed that since being discharged, patient has been, "unable to walk and care for himself at home." family grew concerned, so had patient sent to [facility] er for further evaluation. As per H&P problem/plan 1- Pt with chronic urinary retention 2/2 neurogenic bladder,  self catheterizes prior urology documentation reviewed, "...on CIC 3-4x/day performed by his wife with outputs 700-1000 cc though not always measured. Also appears that pt's compliance with CIC is at times questionable.  Pt's wife states that he has UTIs approx 3-4x/year though it has recently improved after starting methenamine...pt does void somewhat in between caths....B/L Hydroneprhosis, right greater than left. Neurogenic bladder, on CIC...Review of imaging shows that Hyrdo improved compared to 1 yr ago...Increase CIC to 6x/day

## 2024-08-16 NOTE — PHYSICAL THERAPY INITIAL EVALUATION ADULT - TRANSFER TRAINING, PT EVAL
GOAL: Patient will perform sit to stand transfers mod A x 1 at rolling walker with proper hand placement in 1-2 weeks.

## 2024-08-16 NOTE — PHYSICAL THERAPY INITIAL EVALUATION ADULT - NSPTDISCHREC_GEN_A_CORE
if home, pt would require home PT and yancy lift and assistance with all transfers gait and mobility./Sub-acute Rehab

## 2024-08-16 NOTE — PHYSICAL THERAPY INITIAL EVALUATION ADULT - PERTINENT HX OF CURRENT PROBLEM, REHAB EVAL
64 y/oM admitted with UTI. PMH of obesity, asthma, htn, hld, dm, gout, schizophrenia, sbo, chronic urinary retention (self catheterizes), dysphagia 2/2 tardive dyskinesia, p/w foul smelling urine; of note, patient recently hospitalized 8/8/24-8/13/24 for sbo, conservatively managed w bowel rest and ngt. since discharge, patient and family has noticed foul smelling urine. in addition, reportedly, family has also noticed that since being discharged, patient has been, "unable to walk and care for himself at home." family grew concerned, so had patient sent to [facility] er for further evaluation. As per H&P problem/plan 1- Pt with chronic urinary retention 2/2 neurogenic bladder,  self catheterizes prior urology documentation reviewed, "...on CIC 3-4x/day performed by his wife with outputs 700-1000 cc though not always measured. Also appears that pt's compliance with CIC is at times questionable.  Pt's wife states that he has UTIs approx 3-4x/year though it has recently improved after starting methenamine...pt does void somewhat in between caths....B/L Hydroneprhosis, right greater than left. Neurogenic bladder, on CIC...Review of imaging shows that Hyrdo improved compared to 1 yr ago...Increase CIC to 6x/day 63 y/o M admitted with UTI. PMH of obesity, asthma, htn, hld, dm, gout, schizophrenia, sbo, chronic urinary retention (self catheterizes), dysphagia 2/2 tardive dyskinesia, p/w foul smelling urine; c/f uti; admit to medicine for further mgmt. of note, patient recently hospitalized 8/8/24-8/13/24 for sbo, conservatively managed w bowel rest and ngt. since discharge, patient and family has noticed foul smelling urine. in addition, reportedly, family has also noticed that since being discharged, patient has been, "unable to walk and care for himself at home." family grew concerned, so had patient sent to [facility] er for further evaluation. Hospital Course: Imaging:  US Kidney/Bladder 8/19: Mild left hydronephrosis. Resolved right hydronephrosis. Decompressed bladder with Barnes catheter in place.

## 2024-08-16 NOTE — OCCUPATIONAL THERAPY INITIAL EVALUATION ADULT - PLANNED THERAPY INTERVENTIONS, OT EVAL
ADL retraining/balance training ADL retraining/balance training/bed mobility training/strengthening/transfer training

## 2024-08-17 DIAGNOSIS — N31.9 NEUROMUSCULAR DYSFUNCTION OF BLADDER, UNSPECIFIED: ICD-10-CM

## 2024-08-17 LAB
ANION GAP SERPL CALC-SCNC: 18 MMOL/L — HIGH (ref 5–17)
APPEARANCE: ABNORMAL
BACTERIA UR CULT: ABNORMAL
BACTERIA: NEGATIVE /HPF
BILIRUBIN URINE: NEGATIVE
BLOOD URINE: ABNORMAL
BUN SERPL-MCNC: 22 MG/DL — SIGNIFICANT CHANGE UP (ref 7–23)
CALCIUM SERPL-MCNC: 9.3 MG/DL — SIGNIFICANT CHANGE UP (ref 8.4–10.5)
CAST: 0 /LPF
CHLORIDE SERPL-SCNC: 93 MMOL/L — LOW (ref 96–108)
CO2 SERPL-SCNC: 23 MMOL/L — SIGNIFICANT CHANGE UP (ref 22–31)
COLOR: YELLOW
CREAT SERPL-MCNC: 1.34 MG/DL — HIGH (ref 0.5–1.3)
EGFR: 60 ML/MIN/1.73M2 — SIGNIFICANT CHANGE UP
EPITHELIAL CELLS: 0 /HPF
GLUCOSE BLDC GLUCOMTR-MCNC: 102 MG/DL — HIGH (ref 70–99)
GLUCOSE BLDC GLUCOMTR-MCNC: 104 MG/DL — HIGH (ref 70–99)
GLUCOSE BLDC GLUCOMTR-MCNC: 124 MG/DL — HIGH (ref 70–99)
GLUCOSE BLDC GLUCOMTR-MCNC: 138 MG/DL — HIGH (ref 70–99)
GLUCOSE QUALITATIVE U: NEGATIVE MG/DL
GLUCOSE SERPL-MCNC: 119 MG/DL — HIGH (ref 70–99)
HCT VFR BLD CALC: 39.1 % — SIGNIFICANT CHANGE UP (ref 39–50)
HGB BLD-MCNC: 13.3 G/DL — SIGNIFICANT CHANGE UP (ref 13–17)
KETONES URINE: NEGATIVE MG/DL
LEUKOCYTE ESTERASE URINE: ABNORMAL
MCHC RBC-ENTMCNC: 29.6 PG — SIGNIFICANT CHANGE UP (ref 27–34)
MCHC RBC-ENTMCNC: 34 GM/DL — SIGNIFICANT CHANGE UP (ref 32–36)
MCV RBC AUTO: 86.9 FL — SIGNIFICANT CHANGE UP (ref 80–100)
MICROSCOPIC-UA: NORMAL
NITRITE URINE: NEGATIVE
NRBC # BLD: 0 /100 WBCS — SIGNIFICANT CHANGE UP (ref 0–0)
PH URINE: 6.5
PLATELET # BLD AUTO: 234 K/UL — SIGNIFICANT CHANGE UP (ref 150–400)
POTASSIUM SERPL-MCNC: 3.7 MMOL/L — SIGNIFICANT CHANGE UP (ref 3.5–5.3)
POTASSIUM SERPL-SCNC: 3.7 MMOL/L — SIGNIFICANT CHANGE UP (ref 3.5–5.3)
PROTEIN URINE: 30 MG/DL
RBC # BLD: 4.5 M/UL — SIGNIFICANT CHANGE UP (ref 4.2–5.8)
RBC # FLD: 13.5 % — SIGNIFICANT CHANGE UP (ref 10.3–14.5)
RED BLOOD CELLS URINE: 1 /HPF
REVIEW: NORMAL
SODIUM SERPL-SCNC: 134 MMOL/L — LOW (ref 135–145)
SPECIFIC GRAVITY URINE: 1
UROBILINOGEN URINE: 0.2 MG/DL
WBC # BLD: 9.87 K/UL — SIGNIFICANT CHANGE UP (ref 3.8–10.5)
WBC # FLD AUTO: 9.87 K/UL — SIGNIFICANT CHANGE UP (ref 3.8–10.5)
WHITE BLOOD CELLS URINE: 229 /HPF

## 2024-08-17 PROCEDURE — 99233 SBSQ HOSP IP/OBS HIGH 50: CPT

## 2024-08-17 RX ORDER — AMOXICILLIN AND CLAVULANATE POTASSIUM 875; 125 MG/1; MG/1
875-125 TABLET, COATED ORAL TWICE DAILY
Qty: 10 | Refills: 0 | Status: ACTIVE | COMMUNITY
Start: 2024-08-17 | End: 1900-01-01

## 2024-08-17 RX ORDER — NYSTATIN 100000/G
1 CREAM (GRAM) TOPICAL
Refills: 0 | Status: DISCONTINUED | OUTPATIENT
Start: 2024-08-17 | End: 2024-08-21

## 2024-08-17 RX ADMIN — DIVALPROEX SODIUM 500 MILLIGRAM(S): 125 CAPSULE, DELAYED RELEASE ORAL at 06:10

## 2024-08-17 RX ADMIN — CARVEDILOL 12.5 MILLIGRAM(S): 6.25 TABLET ORAL at 06:10

## 2024-08-17 RX ADMIN — MONTELUKAST SODIUM 10 MILLIGRAM(S): 5 TABLET, CHEWABLE ORAL at 21:03

## 2024-08-17 RX ADMIN — RISPERIDONE 4 MILLIGRAM(S): 0.25 TABLET, FILM COATED ORAL at 11:51

## 2024-08-17 RX ADMIN — Medication 50 MILLIGRAM(S): at 23:23

## 2024-08-17 RX ADMIN — Medication 5000 UNIT(S): at 14:10

## 2024-08-17 RX ADMIN — Medication 50 MILLIGRAM(S): at 06:10

## 2024-08-17 RX ADMIN — Medication 5000 UNIT(S): at 06:11

## 2024-08-17 RX ADMIN — Medication 100 MILLIGRAM(S): at 17:16

## 2024-08-17 RX ADMIN — BENZTROPINE MESYLATE 1 MILLIGRAM(S): 2 TABLET ORAL at 06:10

## 2024-08-17 RX ADMIN — Medication 50 MILLIGRAM(S): at 17:09

## 2024-08-17 RX ADMIN — BENZTROPINE MESYLATE 1 MILLIGRAM(S): 2 TABLET ORAL at 17:09

## 2024-08-17 RX ADMIN — Medication 20 MILLIGRAM(S): at 21:02

## 2024-08-17 RX ADMIN — Medication 5000 UNIT(S): at 21:02

## 2024-08-17 RX ADMIN — Medication 100 MILLIGRAM(S): at 06:10

## 2024-08-17 RX ADMIN — Medication 1 APPLICATION(S): at 17:10

## 2024-08-17 RX ADMIN — MOMETASONE FUROATE 2 PUFF(S): 220 INHALANT RESPIRATORY (INHALATION) at 14:13

## 2024-08-17 RX ADMIN — Medication 100 MILLIGRAM(S): at 20:59

## 2024-08-17 RX ADMIN — NIFEDIPINE 30 MILLIGRAM(S): 60 TABLET, FILM COATED, EXTENDED RELEASE ORAL at 06:10

## 2024-08-17 RX ADMIN — Medication 50 MILLIGRAM(S): at 00:02

## 2024-08-17 RX ADMIN — Medication 20 MILLIGRAM(S): at 11:51

## 2024-08-17 RX ADMIN — DIVALPROEX SODIUM 500 MILLIGRAM(S): 125 CAPSULE, DELAYED RELEASE ORAL at 17:10

## 2024-08-17 RX ADMIN — Medication 50 MILLIGRAM(S): at 11:50

## 2024-08-17 RX ADMIN — TAMSULOSIN HYDROCHLORIDE 0.4 MILLIGRAM(S): 0.4 CAPSULE ORAL at 21:02

## 2024-08-17 RX ADMIN — CARVEDILOL 12.5 MILLIGRAM(S): 6.25 TABLET ORAL at 17:10

## 2024-08-17 RX ADMIN — Medication 75 MILLILITER(S): at 11:48

## 2024-08-17 RX ADMIN — NIFEDIPINE 30 MILLIGRAM(S): 60 TABLET, FILM COATED, EXTENDED RELEASE ORAL at 17:10

## 2024-08-18 LAB
-  AMOXICILLIN/CLAVULANIC ACID: SIGNIFICANT CHANGE UP
-  AMPICILLIN/SULBACTAM: SIGNIFICANT CHANGE UP
-  AMPICILLIN: SIGNIFICANT CHANGE UP
-  AZTREONAM: SIGNIFICANT CHANGE UP
-  CEFAZOLIN: SIGNIFICANT CHANGE UP
-  CEFEPIME: SIGNIFICANT CHANGE UP
-  CEFOXITIN: SIGNIFICANT CHANGE UP
-  CEFTRIAXONE: SIGNIFICANT CHANGE UP
-  CEFUROXIME: SIGNIFICANT CHANGE UP
-  CIPROFLOXACIN: SIGNIFICANT CHANGE UP
-  ERTAPENEM: SIGNIFICANT CHANGE UP
-  GENTAMICIN: SIGNIFICANT CHANGE UP
-  IMIPENEM: SIGNIFICANT CHANGE UP
-  LEVOFLOXACIN: SIGNIFICANT CHANGE UP
-  MEROPENEM: SIGNIFICANT CHANGE UP
-  NITROFURANTOIN: SIGNIFICANT CHANGE UP
-  PIPERACILLIN/TAZOBACTAM: SIGNIFICANT CHANGE UP
-  TOBRAMYCIN: SIGNIFICANT CHANGE UP
-  TRIMETHOPRIM/SULFAMETHOXAZOLE: SIGNIFICANT CHANGE UP
ANION GAP SERPL CALC-SCNC: 11 MMOL/L — SIGNIFICANT CHANGE UP (ref 5–17)
BUN SERPL-MCNC: 19 MG/DL — SIGNIFICANT CHANGE UP (ref 7–23)
CALCIUM SERPL-MCNC: 9 MG/DL — SIGNIFICANT CHANGE UP (ref 8.4–10.5)
CHLORIDE SERPL-SCNC: 99 MMOL/L — SIGNIFICANT CHANGE UP (ref 96–108)
CO2 SERPL-SCNC: 24 MMOL/L — SIGNIFICANT CHANGE UP (ref 22–31)
CREAT SERPL-MCNC: 1.04 MG/DL — SIGNIFICANT CHANGE UP (ref 0.5–1.3)
CULTURE RESULTS: ABNORMAL
EGFR: 81 ML/MIN/1.73M2 — SIGNIFICANT CHANGE UP
GLUCOSE BLDC GLUCOMTR-MCNC: 104 MG/DL — HIGH (ref 70–99)
GLUCOSE BLDC GLUCOMTR-MCNC: 108 MG/DL — HIGH (ref 70–99)
GLUCOSE BLDC GLUCOMTR-MCNC: 92 MG/DL — SIGNIFICANT CHANGE UP (ref 70–99)
GLUCOSE BLDC GLUCOMTR-MCNC: 95 MG/DL — SIGNIFICANT CHANGE UP (ref 70–99)
GLUCOSE SERPL-MCNC: 88 MG/DL — SIGNIFICANT CHANGE UP (ref 70–99)
METHOD TYPE: SIGNIFICANT CHANGE UP
ORGANISM # SPEC MICROSCOPIC CNT: ABNORMAL
ORGANISM # SPEC MICROSCOPIC CNT: ABNORMAL
POTASSIUM SERPL-MCNC: 3.7 MMOL/L — SIGNIFICANT CHANGE UP (ref 3.5–5.3)
POTASSIUM SERPL-SCNC: 3.7 MMOL/L — SIGNIFICANT CHANGE UP (ref 3.5–5.3)
SODIUM SERPL-SCNC: 134 MMOL/L — LOW (ref 135–145)
SPECIMEN SOURCE: SIGNIFICANT CHANGE UP

## 2024-08-18 PROCEDURE — 99232 SBSQ HOSP IP/OBS MODERATE 35: CPT

## 2024-08-18 RX ADMIN — MOMETASONE FUROATE 2 PUFF(S): 220 INHALANT RESPIRATORY (INHALATION) at 11:33

## 2024-08-18 RX ADMIN — NIFEDIPINE 30 MILLIGRAM(S): 60 TABLET, FILM COATED, EXTENDED RELEASE ORAL at 05:05

## 2024-08-18 RX ADMIN — Medication 20 MILLIGRAM(S): at 21:21

## 2024-08-18 RX ADMIN — Medication 20 MILLIGRAM(S): at 11:31

## 2024-08-18 RX ADMIN — NIFEDIPINE 30 MILLIGRAM(S): 60 TABLET, FILM COATED, EXTENDED RELEASE ORAL at 17:28

## 2024-08-18 RX ADMIN — Medication 1 APPLICATION(S): at 17:29

## 2024-08-18 RX ADMIN — BENZTROPINE MESYLATE 1 MILLIGRAM(S): 2 TABLET ORAL at 05:05

## 2024-08-18 RX ADMIN — Medication 5000 UNIT(S): at 16:19

## 2024-08-18 RX ADMIN — BENZTROPINE MESYLATE 1 MILLIGRAM(S): 2 TABLET ORAL at 17:29

## 2024-08-18 RX ADMIN — CARVEDILOL 12.5 MILLIGRAM(S): 6.25 TABLET ORAL at 17:28

## 2024-08-18 RX ADMIN — TAMSULOSIN HYDROCHLORIDE 0.4 MILLIGRAM(S): 0.4 CAPSULE ORAL at 21:21

## 2024-08-18 RX ADMIN — RISPERIDONE 4 MILLIGRAM(S): 0.25 TABLET, FILM COATED ORAL at 11:31

## 2024-08-18 RX ADMIN — MONTELUKAST SODIUM 10 MILLIGRAM(S): 5 TABLET, CHEWABLE ORAL at 21:21

## 2024-08-18 RX ADMIN — Medication 100 MILLIGRAM(S): at 21:21

## 2024-08-18 RX ADMIN — Medication 100 MILLIGRAM(S): at 17:28

## 2024-08-18 RX ADMIN — DIVALPROEX SODIUM 500 MILLIGRAM(S): 125 CAPSULE, DELAYED RELEASE ORAL at 17:29

## 2024-08-18 RX ADMIN — Medication 5000 UNIT(S): at 05:04

## 2024-08-18 RX ADMIN — Medication 50 MILLIGRAM(S): at 17:30

## 2024-08-18 RX ADMIN — Medication 100 MILLIGRAM(S): at 05:04

## 2024-08-18 RX ADMIN — CARVEDILOL 12.5 MILLIGRAM(S): 6.25 TABLET ORAL at 05:04

## 2024-08-18 RX ADMIN — DIVALPROEX SODIUM 500 MILLIGRAM(S): 125 CAPSULE, DELAYED RELEASE ORAL at 05:05

## 2024-08-18 RX ADMIN — Medication 5000 UNIT(S): at 21:21

## 2024-08-18 RX ADMIN — Medication 1 APPLICATION(S): at 05:05

## 2024-08-18 RX ADMIN — Medication 50 MILLIGRAM(S): at 11:30

## 2024-08-18 RX ADMIN — Medication 50 MILLIGRAM(S): at 05:05

## 2024-08-19 LAB
ANION GAP SERPL CALC-SCNC: 13 MMOL/L — SIGNIFICANT CHANGE UP (ref 5–17)
BUN SERPL-MCNC: 11 MG/DL — SIGNIFICANT CHANGE UP (ref 7–23)
CALCIUM SERPL-MCNC: 9.2 MG/DL — SIGNIFICANT CHANGE UP (ref 8.4–10.5)
CHLORIDE SERPL-SCNC: 100 MMOL/L — SIGNIFICANT CHANGE UP (ref 96–108)
CO2 SERPL-SCNC: 23 MMOL/L — SIGNIFICANT CHANGE UP (ref 22–31)
CREAT SERPL-MCNC: 0.78 MG/DL — SIGNIFICANT CHANGE UP (ref 0.5–1.3)
EGFR: 101 ML/MIN/1.73M2 — SIGNIFICANT CHANGE UP
GLUCOSE BLDC GLUCOMTR-MCNC: 102 MG/DL — HIGH (ref 70–99)
GLUCOSE BLDC GLUCOMTR-MCNC: 110 MG/DL — HIGH (ref 70–99)
GLUCOSE BLDC GLUCOMTR-MCNC: 114 MG/DL — HIGH (ref 70–99)
GLUCOSE BLDC GLUCOMTR-MCNC: 94 MG/DL — SIGNIFICANT CHANGE UP (ref 70–99)
GLUCOSE SERPL-MCNC: 87 MG/DL — SIGNIFICANT CHANGE UP (ref 70–99)
POTASSIUM SERPL-MCNC: 3.7 MMOL/L — SIGNIFICANT CHANGE UP (ref 3.5–5.3)
POTASSIUM SERPL-SCNC: 3.7 MMOL/L — SIGNIFICANT CHANGE UP (ref 3.5–5.3)
SODIUM SERPL-SCNC: 136 MMOL/L — SIGNIFICANT CHANGE UP (ref 135–145)

## 2024-08-19 PROCEDURE — 99233 SBSQ HOSP IP/OBS HIGH 50: CPT

## 2024-08-19 RX ORDER — POVIDONE, PROPYLENE GLYCOL 6.8; 3 MG/ML; MG/ML
1 LIQUID OPHTHALMIC
Refills: 0 | Status: DISCONTINUED | OUTPATIENT
Start: 2024-08-19 | End: 2024-08-21

## 2024-08-19 RX ADMIN — Medication 50 MILLIGRAM(S): at 00:22

## 2024-08-19 RX ADMIN — Medication 50 MILLIGRAM(S): at 11:59

## 2024-08-19 RX ADMIN — Medication 100 MILLIGRAM(S): at 21:27

## 2024-08-19 RX ADMIN — NIFEDIPINE 30 MILLIGRAM(S): 60 TABLET, FILM COATED, EXTENDED RELEASE ORAL at 05:10

## 2024-08-19 RX ADMIN — TAMSULOSIN HYDROCHLORIDE 0.4 MILLIGRAM(S): 0.4 CAPSULE ORAL at 21:27

## 2024-08-19 RX ADMIN — Medication 100 MILLIGRAM(S): at 05:03

## 2024-08-19 RX ADMIN — Medication 50 MILLIGRAM(S): at 17:24

## 2024-08-19 RX ADMIN — CARVEDILOL 12.5 MILLIGRAM(S): 6.25 TABLET ORAL at 05:04

## 2024-08-19 RX ADMIN — Medication 50 MILLIGRAM(S): at 05:03

## 2024-08-19 RX ADMIN — BENZTROPINE MESYLATE 1 MILLIGRAM(S): 2 TABLET ORAL at 05:03

## 2024-08-19 RX ADMIN — Medication 20 MILLIGRAM(S): at 21:27

## 2024-08-19 RX ADMIN — Medication 1 APPLICATION(S): at 05:04

## 2024-08-19 RX ADMIN — CARVEDILOL 12.5 MILLIGRAM(S): 6.25 TABLET ORAL at 17:25

## 2024-08-19 RX ADMIN — MONTELUKAST SODIUM 10 MILLIGRAM(S): 5 TABLET, CHEWABLE ORAL at 21:27

## 2024-08-19 RX ADMIN — Medication 1 APPLICATION(S): at 17:27

## 2024-08-19 RX ADMIN — Medication 20 MILLIGRAM(S): at 11:56

## 2024-08-19 RX ADMIN — DIVALPROEX SODIUM 500 MILLIGRAM(S): 125 CAPSULE, DELAYED RELEASE ORAL at 05:04

## 2024-08-19 RX ADMIN — NIFEDIPINE 30 MILLIGRAM(S): 60 TABLET, FILM COATED, EXTENDED RELEASE ORAL at 17:24

## 2024-08-19 RX ADMIN — Medication 5000 UNIT(S): at 05:03

## 2024-08-19 RX ADMIN — RISPERIDONE 4 MILLIGRAM(S): 0.25 TABLET, FILM COATED ORAL at 11:56

## 2024-08-19 RX ADMIN — POVIDONE, PROPYLENE GLYCOL 1 DROP(S): 6.8; 3 LIQUID OPHTHALMIC at 17:26

## 2024-08-19 RX ADMIN — Medication 5000 UNIT(S): at 21:27

## 2024-08-19 RX ADMIN — MOMETASONE FUROATE 2 PUFF(S): 220 INHALANT RESPIRATORY (INHALATION) at 11:55

## 2024-08-19 RX ADMIN — DIVALPROEX SODIUM 500 MILLIGRAM(S): 125 CAPSULE, DELAYED RELEASE ORAL at 17:25

## 2024-08-19 RX ADMIN — Medication 100 MILLIGRAM(S): at 17:25

## 2024-08-19 RX ADMIN — Medication 5000 UNIT(S): at 15:26

## 2024-08-19 RX ADMIN — BENZTROPINE MESYLATE 1 MILLIGRAM(S): 2 TABLET ORAL at 17:25

## 2024-08-19 NOTE — SWALLOW BEDSIDE ASSESSMENT ADULT - SLP GENERAL OBSERVATIONS
Pt encountered asleep in bed, roused to persistent noxious stimuli. On RA, did not respond to orientation questions, inconsistently responded to simple yes/no questions, followed simple commands intermittently. Vocal quality WNL.

## 2024-08-19 NOTE — SWALLOW BEDSIDE ASSESSMENT ADULT - ADDITIONAL RECOMMENDATIONS
Maintain good oral hygiene.  This service will continue to follow to determine candidacy for diet advancement if mentation improves.

## 2024-08-19 NOTE — SWALLOW BEDSIDE ASSESSMENT ADULT - ORAL PHASE
Within functional limits required multiple liquid/puree washes to clear/Decreased anterior-posterior movement of the bolus/Stasis in anterior sulcus/Palatal stasis

## 2024-08-19 NOTE — SWALLOW BEDSIDE ASSESSMENT ADULT - SWALLOW EVAL: RECOMMENDED FEEDING/EATING TECHNIQUES
allow for swallow between intakes/alternate food with liquid/check mouth frequently for oral residue/pocketing/crush medication (when feasible)/maintain upright posture during/after eating for 30 mins/small sips/bites

## 2024-08-19 NOTE — SWALLOW BEDSIDE ASSESSMENT ADULT - SWALLOW EVAL: DIAGNOSIS
Patient presents with an oral dysphagia in setting of AMS and generalized weakness. The swallow is characterized by inefficient mastication of advanced solid textures resulting in significant oral residue requiring multiple liquid and/or puree washes to fully clear. No overt s/s of laryngeal penetration/aspiration across consistencies administered. Pt is not a candidate for diet advancement at this time.

## 2024-08-19 NOTE — SWALLOW BEDSIDE ASSESSMENT ADULT - SLP PERTINENT HISTORY OF CURRENT PROBLEM
63yo m w pmh obesity, asthma, htn, hld, dm, gout, schizophrenia, sbo, chronic urinary retention (self catheterizes), dysphagia 2/2 tardive dyskinesia, p/w foul smelling urine; of note, patient recently hospitalized 8/8/24-8/13/24 for sbo, conservatively managed w bowel rest and ngt. since discharge, patient and family has noticed foul smelling urine. in addition, reportedly, family has also noticed that since being discharged, patient has been, "unable to walk and care for himself at home." family grew concerned, so had patient sent to [facility] er for further evaluation. er, c/f uti; admit to medicine for further mgmt. Pt's wife states that pt drinks a lot water and does not eat much; he also pockets food in his mouth.

## 2024-08-19 NOTE — SWALLOW BEDSIDE ASSESSMENT ADULT - COMMENTS
swallow hx: seen for FEES on 8/13/24 with recommendations for puree and thin liquids; poor oral management of regular solids

## 2024-08-19 NOTE — SWALLOW BEDSIDE ASSESSMENT ADULT - SWALLOW EVAL: PATIENT/FAMILY GOALS STATEMENT
Pt's wife at bedside requested pt receive regular solids as she feels his PO intake is minimal due to puree diet. Discussed safety concerns re: advancing diet given pt's swallow profile. Suggested nutrition consult to determine if supplements could be helpful in assisting pt in meeting caloric needs. Wife verbalized understanding and in agreement.

## 2024-08-19 NOTE — SWALLOW BEDSIDE ASSESSMENT ADULT - ASR SWALLOW ASPIRATION MONITOR
Monitor for s/s aspiration/laryngeal penetration. If noted:  D/C p.o. intake, provide non-oral nutrition/hydration/meds, and contact this service @ [phone]/change of breathing pattern/cough/gurgly voice/fever/pneumonia/throat clearing/upper respiratory infection

## 2024-08-20 LAB
GLUCOSE BLDC GLUCOMTR-MCNC: 105 MG/DL — HIGH (ref 70–99)
GLUCOSE BLDC GLUCOMTR-MCNC: 117 MG/DL — HIGH (ref 70–99)
GLUCOSE BLDC GLUCOMTR-MCNC: 134 MG/DL — HIGH (ref 70–99)
GLUCOSE BLDC GLUCOMTR-MCNC: 89 MG/DL — SIGNIFICANT CHANGE UP (ref 70–99)

## 2024-08-20 PROCEDURE — 99232 SBSQ HOSP IP/OBS MODERATE 35: CPT

## 2024-08-20 RX ADMIN — TAMSULOSIN HYDROCHLORIDE 0.4 MILLIGRAM(S): 0.4 CAPSULE ORAL at 21:53

## 2024-08-20 RX ADMIN — Medication 50 MILLIGRAM(S): at 00:02

## 2024-08-20 RX ADMIN — MONTELUKAST SODIUM 10 MILLIGRAM(S): 5 TABLET, CHEWABLE ORAL at 21:52

## 2024-08-20 RX ADMIN — NIFEDIPINE 30 MILLIGRAM(S): 60 TABLET, FILM COATED, EXTENDED RELEASE ORAL at 17:01

## 2024-08-20 RX ADMIN — Medication 1 APPLICATION(S): at 17:02

## 2024-08-20 RX ADMIN — Medication 1 APPLICATION(S): at 05:53

## 2024-08-20 RX ADMIN — Medication 20 MILLIGRAM(S): at 21:52

## 2024-08-20 RX ADMIN — DIVALPROEX SODIUM 500 MILLIGRAM(S): 125 CAPSULE, DELAYED RELEASE ORAL at 05:53

## 2024-08-20 RX ADMIN — RISPERIDONE 4 MILLIGRAM(S): 0.25 TABLET, FILM COATED ORAL at 11:28

## 2024-08-20 RX ADMIN — POVIDONE, PROPYLENE GLYCOL 1 DROP(S): 6.8; 3 LIQUID OPHTHALMIC at 17:03

## 2024-08-20 RX ADMIN — Medication 5000 UNIT(S): at 21:52

## 2024-08-20 RX ADMIN — Medication 20 MILLIGRAM(S): at 11:28

## 2024-08-20 RX ADMIN — POVIDONE, PROPYLENE GLYCOL 1 DROP(S): 6.8; 3 LIQUID OPHTHALMIC at 05:53

## 2024-08-20 RX ADMIN — Medication 50 MILLIGRAM(S): at 23:56

## 2024-08-20 RX ADMIN — DIVALPROEX SODIUM 500 MILLIGRAM(S): 125 CAPSULE, DELAYED RELEASE ORAL at 17:02

## 2024-08-20 RX ADMIN — Medication 100 MILLIGRAM(S): at 21:51

## 2024-08-20 RX ADMIN — CARVEDILOL 12.5 MILLIGRAM(S): 6.25 TABLET ORAL at 17:02

## 2024-08-20 RX ADMIN — Medication 50 MILLIGRAM(S): at 17:02

## 2024-08-20 RX ADMIN — CARVEDILOL 12.5 MILLIGRAM(S): 6.25 TABLET ORAL at 05:52

## 2024-08-20 RX ADMIN — Medication 5000 UNIT(S): at 13:30

## 2024-08-20 RX ADMIN — Medication 100 MILLIGRAM(S): at 05:53

## 2024-08-20 RX ADMIN — Medication 5000 UNIT(S): at 05:53

## 2024-08-20 RX ADMIN — MOMETASONE FUROATE 2 PUFF(S): 220 INHALANT RESPIRATORY (INHALATION) at 11:32

## 2024-08-20 RX ADMIN — Medication 50 MILLIGRAM(S): at 11:28

## 2024-08-20 RX ADMIN — Medication 50 MILLIGRAM(S): at 05:52

## 2024-08-20 RX ADMIN — NIFEDIPINE 30 MILLIGRAM(S): 60 TABLET, FILM COATED, EXTENDED RELEASE ORAL at 05:52

## 2024-08-20 RX ADMIN — Medication 100 MILLIGRAM(S): at 17:02

## 2024-08-20 RX ADMIN — BENZTROPINE MESYLATE 1 MILLIGRAM(S): 2 TABLET ORAL at 17:01

## 2024-08-20 RX ADMIN — BENZTROPINE MESYLATE 1 MILLIGRAM(S): 2 TABLET ORAL at 05:52

## 2024-08-20 NOTE — PROGRESS NOTE ADULT - PROBLEM SELECTOR PROBLEM 2
LONNIE (acute kidney injury)

## 2024-08-20 NOTE — PROGRESS NOTE ADULT - PROBLEM SELECTOR PROBLEM 4
Asthma
[Left] : left elbow
[NL (90)] : supination 90 degrees
Asthma
[Right] : right elbow
[FreeTextEntry9] : 
[FreeTextEntry1] : elbow contusion w/ suspicion for fx
[TWNoteComboBox7] : flexion 130 degrees

## 2024-08-20 NOTE — PROGRESS NOTE ADULT - PROBLEM SELECTOR PLAN 4
Fluticasone => mometasone  Montelukast  Proventil PRN

## 2024-08-20 NOTE — PROGRESS NOTE ADULT - ASSESSMENT
64M with PMHx HTN, HLD, DM, Gout, Schizophrenia, Hx SBO, chronic urinary retention (self-catheterizes), dysphagia 2/2 tardive dyskinesia, p/w foul smelling urine. 
65yo m w pmh obesity, asthma, htn, hld, dm, gout, schizophrenia, sbo, chronic urinary retention (self catheterizes), dysphagia 2/2 tardive dyskinesia, p/w foul smelling urine; in er, c/f uti; admit to medicine for further mgmt 
63yo m w pmh obesity, asthma, htn, hld, dm, gout, schizophrenia, sbo, chronic urinary retention (self catheterizes), dysphagia 2/2 tardive dyskinesia, p/w foul smelling urine; in er, c/f uti; admit to medicine for further mgmt 
65yo m w pmh obesity, asthma, htn, hld, dm, gout, schizophrenia, sbo, chronic urinary retention (self catheterizes), dysphagia 2/2 tardive dyskinesia, p/w foul smelling urine; in er, c/f uti; admit to medicine for further mgmt 
64M with PMHx HTN, HLD, DM, Gout, Schizophrenia, Hx SBO, chronic urinary retention (self-catheterizes), dysphagia 2/2 tardive dyskinesia, p/w foul smelling urine.

## 2024-08-20 NOTE — PROGRESS NOTE ADULT - PROBLEM SELECTOR PLAN 9
a/w tardive dyskinesia => dysphagia  prior SLP eval recs reviewed  cont home risperidone, fluoxetine, valproate, benztropine  Pureed diet w thin liquids  PT reccs CHIKA
a/w tardive dyskinesia => dysphagia  Prior SLP eval recs reviewed  Cont home risperidone, fluoxetine, valproate, benztropine  Pureed diet w thin liquids  PT reccs CHIKA - pending authorization.    Care plan discussed with wife, Elizabeth at bedside.
a/w tardive dyskinesia => dysphagia  prior SLP eval recs reviewed  cont home risperidone, fluoxetine, valproate, benztropine  Pureed diet w thin liquids  PT eval in progress
a/w tardive dyskinesia => dysphagia  Prior SLP eval recs reviewed  cont home risperidone, fluoxetine, valproate, benztropine  Pureed diet w thin liquids  PT reccs HCIKA - pending acceptance and authorization.    Care plan discussed with wife, Elizabeth at bedside.
a/w tardive dyskinesia => dysphagia  prior SLP eval recs reviewed  cont home risperidone, fluoxetine, valproate, benztropine  Pureed diet w thin liquids  PT reccs CHIKA ; pt prefers to go home with outpt PT ; will have PT reassess for home

## 2024-08-20 NOTE — PROGRESS NOTE ADULT - PROBLEM SELECTOR PLAN 3
presented with sodium level 126 ; unclear etiology  check serum osm, urine osm ; urine Na 20  s/p 1L NS, 1L LR  Na improving ; 132 this am  trend Na daily; goal rate of correction ~8 meq/l/day
Presented with sodium level 126 ; unclear etiology  Serum osm, Urine osm ; suggestive of hypo-osmolar hyponatremia ; likely 2/2 poor PO intake  Pt's wife states that pt drinks a lot water and does not eat much; he also pockets food in his mouth  SLP rip - also consult nutrition  s/p 1L NS, 1L LR  Na improving
presented with sodium level 126 ; unclear etiology  serum osm, urine osm ; suggestive of hypo-osmolar hyponatremia ; likely 2/2 poor PO intake  Pt's wife states that pt drinks a lot water and does not eat much  ; he also pockets food in his mouth  SLP eval  s/p 1L NS, 1L LR  Na improving ; 134 this am  trend Na daily; goal rate of correction ~8 meq/l/day
Presented with sodium level 126 ; unclear etiology  Serum Osm, Urine Osm ; suggestive of hypo-osmolar hyponatremia ; likely 2/2 poor PO intake  Pt's wife states that pt drinks a lot water and does not eat much; he also pockets food in his mouth  SLP rip - also consult nutrition  s/p 1L NS, 1L LR  Na improved
presented with sodium level 126 ; unclear etiology   serum osm, urine osm ; suggestive of hypo-osmolar hyponatremia ; likely 2/2 poor PO intake  s/p 1L NS, 1L LR  Na improving ; 134 this am  trend Na daily; goal rate of correction ~8 meq/l/day

## 2024-08-20 NOTE — PROGRESS NOTE ADULT - SUBJECTIVE AND OBJECTIVE BOX
[facility] Division of Hospital Medicine  Whitney Mcclain DO  MS Teams PREFERRED      SUBJECTIVE / OVERNIGHT EVENTS: Patient reports feeling well overall - denies fever, chills, chest pain, palpitations, dyspnea, N/V, abdominal pain. He reports diarrhea. Per nursing, he had one bowel movement so far today.    ADDITIONAL REVIEW OF SYSTEMS:    MEDICATIONS  (STANDING):  allopurinol 100 milliGRAM(s) Oral two times a day  artificial  tears Solution 1 Drop(s) Both EYES two times a day  atorvastatin 20 milliGRAM(s) Oral at bedtime  benztropine 1 milliGRAM(s) Oral two times a day  bethanechol 50 milliGRAM(s) Oral four times a day  carvedilol 12.5 milliGRAM(s) Oral every 12 hours  cefTRIAXone   IVPB 1000 milliGRAM(s) IV Intermittent every 24 hours  dextrose 5%. 1000 milliLiter(s) (100 mL/Hr) IV Continuous <Continuous>  dextrose 5%. 1000 milliLiter(s) (50 mL/Hr) IV Continuous <Continuous>  dextrose 50% Injectable 12.5 Gram(s) IV Push once  dextrose 50% Injectable 25 Gram(s) IV Push once  dextrose 50% Injectable 25 Gram(s) IV Push once  divalproex  milliGRAM(s) Oral two times a day  FLUoxetine 20 milliGRAM(s) Oral daily  glucagon  Injectable 1 milliGRAM(s) IntraMuscular once  heparin   Injectable 5000 Unit(s) SubCutaneous every 8 hours  insulin lispro (ADMELOG) corrective regimen sliding scale   SubCutaneous at bedtime  insulin lispro (ADMELOG) corrective regimen sliding scale   SubCutaneous three times a day before meals  lactated ringers. 500 milliLiter(s) (75 mL/Hr) IV Continuous <Continuous>  mometasone 220 MICROgram(s) Inhaler 2 Puff(s) Inhalation daily  montelukast 10 milliGRAM(s) Oral at bedtime  NIFEdipine XL 30 milliGRAM(s) Oral two times a day  nystatin Powder 1 Application(s) Topical two times a day  risperiDONE   Tablet 4 milliGRAM(s) Oral daily  tamsulosin 0.4 milliGRAM(s) Oral at bedtime    MEDICATIONS  (PRN):  acetaminophen     Tablet .. 650 milliGRAM(s) Oral every 6 hours PRN Temp greater or equal to 38C (100.4F), Mild Pain (1 - 3)  albuterol   0.5% 2.5 milliGRAM(s) Nebulizer daily PRN Shortness of Breath and/or Wheezing  aluminum hydroxide/magnesium hydroxide/simethicone Suspension 30 milliLiter(s) Oral every 4 hours PRN Dyspepsia  dextrose Oral Gel 15 Gram(s) Oral once PRN Blood Glucose LESS THAN 70 milliGRAM(s)/deciliter  melatonin 3 milliGRAM(s) Oral at bedtime PRN Insomnia  ondansetron Injectable 4 milliGRAM(s) IV Push every 8 hours PRN Nausea and/or Vomiting      I&O's Summary    19 Aug 2024 07:01  -  20 Aug 2024 07:00  --------------------------------------------------------  IN: 1770 mL / OUT: 2900 mL / NET: -1130 mL    20 Aug 2024 07:01  -  20 Aug 2024 16:29  --------------------------------------------------------  IN: 480 mL / OUT: 1100 mL / NET: -620 mL        PHYSICAL EXAM:  Vital Signs Last 24 Hrs  T(C): 36.4 (20 Aug 2024 15:37), Max: 37.3 (19 Aug 2024 20:04)  T(F): 97.6 (20 Aug 2024 15:37), Max: 99.2 (19 Aug 2024 20:04)  HR: 65 (20 Aug 2024 15:37) (63 - 73)  BP: 133/83 (20 Aug 2024 15:37) (118/77 - 149/97)  BP(mean): --  RR: 18 (20 Aug 2024 15:37) (18 - 18)  SpO2: 95% (20 Aug 2024 15:37) (94% - 96%)    Parameters below as of 20 Aug 2024 15:37  Patient On (Oxygen Delivery Method): room air    CONSTITUTIONAL: NAD   EYES: Conjunctiva and sclera clear  ENMT: Moist oral mucosa, no pharyngeal injection or exudates   NECK: Supple, midline  RESPIRATORY: Normal respiratory effort; lungs are clear to auscultation bilaterally  CARDIOVASCULAR: Regular rate and rhythm, normal S1 and S2  ABDOMEN: Nontender to palpation, no rebound/guarding  PSYCH: Calm, cooperative           LABS:    08-19    136  |  100  |  11  ----------------------------<  87  3.7   |  23  |  0.78    Ca    9.2      19 Aug 2024 07:10            Urinalysis Basic - ( 19 Aug 2024 07:10 )    Color: x / Appearance: x / SG: x / pH: x  Gluc: 87 mg/dL / Ketone: x  / Bili: x / Urobili: x   Blood: x / Protein: x / Nitrite: x   Leuk Esterase: x / RBC: x / WBC x   Sq Epi: x / Non Sq Epi: x / Bacteria: x          RADIOLOGY & ADDITIONAL TESTS:  Results Reviewed:   Imaging Personally Reviewed:  Electrocardiogram Personally Reviewed:    COORDINATION OF CARE:  Care Discussed with Consultants/Other Providers [Y/N]: Y  Prior or Outpatient Records Reviewed [Y/N]: Y  
[facility] Division of Hospital Medicine  Whitney Mcclain DO  MS Teams PREFERRED      SUBJECTIVE / OVERNIGHT EVENTS: No acute events overnight. Patient responsive to voice and sternal rub, but quickly goes back to sleep.   ADDITIONAL REVIEW OF SYSTEMS:    MEDICATIONS  (STANDING):  allopurinol 100 milliGRAM(s) Oral two times a day  artificial  tears Solution 1 Drop(s) Both EYES two times a day  atorvastatin 20 milliGRAM(s) Oral at bedtime  benztropine 1 milliGRAM(s) Oral two times a day  bethanechol 50 milliGRAM(s) Oral four times a day  carvedilol 12.5 milliGRAM(s) Oral every 12 hours  cefTRIAXone   IVPB 1000 milliGRAM(s) IV Intermittent every 24 hours  dextrose 5%. 1000 milliLiter(s) (100 mL/Hr) IV Continuous <Continuous>  dextrose 5%. 1000 milliLiter(s) (50 mL/Hr) IV Continuous <Continuous>  dextrose 50% Injectable 12.5 Gram(s) IV Push once  dextrose 50% Injectable 25 Gram(s) IV Push once  dextrose 50% Injectable 25 Gram(s) IV Push once  divalproex  milliGRAM(s) Oral two times a day  FLUoxetine 20 milliGRAM(s) Oral daily  glucagon  Injectable 1 milliGRAM(s) IntraMuscular once  heparin   Injectable 5000 Unit(s) SubCutaneous every 8 hours  insulin lispro (ADMELOG) corrective regimen sliding scale   SubCutaneous three times a day before meals  insulin lispro (ADMELOG) corrective regimen sliding scale   SubCutaneous at bedtime  lactated ringers. 500 milliLiter(s) (75 mL/Hr) IV Continuous <Continuous>  mometasone 220 MICROgram(s) Inhaler 2 Puff(s) Inhalation daily  montelukast 10 milliGRAM(s) Oral at bedtime  NIFEdipine XL 30 milliGRAM(s) Oral two times a day  nystatin Powder 1 Application(s) Topical two times a day  risperiDONE   Tablet 4 milliGRAM(s) Oral daily  tamsulosin 0.4 milliGRAM(s) Oral at bedtime    MEDICATIONS  (PRN):  acetaminophen     Tablet .. 650 milliGRAM(s) Oral every 6 hours PRN Temp greater or equal to 38C (100.4F), Mild Pain (1 - 3)  albuterol   0.5% 2.5 milliGRAM(s) Nebulizer daily PRN Shortness of Breath and/or Wheezing  aluminum hydroxide/magnesium hydroxide/simethicone Suspension 30 milliLiter(s) Oral every 4 hours PRN Dyspepsia  dextrose Oral Gel 15 Gram(s) Oral once PRN Blood Glucose LESS THAN 70 milliGRAM(s)/deciliter  melatonin 3 milliGRAM(s) Oral at bedtime PRN Insomnia  ondansetron Injectable 4 milliGRAM(s) IV Push every 8 hours PRN Nausea and/or Vomiting      I&O's Summary    18 Aug 2024 07:01  -  19 Aug 2024 07:00  --------------------------------------------------------  IN: 480 mL / OUT: 4200 mL / NET: -3720 mL    19 Aug 2024 07:01  -  19 Aug 2024 19:18  --------------------------------------------------------  IN: 720 mL / OUT: 1700 mL / NET: -980 mL        PHYSICAL EXAM:  Vital Signs Last 24 Hrs  T(C): 36.6 (19 Aug 2024 16:07), Max: 36.9 (18 Aug 2024 20:07)  T(F): 97.8 (19 Aug 2024 16:07), Max: 98.5 (18 Aug 2024 20:07)  HR: 70 (19 Aug 2024 16:07) (65 - 72)  BP: 133/80 (19 Aug 2024 16:07) (122/77 - 143/80)  BP(mean): --  RR: 18 (19 Aug 2024 16:07) (18 - 18)  SpO2: 94% (19 Aug 2024 16:07) (92% - 94%)    Parameters below as of 19 Aug 2024 16:07  Patient On (Oxygen Delivery Method): room air    CONSTITUTIONAL: NAD   EYES: Conjunctiva and sclera clear  ENMT: Moist oral mucosa, no pharyngeal injection or exudates   NECK: Supple, midline  RESPIRATORY: Normal respiratory effort; lungs are clear to auscultation bilaterally  CARDIOVASCULAR: Regular rate and rhythm, normal S1 and S2  ABDOMEN: Nontender to palpation, no rebound/guarding  PSYCH: Calm, cooperative       LABS:    08-19    136  |  100  |  11  ----------------------------<  87  3.7   |  23  |  0.78    Ca    9.2      19 Aug 2024 07:10            Urinalysis Basic - ( 19 Aug 2024 07:10 )    Color: x / Appearance: x / SG: x / pH: x  Gluc: 87 mg/dL / Ketone: x  / Bili: x / Urobili: x   Blood: x / Protein: x / Nitrite: x   Leuk Esterase: x / RBC: x / WBC x   Sq Epi: x / Non Sq Epi: x / Bacteria: x          RADIOLOGY & ADDITIONAL TESTS:  Results Reviewed:   Imaging Personally Reviewed:  Electrocardiogram Personally Reviewed:    COORDINATION OF CARE:  Care Discussed with Consultants/Other Providers [Y/N]: Y  Prior or Outpatient Records Reviewed [Y/N]: CHRISTIANO  
Patient is a 64y old  Male who presents with a chief complaint of foul smelling urine (16 Aug 2024 11:32)      SUBJECTIVE / OVERNIGHT EVENTS: Pt seen and examined. No acute events overnight. Limited ROS on account of somnolence. Pt appears comfortable.    MEDICATIONS  (STANDING):  allopurinol 100 milliGRAM(s) Oral two times a day  atorvastatin 20 milliGRAM(s) Oral at bedtime  benztropine 1 milliGRAM(s) Oral two times a day  bethanechol 50 milliGRAM(s) Oral four times a day  carvedilol 12.5 milliGRAM(s) Oral every 12 hours  cefTRIAXone   IVPB 1000 milliGRAM(s) IV Intermittent every 24 hours  dextrose 5%. 1000 milliLiter(s) (50 mL/Hr) IV Continuous <Continuous>  dextrose 5%. 1000 milliLiter(s) (100 mL/Hr) IV Continuous <Continuous>  dextrose 50% Injectable 25 Gram(s) IV Push once  dextrose 50% Injectable 12.5 Gram(s) IV Push once  dextrose 50% Injectable 25 Gram(s) IV Push once  divalproex  milliGRAM(s) Oral two times a day  FLUoxetine 20 milliGRAM(s) Oral daily  glucagon  Injectable 1 milliGRAM(s) IntraMuscular once  heparin   Injectable 5000 Unit(s) SubCutaneous every 8 hours  insulin lispro (ADMELOG) corrective regimen sliding scale   SubCutaneous three times a day before meals  insulin lispro (ADMELOG) corrective regimen sliding scale   SubCutaneous at bedtime  lactated ringers. 500 milliLiter(s) (75 mL/Hr) IV Continuous <Continuous>  mometasone 220 MICROgram(s) Inhaler 2 Puff(s) Inhalation daily  montelukast 10 milliGRAM(s) Oral at bedtime  NIFEdipine XL 30 milliGRAM(s) Oral two times a day  nystatin Powder 1 Application(s) Topical two times a day  risperiDONE   Tablet 4 milliGRAM(s) Oral daily  tamsulosin 0.4 milliGRAM(s) Oral at bedtime    MEDICATIONS  (PRN):  acetaminophen     Tablet .. 650 milliGRAM(s) Oral every 6 hours PRN Temp greater or equal to 38C (100.4F), Mild Pain (1 - 3)  albuterol   0.5% 2.5 milliGRAM(s) Nebulizer daily PRN Shortness of Breath and/or Wheezing  aluminum hydroxide/magnesium hydroxide/simethicone Suspension 30 milliLiter(s) Oral every 4 hours PRN Dyspepsia  dextrose Oral Gel 15 Gram(s) Oral once PRN Blood Glucose LESS THAN 70 milliGRAM(s)/deciliter  melatonin 3 milliGRAM(s) Oral at bedtime PRN Insomnia  ondansetron Injectable 4 milliGRAM(s) IV Push every 8 hours PRN Nausea and/or Vomiting      Vital Signs Last 24 Hrs  T(C): 37 (17 Aug 2024 12:51), Max: 37.6 (16 Aug 2024 16:02)  T(F): 98.6 (17 Aug 2024 12:51), Max: 99.7 (16 Aug 2024 20:04)  HR: 69 (17 Aug 2024 12:51) (69 - 88)  BP: 130/72 (17 Aug 2024 12:51) (113/81 - 130/72)  BP(mean): --  RR: 18 (17 Aug 2024 12:51) (18 - 18)  SpO2: 93% (17 Aug 2024 12:51) (93% - 96%)    Parameters below as of 17 Aug 2024 12:51  Patient On (Oxygen Delivery Method): room air      CAPILLARY BLOOD GLUCOSE      POCT Blood Glucose.: 124 mg/dL (17 Aug 2024 12:13)  POCT Blood Glucose.: 138 mg/dL (17 Aug 2024 08:15)  POCT Blood Glucose.: 95 mg/dL (16 Aug 2024 21:53)  POCT Blood Glucose.: 105 mg/dL (16 Aug 2024 16:55)    I&O's Summary    16 Aug 2024 07:01  -  17 Aug 2024 07:00  --------------------------------------------------------  IN: 1320 mL / OUT: 1850 mL / NET: -530 mL    17 Aug 2024 07:01  -  17 Aug 2024 14:56  --------------------------------------------------------  IN: 480 mL / OUT: 800 mL / NET: -320 mL        PHYSICAL EXAM:  GENERAL: NAD, obese  HEAD:  Atraumatic, Normocephalic  EYES:  conjunctiva and sclera clear  NECK: Supple, No JVD  CHEST/LUNG: Clear to auscultation bilaterally; No wheeze  HEART: Regular rate and rhythm; No murmurs, rubs, or gallops  ABDOMEN: Soft, Nontender, Nondistended; Bowel sounds present; pryor in place  EXTREMITIES:  2+ Peripheral Pulses, No clubbing, cyanosis, or edema  PSYCH: somnolent but arousable, oriented x 3  NEUROLOGY: non-focal  SKIN: No rashes or lesions    LABS:                        13.3   9.87  )-----------( 234      ( 17 Aug 2024 06:58 )             39.1     08-17    134<L>  |  93<L>  |  22  ----------------------------<  119<H>  3.7   |  23  |  1.34<H>    Ca    9.3      17 Aug 2024 06:58    TPro  6.4  /  Alb  3.2<L>  /  TBili  1.0  /  DBili  x   /  AST  11  /  ALT  9<L>  /  AlkPhos  63  08-16    PT/INR - ( 16 Aug 2024 07:13 )   PT: 14.5 sec;   INR: 1.33 ratio         PTT - ( 16 Aug 2024 07:13 )  PTT:33.9 sec      Urinalysis Basic - ( 17 Aug 2024 06:58 )    Color: x / Appearance: x / SG: x / pH: x  Gluc: 119 mg/dL / Ketone: x  / Bili: x / Urobili: x   Blood: x / Protein: x / Nitrite: x   Leuk Esterase: x / RBC: x / WBC x   Sq Epi: x / Non Sq Epi: x / Bacteria: x        
Patient is a 64y old  Male who presents with a chief complaint of foul smelling urine (15 Aug 2024 19:48)      SUBJECTIVE / OVERNIGHT EVENTS:  Pt seen and examined. No acute events overnight. He denies cp, sob, n/v, fever/chills, dysuria, abd pain, flank pain.     MEDICATIONS  (STANDING):  allopurinol 100 milliGRAM(s) Oral two times a day  atorvastatin 20 milliGRAM(s) Oral at bedtime  benztropine 1 milliGRAM(s) Oral two times a day  bethanechol 50 milliGRAM(s) Oral four times a day  carvedilol 12.5 milliGRAM(s) Oral every 12 hours  cefTRIAXone   IVPB 1000 milliGRAM(s) IV Intermittent every 24 hours  dextrose 5%. 1000 milliLiter(s) (100 mL/Hr) IV Continuous <Continuous>  dextrose 5%. 1000 milliLiter(s) (50 mL/Hr) IV Continuous <Continuous>  dextrose 50% Injectable 12.5 Gram(s) IV Push once  dextrose 50% Injectable 25 Gram(s) IV Push once  dextrose 50% Injectable 25 Gram(s) IV Push once  divalproex  milliGRAM(s) Oral two times a day  FLUoxetine 20 milliGRAM(s) Oral daily  glucagon  Injectable 1 milliGRAM(s) IntraMuscular once  heparin   Injectable 5000 Unit(s) SubCutaneous every 8 hours  insulin lispro (ADMELOG) corrective regimen sliding scale   SubCutaneous three times a day before meals  insulin lispro (ADMELOG) corrective regimen sliding scale   SubCutaneous at bedtime  mometasone 220 MICROgram(s) Inhaler 2 Puff(s) Inhalation daily  montelukast 10 milliGRAM(s) Oral at bedtime  NIFEdipine XL 30 milliGRAM(s) Oral two times a day  risperiDONE   Tablet 4 milliGRAM(s) Oral daily  tamsulosin 0.4 milliGRAM(s) Oral at bedtime    MEDICATIONS  (PRN):  acetaminophen     Tablet .. 650 milliGRAM(s) Oral every 6 hours PRN Temp greater or equal to 38C (100.4F), Mild Pain (1 - 3)  albuterol   0.5% 2.5 milliGRAM(s) Nebulizer daily PRN Shortness of Breath and/or Wheezing  aluminum hydroxide/magnesium hydroxide/simethicone Suspension 30 milliLiter(s) Oral every 4 hours PRN Dyspepsia  dextrose Oral Gel 15 Gram(s) Oral once PRN Blood Glucose LESS THAN 70 milliGRAM(s)/deciliter  melatonin 3 milliGRAM(s) Oral at bedtime PRN Insomnia  ondansetron Injectable 4 milliGRAM(s) IV Push every 8 hours PRN Nausea and/or Vomiting      Vital Signs Last 24 Hrs  T(C): 37 (16 Aug 2024 12:03), Max: 38.8 (15 Aug 2024 20:00)  T(F): 98.6 (16 Aug 2024 12:03), Max: 101.9 (15 Aug 2024 20:00)  HR: 73 (16 Aug 2024 12:03) (71 - 87)  BP: 109/70 (16 Aug 2024 12:03) (101/60 - 153/78)  BP(mean): 96 (15 Aug 2024 18:00) (96 - 96)  RR: 18 (16 Aug 2024 12:03) (16 - 18)  SpO2: 95% (16 Aug 2024 12:03) (92% - 99%)    Parameters below as of 16 Aug 2024 12:03  Patient On (Oxygen Delivery Method): room air      CAPILLARY BLOOD GLUCOSE      POCT Blood Glucose.: 116 mg/dL (16 Aug 2024 12:31)  POCT Blood Glucose.: 105 mg/dL (16 Aug 2024 08:13)  POCT Blood Glucose.: 106 mg/dL (15 Aug 2024 22:22)    I&O's Summary    15 Aug 2024 07:01  -  16 Aug 2024 07:00  --------------------------------------------------------  IN: 0 mL / OUT: 1250 mL / NET: -1250 mL    16 Aug 2024 07:01  -  16 Aug 2024 13:32  --------------------------------------------------------  IN: 240 mL / OUT: 400 mL / NET: -160 mL        PHYSICAL EXAM:  GENERAL: NAD, obese  HEAD:  Atraumatic, Normocephalic  EYES:  conjunctiva and sclera clear  NECK: Supple, No JVD  CHEST/LUNG: Clear to auscultation bilaterally; No wheeze  HEART: Regular rate and rhythm; No murmurs, rubs, or gallops  ABDOMEN: Soft, Nontender, Nondistended; Bowel sounds present; pryor in place  EXTREMITIES:  2+ Peripheral Pulses, No clubbing, cyanosis, or edema  PSYCH: AAOx3  NEUROLOGY: non-focal  SKIN: No rashes or lesions    LABS:                        12.6   12.73 )-----------( 215      ( 16 Aug 2024 13:08 )             37.2     08-16    132<L>  |  96  |  17  ----------------------------<  107<H>  3.8   |  22  |  1.53<H>    Ca    9.0      16 Aug 2024 07:13    TPro  6.4  /  Alb  3.2<L>  /  TBili  1.0  /  DBili  x   /  AST  11  /  ALT  9<L>  /  AlkPhos  63  08-16    PT/INR - ( 16 Aug 2024 07:13 )   PT: 14.5 sec;   INR: 1.33 ratio         PTT - ( 16 Aug 2024 07:13 )  PTT:33.9 sec      Urinalysis Basic - ( 16 Aug 2024 07:13 )    Color: x / Appearance: x / SG: x / pH: x  Gluc: 107 mg/dL / Ketone: x  / Bili: x / Urobili: x   Blood: x / Protein: x / Nitrite: x   Leuk Esterase: x / RBC: x / WBC x   Sq Epi: x / Non Sq Epi: x / Bacteria: x        
Patient is a 64y old  Male who presents with a chief complaint of foul smelling urine (17 Aug 2024 11:56)      SUBJECTIVE / OVERNIGHT EVENTS:  Pt seen and examined. No acute events overnight. He denies CP, SOB, abd pain, n/v, fever/chills, dysuria.    MEDICATIONS  (STANDING):  allopurinol 100 milliGRAM(s) Oral two times a day  atorvastatin 20 milliGRAM(s) Oral at bedtime  benztropine 1 milliGRAM(s) Oral two times a day  bethanechol 50 milliGRAM(s) Oral four times a day  carvedilol 12.5 milliGRAM(s) Oral every 12 hours  cefTRIAXone   IVPB 1000 milliGRAM(s) IV Intermittent every 24 hours  dextrose 5%. 1000 milliLiter(s) (50 mL/Hr) IV Continuous <Continuous>  dextrose 5%. 1000 milliLiter(s) (100 mL/Hr) IV Continuous <Continuous>  dextrose 50% Injectable 25 Gram(s) IV Push once  dextrose 50% Injectable 12.5 Gram(s) IV Push once  dextrose 50% Injectable 25 Gram(s) IV Push once  divalproex  milliGRAM(s) Oral two times a day  FLUoxetine 20 milliGRAM(s) Oral daily  glucagon  Injectable 1 milliGRAM(s) IntraMuscular once  heparin   Injectable 5000 Unit(s) SubCutaneous every 8 hours  insulin lispro (ADMELOG) corrective regimen sliding scale   SubCutaneous three times a day before meals  insulin lispro (ADMELOG) corrective regimen sliding scale   SubCutaneous at bedtime  lactated ringers. 500 milliLiter(s) (75 mL/Hr) IV Continuous <Continuous>  mometasone 220 MICROgram(s) Inhaler 2 Puff(s) Inhalation daily  montelukast 10 milliGRAM(s) Oral at bedtime  NIFEdipine XL 30 milliGRAM(s) Oral two times a day  nystatin Powder 1 Application(s) Topical two times a day  risperiDONE   Tablet 4 milliGRAM(s) Oral daily  tamsulosin 0.4 milliGRAM(s) Oral at bedtime    MEDICATIONS  (PRN):  acetaminophen     Tablet .. 650 milliGRAM(s) Oral every 6 hours PRN Temp greater or equal to 38C (100.4F), Mild Pain (1 - 3)  albuterol   0.5% 2.5 milliGRAM(s) Nebulizer daily PRN Shortness of Breath and/or Wheezing  aluminum hydroxide/magnesium hydroxide/simethicone Suspension 30 milliLiter(s) Oral every 4 hours PRN Dyspepsia  dextrose Oral Gel 15 Gram(s) Oral once PRN Blood Glucose LESS THAN 70 milliGRAM(s)/deciliter  melatonin 3 milliGRAM(s) Oral at bedtime PRN Insomnia  ondansetron Injectable 4 milliGRAM(s) IV Push every 8 hours PRN Nausea and/or Vomiting      Vital Signs Last 24 Hrs  T(C): 36.8 (18 Aug 2024 08:51), Max: 37 (17 Aug 2024 12:51)  T(F): 98.2 (18 Aug 2024 08:51), Max: 98.6 (17 Aug 2024 12:51)  HR: 66 (18 Aug 2024 08:51) (63 - 74)  BP: 135/77 (18 Aug 2024 08:51) (104/65 - 135/77)  BP(mean): --  RR: 18 (18 Aug 2024 08:51) (18 - 18)  SpO2: 94% (18 Aug 2024 08:51) (90% - 94%)    Parameters below as of 18 Aug 2024 08:51  Patient On (Oxygen Delivery Method): room air      CAPILLARY BLOOD GLUCOSE      POCT Blood Glucose.: 92 mg/dL (18 Aug 2024 08:17)  POCT Blood Glucose.: 104 mg/dL (17 Aug 2024 20:53)  POCT Blood Glucose.: 102 mg/dL (17 Aug 2024 17:09)  POCT Blood Glucose.: 124 mg/dL (17 Aug 2024 12:13)    I&O's Summary    17 Aug 2024 07:01  -  18 Aug 2024 07:00  --------------------------------------------------------  IN: 1620 mL / OUT: 2400 mL / NET: -780 mL    18 Aug 2024 07:01  -  18 Aug 2024 09:35  --------------------------------------------------------  IN: 480 mL / OUT: 450 mL / NET: 30 mL        PHYSICAL EXAM:  GENERAL: NAD, obese  HEAD:  Atraumatic, Normocephalic  EYES:  conjunctiva and sclera clear  NECK: Supple, No JVD  CHEST/LUNG: Clear to auscultation bilaterally; No wheeze  HEART: Regular rate and rhythm; No murmurs, rubs, or gallops  ABDOMEN: Soft, Nontender, Nondistended; Bowel sounds present; pryor in place  EXTREMITIES:  2+ Peripheral Pulses, No clubbing, cyanosis, or edema  PSYCH: AAOx3  NEUROLOGY: non-focal  SKIN: No rashes or lesions      LABS:                        13.3   9.87  )-----------( 234      ( 17 Aug 2024 06:58 )             39.1     08-18    134<L>  |  99  |  19  ----------------------------<  88  3.7   |  24  |  1.04    Ca    9.0      18 Aug 2024 07:11            Urinalysis Basic - ( 18 Aug 2024 07:11 )    Color: x / Appearance: x / SG: x / pH: x  Gluc: 88 mg/dL / Ketone: x  / Bili: x / Urobili: x   Blood: x / Protein: x / Nitrite: x   Leuk Esterase: x / RBC: x / WBC x   Sq Epi: x / Non Sq Epi: x / Bacteria: x

## 2024-08-20 NOTE — PROGRESS NOTE ADULT - PROBLEM SELECTOR PLAN 2
baseline cr is ~ 0.9  clinically appears to be hypovolemic  suspect multifactorial (dehydration +/- obstructive)  s/p 2 l ivf in ED  urine studies reviewed  renal/bladder us with mild left hydronephrosis. Resolved right hydronephrosis.  Monitor I/Os, daily weights, UO, BUN/Cr, volume status  monitor BMP  avoid nephrotoxic agents; appropriate dose adjustments for all renally cleared medications  encourage po intake; judicious IVF + electrolyte supplementation as needed
p/w Cr 1.5 , baseline cr is ~ 0.9  likely multifactorial (dehydration +/- obstructive)  s/p 2 l ivf in ED ; Cr down to 1.3  urine studies reviewed  renal/bladder us with mild left hydronephrosis. Resolved right hydronephrosis.  Monitor I/Os, daily weights, UO, BUN/Cr, volume status  monitor BMP  avoid nephrotoxic agents; appropriate dose adjustments for all renally cleared medications  encourage po intake; judicious IVF + electrolyte supplementation as needed
p/w Cr 1.5 , baseline cr is ~ 0.9  likely multifactorial (dehydration +/- obstructive)  s/p IVF ; Cr down to 1.0  Urine studies reviewed  Renal/bladder us with mild left hydronephrosis. Resolved right hydronephrosis.  Monitor I/Os, daily weights, UO, BUN/Cr, volume status  Monitor BMP  Avoid nephrotoxic agents; appropriate dose adjustments for all renally cleared medications  Encourage po intake; judicious IVF + electrolyte supplementation as needed

## 2024-08-20 NOTE — PROGRESS NOTE ADULT - PROBLEM SELECTOR PLAN 6
hold enalapril + furosemide in lieu of guzman  cont carvedilol + nifedipine
Holding enalapril + furosemide iso LONNIE  Cont carvedilol + nifedipine  BP controlled
holding enalapril + furosemide iso guzman  cont carvedilol + nifedipine

## 2024-08-20 NOTE — PHARMACOTHERAPY INTERVENTION NOTE - COMMENTS
65 y/o male with PMH HTN, HLD, DM, Gout, Schizophrenia, SBO, chronic urinary retention (self-catheterizes), dysphagia 2/2 tardive dyskinesia, and presents with foul smelling urine.     Reviewed patient's psych medications: benztropine 1mg 2 times a day, divalproex 750mg ER 2 times a day, fluoxetine 20mg once a day, and risperidone 4mg once a day. Patient expressed that he ran out of his home supply of above medications, and needs refills at discharge. Patient is currently planned to be discharged to subacute rehab. Let patient know that the subacute rehab will continue to give him his medications, and that they will be the ones to send out new prescriptions for him to continue when he is discharged from subacute rehab to home. Patient's questions and concerns answered. Patient demonstrated understanding.     Millie Fraire, PharmD, BCPS  Clinical Pharmacy Specialist  Available on Microsoft Teams (preferred)  Cell: [phone]

## 2024-08-20 NOTE — PROGRESS NOTE ADULT - NSPROGADDITIONALINFOA_GEN_ALL_CORE
time spent reviewing prior charts, meds, discussing plan with patient= 45 minutes    Pt's wife updated on plan of care    d/w ACP Livia
Time-based billing (NON-critical care).     51 minutes spent on total encounter. The necessity of the time spent during the encounter on this date of service was due to:     - Reviewing, and interpreting labs, testing, and imaging.  - Independently obtaining a review of systems and performing a physical exam  - Reviewing prior records and where necessary, outpatient records.  - Counselling and educating patient regarding interpretation of aforementioned items and plan of care.      d/w ACP
time spent reviewing prior charts, meds, discussing plan with patient= 51 minutes    d/w ACP Tasia
Time-based billing (NON-critical care).     45 minutes spent on total encounter. The necessity of the time spent during the encounter on this date of service was due to:     - Reviewing, and interpreting labs, testing, and imaging.  - Independently obtaining a review of systems and performing a physical exam  - Reviewing prior records and where necessary, outpatient records.  - Counselling and educating patient regarding interpretation of aforementioned items and plan of care.      d/w ACP
time spent reviewing prior charts, meds, discussing plan with patient= 52 minutes    d/w ACP Loreto

## 2024-08-20 NOTE — PROGRESS NOTE ADULT - PROBLEM SELECTOR PLAN 1
H/o chronic urinary retention 2/2 neurogenic bladder, self catheterizes; CIC 6x/day  Presented with leukocytosis; afebrile and HDS otherwise  UA with +epithelial cells; nitrites, few bacteria + pyuria with leukocyte esterase   c/w CTX; can switch to PO abx on discharge  Urine culture with < 100,000 E. coli  Monitor for fever, changes in white count  cont home bethanechol + tamsulosin  Barnes catheter in place - DC to [facility] with Barnes
h/o chronic urinary retention 2/2 neurogenic bladder, self catheterizes ; CIC  6x/day  currently, with leukocytosis; afebrile and hds otherwise  UA with +epithelial cells; nitrites, few bacteria + pyuria with leukocyte esterase; rbcs and blood  c/w CTX  follow up urine culture  monitor for fever, changes in white count  cont home bethanechol + tamsulosin  pryor catheter in place
h/o chronic urinary retention 2/2 neurogenic bladder, self catheterizes ; CIC  6x/day  currently, with leukocytosis; afebrile and hds otherwise  UA with +epithelial cells; nitrites, few bacteria + pyuria with leukocyte esterase; rbcs and blood  c/w CTX day 3/3  urine culture with < 100,000 cfu E. coli  monitor for fever, changes in white count  cont home bethanechol + tamsulosin  pryor catheter in place
h/o chronic urinary retention 2/2 neurogenic bladder, self catheterizes ; CIC  6x/day  currently, with leukocytosis; afebrile and hds otherwise  UA with +epithelial cells; nitrites, few bacteria + pyuria with leukocyte esterase; rbcs and blood  c/w CTX day 4/7 ; can switch to PO abx on discharge  urine culture with < 100,000 cfu E. coli  monitor for fever, changes in white count  cont home bethanechol + tamsulosin  pryor catheter in place
H/o chronic urinary retention 2/2 neurogenic bladder, self catheterizes; CIC 6x/day  Presented with leukocytosis; afebrile and HDS otherwise  UA with +epithelial cells; nitrites, few bacteria + pyuria with leukocyte esterase   Urine culture with < 100,000 E. coli  Monitor for fever, changes in white count  Cont home bethanechol + tamsulosin  Barnes catheter in place - DC to [facility] with Barnes  c/w CTX (8/15-8/21), plan for 7 day course

## 2024-08-20 NOTE — PROGRESS NOTE ADULT - PROBLEM SELECTOR PROBLEM 6
Medication Refill Request    Lanny Hannah is requesting a refill of the following medication(s):   meclizine (ANTIVERT) 25 MG tablet                  Please send refill to:    [facility]/pharmacy [address] [place], [address] [phone] - F [phone]
HTN (hypertension)

## 2024-08-20 NOTE — PROGRESS NOTE ADULT - PROBLEM SELECTOR PLAN 5
hold home regimen  Hb a1c 6.1  trend fingerstick  low dose correctional lispro  adjust to maintain goal bg 100-180  carb consistent diet
Hold home regimen  Hb A1c 6.1  Trend fingerstick  Low dose correctional lispro  Adjust to maintain goal -180  Carb consistent diet
hold home regimen  follow up a1c   trend fingerstick  low dose correctional lispro  adjust to maintain goal bg 100-180  carb consistent diet

## 2024-08-21 ENCOUNTER — TRANSCRIPTION ENCOUNTER (OUTPATIENT)
Age: 64
End: 2024-08-21

## 2024-08-21 VITALS
HEART RATE: 69 BPM | TEMPERATURE: 98 F | RESPIRATION RATE: 18 BRPM | DIASTOLIC BLOOD PRESSURE: 79 MMHG | WEIGHT: 283.07 LBS | SYSTOLIC BLOOD PRESSURE: 126 MMHG | OXYGEN SATURATION: 93 %

## 2024-08-21 LAB
CULTURE RESULTS: SIGNIFICANT CHANGE UP
CULTURE RESULTS: SIGNIFICANT CHANGE UP
GLUCOSE BLDC GLUCOMTR-MCNC: 101 MG/DL — HIGH (ref 70–99)
GLUCOSE BLDC GLUCOMTR-MCNC: 107 MG/DL — HIGH (ref 70–99)
SPECIMEN SOURCE: SIGNIFICANT CHANGE UP
SPECIMEN SOURCE: SIGNIFICANT CHANGE UP

## 2024-08-21 PROCEDURE — 97162 PT EVAL MOD COMPLEX 30 MIN: CPT

## 2024-08-21 PROCEDURE — 83605 ASSAY OF LACTIC ACID: CPT

## 2024-08-21 PROCEDURE — 82330 ASSAY OF CALCIUM: CPT

## 2024-08-21 PROCEDURE — 81001 URINALYSIS AUTO W/SCOPE: CPT

## 2024-08-21 PROCEDURE — 85018 HEMOGLOBIN: CPT

## 2024-08-21 PROCEDURE — 82962 GLUCOSE BLOOD TEST: CPT

## 2024-08-21 PROCEDURE — 83930 ASSAY OF BLOOD OSMOLALITY: CPT

## 2024-08-21 PROCEDURE — 84156 ASSAY OF PROTEIN URINE: CPT

## 2024-08-21 PROCEDURE — 96374 THER/PROPH/DIAG INJ IV PUSH: CPT

## 2024-08-21 PROCEDURE — 87086 URINE CULTURE/COLONY COUNT: CPT

## 2024-08-21 PROCEDURE — 97166 OT EVAL MOD COMPLEX 45 MIN: CPT

## 2024-08-21 PROCEDURE — 87186 SC STD MICRODIL/AGAR DIL: CPT

## 2024-08-21 PROCEDURE — 92610 EVALUATE SWALLOWING FUNCTION: CPT

## 2024-08-21 PROCEDURE — 83935 ASSAY OF URINE OSMOLALITY: CPT

## 2024-08-21 PROCEDURE — 97530 THERAPEUTIC ACTIVITIES: CPT

## 2024-08-21 PROCEDURE — 82570 ASSAY OF URINE CREATININE: CPT

## 2024-08-21 PROCEDURE — 84133 ASSAY OF URINE POTASSIUM: CPT

## 2024-08-21 PROCEDURE — 84132 ASSAY OF SERUM POTASSIUM: CPT

## 2024-08-21 PROCEDURE — 80053 COMPREHEN METABOLIC PANEL: CPT

## 2024-08-21 PROCEDURE — 36415 COLL VENOUS BLD VENIPUNCTURE: CPT

## 2024-08-21 PROCEDURE — 97116 GAIT TRAINING THERAPY: CPT

## 2024-08-21 PROCEDURE — 85610 PROTHROMBIN TIME: CPT

## 2024-08-21 PROCEDURE — 94640 AIRWAY INHALATION TREATMENT: CPT

## 2024-08-21 PROCEDURE — 87040 BLOOD CULTURE FOR BACTERIA: CPT

## 2024-08-21 PROCEDURE — 80061 LIPID PANEL: CPT

## 2024-08-21 PROCEDURE — 83036 HEMOGLOBIN GLYCOSYLATED A1C: CPT

## 2024-08-21 PROCEDURE — 80048 BASIC METABOLIC PNL TOTAL CA: CPT

## 2024-08-21 PROCEDURE — 86901 BLOOD TYPING SEROLOGIC RH(D): CPT

## 2024-08-21 PROCEDURE — 76770 US EXAM ABDO BACK WALL COMP: CPT

## 2024-08-21 PROCEDURE — 84295 ASSAY OF SERUM SODIUM: CPT

## 2024-08-21 PROCEDURE — 86850 RBC ANTIBODY SCREEN: CPT

## 2024-08-21 PROCEDURE — 82947 ASSAY GLUCOSE BLOOD QUANT: CPT

## 2024-08-21 PROCEDURE — 85025 COMPLETE CBC W/AUTO DIFF WBC: CPT

## 2024-08-21 PROCEDURE — 82435 ASSAY OF BLOOD CHLORIDE: CPT

## 2024-08-21 PROCEDURE — 85027 COMPLETE CBC AUTOMATED: CPT

## 2024-08-21 PROCEDURE — 84540 ASSAY OF URINE/UREA-N: CPT

## 2024-08-21 PROCEDURE — 82803 BLOOD GASES ANY COMBINATION: CPT

## 2024-08-21 PROCEDURE — 99285 EMERGENCY DEPT VISIT HI MDM: CPT | Mod: 25

## 2024-08-21 PROCEDURE — 86900 BLOOD TYPING SEROLOGIC ABO: CPT

## 2024-08-21 PROCEDURE — 85014 HEMATOCRIT: CPT

## 2024-08-21 PROCEDURE — 84300 ASSAY OF URINE SODIUM: CPT

## 2024-08-21 PROCEDURE — 85730 THROMBOPLASTIN TIME PARTIAL: CPT

## 2024-08-21 PROCEDURE — 99239 HOSP IP/OBS DSCHRG MGMT >30: CPT

## 2024-08-21 RX ORDER — BETHANECHOL CHLORIDE 10 MG
1 TABLET ORAL
Qty: 0 | Refills: 0 | DISCHARGE
Start: 2024-08-21

## 2024-08-21 RX ORDER — BENZTROPINE MESYLATE 2 MG/1
1 TABLET ORAL
Qty: 0 | Refills: 0 | DISCHARGE
Start: 2024-08-21

## 2024-08-21 RX ORDER — MONTELUKAST SODIUM 5 MG/1
1 TABLET, CHEWABLE ORAL
Qty: 0 | Refills: 0 | DISCHARGE
Start: 2024-08-21

## 2024-08-21 RX ORDER — DIVALPROEX SODIUM 125 MG/1
1 CAPSULE, DELAYED RELEASE ORAL
Qty: 0 | Refills: 0 | DISCHARGE
Start: 2024-08-21

## 2024-08-21 RX ORDER — ENALAPRIL MALEATE 5 MG/1
1 TABLET ORAL
Qty: 30 | Refills: 0
Start: 2024-08-21

## 2024-08-21 RX ORDER — NIFEDIPINE 60 MG/1
1 TABLET, FILM COATED, EXTENDED RELEASE ORAL
Qty: 0 | Refills: 0 | DISCHARGE
Start: 2024-08-21

## 2024-08-21 RX ORDER — ALLOPURINOL 300 MG
1 TABLET ORAL
Qty: 0 | Refills: 0 | DISCHARGE
Start: 2024-08-21

## 2024-08-21 RX ORDER — FLUOXETINE HCL 20 MG/5 ML
1 SOLUTION, ORAL ORAL
Qty: 0 | Refills: 0 | DISCHARGE
Start: 2024-08-21

## 2024-08-21 RX ORDER — CARVEDILOL 6.25 MG/1
1 TABLET ORAL
Qty: 0 | Refills: 0 | DISCHARGE
Start: 2024-08-21

## 2024-08-21 RX ORDER — TAMSULOSIN HYDROCHLORIDE 0.4 MG/1
1 CAPSULE ORAL
Qty: 0 | Refills: 0 | DISCHARGE
Start: 2024-08-21

## 2024-08-21 RX ORDER — RISPERIDONE 0.25 MG/1
1 TABLET, FILM COATED ORAL
Qty: 0 | Refills: 0 | DISCHARGE
Start: 2024-08-21

## 2024-08-21 RX ADMIN — BENZTROPINE MESYLATE 1 MILLIGRAM(S): 2 TABLET ORAL at 06:51

## 2024-08-21 RX ADMIN — Medication 50 MILLIGRAM(S): at 06:51

## 2024-08-21 RX ADMIN — RISPERIDONE 4 MILLIGRAM(S): 0.25 TABLET, FILM COATED ORAL at 12:50

## 2024-08-21 RX ADMIN — Medication 5000 UNIT(S): at 06:50

## 2024-08-21 RX ADMIN — POVIDONE, PROPYLENE GLYCOL 1 DROP(S): 6.8; 3 LIQUID OPHTHALMIC at 06:52

## 2024-08-21 RX ADMIN — Medication 50 MILLIGRAM(S): at 12:50

## 2024-08-21 RX ADMIN — NIFEDIPINE 30 MILLIGRAM(S): 60 TABLET, FILM COATED, EXTENDED RELEASE ORAL at 06:52

## 2024-08-21 RX ADMIN — MOMETASONE FUROATE 2 PUFF(S): 220 INHALANT RESPIRATORY (INHALATION) at 12:52

## 2024-08-21 RX ADMIN — DIVALPROEX SODIUM 500 MILLIGRAM(S): 125 CAPSULE, DELAYED RELEASE ORAL at 06:51

## 2024-08-21 RX ADMIN — Medication 20 MILLIGRAM(S): at 12:51

## 2024-08-21 RX ADMIN — CARVEDILOL 12.5 MILLIGRAM(S): 6.25 TABLET ORAL at 06:51

## 2024-08-21 RX ADMIN — Medication 100 MILLIGRAM(S): at 06:51

## 2024-08-21 RX ADMIN — Medication 1 APPLICATION(S): at 06:52

## 2024-08-21 NOTE — DISCHARGE NOTE NURSING/CASE MANAGEMENT/SOCIAL WORK - NSDCPEFALRISK_GEN_ALL_CORE
For information on Fall & Injury Prevention, visit: [email or web address] OR  [email or web address] OR  https://www.cdc.gov/steadi/patient.html

## 2024-08-21 NOTE — DISCHARGE NOTE NURSING/CASE MANAGEMENT/SOCIAL WORK - PATIENT PORTAL LINK FT
You can access the FollowMyHealth Patient Portal offered by [facility] by registering at the following website: [email or web address]. By joining [facility]’s FollowMyHealth portal, you will also be able to view your health information using other applications (apps) compatible with our system.

## 2024-08-21 NOTE — DISCHARGE NOTE PROVIDER - NSFOLLOWUPCLINICS_GEN_ALL_ED_FT
[facility] [employer]  Urology  [address] [place] [address], [place] [address]  Phone: [phone]  Fax:

## 2024-08-21 NOTE — DISCHARGE NOTE PROVIDER - ATTENDING DISCHARGE PHYSICAL EXAMINATION:
Vital Signs Last 24 Hrs  T(C): 36.9 (21 Aug 2024 09:32), Max: 36.9 (21 Aug 2024 09:32)  T(F): 98.5 (21 Aug 2024 09:32), Max: 98.5 (21 Aug 2024 09:32)  HR: 69 (21 Aug 2024 09:32) (63 - 72)  BP: 126/79 (21 Aug 2024 09:32) (118/77 - 152/79)  BP(mean): --  RR: 18 (21 Aug 2024 09:32) (18 - 18)  SpO2: 93% (21 Aug 2024 09:32) (93% - 96%)    Parameters below as of 21 Aug 2024 09:32  Patient On (Oxygen Delivery Method): room air    CONSTITUTIONAL: NAD   EYES: Conjunctiva and sclera clear  ENMT: Moist oral mucosa, no pharyngeal injection or exudates   NECK: Supple, midline  RESPIRATORY: Normal respiratory effort; lungs are clear to auscultation bilaterally  CARDIOVASCULAR: Regular rate and rhythm, normal S1 and S2  ABDOMEN: Nontender to palpation, no rebound/guarding  PSYCH: Calm, cooperative

## 2024-08-21 NOTE — DISCHARGE NOTE PROVIDER - CARE PROVIDER_API CALL
PREETHI HAHN KIT  [address] [place] [address]  Phone: [phone]  Fax: [phone]  Established Patient  Follow Up Time: 2 weeks

## 2024-08-21 NOTE — DISCHARGE NOTE PROVIDER - NSDCFUADDAPPT_GEN_ALL_CORE_FT
APPTS ARE READY TO BE MADE: [x] YES    Best Family or Patient Contact (if needed):    Additional Information about above appointments (if needed):    1:   2:   3:     Other comments or requests:    APPTS ARE READY TO BE MADE: [x] YES    Best Family or Patient Contact (if needed):    Additional Information about above appointments (if needed):    1:   2:   3:     Other comments or requests:     Patient is being discharged to [facility]. Caregiver will arrange follow up.

## 2024-08-21 NOTE — DISCHARGE NOTE PROVIDER - NSDCMRMEDTOKEN_GEN_ALL_CORE_FT
albuterol 5 mg/mL (0.5%) inhalation solution: 0.5 milliliter(s) inhaled once a day As needed Shortness of Breath and/or Wheezing  allopurinol 100 mg oral tablet: 1 tab(s) orally 2 times a day  benztropine 1 mg oral tablet: 1 tab(s) orally 2 times a day  bethanechol 50 mg oral tablet: 1 tab(s) orally 4 times a day  Bydureon Kit 2 mg subcutaneous injection, extended release: subcutaneous once a week  carvedilol 12.5 mg oral tablet: 1 tab(s) orally every 12 hours  Crestor 5 mg oral tablet: 1 tab(s) orally once a day  divalproex sodium 500 mg oral delayed release tablet: 1 tab(s) orally 2 times a day  FLUoxetine 20 mg oral capsule: 1 cap(s) orally once a day  fluticasone 220 mcg/inh inhalation aerosol: 2 puff(s) inhaled 2 times a day  metFORMIN 1000 mg oral tablet: 1 tab(s) orally 2 times a day  montelukast 10 mg oral tablet: 1 tab(s) orally once a day (at bedtime)  NIFEdipine 30 mg oral tablet, extended release: 1 tab(s) orally 2 times a day  risperiDONE 4 mg oral tablet: 1 tab(s) orally once a day  tamsulosin 0.4 mg oral capsule: 1 cap(s) orally once a day (at bedtime)

## 2024-08-21 NOTE — DISCHARGE NOTE PROVIDER - NSDCCPCAREPLAN_GEN_ALL_CORE_FT
PRINCIPAL DISCHARGE DIAGNOSIS  Diagnosis: Acute UTI  Assessment and Plan of Treatment: You were found to have a urinary tract infection. You completed course of antibiotics with resolution of symptoms.  A urinary catheter was placed as you were retaining urine.   Consider trial of void once more ambulatory.  Follow-up with your PCP and urologist for further management.  If any new or worsening symptoms, including fever, chills, chest pain, shortness of breath, nausea, vomit, abdominal pain, seek immediate medical attention.      SECONDARY DISCHARGE DIAGNOSES  Diagnosis: LONNIE (acute kidney injury)  Assessment and Plan of Treatment: Your initial labs showed acute kidney injury likely due to infection and poor oral intake.  It improved with intravenous hydration.  Stay well-hydrated.     PRINCIPAL DISCHARGE DIAGNOSIS  Diagnosis: Acute UTI  Assessment and Plan of Treatment: You were found to have a urinary tract infection. You completed course of antibiotics with resolution of symptoms.  A urinary catheter was placed as you were retaining urine.   Consider trial of void once more ambulatory.  Follow-up with your PCP and urologist for further management.  If any new or worsening symptoms, including fever, chills, chest pain, shortness of breath, nausea, vomit, abdominal pain, seek immediate medical attention.      SECONDARY DISCHARGE DIAGNOSES  Diagnosis: LONNIE (acute kidney injury)  Assessment and Plan of Treatment: Your initial labs showed acute kidney injury likely due to infection and poor oral intake.  It improved with intravenous hydration.  Stay well-hydrated.    Diagnosis: Hyponatremia  Assessment and Plan of Treatment: Presented with sodium level 126 ; unclear etiology  Serum Osm, Urine Osm ; suggestive of hypo-osmolar hyponatremia ; likely 2/2 poor PO intake  s/p 1L NS, 1L LR  Na improved.

## 2024-08-21 NOTE — DISCHARGE NOTE PROVIDER - HOSPITAL COURSE
HPI:  64M with PMHx HTN, HLD, DM, Gout, Schizophrenia, Hx SBO, chronic urinary retention (self-catheterizes), dysphagia 2/2 tardive dyskinesia, p/w foul smelling urine. Of note, patient recently hospitalized 8/8/24-8/13/24 for SBO, conservatively managed w bowel rest and NGT. Since discharge, patient and family has noticed foul smelling urine. In addition, reportedly, family has also noticed that since being discharged, patient has been, "unable to walk and care for himself at home." Family grew concerned, so had patient sent to [facility] ER for further evaluation. ER, c/f uti; admit to medicine for further mgmt  (15 Aug 2024 19:48)    Hospital Course:  Met SIRS initially with leukocytosis and tachypnea. Likely 2/2 UTI. UClx grew 50-99K E coli, sensitive to CTX. BClx showed NGTD. Patient received CTX x 6 days. He was afebrile, with resolution of leukocytosis. US Renal showed mild left hydronephrosis. Resolved right hydronephrosis. Barnes catheter was placed for urinary retention.  Initial labs also significant for hyponatremia and LONNIE - likely due to poor oral intake, sepsis. Both improved with intravenous fluids.    Patient symptomatically improved and hemodynamically stable for discharge to [facility].     Important Medication Changes and Reason:  Hold furosemide and potassium supplementation - patient appears euvolemic and still with poor appetite.     Active or Pending Issues Requiring Follow-up:  Follow-up with PCP, psychiatry and urology.    Advanced Directives:   [X] Full code  [ ] DNR  [ ] Hospice    Discharge Diagnoses:  Sepsis 2/2 UTI from intermittent self-catheterization  Functional Quadriplegia - PPSv2 30%  Hyponatremia  LONNIE due to prerenal       HPI:  64M with PMHx HTN, HLD, DM, Gout, Schizophrenia, Hx SBO, chronic urinary retention (self-catheterizes), dysphagia 2/2 tardive dyskinesia, p/w foul smelling urine. Of note, patient recently hospitalized 8/8/24-8/13/24 for SBO, conservatively managed w bowel rest and NGT. Since discharge, patient and family has noticed foul smelling urine. In addition, reportedly, family has also noticed that since being discharged, patient has been, "unable to walk and care for himself at home." Family grew concerned, so had patient sent to [facility] ER for further evaluation. ER, c/f uti; admit to medicine for further mgmt  (15 Aug 2024 19:48)    Hospital Course:  Met SIRS initially with leukocytosis and tachypnea. Likely 2/2 UTI. UClx grew 50-99K E coli, sensitive to CTX. BClx showed NGTD. Patient received CTX x 6 days. He was afebrile, with resolution of leukocytosis. US Renal showed mild left hydronephrosis. Resolved right hydronephrosis. Barnes catheter was placed for urinary retention.  Initial labs also significant for hyponatremia and LONNIE - likely due to poor oral intake, sepsis. Both improved with intravenous fluids.    Patient symptomatically improved and hemodynamically stable for discharge to [facility].     Important Medication Changes and Reason:  Hold furosemide and potassium supplementation - patient appears euvolemic and still with poor appetite.     Active or Pending Issues Requiring Follow-up:  Follow-up with PCP, psychiatry and urology.    Advanced Directives:   [X] Full code  [ ] DNR  [ ] Hospice    Discharge Diagnoses:  Sepsis 2/2 UTI from intermittent self-catheterization  Functional Quadriplegia - PPSv2 30%  Hyponatremia  LONNIE due to prerenal  Urinary retention.       HPI:  64M with PMHx HTN, HLD, DM, Gout, Schizophrenia, Hx SBO, chronic urinary retention (self-catheterizes), dysphagia 2/2 tardive dyskinesia, p/w foul smelling urine. Of note, patient recently hospitalized 8/8/24-8/13/24 for SBO, conservatively managed w bowel rest and NGT. Since discharge, patient and family has noticed foul smelling urine. In addition, reportedly, family has also noticed that since being discharged, patient has been, "unable to walk and care for himself at home." Family grew concerned, so had patient sent to [facility] ER for further evaluation. ER, c/f uti; admit to medicine for further mgmt  (15 Aug 2024 19:48)    Hospital Course:  Met SIRS initially with leukocytosis and tachypnea. Likely 2/2 UTI. UClx grew 50-99K E coli, sensitive to CTX. BClx showed NGTD. Patient received CTX x 6 days. He was afebrile, with resolution of leukocytosis. US Renal showed mild left hydronephrosis. Resolved right hydronephrosis. Barnes catheter was placed for urinary retention.  Initial labs also significant for hyponatremia and LONNIE - likely due to poor oral intake, sepsis. Both improved with intravenous fluids.    Patient symptomatically improved and hemodynamically stable for discharge to [facility].     Important Medication Changes and Reason:  Hold furosemide and potassium supplementation - patient appears euvolemic and still with poor appetite.     Active or Pending Issues Requiring Follow-up:  Follow-up with PCP, psychiatry and urology.    Advanced Directives:   [X] Full code  [ ] DNR  [ ] Hospice    Discharge Diagnoses:  Sepsis 2/2 UTI from intermittent self-catheterization  Functional Quadriplegia - PPSv2 30%  Hyponatremia  LONNIE due to prerenal  Urinary retention.  Type 2 DM with kidney complication (proteinuria)

## 2024-08-21 NOTE — DISCHARGE NOTE PROVIDER - NSDCFUSCHEDAPPT_GEN_ALL_CORE_FT
[facility]  [facility] [address]  Scheduled Appointment: 08/28/2024

## 2024-08-21 NOTE — DISCHARGE NOTE PROVIDER - NSDCQMSTROKE_NEU_ALL_CORE
Rx refill request   Last refill:  allopurinol (ZYLOPRIM) 300 MG tablet 90 tablet 3 5/17/2021     Sig - Route: Take 1 tablet by mouth daily        Last office visit:2/8/22  Future visit: 4/1/22    
No

## 2024-08-22 NOTE — ED PROVIDER NOTE - OBJECTIVE STATEMENT
Care Transitions Note    Initial Call - Call within 2 business days of discharge: Yes    Patient Current Location:  Home: [address] [place] [address] [place] [address]    LPN Care Coordinator contacted the family, patient's daughter  by telephone to perform post hospital discharge assessment, verified name and  as identifiers. Provided introduction to self, and explanation of the LPN Care Coordinator role.     Patient: Christi Andrade    Patient : 1927   MRN: [id number]    Reason for Admission: Symptomatic Bradycardia  Discharge Date: 24  RURS: No data recorded    Last Discharge Facility       Date Complaint Diagnosis Description Type Department Provider    24 Bradycardia Symptomatic bradycardia ... ED to Hosp-Admission (Discharged) (ADMITTED) [id number] Bari Morales MD; Tereso Patel...            Was this an external facility discharge? No    Additional needs identified to be addressed with provider   No needs identified             Method of communication with provider: none.    Patients top risk factors for readmission: medical condition-Symptomatic Bradycardia    Interventions to address risk factors:   Review of patient management of conditions/medications: diet, hydration, medication compliance , falls and follow up appointment.    Care Summary Note: Spoke with patient's daughter states patient is sleeping. Patient's daughter denies any c/o of chest pain, shortness of breath or heart racing.  Patients daughter denies any concerns with incision site.Patient's daughter states patient has all medication. Daughter states patient lives alone , but children stay with patient @ night time. Daughter states [facility] . Will follow up with patient's daughter for any new changes or concerns.    LPN Care Coordinator reviewed discharge instructions with family. The family was given an opportunity to ask questions; all questions answered at this time.. The  see mdm

## 2024-08-28 ENCOUNTER — APPOINTMENT (OUTPATIENT)
Dept: WOUND CARE | Facility: CLINIC | Age: 64
End: 2024-08-28

## 2024-09-16 NOTE — DIETITIAN INITIAL EVALUATION ADULT. - PROBLEM SELECTOR PLAN 3
Left subclavian venous access X 2 obtained using micropuncture needle. Wires placed and needle removed.    Patient with b/l severe hydronephrosis  per patient, he is scheduled for cystoscopy in July with his urologist  f/u as outpatient

## 2024-09-19 ENCOUNTER — NON-APPOINTMENT (OUTPATIENT)
Age: 64
End: 2024-09-19

## 2024-09-24 NOTE — ED PROVIDER NOTE - INTERNATIONAL TRAVEL
No 25yo female with chest pain and tightness and difficulty breathing.  She states her hands are sweating, she had a headache, and right pressur since Sunday, yesterday.  Pt has taken acetaminophen + caffeine for her headache without relief.

## 2024-10-03 ENCOUNTER — NON-APPOINTMENT (OUTPATIENT)
Age: 64
End: 2024-10-03

## 2024-10-03 ENCOUNTER — APPOINTMENT (OUTPATIENT)
Dept: UROLOGY | Facility: CLINIC | Age: 64
End: 2024-10-03
Payer: MEDICARE

## 2024-10-03 ENCOUNTER — OUTPATIENT (OUTPATIENT)
Dept: OUTPATIENT SERVICES | Facility: HOSPITAL | Age: 64
LOS: 1 days | End: 2024-10-03
Payer: MEDICARE

## 2024-10-03 DIAGNOSIS — Z98.890 OTHER SPECIFIED POSTPROCEDURAL STATES: Chronic | ICD-10-CM

## 2024-10-03 DIAGNOSIS — R35.0 FREQUENCY OF MICTURITION: ICD-10-CM

## 2024-10-03 DIAGNOSIS — N39.0 URINARY TRACT INFECTION, SITE NOT SPECIFIED: ICD-10-CM

## 2024-10-03 PROCEDURE — 51700 IRRIGATION OF BLADDER: CPT

## 2024-10-03 PROCEDURE — 99214 OFFICE O/P EST MOD 30 MIN: CPT | Mod: 25

## 2024-10-03 RX ORDER — AMIKACIN SULFATE 250 MG/ML
1 INJECTION, SOLUTION INTRAMUSCULAR; INTRAVENOUS
Refills: 0 | Status: COMPLETED | OUTPATIENT
Start: 2024-10-03

## 2024-10-03 RX ORDER — CIPROFLOXACIN HYDROCHLORIDE 500 MG/1
500 TABLET, FILM COATED ORAL
Refills: 0 | Status: COMPLETED | OUTPATIENT
Start: 2024-10-03

## 2024-10-03 RX ORDER — AMIKACIN SULFATE 250 MG/ML
1 INJECTION, SOLUTION INTRAMUSCULAR; INTRAVENOUS
Qty: 4 | Refills: 0 | Status: ACTIVE | OUTPATIENT
Start: 2024-10-03

## 2024-10-03 RX ADMIN — AMIKACIN SULFATE 0 GM/4ML: 250 INJECTION INTRAMUSCULAR; INTRAVENOUS at 00:00

## 2024-10-04 DIAGNOSIS — N39.0 URINARY TRACT INFECTION, SITE NOT SPECIFIED: ICD-10-CM

## 2024-10-04 NOTE — HISTORY OF PRESENT ILLNESS
[FreeTextEntry1] : Follow-up urine retention.  Patient has neurogenic bladder.  Chronic CIC. Patient was hospitalized for small bowel obstruction.  Required exploratory laparotomy. Patient developed UTI secondary to Barnes catheter placement.  Plan: Voiding trial.  Amikacin intravesical.  Cipro for prophylaxis.  Encourage CIC 4 times a day.   Please refer to URO Consult Note.

## 2024-10-04 NOTE — ADDENDUM
[FreeTextEntry1] : Entered by Danie Wall, acting as scribe for Dr. Dixon Gr. The documentation recorded by the scribe accurately reflects the service I personally performed and the decisions made by me.

## 2024-10-04 NOTE — LETTER BODY
[FreeTextEntry1] : Gilbert Bruno [address] [place] [address], [place], [place], [address] [phone]  Dear Dr. Bruno,  Reason for Visit: Chronic neurogenic bladder. recurrent UTI  This is a 64 year-old gentleman with chronic neurogenic bladder on clean intermittent catheterization and recurrent UTI. He has been self catheterizing for over 10 years. He performs CIC 4 times a day. CIC's were performed by his wife. His recent CT scan demonstrates mild hydronephrosis. His creatinine was stable. Patient with recent urinary tract infection.  He returns for follow-up. Since he was last seen, patient was hospitalized for small bowel obstruction which required exploratory laparotomy. Patient developed UTI secondary to Barnes catheter placement. His urine culture was positive for Pseudomonas.  He is on methenamine and vitamin C. Patient denies any gross hematuria or dysuria or urinary incontinence. The patient denies any aggravating or relieving factors. The patient denies any interference of function. The patient is entirely asymptomatic. All other review of systems are negative. He has no cancer in his family medical history. He has no previous surgical history. Past medical history, family history and social history were inquired and were noncontributory to current condition. The patient does not use tobacco or drink alcohol. Medications and allergies were reviewed. He has no known allergies to medication.  On examination, the patient is a obese-appearing gentleman in no acute distress. He is alert and oriented follows commands. He has normal mood and affect. He is normocephalic. Oral no thrush. Neck is supple. Respirations are unlabored. His abdomen is soft and nontender. Liver is nonpalpable. Bladder is nonpalpable. No CVA tenderness. Neurologically he is grossly intact. No peripheral edema. Skin without gross abnormality. He has normal male external genitalia. Normal meatus. Bilateral testes are descended intrascrotally and normal to palpation.   Patient was given a voiding trial today. The patient's bladder was filled with 50 cc of water with 1 g of amikacin for urinary prophylaxis. Patient was unable to void spontaneously.  Patient performs CIC in 6 hours as needed.    Assessment: Chronic neurogenic bladder. Recurrent UTI  I counseled the patient. In terms of his recurrent UTI, Patient developed UTI secondary to Barnes catheter. I recommended the patient continue methenamine and Vitamin C. I renewed the patient's prescription for methenamine and vitamin C today. I encouraged the patient to continue medications regularly as directed. Patient will also take intravesical Amikacin and Cipro for prophylaxis. I discussed the potential side effects of the medications. I counseled the patient on its use and side effects. If the patient develops any side effects, the patient will discontinue medication and contact me. In terms of his neurogenic bladder, patient performs CIC 4 times a day. CIC's are done by his wife. I encouraged him performing CIC x 4 as directed. He will also return for voiding trial to evaluate urinary output. The patient will follow-up as directed and will contact me with any questions or concerns. Thank you for the opportunity to participate in the care of this patient. I'll keep you updated on his progress.  Plan: Amikacin and Cipro. Continue methenamine and vitamin C. Continue CIC. Voiding trial. Follow up as directed.  I spent 30-minutes time today on all issues related to this patient on today date of service including non face to face time.

## 2024-10-04 NOTE — LETTER BODY
[FreeTextEntry1] : Gilbert Bruno [address] [place] [address], [place], [place], [address] [phone]  Dear Dr. Bruno,  Reason for Visit: Chronic neurogenic bladder. recurrent UTI  This is a 64 year-old gentleman with chronic neurogenic bladder on clean intermittent catheterization and recurrent UTI. He has been self catheterizing for over 10 years. He performs CIC 4 times a day. CIC's were performed by his wife. His recent CT scan demonstrates mild hydronephrosis. His creatinine was stable. Patient with recent urinary tract infection.  He returns for follow-up. Since he was last seen, patient was hospitalized for small bowel obstruction which required exploratory laparotomy. Patient developed UTI secondary to Barnes catheter placement. His urine culture was positive for Pseudomonas.  He is on methenamine and vitamin C. Patient denies any gross hematuria or dysuria or urinary incontinence. The patient denies any aggravating or relieving factors. The patient denies any interference of function. The patient is entirely asymptomatic. All other review of systems are negative. He has no cancer in his family medical history. He has no previous surgical history. Past medical history, family history and social history were inquired and were noncontributory to current condition. The patient does not use tobacco or drink alcohol. Medications and allergies were reviewed. He has no known allergies to medication.  On examination, the patient is a obese-appearing gentleman in no acute distress. He is alert and oriented follows commands. He has normal mood and affect. He is normocephalic. Oral no thrush. Neck is supple. Respirations are unlabored. His abdomen is soft and nontender. Liver is nonpalpable. Bladder is nonpalpable. No CVA tenderness. Neurologically he is grossly intact. No peripheral edema. Skin without gross abnormality. He has normal male external genitalia. Normal meatus. Bilateral testes are descended intrascrotally and normal to palpation.   Patient was given a voiding trial today. The patient's bladder was filled with 50 cc of water with 1 g of amikacin for urinary prophylaxis. Patient was unable to void spontaneously.  Patient performs CIC in 6 hours as needed.    Assessment: Chronic neurogenic bladder. Recurrent UTI  I counseled the patient. In terms of his recurrent UTI, Patient developed UTI secondary to Barnes catheter. I recommended the patient continue methenamine and Vitamin C. I renewed the patient's prescription for methenamine and vitamin C today. I encouraged the patient to continue medications regularly as directed. Patient will also take intravesical Amikacin and Cipro for prophylaxis. I discussed the potential side effects of the medications. I counseled the patient on its use and side effects. If the patient develops any side effects, the patient will discontinue medication and contact me. In terms of his neurogenic bladder, patient performs CIC 4 times a day. CIC's are done by his wife. I encouraged him performing CIC x 4 as directed. He will also return for voiding trial to evaluate urinary output. The patient will follow-up as directed and will contact me with any questions or concerns. Thank you for the opportunity to participate in the care of this patient. I'll keep you updated on his progress.  Plan: Amikacin and Cipro. Continue methenamine and vitamin C. Continue CIC. Voiding trial. Follow up as directed.  I spent 30-minutes time today on all issues related to this patient on today date of service including non face to face time.

## 2024-10-09 ENCOUNTER — APPOINTMENT (OUTPATIENT)
Dept: GASTROENTEROLOGY | Facility: CLINIC | Age: 64
End: 2024-10-09
Payer: MEDICARE

## 2024-10-09 VITALS
HEIGHT: 72 IN | DIASTOLIC BLOOD PRESSURE: 67 MMHG | RESPIRATION RATE: 16 BRPM | WEIGHT: 285 LBS | BODY MASS INDEX: 38.6 KG/M2 | OXYGEN SATURATION: 95 % | HEART RATE: 71 BPM | SYSTOLIC BLOOD PRESSURE: 114 MMHG

## 2024-10-09 DIAGNOSIS — K59.00 CONSTIPATION, UNSPECIFIED: ICD-10-CM

## 2024-10-09 PROCEDURE — 99213 OFFICE O/P EST LOW 20 MIN: CPT

## 2024-10-09 RX ORDER — LINACLOTIDE 145 UG/1
145 CAPSULE, GELATIN COATED ORAL
Qty: 30 | Refills: 5 | Status: ACTIVE | COMMUNITY
Start: 2024-10-09 | End: 1900-01-01

## 2024-11-10 NOTE — ED ADULT NURSE NOTE - CAS EDP DISCH TYPE
CAPRINI SCORE [CLOT] Score on Admission for     AGE RELATED RISK FACTORS                                                       MOBILITY RELATED FACTORS  [ ] Age 41-60 years                                            (1 Point)                  [x ] Bed rest                                                        (1 Point)  [x ] Age: 61-74 years                                           (2 Points)                 [ ] Plaster cast                                                   (2 Points)  [ ] Age= 75 years                                              (3 Points)                 [x ] Bed bound for more than 72 hours                 (2 Points)    DISEASE RELATED RISK FACTORS                                               GENDER SPECIFIC FACTORS  [ ] Edema in the lower extremities                       (1 Point)                  [ ] Pregnancy                                                     (1 Point)  [ ] Varicose veins                                               (1 Point)                  [ ] Post-partum < 6 weeks                                   (1 Point)             [x ] BMI > 25 Kg/m2                                            (1 Point)                  [ ] Hormonal therapy  or oral contraception          (1 Point)                 [ ] Sepsis (in the previous month)                        (1 Point)                  [ ] History of pregnancy complications                 (1 point)  [ ] Pneumonia or serious lung disease                                               [ ] Unexplained or recurrent                     (1 Point)           (in the previous month)                               (1 Point)  [x ] Abnormal pulmonary function test                     (1 Point)                 SURGERY RELATED RISK FACTORS (include planned surgeries)  [ ] Acute myocardial infarction                              (1 Point)                 [ ]  Section                                             (1 Point)  [ ] Congestive heart failure (in the previous month)  (1 Point)         [ ] Minor surgery                                                  (1 Point)   [ ] Inflammatory bowel disease                             (1 Point)                 [ ] Arthroscopic surgery                                        (2 Points)  [ ] Central venous access                                      (2 Points)                [ ] General surgery lasting more than 45 minutes   (2 Points)       [ ] Stroke (in the previous month)                          (5 Points)               [ ] Elective arthroplasty                                         (5 Points)            [ ] current or past malignancy                              (2 Points)                                                                                                       HEMATOLOGY RELATED FACTORS                                                 TRAUMA RELATED RISK FACTORS  [ ] Prior episodes of VTE                                     (3 Points)                [ ] Fracture of the hip, pelvis, or leg                       (5 Points)  [ ] Positive family history for VTE                         (3 Points)                 [ ] Acute spinal cord injury (in the previous month)  (5 Points)  [ ] Prothrombin 32706 A                                     (3 Points)                 [ ] Paralysis  (less than 1 month)                             (5 Points)  [ ] Factor V Leiden                                             (3 Points)                  [ ] Multiple Trauma within 1 month                        (5 Points)  [ ] Lupus anticoagulants                                     (3 Points)                                                           [ ] Anticardiolipin antibodies                               (3 Points)                                                       [ ] High homocysteine in the blood                      (3 Points)                                             [ ] Other congenital or acquired thrombophilia      (3 Points)                                                [ ] Heparin induced thrombocytopenia                  (3 Points)                                          Total Score [     7     ]    Risk:  Very low 0   Low 1 to 2   Moderate 3 to 4   High =5       VTE Prophylasix Recommednations:  [ ] mechanical pneumatic compression devices                                      [ ] contraindicated: _____________________  [ ] chemo prophylasix                                                                                   [ ] contraindicated _____________________    **** HIGH LIKELIHOOD DVT PRESENT ON ADMISSION  [ ] (please order LE dopplers within 24 hours of admission) CAPRINI SCORE [CLOT] Score on Admission for     AGE RELATED RISK FACTORS                                                       MOBILITY RELATED FACTORS  [ ] Age 41-60 years                                            (1 Point)                  [x ] Bed rest                                                        (1 Point)  [x ] Age: 61-74 years                                           (2 Points)                 [ ] Plaster cast                                                   (2 Points)  [ ] Age= 75 years                                              (3 Points)                 [x ] Bed bound for more than 72 hours                 (2 Points)    DISEASE RELATED RISK FACTORS                                               GENDER SPECIFIC FACTORS  [ ] Edema in the lower extremities                       (1 Point)                  [ ] Pregnancy                                                     (1 Point)  [ ] Varicose veins                                               (1 Point)                  [ ] Post-partum < 6 weeks                                   (1 Point)             [x ] BMI > 25 Kg/m2                                            (1 Point)                  [ ] Hormonal therapy  or oral contraception          (1 Point)                 [ ] Sepsis (in the previous month)                        (1 Point)                  [ ] History of pregnancy complications                 (1 point)  [ ] Pneumonia or serious lung disease                                               [ ] Unexplained or recurrent                     (1 Point)           (in the previous month)                               (1 Point)  [x ] Abnormal pulmonary function test                     (1 Point)                 SURGERY RELATED RISK FACTORS (include planned surgeries)  [ ] Acute myocardial infarction                              (1 Point)                 [ ]  Section                                             (1 Point)  [ ] Congestive heart failure (in the previous month)  (1 Point)         [ ] Minor surgery                                                  (1 Point)   [ ] Inflammatory bowel disease                             (1 Point)                 [ ] Arthroscopic surgery                                        (2 Points)  [ ] Central venous access                                      (2 Points)                [ ] General surgery lasting more than 45 minutes   (2 Points)       [ ] Stroke (in the previous month)                          (5 Points)               [ ] Elective arthroplasty                                         (5 Points)            [ ] current or past malignancy                              (2 Points)                                                                                                       HEMATOLOGY RELATED FACTORS                                                 TRAUMA RELATED RISK FACTORS  [ ] Prior episodes of VTE                                     (3 Points)                [ ] Fracture of the hip, pelvis, or leg                       (5 Points)  [ ] Positive family history for VTE                         (3 Points)                 [ ] Acute spinal cord injury (in the previous month)  (5 Points)  [ ] Prothrombin 49464 A                                     (3 Points)                 [ ] Paralysis  (less than 1 month)                             (5 Points)  [ ] Factor V Leiden                                             (3 Points)                  [ ] Multiple Trauma within 1 month                        (5 Points)  [ ] Lupus anticoagulants                                     (3 Points)                                                           [ ] Anticardiolipin antibodies                               (3 Points)                                                       [ ] High homocysteine in the blood                      (3 Points)                                             [ ] Other congenital or acquired thrombophilia      (3 Points)                                                [ ] Heparin induced thrombocytopenia                  (3 Points)                                          Total Score [     7     ]    Risk:  Very low 0   Low 1 to 2   Moderate 3 to 4   High =5       VTE Prophylasix Recommednations:  [xx ] mechanical pneumatic compression devices                                      [ ] contraindicated: _____________________  [ ] chemo prophylasix                                                                                   [x] contraindicated _____________________    **** HIGH LIKELIHOOD DVT PRESENT ON ADMISSION  [ ] (please order LE dopplers within 24 hours of admission) Home

## 2024-11-20 PROCEDURE — 51700 IRRIGATION OF BLADDER: CPT

## 2024-11-20 PROCEDURE — 96372 THER/PROPH/DIAG INJ SC/IM: CPT

## 2024-12-04 ENCOUNTER — APPOINTMENT (OUTPATIENT)
Dept: GASTROENTEROLOGY | Facility: CLINIC | Age: 64
End: 2024-12-04
Payer: MEDICARE

## 2024-12-04 VITALS
SYSTOLIC BLOOD PRESSURE: 128 MMHG | RESPIRATION RATE: 16 BRPM | HEIGHT: 72 IN | OXYGEN SATURATION: 98 % | HEART RATE: 87 BPM | BODY MASS INDEX: 39.01 KG/M2 | WEIGHT: 288 LBS | DIASTOLIC BLOOD PRESSURE: 70 MMHG

## 2024-12-04 DIAGNOSIS — K59.00 CONSTIPATION, UNSPECIFIED: ICD-10-CM

## 2024-12-04 PROCEDURE — 99213 OFFICE O/P EST LOW 20 MIN: CPT

## 2024-12-04 RX ORDER — LINACLOTIDE 290 UG/1
290 CAPSULE, GELATIN COATED ORAL
Qty: 30 | Refills: 4 | Status: ACTIVE | COMMUNITY
Start: 2024-12-04 | End: 1900-01-01

## 2025-01-09 ENCOUNTER — APPOINTMENT (OUTPATIENT)
Dept: ORTHOPEDIC SURGERY | Facility: CLINIC | Age: 65
End: 2025-01-09
Payer: MEDICARE

## 2025-01-09 VITALS
WEIGHT: 290 LBS | BODY MASS INDEX: 39.28 KG/M2 | SYSTOLIC BLOOD PRESSURE: 154 MMHG | HEIGHT: 72 IN | DIASTOLIC BLOOD PRESSURE: 93 MMHG | HEART RATE: 90 BPM

## 2025-01-09 DIAGNOSIS — M17.11 UNILATERAL PRIMARY OSTEOARTHRITIS, RIGHT KNEE: ICD-10-CM

## 2025-01-09 DIAGNOSIS — M17.12 UNILATERAL PRIMARY OSTEOARTHRITIS, LEFT KNEE: ICD-10-CM

## 2025-01-09 PROCEDURE — 73564 X-RAY EXAM KNEE 4 OR MORE: CPT | Mod: LT

## 2025-01-09 PROCEDURE — 99204 OFFICE O/P NEW MOD 45 MIN: CPT

## 2025-01-13 ENCOUNTER — APPOINTMENT (OUTPATIENT)
Dept: ORTHOPEDIC SURGERY | Facility: CLINIC | Age: 65
End: 2025-01-13

## 2025-01-24 ENCOUNTER — RX RENEWAL (OUTPATIENT)
Age: 65
End: 2025-01-24

## 2025-01-24 ENCOUNTER — APPOINTMENT (OUTPATIENT)
Dept: ORTHOPEDIC SURGERY | Facility: CLINIC | Age: 65
End: 2025-01-24

## 2025-01-28 ENCOUNTER — APPOINTMENT (OUTPATIENT)
Dept: ORTHOPEDIC SURGERY | Facility: CLINIC | Age: 65
End: 2025-01-28
Payer: MEDICARE

## 2025-01-28 DIAGNOSIS — M17.11 UNILATERAL PRIMARY OSTEOARTHRITIS, RIGHT KNEE: ICD-10-CM

## 2025-01-28 DIAGNOSIS — M17.12 UNILATERAL PRIMARY OSTEOARTHRITIS, LEFT KNEE: ICD-10-CM

## 2025-01-28 PROCEDURE — 99213 OFFICE O/P EST LOW 20 MIN: CPT

## 2025-02-01 ENCOUNTER — INPATIENT (INPATIENT)
Facility: HOSPITAL | Age: 65
LOS: 4 days | Discharge: HOME | DRG: 872 | End: 2025-02-06
Attending: INTERNAL MEDICINE | Admitting: INTERNAL MEDICINE
Payer: MEDICARE

## 2025-02-01 VITALS
DIASTOLIC BLOOD PRESSURE: 115 MMHG | OXYGEN SATURATION: 94 % | RESPIRATION RATE: 20 BRPM | TEMPERATURE: 100 F | SYSTOLIC BLOOD PRESSURE: 189 MMHG | HEART RATE: 99 BPM | WEIGHT: 300.05 LBS | HEIGHT: 73 IN

## 2025-02-01 DIAGNOSIS — Z98.890 OTHER SPECIFIED POSTPROCEDURAL STATES: Chronic | ICD-10-CM

## 2025-02-01 DIAGNOSIS — A41.9 SEPSIS, UNSPECIFIED ORGANISM: ICD-10-CM

## 2025-02-01 LAB
ALBUMIN SERPL ELPH-MCNC: 3.5 G/DL — SIGNIFICANT CHANGE UP (ref 3.3–5)
ALP SERPL-CCNC: 67 U/L — SIGNIFICANT CHANGE UP (ref 40–120)
ALT FLD-CCNC: 10 U/L — SIGNIFICANT CHANGE UP (ref 10–45)
ANION GAP SERPL CALC-SCNC: 14 MMOL/L — SIGNIFICANT CHANGE UP (ref 5–17)
APPEARANCE UR: ABNORMAL
APTT BLD: 31.8 SEC — SIGNIFICANT CHANGE UP (ref 24.5–35.6)
AST SERPL-CCNC: 17 U/L — SIGNIFICANT CHANGE UP (ref 10–40)
BACTERIA # UR AUTO: ABNORMAL /HPF
BASOPHILS # BLD AUTO: 0 K/UL — SIGNIFICANT CHANGE UP (ref 0–0.2)
BASOPHILS NFR BLD AUTO: 0 % — SIGNIFICANT CHANGE UP (ref 0–2)
BILIRUB SERPL-MCNC: 0.7 MG/DL — SIGNIFICANT CHANGE UP (ref 0.2–1.2)
BILIRUB UR-MCNC: NEGATIVE — SIGNIFICANT CHANGE UP
BUN SERPL-MCNC: 16 MG/DL — SIGNIFICANT CHANGE UP (ref 7–23)
CALCIUM SERPL-MCNC: 9.4 MG/DL — SIGNIFICANT CHANGE UP (ref 8.4–10.5)
CAST: 0 /LPF — SIGNIFICANT CHANGE UP (ref 0–4)
CHLORIDE SERPL-SCNC: 97 MMOL/L — SIGNIFICANT CHANGE UP (ref 96–108)
CO2 SERPL-SCNC: 25 MMOL/L — SIGNIFICANT CHANGE UP (ref 22–31)
COLOR SPEC: YELLOW — SIGNIFICANT CHANGE UP
CREAT SERPL-MCNC: 0.94 MG/DL — SIGNIFICANT CHANGE UP (ref 0.5–1.3)
DIFF PNL FLD: ABNORMAL
EGFR: 92 ML/MIN/1.73M2 — SIGNIFICANT CHANGE UP
EOSINOPHIL # BLD AUTO: 0.16 K/UL — SIGNIFICANT CHANGE UP (ref 0–0.5)
EOSINOPHIL NFR BLD AUTO: 0.9 % — SIGNIFICANT CHANGE UP (ref 0–6)
FLUAV AG NPH QL: SIGNIFICANT CHANGE UP
FLUBV AG NPH QL: SIGNIFICANT CHANGE UP
GAS PNL BLDV: SIGNIFICANT CHANGE UP
GLUCOSE BLDC GLUCOMTR-MCNC: 112 MG/DL — HIGH (ref 70–99)
GLUCOSE BLDC GLUCOMTR-MCNC: 112 MG/DL — HIGH (ref 70–99)
GLUCOSE SERPL-MCNC: 168 MG/DL — HIGH (ref 70–99)
GLUCOSE UR QL: NEGATIVE MG/DL — SIGNIFICANT CHANGE UP
HCT VFR BLD CALC: 40.7 % — SIGNIFICANT CHANGE UP (ref 39–50)
HGB BLD-MCNC: 13.5 G/DL — SIGNIFICANT CHANGE UP (ref 13–17)
INR BLD: 1.05 RATIO — SIGNIFICANT CHANGE UP (ref 0.85–1.16)
KETONES UR-MCNC: NEGATIVE MG/DL — SIGNIFICANT CHANGE UP
LACTATE SERPL-SCNC: 1.5 MMOL/L — SIGNIFICANT CHANGE UP (ref 0.5–2)
LEUKOCYTE ESTERASE UR-ACNC: ABNORMAL
LYMPHOCYTES # BLD AUTO: 1.38 K/UL — SIGNIFICANT CHANGE UP (ref 1–3.3)
LYMPHOCYTES # BLD AUTO: 7.8 % — LOW (ref 13–44)
MCHC RBC-ENTMCNC: 29.5 PG — SIGNIFICANT CHANGE UP (ref 27–34)
MCHC RBC-ENTMCNC: 33.2 G/DL — SIGNIFICANT CHANGE UP (ref 32–36)
MCV RBC AUTO: 89.1 FL — SIGNIFICANT CHANGE UP (ref 80–100)
MONOCYTES # BLD AUTO: 1.08 K/UL — HIGH (ref 0–0.9)
MONOCYTES NFR BLD AUTO: 6.1 % — SIGNIFICANT CHANGE UP (ref 2–14)
NEUTROPHILS # BLD AUTO: 15.05 K/UL — HIGH (ref 1.8–7.4)
NEUTROPHILS NFR BLD AUTO: 80.9 % — HIGH (ref 43–77)
NITRITE UR-MCNC: POSITIVE
PH UR: 7 — SIGNIFICANT CHANGE UP (ref 5–8)
PLATELET # BLD AUTO: 203 K/UL — SIGNIFICANT CHANGE UP (ref 150–400)
POTASSIUM SERPL-MCNC: 4.1 MMOL/L — SIGNIFICANT CHANGE UP (ref 3.5–5.3)
POTASSIUM SERPL-SCNC: 4.1 MMOL/L — SIGNIFICANT CHANGE UP (ref 3.5–5.3)
PROT SERPL-MCNC: 6.7 G/DL — SIGNIFICANT CHANGE UP (ref 6–8.3)
PROT UR-MCNC: >=1000 MG/DL
PROTHROM AB SERPL-ACNC: 12.1 SEC — SIGNIFICANT CHANGE UP (ref 9.9–13.4)
RBC # BLD: 4.57 M/UL — SIGNIFICANT CHANGE UP (ref 4.2–5.8)
RBC # FLD: 13.7 % — SIGNIFICANT CHANGE UP (ref 10.3–14.5)
RBC CASTS # UR COMP ASSIST: 302 /HPF — HIGH (ref 0–4)
REVIEW: SIGNIFICANT CHANGE UP
RSV RNA NPH QL NAA+NON-PROBE: SIGNIFICANT CHANGE UP
SARS-COV-2 RNA SPEC QL NAA+PROBE: SIGNIFICANT CHANGE UP
SODIUM SERPL-SCNC: 136 MMOL/L — SIGNIFICANT CHANGE UP (ref 135–145)
SP GR SPEC: 1.01 — SIGNIFICANT CHANGE UP (ref 1–1.03)
SQUAMOUS # UR AUTO: 1 /HPF — SIGNIFICANT CHANGE UP (ref 0–5)
UROBILINOGEN FLD QL: 0.2 MG/DL — SIGNIFICANT CHANGE UP (ref 0.2–1)
WBC # BLD: 17.66 K/UL — HIGH (ref 3.8–10.5)
WBC # FLD AUTO: 17.66 K/UL — HIGH (ref 3.8–10.5)
WBC UR QL: >998 /HPF — HIGH (ref 0–5)

## 2025-02-01 PROCEDURE — 71045 X-RAY EXAM CHEST 1 VIEW: CPT | Mod: 26

## 2025-02-01 PROCEDURE — 99285 EMERGENCY DEPT VISIT HI MDM: CPT | Mod: FS

## 2025-02-01 PROCEDURE — G0545: CPT

## 2025-02-01 PROCEDURE — 99223 1ST HOSP IP/OBS HIGH 75: CPT

## 2025-02-01 RX ORDER — BETHANECHOL CHLORIDE 5 MG
50 TABLET ORAL
Refills: 0 | Status: DISCONTINUED | OUTPATIENT
Start: 2025-02-01 | End: 2025-02-06

## 2025-02-01 RX ORDER — DIVALPROEX SODIUM 500 MG
500 TABLET, DELAYED RELEASE (ENTERIC COATED) ORAL
Refills: 0 | Status: DISCONTINUED | OUTPATIENT
Start: 2025-02-01 | End: 2025-02-06

## 2025-02-01 RX ORDER — RISPERIDONE 3 MG
6 TABLET ORAL DAILY
Refills: 0 | Status: DISCONTINUED | OUTPATIENT
Start: 2025-02-01 | End: 2025-02-06

## 2025-02-01 RX ORDER — LINACLOTIDE 290 UG/1
1 CAPSULE, GELATIN COATED ORAL
Refills: 0 | DISCHARGE

## 2025-02-01 RX ORDER — CEFEPIME HCL 1 G
2000 IV SOLUTION, PIGGYBACK, BOTTLE (EA) INTRAVENOUS EVERY 8 HOURS
Refills: 0 | Status: DISCONTINUED | OUTPATIENT
Start: 2025-02-01 | End: 2025-02-03

## 2025-02-01 RX ORDER — ENALAPRIL MALEATE 20 MG
20 TABLET ORAL DAILY
Refills: 0 | Status: DISCONTINUED | OUTPATIENT
Start: 2025-02-01 | End: 2025-02-06

## 2025-02-01 RX ORDER — ALLOPURINOL 300 MG
100 TABLET ORAL
Refills: 0 | Status: DISCONTINUED | OUTPATIENT
Start: 2025-02-01 | End: 2025-02-06

## 2025-02-01 RX ORDER — NIFEDIPINE 90 MG/1
60 TABLET, FILM COATED, EXTENDED RELEASE ORAL DAILY
Refills: 0 | Status: DISCONTINUED | OUTPATIENT
Start: 2025-02-01 | End: 2025-02-06

## 2025-02-01 RX ORDER — FLUOXETINE HCL 10 MG
20 CAPSULE ORAL DAILY
Refills: 0 | Status: DISCONTINUED | OUTPATIENT
Start: 2025-02-01 | End: 2025-02-06

## 2025-02-01 RX ORDER — ROSUVASTATIN CALCIUM 10 MG/1
5 TABLET, FILM COATED ORAL AT BEDTIME
Refills: 0 | Status: DISCONTINUED | OUTPATIENT
Start: 2025-02-01 | End: 2025-02-06

## 2025-02-01 RX ORDER — BACTERIOSTATIC SODIUM CHLORIDE 0.9 %
1000 VIAL (ML) INJECTION ONCE
Refills: 0 | Status: COMPLETED | OUTPATIENT
Start: 2025-02-01 | End: 2025-02-01

## 2025-02-01 RX ORDER — TAMSULOSIN HYDROCHLORIDE 0.4 MG/1
0.4 CAPSULE ORAL AT BEDTIME
Refills: 0 | Status: DISCONTINUED | OUTPATIENT
Start: 2025-02-01 | End: 2025-02-06

## 2025-02-01 RX ORDER — VALBENAZINE 40 MG/1
1 CAPSULE ORAL
Refills: 0 | DISCHARGE

## 2025-02-01 RX ORDER — CARVEDILOL 6.25 MG
12.5 TABLET ORAL EVERY 12 HOURS
Refills: 0 | Status: DISCONTINUED | OUTPATIENT
Start: 2025-02-01 | End: 2025-02-06

## 2025-02-01 RX ORDER — HEPARIN SODIUM,PORCINE 10000/ML
5000 VIAL (ML) INJECTION EVERY 12 HOURS
Refills: 0 | Status: DISCONTINUED | OUTPATIENT
Start: 2025-02-01 | End: 2025-02-06

## 2025-02-01 RX ORDER — BACTERIOSTATIC SODIUM CHLORIDE 0.9 %
1000 VIAL (ML) INJECTION
Refills: 0 | Status: DISCONTINUED | OUTPATIENT
Start: 2025-02-01 | End: 2025-02-06

## 2025-02-01 RX ORDER — MONTELUKAST SODIUM 5 MG/1
10 TABLET, CHEWABLE ORAL AT BEDTIME
Refills: 0 | Status: DISCONTINUED | OUTPATIENT
Start: 2025-02-01 | End: 2025-02-06

## 2025-02-01 RX ORDER — CEFTRIAXONE 250 MG/1
1000 INJECTION, POWDER, FOR SOLUTION INTRAMUSCULAR; INTRAVENOUS EVERY 24 HOURS
Refills: 0 | Status: DISCONTINUED | OUTPATIENT
Start: 2025-02-02 | End: 2025-02-01

## 2025-02-01 RX ORDER — LINACLOTIDE 290 UG/1
290 CAPSULE, GELATIN COATED ORAL
Refills: 0 | Status: DISCONTINUED | OUTPATIENT
Start: 2025-02-01 | End: 2025-02-06

## 2025-02-01 RX ORDER — POTASSIUM CHLORIDE 750 MG/1
1 TABLET, EXTENDED RELEASE ORAL
Refills: 0 | DISCHARGE

## 2025-02-01 RX ORDER — RISPERIDONE 3 MG
1.5 TABLET ORAL DAILY
Refills: 0 | Status: DISCONTINUED | OUTPATIENT
Start: 2025-02-01 | End: 2025-02-01

## 2025-02-01 RX ORDER — SEMAGLUTIDE 0.25 MG/.5ML
0.5 INJECTION, SOLUTION SUBCUTANEOUS
Refills: 0 | DISCHARGE

## 2025-02-01 RX ORDER — CEFTRIAXONE 250 MG/1
1000 INJECTION, POWDER, FOR SOLUTION INTRAMUSCULAR; INTRAVENOUS ONCE
Refills: 0 | Status: COMPLETED | OUTPATIENT
Start: 2025-02-01 | End: 2025-02-01

## 2025-02-01 RX ADMIN — CEFTRIAXONE 100 MILLIGRAM(S): 250 INJECTION, POWDER, FOR SOLUTION INTRAMUSCULAR; INTRAVENOUS at 11:03

## 2025-02-01 RX ADMIN — Medication 50 MILLIGRAM(S): at 20:03

## 2025-02-01 RX ADMIN — ROSUVASTATIN CALCIUM 5 MILLIGRAM(S): 10 TABLET, FILM COATED ORAL at 22:32

## 2025-02-01 RX ADMIN — Medication 100 MILLIGRAM(S): at 22:32

## 2025-02-01 RX ADMIN — Medication 5000 UNIT(S): at 18:52

## 2025-02-01 RX ADMIN — Medication 100 MILLIGRAM(S): at 18:53

## 2025-02-01 RX ADMIN — Medication 50 MILLIGRAM(S): at 22:31

## 2025-02-01 RX ADMIN — Medication 500 MILLIGRAM(S): at 18:53

## 2025-02-01 RX ADMIN — Medication 1000 MILLILITER(S): at 08:51

## 2025-02-01 RX ADMIN — Medication 75 MILLILITER(S): at 18:53

## 2025-02-01 RX ADMIN — Medication 12.5 MILLIGRAM(S): at 18:53

## 2025-02-01 RX ADMIN — Medication 6 MILLIGRAM(S): at 22:32

## 2025-02-01 RX ADMIN — MONTELUKAST SODIUM 10 MILLIGRAM(S): 5 TABLET, CHEWABLE ORAL at 22:32

## 2025-02-01 RX ADMIN — TAMSULOSIN HYDROCHLORIDE 0.4 MILLIGRAM(S): 0.4 CAPSULE ORAL at 22:32

## 2025-02-01 NOTE — ED PROVIDER NOTE - PHYSICAL EXAMINATION
Gen: A&Ox3 drowsy, needs ashlyn  Head: NCAT  HEENT: EOMI, oral mucosa moist, normal conjunctiva  Lung: CTAB, no respiratory distress, no wheezes/rhonchi/rales B/L, speaking in full sentences  CV: RRR, no murmurs  Abd: distended, soft, nontender, no guarding,   MSK: no visible deformities  Neuro: Appears non focal  Skin: Warm, well perfused, no rash

## 2025-02-01 NOTE — ED PROVIDER NOTE - PROGRESS NOTE DETAILS
Morgan Colletta NP-  d/w spouse CameronDane  yesterday had urinary urgency. Frequently had to self catheterize. Went to Urgent care and was told he did not have a UTI. Spouse states last night he had a fever, was weak and had trouble getting up from the toilet. Morgan Colletta NP- Leukocytosis noted, positive UTI, per last ucx sensitive to ceftriaxone,  ABX ordered plan for admission. Morgan Colletta NP- Leukocytosis noted, positive UTI, per last ucx sensitive to ceftriaxone,  ABX ordered plan for admission. Family updated

## 2025-02-01 NOTE — ED ADULT NURSE NOTE - OBJECTIVE STATEMENT
64y male w/ pmh of DM, HTN, schizophrenia presents with fever from home. Pt BIBEMS; pt states "I have been feeling sick the past two days. I think I have a urinary tract infection." Pt is unable to specify why he thinks this on assessment. Pt is AOx3, unable to provide adequate history at this time. Pt soled in urine on arrival; Pt cleaned and changed into hospital gown. Pt endorses fever, chills, denies abdominal pain, nausea, vomiting, diarrhea. Pt endorsing left shoulder pain x2 weeks.

## 2025-02-01 NOTE — H&P ADULT - ASSESSMENT
63 y/o M PMH HTN, HLD, DM, Gout, Schizophrenia, Hx SBO, chronic urinary retention (self-catheterizes), dysphagia 2/2 tardive dyskinesia Brought in by EMS from home for urinary urgency.    Patient  self catheterizes, over the past couple days has had to do it more frequently    pt w/ leukocytosis and urosepsis   iv abs  id eval  f/u cultures  c/w outpt meds  dvt proph

## 2025-02-01 NOTE — ED ADULT NURSE NOTE - NSFALLRISKINTERV_ED_ALL_ED

## 2025-02-01 NOTE — H&P ADULT - NSHPLABSRESULTS_GEN_ALL_CORE
13.5   17.66 )-----------( 203      ( 2025 09:09 )             40.7       02-    136  |  97  |  16  ----------------------------<  168[H]  4.1   |  25  |  0.94    Ca    9.4      2025 09:09    TPro  6.7  /  Alb  3.5  /  TBili  0.7  /  DBili  x   /  AST  17  /  ALT  10  /  AlkPhos  67  02-              Urinalysis Basic - ( 2025 09:36 )    Color: Yellow / Appearance: Turbid / S.014 / pH: x  Gluc: x / Ketone: Negative mg/dL  / Bili: Negative / Urobili: 0.2 mg/dL   Blood: x / Protein: >=1000 mg/dL / Nitrite: Positive   Leuk Esterase: Large / RBC: 302 /HPF / WBC >998 /HPF   Sq Epi: x / Non Sq Epi: 1 /HPF / Bacteria: Few /HPF        PT/INR - ( 2025 09:09 )   PT: 12.1 sec;   INR: 1.05 ratio         PTT - ( 2025 09:09 )  PTT:31.8 sec    Lactate Trend   @ 10:20 Lactate:1.5             CAPILLARY BLOOD GLUCOSE

## 2025-02-01 NOTE — H&P ADULT - HISTORY OF PRESENT ILLNESS
63 y/o M PMH HTN, HLD, DM, Gout, Schizophrenia, Hx SBO, chronic urinary retention (self-catheterizes), dysphagia 2/2 tardive dyskinesia Brought in by EMS from home for urinary urgency.    Patient  self catheterizes, over the past couple days has had to do it more frequently

## 2025-02-01 NOTE — ED PROVIDER NOTE - CLINICAL SUMMARY MEDICAL DECISION MAKING FREE TEXT BOX
63 y/o M PMH HTN, HLD, DM, Gout, Schizophrenia, Hx SBO, chronic urinary retention (self-catheterizes), dysphagia 2/2 tardive dyskinesia Brought in by EMS from home for urinary urgency.  PE as above patient meeting sepsis criteria, likely urosepsis, sepsis order set initiated, TBA 65 y/o M PMH HTN, HLD, DM, Gout, Schizophrenia, Hx SBO, chronic urinary retention (self-catheterizes), dysphagia 2/2 tardive dyskinesia Brought in by EMS from home for urinary urgency.  PE as above patient meeting sepsis criteria, likely urosepsis, sepsis order set initiated, TBA    AR Morgan MD: Agree with resident/ACP MDM, assessment and plan as above.

## 2025-02-01 NOTE — CONSULT NOTE ADULT - SUBJECTIVE AND OBJECTIVE BOX
Patient is a 64y old  Male who presents with a chief complaint of     HPI:   65 y/o M PMH HTN, HLD, DM, Gout, Schizophrenia, Hx SBO, chronic urinary retention (self-catheterizes), dysphagia 2/2 tardive dyskinesia Brought in by EMS from home for urinary urgency.    Patient  self catheterizes, over the past couple days has had to do it more frequently   (01 Feb 2025 13:37)  Above reviewed:   Per pt denies any symptoms, friend at bedside who says pt completed a course of abx recently from his dentist and was on therapy for UTI recently. Currently pt denies any symptoms.         PAST MEDICAL & SURGICAL HISTORY:  History of Schizophrenia      HTN (Hypertension)      Hyperlipidemia      Gout      Anxiety      Asthma      DM (diabetes mellitus)      History of Tonsillectomy      S/P Laparotomy      H/O knee surgery          REVIEW OF SYSTEMS    General: + Fevers,     Skin: No rash  	  Ophthalmologic: Denies any discharge  	  ENMT: No throat pain.     Respiratory and Thorax: No cough, sputum. Denies shortness of breath.  	  Cardiovascular: No chest pain,     Gastrointestinal: No nausea, abdominal pain or diarrhea.    Genitourinary: No dysuria,     Musculoskeletal: No joint swelling     Extremities: No swelling     Endocrine: No abnormal heat or cold intolerance     Allergic/Immunologic:	No hives        Social history:  Lives with family.         FAMILY HISTORY:  FH: heart disease (Father)    FH: diabetes mellitus (Mother)        Allergies  No Known Allergies        Antimicrobials:    cefTRIAXone   IVPB 1000 milliGRAM(s) IV Intermittent every 24 hours        Vital Signs Last 24 Hrs  T(C): 36.2 (01 Feb 2025 14:07), Max: 38.7 (01 Feb 2025 08:50)  T(F): 97.1 (01 Feb 2025 14:07), Max: 101.7 (01 Feb 2025 08:50)  HR: 79 (01 Feb 2025 14:07) (79 - 99)  BP: 139/76 (01 Feb 2025 14:07) (139/76 - 189/115)  BP(mean): --  RR: 18 (01 Feb 2025 14:07) (16 - 20)  SpO2: 93% (01 Feb 2025 14:07) (93% - 95%)    Parameters below as of 01 Feb 2025 14:07  Patient On (Oxygen Delivery Method): room air        PHYSICAL EXAM: Patient in no acute distress.    Constitutional: Comfortable. Awake and communicative    Eyes: No discharge     ENMT: No thrush    Neck: Supple,     Respiratory:  + air entry bilaterally.    Cardiovascular: S1 S2 wnl,     Gastrointestinal: Soft no tenderness, non distended.    Genitourinary: No pryor    Extremities: + edema.    Skin: No rash     Musculoskeletal: No joint swelling.    Psychiatric: Affect normal.                              13.5   17.66 )-----------( 203      ( 01 Feb 2025 09:09 )             40.7       02-01    136  |  97  |  16  ----------------------------<  168[H]  4.1   |  25  |  0.94    Ca    9.4      01 Feb 2025 09:09    TPro  6.7  /  Alb  3.5  /  TBili  0.7  /  DBili  x   /  AST  17  /  ALT  10  /  AlkPhos  67  02-01      Urinalysis + Microscopic Examination (02.01.25 @ 09:36)   pH Urine: 7.0  Urine Appearance: Turbid  Color: Yellow  Specific Gravity: 1.014  Protein, Urine: >=1000 mg/dL  Glucose Qualitative, Urine: Negative mg/dL  Ketone - Urine: Negative mg/dL  Blood, Urine: Large  Bilirubin: Negative  Urobilinogen: 0.2 mg/dL  Leukocyte Esterase Concentration: Large  Nitrite: Positive  Review: Reviewed  White Blood Cell - Urine: >998 /HPF  Red Blood Cell - Urine: 302 /HPF  Bacteria: Few /HPF  Cast: 0 /LPF  Epithelial Cells: 1 /HPF          Radiology: No Imaging performed

## 2025-02-01 NOTE — ED PROVIDER NOTE - OBJECTIVE STATEMENT
65 y/o M PMH HTN, HLD, DM, Gout, Schizophrenia, Hx SBO, chronic urinary retention (self-catheterizes), dysphagia 2/2 tardive dyskinesia Brought in by EMS from home for urinary urgency.  Patient  self catheterizes, over the past couple days has had to do it more frequently. Otherwise pt is a poor historian and denies other complaints.

## 2025-02-02 DIAGNOSIS — R33.9 RETENTION OF URINE, UNSPECIFIED: ICD-10-CM

## 2025-02-02 DIAGNOSIS — F20.9 SCHIZOPHRENIA, UNSPECIFIED: ICD-10-CM

## 2025-02-02 DIAGNOSIS — A41.9 SEPSIS, UNSPECIFIED ORGANISM: ICD-10-CM

## 2025-02-02 DIAGNOSIS — E11.9 TYPE 2 DIABETES MELLITUS WITHOUT COMPLICATIONS: ICD-10-CM

## 2025-02-02 DIAGNOSIS — I10 ESSENTIAL (PRIMARY) HYPERTENSION: ICD-10-CM

## 2025-02-02 LAB
ANION GAP SERPL CALC-SCNC: 12 MMOL/L — SIGNIFICANT CHANGE UP (ref 5–17)
BUN SERPL-MCNC: 16 MG/DL — SIGNIFICANT CHANGE UP (ref 7–23)
CALCIUM SERPL-MCNC: 8.7 MG/DL — SIGNIFICANT CHANGE UP (ref 8.4–10.5)
CHLORIDE SERPL-SCNC: 100 MMOL/L — SIGNIFICANT CHANGE UP (ref 96–108)
CO2 SERPL-SCNC: 24 MMOL/L — SIGNIFICANT CHANGE UP (ref 22–31)
CREAT SERPL-MCNC: 0.91 MG/DL — SIGNIFICANT CHANGE UP (ref 0.5–1.3)
EGFR: 95 ML/MIN/1.73M2 — SIGNIFICANT CHANGE UP
GLUCOSE SERPL-MCNC: 88 MG/DL — SIGNIFICANT CHANGE UP (ref 70–99)
HCT VFR BLD CALC: 38 % — LOW (ref 39–50)
HGB BLD-MCNC: 12.1 G/DL — LOW (ref 13–17)
MCHC RBC-ENTMCNC: 29.3 PG — SIGNIFICANT CHANGE UP (ref 27–34)
MCHC RBC-ENTMCNC: 31.8 G/DL — LOW (ref 32–36)
MCV RBC AUTO: 92 FL — SIGNIFICANT CHANGE UP (ref 80–100)
NRBC # BLD: 0 /100 WBCS — SIGNIFICANT CHANGE UP (ref 0–0)
NRBC BLD-RTO: 0 /100 WBCS — SIGNIFICANT CHANGE UP (ref 0–0)
PLATELET # BLD AUTO: 186 K/UL — SIGNIFICANT CHANGE UP (ref 150–400)
POTASSIUM SERPL-MCNC: 4.1 MMOL/L — SIGNIFICANT CHANGE UP (ref 3.5–5.3)
POTASSIUM SERPL-SCNC: 4.1 MMOL/L — SIGNIFICANT CHANGE UP (ref 3.5–5.3)
RBC # BLD: 4.13 M/UL — LOW (ref 4.2–5.8)
RBC # FLD: 13.9 % — SIGNIFICANT CHANGE UP (ref 10.3–14.5)
SODIUM SERPL-SCNC: 136 MMOL/L — SIGNIFICANT CHANGE UP (ref 135–145)
WBC # BLD: 14.47 K/UL — HIGH (ref 3.8–10.5)
WBC # FLD AUTO: 14.47 K/UL — HIGH (ref 3.8–10.5)

## 2025-02-02 PROCEDURE — G0545: CPT

## 2025-02-02 PROCEDURE — 74177 CT ABD & PELVIS W/CONTRAST: CPT | Mod: 26

## 2025-02-02 PROCEDURE — 99232 SBSQ HOSP IP/OBS MODERATE 35: CPT

## 2025-02-02 RX ORDER — LIDOCAINE HYDROCHLORIDE 30 MG/G
2 CREAM TOPICAL EVERY 24 HOURS
Refills: 0 | Status: DISCONTINUED | OUTPATIENT
Start: 2025-02-02 | End: 2025-02-06

## 2025-02-02 RX ADMIN — Medication 100 MILLIGRAM(S): at 06:48

## 2025-02-02 RX ADMIN — LIDOCAINE HYDROCHLORIDE 2 PATCH: 30 CREAM TOPICAL at 17:09

## 2025-02-02 RX ADMIN — Medication 500 MILLIGRAM(S): at 06:48

## 2025-02-02 RX ADMIN — Medication 50 MILLIGRAM(S): at 14:02

## 2025-02-02 RX ADMIN — TAMSULOSIN HYDROCHLORIDE 0.4 MILLIGRAM(S): 0.4 CAPSULE ORAL at 22:00

## 2025-02-02 RX ADMIN — Medication 12.5 MILLIGRAM(S): at 17:08

## 2025-02-02 RX ADMIN — LINACLOTIDE 290 MICROGRAM(S): 290 CAPSULE, GELATIN COATED ORAL at 06:48

## 2025-02-02 RX ADMIN — Medication 5000 UNIT(S): at 17:08

## 2025-02-02 RX ADMIN — ROSUVASTATIN CALCIUM 5 MILLIGRAM(S): 10 TABLET, FILM COATED ORAL at 22:00

## 2025-02-02 RX ADMIN — Medication 500 MILLIGRAM(S): at 17:08

## 2025-02-02 RX ADMIN — Medication 12.5 MILLIGRAM(S): at 06:48

## 2025-02-02 RX ADMIN — LIDOCAINE HYDROCHLORIDE 2 PATCH: 30 CREAM TOPICAL at 22:44

## 2025-02-02 RX ADMIN — Medication 100 MILLIGRAM(S): at 14:02

## 2025-02-02 RX ADMIN — Medication 6 MILLIGRAM(S): at 14:01

## 2025-02-02 RX ADMIN — NIFEDIPINE 60 MILLIGRAM(S): 90 TABLET, FILM COATED, EXTENDED RELEASE ORAL at 06:48

## 2025-02-02 RX ADMIN — Medication 100 MILLIGRAM(S): at 17:09

## 2025-02-02 RX ADMIN — Medication 20 MILLIGRAM(S): at 14:01

## 2025-02-02 RX ADMIN — Medication 50 MILLIGRAM(S): at 17:08

## 2025-02-02 RX ADMIN — Medication 100 MILLIGRAM(S): at 06:49

## 2025-02-02 RX ADMIN — Medication 50 MILLIGRAM(S): at 06:48

## 2025-02-02 RX ADMIN — Medication 20 MILLIGRAM(S): at 06:48

## 2025-02-02 RX ADMIN — Medication 40 MILLIGRAM(S): at 06:48

## 2025-02-02 RX ADMIN — MONTELUKAST SODIUM 10 MILLIGRAM(S): 5 TABLET, CHEWABLE ORAL at 22:00

## 2025-02-02 RX ADMIN — Medication 50 MILLIGRAM(S): at 22:44

## 2025-02-02 RX ADMIN — Medication 100 MILLIGRAM(S): at 22:00

## 2025-02-02 RX ADMIN — Medication 5000 UNIT(S): at 06:49

## 2025-02-02 RX ADMIN — Medication 2 MILLIGRAM(S): at 14:02

## 2025-02-02 NOTE — PROVIDER CONTACT NOTE (OTHER) - ASSESSMENT
Pt A&Ox3, disoriented to year, poor historian. Pt states that wife usually helps him catheterize himself. Wife and pt educated that indwelling catheter will only be for the duration of the hospital stay, wife still refused.

## 2025-02-03 LAB
ANION GAP SERPL CALC-SCNC: 13 MMOL/L — SIGNIFICANT CHANGE UP (ref 5–17)
BUN SERPL-MCNC: 13 MG/DL — SIGNIFICANT CHANGE UP (ref 7–23)
CALCIUM SERPL-MCNC: 8.7 MG/DL — SIGNIFICANT CHANGE UP (ref 8.4–10.5)
CHLORIDE SERPL-SCNC: 99 MMOL/L — SIGNIFICANT CHANGE UP (ref 96–108)
CO2 SERPL-SCNC: 23 MMOL/L — SIGNIFICANT CHANGE UP (ref 22–31)
CREAT SERPL-MCNC: 0.84 MG/DL — SIGNIFICANT CHANGE UP (ref 0.5–1.3)
EGFR: 99 ML/MIN/1.73M2 — SIGNIFICANT CHANGE UP
GLUCOSE SERPL-MCNC: 83 MG/DL — SIGNIFICANT CHANGE UP (ref 70–99)
HCT VFR BLD CALC: 36.6 % — LOW (ref 39–50)
HGB BLD-MCNC: 11.7 G/DL — LOW (ref 13–17)
MCHC RBC-ENTMCNC: 29.3 PG — SIGNIFICANT CHANGE UP (ref 27–34)
MCHC RBC-ENTMCNC: 32 G/DL — SIGNIFICANT CHANGE UP (ref 32–36)
MCV RBC AUTO: 91.7 FL — SIGNIFICANT CHANGE UP (ref 80–100)
NRBC # BLD: 0 /100 WBCS — SIGNIFICANT CHANGE UP (ref 0–0)
NRBC BLD-RTO: 0 /100 WBCS — SIGNIFICANT CHANGE UP (ref 0–0)
PLATELET # BLD AUTO: 188 K/UL — SIGNIFICANT CHANGE UP (ref 150–400)
POTASSIUM SERPL-MCNC: 4.1 MMOL/L — SIGNIFICANT CHANGE UP (ref 3.5–5.3)
POTASSIUM SERPL-SCNC: 4.1 MMOL/L — SIGNIFICANT CHANGE UP (ref 3.5–5.3)
RBC # BLD: 3.99 M/UL — LOW (ref 4.2–5.8)
RBC # FLD: 13.5 % — SIGNIFICANT CHANGE UP (ref 10.3–14.5)
SODIUM SERPL-SCNC: 135 MMOL/L — SIGNIFICANT CHANGE UP (ref 135–145)
WBC # BLD: 9.5 K/UL — SIGNIFICANT CHANGE UP (ref 3.8–10.5)
WBC # FLD AUTO: 9.5 K/UL — SIGNIFICANT CHANGE UP (ref 3.8–10.5)

## 2025-02-03 PROCEDURE — 99232 SBSQ HOSP IP/OBS MODERATE 35: CPT

## 2025-02-03 PROCEDURE — G0545: CPT

## 2025-02-03 PROCEDURE — 99222 1ST HOSP IP/OBS MODERATE 55: CPT

## 2025-02-03 RX ORDER — CEFEPIME HCL 1 G
2000 IV SOLUTION, PIGGYBACK, BOTTLE (EA) INTRAVENOUS EVERY 12 HOURS
Refills: 0 | Status: DISCONTINUED | OUTPATIENT
Start: 2025-02-03 | End: 2025-02-04

## 2025-02-03 RX ADMIN — Medication 500 MILLIGRAM(S): at 17:51

## 2025-02-03 RX ADMIN — Medication 100 MILLIGRAM(S): at 06:49

## 2025-02-03 RX ADMIN — LIDOCAINE HYDROCHLORIDE 2 PATCH: 30 CREAM TOPICAL at 19:00

## 2025-02-03 RX ADMIN — Medication 5000 UNIT(S): at 17:51

## 2025-02-03 RX ADMIN — LIDOCAINE HYDROCHLORIDE 2 PATCH: 30 CREAM TOPICAL at 16:03

## 2025-02-03 RX ADMIN — Medication 50 MILLIGRAM(S): at 13:11

## 2025-02-03 RX ADMIN — MONTELUKAST SODIUM 10 MILLIGRAM(S): 5 TABLET, CHEWABLE ORAL at 22:24

## 2025-02-03 RX ADMIN — Medication 20 MILLIGRAM(S): at 06:49

## 2025-02-03 RX ADMIN — ROSUVASTATIN CALCIUM 5 MILLIGRAM(S): 10 TABLET, FILM COATED ORAL at 22:24

## 2025-02-03 RX ADMIN — Medication 50 MILLIGRAM(S): at 17:52

## 2025-02-03 RX ADMIN — Medication 50 MILLIGRAM(S): at 23:42

## 2025-02-03 RX ADMIN — Medication 12.5 MILLIGRAM(S): at 17:51

## 2025-02-03 RX ADMIN — Medication 500 MILLIGRAM(S): at 06:49

## 2025-02-03 RX ADMIN — Medication 100 MILLIGRAM(S): at 17:51

## 2025-02-03 RX ADMIN — Medication 2 MILLIGRAM(S): at 13:11

## 2025-02-03 RX ADMIN — Medication 6 MILLIGRAM(S): at 13:11

## 2025-02-03 RX ADMIN — Medication 50 MILLIGRAM(S): at 06:49

## 2025-02-03 RX ADMIN — LINACLOTIDE 290 MICROGRAM(S): 290 CAPSULE, GELATIN COATED ORAL at 06:49

## 2025-02-03 RX ADMIN — NIFEDIPINE 60 MILLIGRAM(S): 90 TABLET, FILM COATED, EXTENDED RELEASE ORAL at 06:49

## 2025-02-03 RX ADMIN — Medication 20 MILLIGRAM(S): at 13:10

## 2025-02-03 RX ADMIN — TAMSULOSIN HYDROCHLORIDE 0.4 MILLIGRAM(S): 0.4 CAPSULE ORAL at 22:24

## 2025-02-03 RX ADMIN — Medication 5000 UNIT(S): at 06:49

## 2025-02-03 RX ADMIN — Medication 40 MILLIGRAM(S): at 06:49

## 2025-02-03 RX ADMIN — LIDOCAINE HYDROCHLORIDE 2 PATCH: 30 CREAM TOPICAL at 04:56

## 2025-02-03 RX ADMIN — Medication 12.5 MILLIGRAM(S): at 06:49

## 2025-02-03 NOTE — PHYSICAL THERAPY INITIAL EVALUATION ADULT - PLANNED THERAPY INTERVENTIONS, PT EVAL
balance training/bed mobility training/gait training/strengthening/transfer training Niacinamide Counseling: I recommended taking niacin or niacinamide, also know as vitamin B3, twice daily. Recent evidence suggests that taking vitamin B3 (500 mg twice daily) can reduce the risk of actinic keratoses and non-melanoma skin cancers. Side effects of vitamin B3 include flushing and headache.

## 2025-02-03 NOTE — CONSULT NOTE ADULT - SUBJECTIVE AND OBJECTIVE BOX
HPI:  64y Male with PMHx HTN, HLD, DM, gout, schizophrenia, chronic outlet obstruction (CIC at home, f/b Dixon Mars) admitted with a UTI. Urology consulted for bilateral hydronephrosis seen on CT.   Per primary team, patient has been straight catheterized 4x at day by nursing staff, straight cath outputs have been high (most recent 800cc), however, patient and family at the time refusing an indwelling pryor catheter.  Patient seen at bedside, states that he CIC ~3-4x a day at home. Endorsed a fever a couple of days ago, per chart had 101.7F in ER on 2/1/25. Patient currently amendable to an indwelling pryor catheter as long as it is removed prior to discharge.     PAST MEDICAL & SURGICAL HISTORY:  History of Schizophrenia      HTN (Hypertension)      Hyperlipidemia      Gout      Anxiety      Asthma      DM (diabetes mellitus)      History of Tonsillectomy      S/P Laparotomy      H/O knee surgery          FAMILY HISTORY:  FH: heart disease (Father)    FH: diabetes mellitus (Mother)    No known  malignancy     Denies alcohol and drug abuse, nonsmoker     MEDICATIONS  (STANDING):  allopurinol 100 milliGRAM(s) Oral two times a day  benztropine 2 milliGRAM(s) Oral daily  bethanechol 50 milliGRAM(s) Oral four times a day  carvedilol 12.5 milliGRAM(s) Oral every 12 hours  cefepime   IVPB 2000 milliGRAM(s) IV Intermittent every 12 hours  divalproex  milliGRAM(s) Oral two times a day  enalapril 20 milliGRAM(s) Oral daily  FLUoxetine 20 milliGRAM(s) Oral daily  furosemide    Tablet 40 milliGRAM(s) Oral daily  heparin   Injectable 5000 Unit(s) SubCutaneous every 12 hours  lidocaine   4% Patch 2 Patch Transdermal every 24 hours  linaclotide 290 MICROGram(s) Oral before breakfast  montelukast 10 milliGRAM(s) Oral at bedtime  NIFEdipine XL 60 milliGRAM(s) Oral daily  risperiDONE   Tablet 6 milliGRAM(s) Oral daily  rosuvastatin 5 milliGRAM(s) Oral at bedtime  sodium chloride 0.9%. 1000 milliLiter(s) (75 mL/Hr) IV Continuous <Continuous>  tamsulosin 0.4 milliGRAM(s) Oral at bedtime    MEDICATIONS  (PRN):    Allergies    No Known Allergies    Intolerances      REVIEW OF SYSTEMS: Pertinent positives and negatives as stated in HPI, otherwise negative    Vital signs  T(C): 36.8 (02-03-25 @ 18:01), Max: 37.3 (02-02-25 @ 20:17)  HR: 80 (02-03-25 @ 18:01)  BP: 124/67 (02-03-25 @ 18:01)  SpO2: 93% (02-03-25 @ 18:01)  Wt(kg): --    Physical Exam  Gen: NAD  HEENT: normocephalic, atraumatic, no scleral icterus  Pulm: No respiratory distress, no subcostal retractions, no accessory muscle use   CV:  no JVD  Abd: Soft, NT, ND  : Condom cath on draining CYU in cannister   Back: R CVAT, Moving all extremities  NEURO: no focal neurological deficits, CN 2-12 grossly intact  SKIN: warm, dry    LABS:  CBC                       11.7   9.50  )-----------( 188      ( 03 Feb 2025 07:25 )             36.6     BMP   02-03    135  |  99  |  13  ----------------------------<  83  4.1   |  23  |  0.84    Ca    8.7      03 Feb 2025 07:25          Urinalysis Basic - ( 03 Feb 2025 07:25 )    Color: x / Appearance: x / SG: x / pH: x  Gluc: 83 mg/dL / Ketone: x  / Bili: x / Urobili: x   Blood: x / Protein: x / Nitrite: x   Leuk Esterase: x / RBC: x / WBC x   Sq Epi: x / Non Sq Epi: x / Bacteria: x      Urine Cx:   Culture - Urine (02.01.25 @ 09:36)   Specimen Source: Clean Catch Clean Catch (Midstream)  Culture Results:   10,000 - 49,000 CFU/mL Gram Negative Rods  Blood Cx:    Radiology:  < from: CT Abdomen and Pelvis w/ IV Cont (02.02.25 @ 17:04) >  ACC: [id number] EXAM:  CT ABDOMEN AND PELVIS IC   ORDERED BY:  MISHA FARMER     PROCEDURE DATE:  02/02/2025          INTERPRETATION:  CLINICAL INFORMATION: Urinary retention. Evaluate for   infection.    COMPARISON: 8/8/2024    CONTRAST/COMPLICATIONS:  IV Contrast: Omnipaque 350  90 cc administered   10 cc discarded  Oral Contrast: NONE  .    PROCEDURE:  CT of the Abdomen and Pelvis was performed.  Sagittal and coronal reformats were performed.    FINDINGS:  LOWER CHEST: Within normal limits.    LIVER: Subcentimeter liver lesions too small to characterize, likely   cysts.  BILE DUCTS: Normal caliber.  GALLBLADDER: Cholelithiasis.  SPLEEN: Within normal limits.  PANCREAS: Within normal limits.  ADRENALS: Within normal limits.  KIDNEYS/URETERS: Moderate bilateral hydroureteronephrosis low-level the   bladder without evidence of obstructing ureteral stone. Symmetric   nephrograms.    BLADDER: Thickened trabeculated bladder wall. Small bladder diverticula.  REPRODUCTIVE ORGANS: TURP defect.    BOWEL: No bowel obstruction. Appendix is normal.  PERITONEUM/RETROPERITONEUM: Within normal limits.  VESSELS: Atherosclerotic calcifications.  LYMPH NODES: No lymphadenopathy.  ABDOMINAL WALL: Postoperative changes.  BONES: Degenerative changes.    IMPRESSION:  *  Mild bilateral hydroureteronephrosis level bladder without evidence of   obstructing ureteral stone.  *  Chronic changes of bladder outlet obstruction. Correlate with   urinalysis if cystitis is of concern.        --- End of Report ---            JONATHON MANLEY MD; Attending Radiologist  This document has been electronically signed. Feb  3 2025  5:36AM    < end of copied text >

## 2025-02-03 NOTE — CONSULT NOTE ADULT - ASSESSMENT
64y Male with PMHx HTN, HLD, DM, gout, schizophrenia, chronic outlet obstruction (CIC at home, f/b [place] [facility]) admitted with a UTI. Urology consulted for bilateral hydronephrosis seen on CT.   Patient currently being straight catheterized with high outputs (most recent 800cc). Now amendable to indwelling pryor.   Currently afebrile (last fever 2/1/25 101.7F), current VSS   Labs WBC 9.5 / HCt 36.6 / SCr 0.82 / Lactate 1.5   CT with moderate bilateral hydronephrosis     Recs  - Please place pryor catheter for optimal urinary drainage  - RBUS in 2d after pryor placement to reassess hydronephrosis  - Continue abxs   - Will likely have to increase straight catheterizations to q4hrs after pryor catheter is discharged       [facility]  [address] [place] [address], [address] [address] [phone] 
 63 y/o M PMH HTN, HLD, DM, Gout, Schizophrenia, Hx SBO, chronic urinary retention (self-catheterizes), dysphagia 2/2 tardive dyskinesia Brought in by EMS from home for urinary urgency.        Overall sepsis, leucocytosis, bandemia, lactic acidosis, fever, due to pyelonephritis       PLAN:   Based on prior cx will switch to cefepime   f/u urine cx  f/u blood cx  trend cbc, + leucocytosis   recommend CT abd/pelvis to ensure no other focus of infection.     Plan discussed with Medicine ACP.     Pino Berry  Please contact through MS Teams   If no response or past 5 pm/weekend call [phone].

## 2025-02-03 NOTE — PHYSICAL THERAPY INITIAL EVALUATION ADULT - DIAGNOSIS, PT EVAL
Problem: Breathing Pattern Ineffective  Goal: Air exchange is effective, demonstrated by Sp02 sat of greater then or = 92% (or as ordered)  Outcome: Outcome Not Met, Continue to Monitor  Goal: Respiratory pattern is quiet and regular without report of SOB  Outcome: Outcome Not Met, Continue to Monitor  Goal: Breathing pattern demonstrates minimal apnea during sleep with appropriate use of airway pressure support devices  Outcome: Outcome Not Met, Continue to Monitor  Goal: Verbalizes/demonstrates effective breathing management strategies  Description: Document education using the patient education activity.   Outcome: Outcome Not Met, Continue to Monitor     Problem: VTE, Risk for  Goal: # No s/s of VTE  Outcome: Outcome Not Met, Continue to Monitor  Goal: # Verbalizes understanding of VTE risk factors and prevention  Description: Document education using the patient education activity.   Outcome: Outcome Not Met, Continue to Monitor  Goal: Demonstrates ability to administer injectable anticoagulants if ordered for d/c  Description: Document education using the patient education activity.  Outcome: Outcome Not Met, Continue to Monitor     Problem: Activity Intolerance  Goal: # Functional status is maintained or returned to baseline  Outcome: Outcome Not Met, Continue to Monitor  Goal: # Tolerates activity for d/c setting with no clinical problems  Outcome: Outcome Not Met, Continue to Monitor      Impaired functional mobility.

## 2025-02-03 NOTE — PHYSICAL THERAPY INITIAL EVALUATION ADULT - ADDITIONAL COMMENTS
Patient lives in pvt house with family  2 steps to enter. No steps inside.  Patient ambulated using RW independent. Spouse reports pt requires one person assist to perform sit/stand transfers from low surface. Pt owns rw, w/c, bed side comode at home

## 2025-02-03 NOTE — PHYSICAL THERAPY INITIAL EVALUATION ADULT - PERTINENT HX OF CURRENT PROBLEM, REHAB EVAL
65 y/o M PMH HTN, HLD, DM, Gout, Schizophrenia, Hx SBO, chronic urinary retention (self-catheterizes), dysphagia 2/2 tardive dyskinesia Brought in by EMS from home for urinary urgency.  Patient  self catheterizes, over the past couple days has had to do it more frequently.  pt w/ leukocytosis and urosepsis.

## 2025-02-03 NOTE — PHYSICAL THERAPY INITIAL EVALUATION ADULT - RANGE OF MOTION EXAMINATION, REHAB EVAL
Detail Level: Detailed
Depth Of Biopsy: dermis
Was A Bandage Applied: Yes
Size Of Lesion In Cm: 0.5
X Size Of Lesion In Cm: 0
Biopsy Type: H and E
bilateral upper extremity ROM was WFL (within functional limits)/bilateral lower extremity ROM was WFL (within functional limits)
Biopsy Method: Dermablade
Anesthesia Type: 1% lidocaine with epinephrine
Hemostasis: Drysol
Wound Care: Petrolatum
Dressing: bandage
Destruction After The Procedure: No
Type Of Destruction Used: Curettage
Curettage Text: The wound bed was treated with curettage after the biopsy was performed.
Cryotherapy Text: The wound bed was treated with cryotherapy after the biopsy was performed.
Electrodesiccation Text: The wound bed was treated with electrodesiccation after the biopsy was performed.
Electrodesiccation And Curettage Text: The wound bed was treated with electrodesiccation and curettage after the biopsy was performed.
Silver Nitrate Text: The wound bed was treated with silver nitrate after the biopsy was performed.
Lab: [id number]
Lab Facility: [id number]
Consent: Written consent was obtained and risks were reviewed including but not limited to scarring, infection, bleeding, scabbing, incomplete removal, nerve damage and allergy to anesthesia.
Post-Care Instructions: I reviewed with the patient in detail post-care instructions. Patient is to keep the biopsy site dry overnight, and then apply bacitracin twice daily until healed. Patient may apply hydrogen peroxide soaks to remove any crusting.
Notification Instructions: Patient will be notified of biopsy results. However, patient instructed to call the office if not contacted within 2 weeks.
Billing Type: Third-Party Bill
Information: Selecting Yes will display possible errors in your note based on the variables you have selected. This validation is only offered as a suggestion for you. PLEASE NOTE THAT THE VALIDATION TEXT WILL BE REMOVED WHEN YOU FINALIZE YOUR NOTE. IF YOU WANT TO FAX A PRELIMINARY NOTE YOU WILL NEED TO TOGGLE THIS TO 'NO' IF YOU DO NOT WANT IT IN YOUR FAXED NOTE.
Size Of Lesion In Cm: 0.6

## 2025-02-03 NOTE — CONSULT NOTE ADULT - NS ATTEND AMEND GEN_ALL_CORE FT
Barnes for UTI, retention and b/l hydro.  Suspect hydro related to retention and chronic NGB  Repeat renal sono in 2-3 days  TOV on dispo, instructed pt to CIC q4h

## 2025-02-04 LAB
-  AMPICILLIN/SULBACTAM: SIGNIFICANT CHANGE UP
-  AMPICILLIN: SIGNIFICANT CHANGE UP
-  AZTREONAM: SIGNIFICANT CHANGE UP
-  CEFAZOLIN: SIGNIFICANT CHANGE UP
-  CEFEPIME: SIGNIFICANT CHANGE UP
-  CEFTRIAXONE: SIGNIFICANT CHANGE UP
-  CEFUROXIME: SIGNIFICANT CHANGE UP
-  CIPROFLOXACIN: SIGNIFICANT CHANGE UP
-  ERTAPENEM: SIGNIFICANT CHANGE UP
-  GENTAMICIN: SIGNIFICANT CHANGE UP
-  IMIPENEM: SIGNIFICANT CHANGE UP
-  LEVOFLOXACIN: SIGNIFICANT CHANGE UP
-  MEROPENEM: SIGNIFICANT CHANGE UP
-  NITROFURANTOIN: SIGNIFICANT CHANGE UP
-  PIPERACILLIN/TAZOBACTAM: SIGNIFICANT CHANGE UP
-  TOBRAMYCIN: SIGNIFICANT CHANGE UP
-  TRIMETHOPRIM/SULFAMETHOXAZOLE: SIGNIFICANT CHANGE UP
A1C WITH ESTIMATED AVERAGE GLUCOSE RESULT: 6.3 % — HIGH (ref 4–5.6)
ANION GAP SERPL CALC-SCNC: 12 MMOL/L — SIGNIFICANT CHANGE UP (ref 5–17)
BUN SERPL-MCNC: 12 MG/DL — SIGNIFICANT CHANGE UP (ref 7–23)
CALCIUM SERPL-MCNC: 8.9 MG/DL — SIGNIFICANT CHANGE UP (ref 8.4–10.5)
CHLORIDE SERPL-SCNC: 100 MMOL/L — SIGNIFICANT CHANGE UP (ref 96–108)
CO2 SERPL-SCNC: 26 MMOL/L — SIGNIFICANT CHANGE UP (ref 22–31)
CREAT SERPL-MCNC: 0.8 MG/DL — SIGNIFICANT CHANGE UP (ref 0.5–1.3)
CULTURE RESULTS: ABNORMAL
EGFR: 100 ML/MIN/1.73M2 — SIGNIFICANT CHANGE UP
ESTIMATED AVERAGE GLUCOSE: 134 MG/DL — HIGH (ref 68–114)
GLUCOSE BLDC GLUCOMTR-MCNC: 148 MG/DL — HIGH (ref 70–99)
GLUCOSE SERPL-MCNC: 95 MG/DL — SIGNIFICANT CHANGE UP (ref 70–99)
METHOD TYPE: SIGNIFICANT CHANGE UP
ORGANISM # SPEC MICROSCOPIC CNT: ABNORMAL
ORGANISM # SPEC MICROSCOPIC CNT: ABNORMAL
POTASSIUM SERPL-MCNC: 4.1 MMOL/L — SIGNIFICANT CHANGE UP (ref 3.5–5.3)
POTASSIUM SERPL-SCNC: 4.1 MMOL/L — SIGNIFICANT CHANGE UP (ref 3.5–5.3)
SODIUM SERPL-SCNC: 138 MMOL/L — SIGNIFICANT CHANGE UP (ref 135–145)
SPECIMEN SOURCE: SIGNIFICANT CHANGE UP
TSH SERPL-MCNC: 2.14 UIU/ML — SIGNIFICANT CHANGE UP (ref 0.27–4.2)

## 2025-02-04 PROCEDURE — 99232 SBSQ HOSP IP/OBS MODERATE 35: CPT

## 2025-02-04 PROCEDURE — G0545: CPT

## 2025-02-04 RX ORDER — ERTAPENEM SODIUM 1 G/1
1000 INJECTION, POWDER, LYOPHILIZED, FOR SOLUTION INTRAMUSCULAR; INTRAVENOUS EVERY 24 HOURS
Refills: 0 | Status: DISCONTINUED | OUTPATIENT
Start: 2025-02-04 | End: 2025-02-06

## 2025-02-04 RX ORDER — POLYETHYLENE GLYCOL 3350 17 G/17G
17 POWDER, FOR SOLUTION ORAL DAILY
Refills: 0 | Status: DISCONTINUED | OUTPATIENT
Start: 2025-02-04 | End: 2025-02-06

## 2025-02-04 RX ORDER — SENNOSIDES 8.6 MG
2 TABLET ORAL AT BEDTIME
Refills: 0 | Status: DISCONTINUED | OUTPATIENT
Start: 2025-02-04 | End: 2025-02-06

## 2025-02-04 RX ADMIN — Medication 2 MILLIGRAM(S): at 11:13

## 2025-02-04 RX ADMIN — Medication 100 MILLIGRAM(S): at 06:35

## 2025-02-04 RX ADMIN — Medication 2 TABLET(S): at 22:32

## 2025-02-04 RX ADMIN — Medication 6 MILLIGRAM(S): at 11:13

## 2025-02-04 RX ADMIN — Medication 100 MILLIGRAM(S): at 17:01

## 2025-02-04 RX ADMIN — Medication 5000 UNIT(S): at 17:01

## 2025-02-04 RX ADMIN — Medication 12.5 MILLIGRAM(S): at 17:01

## 2025-02-04 RX ADMIN — Medication 500 MILLIGRAM(S): at 06:35

## 2025-02-04 RX ADMIN — NIFEDIPINE 60 MILLIGRAM(S): 90 TABLET, FILM COATED, EXTENDED RELEASE ORAL at 06:35

## 2025-02-04 RX ADMIN — Medication 500 MILLIGRAM(S): at 17:01

## 2025-02-04 RX ADMIN — LIDOCAINE HYDROCHLORIDE 2 PATCH: 30 CREAM TOPICAL at 16:11

## 2025-02-04 RX ADMIN — Medication 5000 UNIT(S): at 06:36

## 2025-02-04 RX ADMIN — LIDOCAINE HYDROCHLORIDE 2 PATCH: 30 CREAM TOPICAL at 22:25

## 2025-02-04 RX ADMIN — POLYETHYLENE GLYCOL 3350 17 GRAM(S): 17 POWDER, FOR SOLUTION ORAL at 13:22

## 2025-02-04 RX ADMIN — LIDOCAINE HYDROCHLORIDE 2 PATCH: 30 CREAM TOPICAL at 04:56

## 2025-02-04 RX ADMIN — Medication 50 MILLIGRAM(S): at 11:13

## 2025-02-04 RX ADMIN — Medication 20 MILLIGRAM(S): at 11:13

## 2025-02-04 RX ADMIN — Medication 50 MILLIGRAM(S): at 06:35

## 2025-02-04 RX ADMIN — ROSUVASTATIN CALCIUM 5 MILLIGRAM(S): 10 TABLET, FILM COATED ORAL at 22:32

## 2025-02-04 RX ADMIN — MONTELUKAST SODIUM 10 MILLIGRAM(S): 5 TABLET, CHEWABLE ORAL at 22:32

## 2025-02-04 RX ADMIN — Medication 20 MILLIGRAM(S): at 06:35

## 2025-02-04 RX ADMIN — Medication 12.5 MILLIGRAM(S): at 06:35

## 2025-02-04 RX ADMIN — LINACLOTIDE 290 MICROGRAM(S): 290 CAPSULE, GELATIN COATED ORAL at 06:35

## 2025-02-04 RX ADMIN — Medication 40 MILLIGRAM(S): at 06:35

## 2025-02-04 RX ADMIN — Medication 50 MILLIGRAM(S): at 17:01

## 2025-02-04 RX ADMIN — TAMSULOSIN HYDROCHLORIDE 0.4 MILLIGRAM(S): 0.4 CAPSULE ORAL at 22:32

## 2025-02-05 LAB
GLUCOSE BLDC GLUCOMTR-MCNC: 120 MG/DL — HIGH (ref 70–99)
GLUCOSE BLDC GLUCOMTR-MCNC: 127 MG/DL — HIGH (ref 70–99)

## 2025-02-05 PROCEDURE — G0545: CPT

## 2025-02-05 PROCEDURE — 76770 US EXAM ABDO BACK WALL COMP: CPT | Mod: 26

## 2025-02-05 PROCEDURE — 99232 SBSQ HOSP IP/OBS MODERATE 35: CPT

## 2025-02-05 RX ADMIN — Medication 20 MILLIGRAM(S): at 06:55

## 2025-02-05 RX ADMIN — Medication 50 MILLIGRAM(S): at 13:07

## 2025-02-05 RX ADMIN — Medication 2 MILLIGRAM(S): at 13:07

## 2025-02-05 RX ADMIN — Medication 5000 UNIT(S): at 06:55

## 2025-02-05 RX ADMIN — Medication 12.5 MILLIGRAM(S): at 17:32

## 2025-02-05 RX ADMIN — Medication 500 MILLIGRAM(S): at 06:56

## 2025-02-05 RX ADMIN — LINACLOTIDE 290 MICROGRAM(S): 290 CAPSULE, GELATIN COATED ORAL at 06:56

## 2025-02-05 RX ADMIN — Medication 6 MILLIGRAM(S): at 13:07

## 2025-02-05 RX ADMIN — ERTAPENEM SODIUM 100 MILLIGRAM(S): 1 INJECTION, POWDER, LYOPHILIZED, FOR SOLUTION INTRAMUSCULAR; INTRAVENOUS at 06:56

## 2025-02-05 RX ADMIN — POLYETHYLENE GLYCOL 3350 17 GRAM(S): 17 POWDER, FOR SOLUTION ORAL at 13:13

## 2025-02-05 RX ADMIN — MONTELUKAST SODIUM 10 MILLIGRAM(S): 5 TABLET, CHEWABLE ORAL at 22:46

## 2025-02-05 RX ADMIN — NIFEDIPINE 60 MILLIGRAM(S): 90 TABLET, FILM COATED, EXTENDED RELEASE ORAL at 06:56

## 2025-02-05 RX ADMIN — TAMSULOSIN HYDROCHLORIDE 0.4 MILLIGRAM(S): 0.4 CAPSULE ORAL at 22:45

## 2025-02-05 RX ADMIN — Medication 100 MILLIGRAM(S): at 17:32

## 2025-02-05 RX ADMIN — Medication 20 MILLIGRAM(S): at 13:07

## 2025-02-05 RX ADMIN — Medication 12.5 MILLIGRAM(S): at 06:55

## 2025-02-05 RX ADMIN — Medication 50 MILLIGRAM(S): at 23:41

## 2025-02-05 RX ADMIN — Medication 500 MILLIGRAM(S): at 17:32

## 2025-02-05 RX ADMIN — Medication 40 MILLIGRAM(S): at 06:56

## 2025-02-05 RX ADMIN — Medication 2 TABLET(S): at 22:45

## 2025-02-05 RX ADMIN — Medication 50 MILLIGRAM(S): at 06:55

## 2025-02-05 RX ADMIN — Medication 50 MILLIGRAM(S): at 01:47

## 2025-02-05 RX ADMIN — Medication 50 MILLIGRAM(S): at 17:33

## 2025-02-05 RX ADMIN — Medication 100 MILLIGRAM(S): at 06:56

## 2025-02-05 RX ADMIN — LIDOCAINE HYDROCHLORIDE 2 PATCH: 30 CREAM TOPICAL at 07:10

## 2025-02-05 RX ADMIN — ROSUVASTATIN CALCIUM 5 MILLIGRAM(S): 10 TABLET, FILM COATED ORAL at 22:45

## 2025-02-05 RX ADMIN — Medication 5000 UNIT(S): at 17:14

## 2025-02-05 NOTE — PATIENT PROFILE ADULT - FALL HARM RISK - HARM RISK INTERVENTIONS

## 2025-02-06 ENCOUNTER — TRANSCRIPTION ENCOUNTER (OUTPATIENT)
Age: 65
End: 2025-02-06

## 2025-02-06 ENCOUNTER — APPOINTMENT (OUTPATIENT)
Dept: GASTROENTEROLOGY | Facility: CLINIC | Age: 65
End: 2025-02-06

## 2025-02-06 VITALS
SYSTOLIC BLOOD PRESSURE: 132 MMHG | OXYGEN SATURATION: 94 % | TEMPERATURE: 98 F | DIASTOLIC BLOOD PRESSURE: 84 MMHG | RESPIRATION RATE: 18 BRPM | HEART RATE: 69 BPM

## 2025-02-06 LAB
ANION GAP SERPL CALC-SCNC: 11 MMOL/L — SIGNIFICANT CHANGE UP (ref 5–17)
BUN SERPL-MCNC: 11 MG/DL — SIGNIFICANT CHANGE UP (ref 7–23)
CALCIUM SERPL-MCNC: 9.4 MG/DL — SIGNIFICANT CHANGE UP (ref 8.4–10.5)
CHLORIDE SERPL-SCNC: 99 MMOL/L — SIGNIFICANT CHANGE UP (ref 96–108)
CO2 SERPL-SCNC: 26 MMOL/L — SIGNIFICANT CHANGE UP (ref 22–31)
CREAT SERPL-MCNC: 0.7 MG/DL — SIGNIFICANT CHANGE UP (ref 0.5–1.3)
CULTURE RESULTS: SIGNIFICANT CHANGE UP
CULTURE RESULTS: SIGNIFICANT CHANGE UP
EGFR: 104 ML/MIN/1.73M2 — SIGNIFICANT CHANGE UP
GLUCOSE SERPL-MCNC: 114 MG/DL — HIGH (ref 70–99)
HCT VFR BLD CALC: 40.1 % — SIGNIFICANT CHANGE UP (ref 39–50)
HGB BLD-MCNC: 12.9 G/DL — LOW (ref 13–17)
MCHC RBC-ENTMCNC: 28.4 PG — SIGNIFICANT CHANGE UP (ref 27–34)
MCHC RBC-ENTMCNC: 32.2 G/DL — SIGNIFICANT CHANGE UP (ref 32–36)
MCV RBC AUTO: 88.1 FL — SIGNIFICANT CHANGE UP (ref 80–100)
NRBC # BLD: 0 /100 WBCS — SIGNIFICANT CHANGE UP (ref 0–0)
NRBC BLD-RTO: 0 /100 WBCS — SIGNIFICANT CHANGE UP (ref 0–0)
PLATELET # BLD AUTO: 239 K/UL — SIGNIFICANT CHANGE UP (ref 150–400)
POTASSIUM SERPL-MCNC: 3.7 MMOL/L — SIGNIFICANT CHANGE UP (ref 3.5–5.3)
POTASSIUM SERPL-SCNC: 3.7 MMOL/L — SIGNIFICANT CHANGE UP (ref 3.5–5.3)
RBC # BLD: 4.55 M/UL — SIGNIFICANT CHANGE UP (ref 4.2–5.8)
RBC # FLD: 13.2 % — SIGNIFICANT CHANGE UP (ref 10.3–14.5)
SODIUM SERPL-SCNC: 136 MMOL/L — SIGNIFICANT CHANGE UP (ref 135–145)
SPECIMEN SOURCE: SIGNIFICANT CHANGE UP
SPECIMEN SOURCE: SIGNIFICANT CHANGE UP
WBC # BLD: 7.92 K/UL — SIGNIFICANT CHANGE UP (ref 3.8–10.5)
WBC # FLD AUTO: 7.92 K/UL — SIGNIFICANT CHANGE UP (ref 3.8–10.5)

## 2025-02-06 PROCEDURE — 71045 X-RAY EXAM CHEST 1 VIEW: CPT

## 2025-02-06 PROCEDURE — 87086 URINE CULTURE/COLONY COUNT: CPT

## 2025-02-06 PROCEDURE — 84132 ASSAY OF SERUM POTASSIUM: CPT

## 2025-02-06 PROCEDURE — 81001 URINALYSIS AUTO W/SCOPE: CPT

## 2025-02-06 PROCEDURE — 85018 HEMOGLOBIN: CPT

## 2025-02-06 PROCEDURE — 87186 SC STD MICRODIL/AGAR DIL: CPT

## 2025-02-06 PROCEDURE — 85610 PROTHROMBIN TIME: CPT

## 2025-02-06 PROCEDURE — 36415 COLL VENOUS BLD VENIPUNCTURE: CPT

## 2025-02-06 PROCEDURE — 97116 GAIT TRAINING THERAPY: CPT

## 2025-02-06 PROCEDURE — 82947 ASSAY GLUCOSE BLOOD QUANT: CPT

## 2025-02-06 PROCEDURE — 85014 HEMATOCRIT: CPT

## 2025-02-06 PROCEDURE — 83605 ASSAY OF LACTIC ACID: CPT

## 2025-02-06 PROCEDURE — 87637 SARSCOV2&INF A&B&RSV AMP PRB: CPT

## 2025-02-06 PROCEDURE — 84295 ASSAY OF SERUM SODIUM: CPT

## 2025-02-06 PROCEDURE — 76770 US EXAM ABDO BACK WALL COMP: CPT

## 2025-02-06 PROCEDURE — 84443 ASSAY THYROID STIM HORMONE: CPT

## 2025-02-06 PROCEDURE — 83036 HEMOGLOBIN GLYCOSYLATED A1C: CPT

## 2025-02-06 PROCEDURE — 99285 EMERGENCY DEPT VISIT HI MDM: CPT

## 2025-02-06 PROCEDURE — 97110 THERAPEUTIC EXERCISES: CPT

## 2025-02-06 PROCEDURE — 96374 THER/PROPH/DIAG INJ IV PUSH: CPT

## 2025-02-06 PROCEDURE — 85027 COMPLETE CBC AUTOMATED: CPT

## 2025-02-06 PROCEDURE — 87040 BLOOD CULTURE FOR BACTERIA: CPT

## 2025-02-06 PROCEDURE — 82330 ASSAY OF CALCIUM: CPT

## 2025-02-06 PROCEDURE — 82435 ASSAY OF BLOOD CHLORIDE: CPT

## 2025-02-06 PROCEDURE — 80048 BASIC METABOLIC PNL TOTAL CA: CPT

## 2025-02-06 PROCEDURE — 82803 BLOOD GASES ANY COMBINATION: CPT

## 2025-02-06 PROCEDURE — 99231 SBSQ HOSP IP/OBS SF/LOW 25: CPT

## 2025-02-06 PROCEDURE — 87077 CULTURE AEROBIC IDENTIFY: CPT

## 2025-02-06 PROCEDURE — 85730 THROMBOPLASTIN TIME PARTIAL: CPT

## 2025-02-06 PROCEDURE — 74177 CT ABD & PELVIS W/CONTRAST: CPT | Mod: MC

## 2025-02-06 PROCEDURE — 0241U: CPT

## 2025-02-06 PROCEDURE — G0545: CPT

## 2025-02-06 PROCEDURE — 80053 COMPREHEN METABOLIC PANEL: CPT

## 2025-02-06 PROCEDURE — 82962 GLUCOSE BLOOD TEST: CPT

## 2025-02-06 PROCEDURE — 97162 PT EVAL MOD COMPLEX 30 MIN: CPT

## 2025-02-06 PROCEDURE — 85025 COMPLETE CBC W/AUTO DIFF WBC: CPT

## 2025-02-06 RX ORDER — ENALAPRIL MALEATE 20 MG
1 TABLET ORAL
Refills: 0 | DISCHARGE

## 2025-02-06 RX ORDER — LACTULOSE 10 G/15 ML
30 SOLUTION, ORAL ORAL ONCE
Refills: 0 | Status: COMPLETED | OUTPATIENT
Start: 2025-02-06 | End: 2025-02-06

## 2025-02-06 RX ORDER — TAMSULOSIN HYDROCHLORIDE 0.4 MG/1
2 CAPSULE ORAL
Qty: 60 | Refills: 0
Start: 2025-02-06 | End: 2025-03-07

## 2025-02-06 RX ADMIN — Medication 100 MILLIGRAM(S): at 06:08

## 2025-02-06 RX ADMIN — Medication 40 MILLIGRAM(S): at 06:08

## 2025-02-06 RX ADMIN — POLYETHYLENE GLYCOL 3350 17 GRAM(S): 17 POWDER, FOR SOLUTION ORAL at 11:00

## 2025-02-06 RX ADMIN — Medication 12.5 MILLIGRAM(S): at 17:33

## 2025-02-06 RX ADMIN — Medication 500 MILLIGRAM(S): at 17:33

## 2025-02-06 RX ADMIN — Medication 30 GRAM(S): at 12:58

## 2025-02-06 RX ADMIN — Medication 2 MILLIGRAM(S): at 11:00

## 2025-02-06 RX ADMIN — Medication 6 MILLIGRAM(S): at 11:00

## 2025-02-06 RX ADMIN — Medication 500 MILLIGRAM(S): at 06:08

## 2025-02-06 RX ADMIN — Medication 50 MILLIGRAM(S): at 11:00

## 2025-02-06 RX ADMIN — Medication 100 MILLIGRAM(S): at 17:33

## 2025-02-06 RX ADMIN — Medication 20 MILLIGRAM(S): at 11:00

## 2025-02-06 RX ADMIN — Medication 20 MILLIGRAM(S): at 06:08

## 2025-02-06 RX ADMIN — Medication 5000 UNIT(S): at 06:03

## 2025-02-06 RX ADMIN — Medication 12.5 MILLIGRAM(S): at 06:08

## 2025-02-06 RX ADMIN — Medication 50 MILLIGRAM(S): at 06:08

## 2025-02-06 RX ADMIN — NIFEDIPINE 60 MILLIGRAM(S): 90 TABLET, FILM COATED, EXTENDED RELEASE ORAL at 06:08

## 2025-02-06 RX ADMIN — ERTAPENEM SODIUM 100 MILLIGRAM(S): 1 INJECTION, POWDER, LYOPHILIZED, FOR SOLUTION INTRAMUSCULAR; INTRAVENOUS at 06:03

## 2025-02-06 RX ADMIN — Medication 50 MILLIGRAM(S): at 17:33

## 2025-02-06 RX ADMIN — LINACLOTIDE 290 MICROGRAM(S): 290 CAPSULE, GELATIN COATED ORAL at 06:08

## 2025-02-06 NOTE — PROGRESS NOTE ADULT - PROBLEM SELECTOR PLAN 1
hemodynamically stable  will continue Cefepime day 3 today  blood cultures negative  urine culture GNR < [id number] CFU awaiting sensitivity   continue to follow ID recommendations  patient declined in-dwelling Barnes
hemodynamically stable  will continue Cefepime  blood cultures negative  urine culture pending  continue to follow ID recommendations
urine culture with ESBL Klebsiella   now on ertapenem  ID follow up   repeat US pending
urine culture with ESBL Klebsiella   discussed with ID attending  no need to continue antibiotics  received two doses ertapenem  cleared for discharge   repeat US mild hydro  urology recommendations noted  patient and wife amenable for Barnes with leg bag  outpatient follow up with urology
hemodynamically stable  will continue Cefepime day 4 today  blood cultures negative  urine culture GNR < [id number] Klebsiella awaiting sensitivity   continue to follow ID recommendations  now amenable for Barnes  repeat US tomorrow to see if hydro improved

## 2025-02-06 NOTE — DISCHARGE NOTE PROVIDER - NSDCCPCAREPLAN_GEN_ALL_CORE_FT
PRINCIPAL DISCHARGE DIAGNOSIS  Diagnosis: Sepsis due to urinary tract infection  Assessment and Plan of Treatment: You were treated with IV antibiotics while you were in the hospital (Ceftriaxone, Cefepime, Ertapenem). You no longer require antibiotics. Urine culture grew Klebsiella bacteria. You are being discharged with an indwelling urinary catheter instead of your home routine of intermittent self-catheterization. Please follow up with your Urologist Dr. Gr 1-2 weeks after discharge for further indwelling urinary catheter managment. Also follow up with your PCP Dr. Snell 1-2 weeks after discharge.    Nutrition is important, eat small frequent meals to help ensure you get adequate calories.  Do not stay in bed all day!  Increase your activity daily as tolerated.  If you develop fever, chills, malaise, or change in mental status, call your Health Care Provider or go to the Emergency Department.      SECONDARY DISCHARGE DIAGNOSES  Diagnosis: Bilateral hydronephrosis  Assessment and Plan of Treatment: mild, in setting of urinary retention, seen on CT Abdomen-Pelvis on admission. An ultrasound of your kidneys and bladder again showed mild bilateral hydronephrosis but improved from the CT scan results. You are being discharged with an indwelling urinary catheter instead of your home routine of intermittent self-catheterization. Please follow up with your Urologist Dr. Gr 1-2 weeks after discharge for further indwelling urinary catheter managment and monitoring

## 2025-02-06 NOTE — DISCHARGE NOTE PROVIDER - NSDCFUADDAPPT_GEN_ALL_CORE_FT
APPTS ARE READY TO BE MADE: [X] YES    Best Family or Patient Contact (if needed):    Additional Information about above appointments (if needed):    1: Urologist Dr. Gr  2: PCP Dr. Stewart Snell [phone]  3:     Other comments or requests:    APPTS ARE READY TO BE MADE: [X] YES    Best Family or Patient Contact (if needed):    Additional Information about above appointments (if needed):    1: Urologist Dr. Gr  2: PCP Dr. Stewart Snell [phone]  3:     Other comments or requests:     Urology:  Prior to outreaching the patient, it was visible that the patient has secured a follow up appointment which was not scheduled by our team.  Dr BERNARDA SHELL,BARRON LEW 02/13/2025 09:30 AM   APPTS ARE READY TO BE MADE: [X] YES    Best Family or Patient Contact (if needed):    Additional Information about above appointments (if needed):    1: Urologist Dr. Gr  2: PCP Dr. Stewart Snell [phone]  3:     Other comments or requests:     Urology:  Prior to outreaching the patient, it was visible that the patient has secured a follow up appointment which was not scheduled by our team.  Dr BERNARDA SHELL,BARRON LOUANN 02/13/2025 09:30 AM    Primary care:  Provided patient with provider referral information, however patient prefers to schedule the appointments on their own.  Spoke with Patient and spouse. They will schedule.

## 2025-02-06 NOTE — PROGRESS NOTE ADULT - PROBLEM SELECTOR PLAN 5
chronic  patient declining Barnes
chronic
chronic  see above HPI
chronic
chronic  will leave Barnes in   remove at time of discharge

## 2025-02-06 NOTE — CHART NOTE - NSCHARTNOTEFT_GEN_A_CORE
Notified UCX sensitivities from 2/1 shows resistance to Cefepime. Contacted ID on call; recommending to change antibiotics to Ertapenem 1g daily. Cefepime discontinued and Ertapenem 1g daily ordered. F/u further ID recommendations in AM. Will endorse to day team in AM, attending to follow.    Fer Torres PA-C  Department of Medicine
< from: US Kidney and Bladder (02.05.25 @ 16:20) >    IMPRESSION:  Mild bilateral hydronephrosis. Bladder is underdistended with Pryor   catheter in place and a small focus of airwhich could be secondary to   instrumentation or represent emphysematous cystitis in the setting of UTI.    Evaluation is overall limited by patient body habitus.    < end of copied text >    64y Male with PMHx HTN, HLD, DM, gout, schizophrenia, chronic outlet obstruction (CIC at home, f/b Dr. Gr Henderson) admitted with a UTI.     Urology consulted for bilateral hydronephrosis seen on CT. CT images reviewed with mild bilateral hydro. Cr. 0.82.   Patient currently being straight catheterized with high outputs (most recent 800cc). Now amendable to indwelling pryor.       Recs:  - recommend continuing indwelling catheter while inpatient  - US images reviewed with visually improving hydro compared to prior exam.    -At time of discharge, given patients large capacity bladder would recommend discharging with either an indwelling pryor or start CIC 6x daily.   - Patient to follow-up with his urologist Dr. Gr after discharge     [facility]  [address] [place] [address] [phone]

## 2025-02-06 NOTE — DISCHARGE NOTE NURSING/CASE MANAGEMENT/SOCIAL WORK - FINANCIAL ASSISTANCE
[facility] provides services at a reduced cost to those who are determined to be eligible through [facility]’s financial assistance program. Information regarding [facility]’s financial assistance program can be found by going to [email or web address] or by calling [phone].

## 2025-02-06 NOTE — DISCHARGE NOTE PROVIDER - NSDCMRMEDTOKEN_GEN_ALL_CORE_FT
allopurinol 100 mg oral tablet: 1 tab(s) orally 2 times a day  benztropine 1 mg oral tablet: 2 tab(s) orally once a day  bethanechol 50 mg oral tablet: 1 tab(s) orally 4 times a day  carvedilol 12.5 mg oral tablet: 1 tab(s) orally every 12 hours  Crestor 5 mg oral tablet: 1 tab(s) orally once a day  divalproex sodium 500 mg oral delayed release tablet: 1 tab(s) orally 2 times a day  enalapril 20 mg oral tablet: 1 tab(s) orally once a day  FLUoxetine 20 mg oral capsule: 1 cap(s) orally once a day  furosemide 40 mg oral tablet: 1 tab(s) orally once a day  Ingrezza 40 mg oral capsule: 1 cap(s) orally once a day  linaclotide 290 mcg oral capsule: 1 cap(s) orally once a day  metFORMIN 1000 mg oral tablet: 1 tab(s) orally 2 times a day  montelukast 10 mg oral tablet: 1 tab(s) orally once a day (at bedtime)  NIFEdipine 30 mg oral tablet, extended release: 2 tab(s) orally once a day  Ozempic 2 mg/1.5 mL (0.25 mg or 0.5 mg dose) subcutaneous solution: 0.5 milligram(s) subcutaneously once a week  Physical Therapy: 3 times a week. Evaluate and treat  potassium chloride 20 mEq oral tablet, extended release: 1 tab(s) orally once a day  risperiDONE 4 mg oral tablet: 1.5 tab(s) orally once a day  tamsulosin 0.4 mg oral capsule: 1 cap(s) orally once a day (at bedtime)   allopurinol 100 mg oral tablet: 1 tab(s) orally 2 times a day  benztropine 1 mg oral tablet: 2 tab(s) orally once a day  bethanechol 50 mg oral tablet: 1 tab(s) orally 4 times a day  carvedilol 12.5 mg oral tablet: 1 tab(s) orally every 12 hours  Crestor 5 mg oral tablet: 1 tab(s) orally once a day  divalproex sodium 500 mg oral delayed release tablet: 1 tab(s) orally 2 times a day  FLUoxetine 20 mg oral capsule: 1 cap(s) orally once a day  furosemide 40 mg oral tablet: 1 tab(s) orally once a day  Ingrezza 40 mg oral capsule: 1 cap(s) orally once a day  linaclotide 290 mcg oral capsule: 1 cap(s) orally once a day  metFORMIN 1000 mg oral tablet: 1 tab(s) orally 2 times a day  montelukast 10 mg oral tablet: 1 tab(s) orally once a day (at bedtime)  NIFEdipine 30 mg oral tablet, extended release: 2 tab(s) orally once a day  Ozempic 2 mg/1.5 mL (0.25 mg or 0.5 mg dose) subcutaneous solution: 0.5 milligram(s) subcutaneously once a week  potassium chloride 20 mEq oral tablet, extended release: 1 tab(s) orally once a day  risperiDONE 4 mg oral tablet: 1.5 tab(s) orally once a day  tamsulosin 0.4 mg oral capsule: 2 cap(s) orally once a day (at bedtime)

## 2025-02-06 NOTE — PROGRESS NOTE ADULT - ASSESSMENT
65 y/o M PMH HTN, HLD, DM (08.16.24 A1C= 6.1%), Obesity (BMI = 39.6), Gout, Schizophrenia, Hx SBO, chronic urinary retention (self-catheterizes), dysphagia 2/2 tardive dyskinesia Brought in by EMS from home for urinary urgency.  Admitted 2/1/25  chronic urinary retention  -- self catheterizes    leucocytosis resolved after 2/2  afebrile since admission   normal lactic acid level on 2/1  GNR bacteruria, pyuria  imaging consistent with bladder outlet obstruction  appreciate Urology evaluation  indwelling catheter  - out pt follow up with urology      Antibiotics  Ceftriaxone 2/1  Cefepime 2/1-->      PLAN:   Complete Cefepime 2 gms every 12 hours 2/5  - adequate course for cystitis    
 65 y/o M PMH HTN, HLD, DM, Gout, Schizophrenia, Hx SBO, chronic urinary retention (self-catheterizes), dysphagia 2/2 tardive dyskinesia Brought in by EMS from home for urinary urgency.        Overall sepsis, leucocytosis, bandemia, lactic acidosis, fever, due to pyelonephritis       PLAN:   c/w cefepime   f/u urine cx, prelim with GN  f/u blood cx, NTD   trend cbc, + leucocytosis; downtrending   Pending CT abd/pelvis to ensure no other focus of infection.         Pino Berry  Please contact through MS Teams   If no response or past 5 pm/weekend call [phone]. 
64y Male with PMHx HTN, HLD, DM, gout, schizophrenia, chronic outlet obstruction (CIC at home, f/b [place]) admitted with a UTI.     Urology consulted for bilateral hydronephrosis seen on CT. CT images reviewed with mild bilateral hydro. Cr. 0.82.   Patient currently being straight catheterized with high outputs (most recent 800cc). Now amendable to indwelling pryor.       Recs:  - recommend continuing indwelling catheter while inpatient  - if patient is still admitted, would recommend RBUS in 48hours after pryor placement to reassess hydro, but should not delay discharge   - Continue abxs   -At time of discharge, given patients large capacity bladder would recommend discharging with either an indwelling pryor or start CIC 6x daily.   - Patient to follow-up with his urologist Dr. Gr after discharge       [facility]  [address] [place] [address], [address] [address] [phone] 
 65 y/o M PMH HTN, HLD, DM (08.16.24 A1C= 6.1%), Obesity (BMI = 39.6), Gout, Schizophrenia, Hx SBO, chronic urinary retention (self-catheterizes), dysphagia 2/2 tardive dyskinesia Brought in by EMS from home for urinary urgency.  Admitted 2/1/25  chronic urinary retention  -- self catheterizes    leucocytosis resolved after 2/2  afebrile since admission   normal lactic acid level on 2/1  GNR bacteruria, pyuria    imaging consistent with bladder outlet obstruction  appreciate Urology evaluation  indwelling catheter  - out pt follow up with urology  +ESBL Klebs pn bacteruria  - despite resistance, pt improved and leukocytosis resolved, suggesting that it was bladder outlet obstn that was main issue - now resolved with indwelling pryor and/or high levels of Cefepime in bladder was able to overcome and kill off the Klelbs      Antibiotics  Ceftriaxone 2/1  Cefepime 2/1--> 2/4  Erapenem 2/4-->      PLAN:   no objection to discontinuing Ertapenem and discharging home if otherwise stable  
 65 y/o M PMH HTN, HLD, DM, Gout, Schizophrenia, Hx SBO, chronic urinary retention (self-catheterizes), dysphagia 2/2 tardive dyskinesia Brought in by EMS from home for urinary urgency clinical presentation consistent with UTI    
 63 y/o M PMH HTN, HLD, DM (08.16.24 A1C= 6.1%), Obesity (BMI = 39.6), Gout, Schizophrenia, Hx SBO, chronic urinary retention (self-catheterizes), dysphagia 2/2 tardive dyskinesia Brought in by EMS from home for urinary urgency.  Admitted 2/1/25  chronic urinary retention  -- self catheterizes    leucocytosis resolved after 2/2  afebrile since admission   normal lactic acid level on 2/1  GNR bacteruria, pyuria    imaging consistent with bladder outlet obstruction  appreciate Urology evaluation  indwelling catheter  - out pt follow up with urology  +ESBL Klebs pn bacteruria  - despite resistance, pt improved and leukocytosis resolved, suggesting that it was bladder outlet obstn that was main issue - now resolved with indwelling pryor and/or high levels of Cefepime in bladder was able to overcome and kill off the Klelbs      Antibiotics  Ceftriaxone 2/1  Cefepime 2/1--> 2/4  Erapenem 2/4-->      PLAN:   STOP Ertapenem   NO ID objection to discharge    discussed with primary medical attending    
 63 y/o M PMH HTN, HLD, DM (08.16.24 A1C= 6.1%), Obesity (BMI = 39.6), Gout, Schizophrenia, Hx SBO, chronic urinary retention (self-catheterizes), dysphagia 2/2 tardive dyskinesia Brought in by EMS from home for urinary urgency.  Admitted 2/1/25  chronic urinary retention  -- self catheterizes    leucocytosis resolved after 2/2  afebrile since admission   normal lactic acid level on 2/1  GNR bacteruria, pyuria  imaging consistent with bladder outlet obstruction    Antibiotics  Ceftriaxone 2/1  Cefepime 2/1-->      PLAN:   DECREASE Cefepime 2 gms every 12 hours (I ordered)  short course likely will suffice  consider Urology evaluation     Is pt candidate for Transurethral Incision of the Prostate? any benefit from Terazosin or Tamsulosin? Is chronic bladder catheterization indicated?      
 65 y/o M PMH HTN, HLD, DM, Gout, Schizophrenia, Hx SBO, chronic urinary retention (self-catheterizes), dysphagia 2/2 tardive dyskinesia Brought in by EMS from home for urinary urgency clinical presentation consistent with UTI    
 63 y/o M PMH HTN, HLD, DM, Gout, Schizophrenia, Hx SBO, chronic urinary retention (self-catheterizes), dysphagia 2/2 tardive dyskinesia Brought in by EMS from home for urinary urgency clinical presentation consistent with UTI

## 2025-02-06 NOTE — PROGRESS NOTE ADULT - PROBLEM SELECTOR PLAN 4
continue home meds with hold parameters

## 2025-02-06 NOTE — DISCHARGE NOTE PROVIDER - PROVIDER TOKENS
PROVIDER:[TOKEN:[[id number]],FOLLOWUP:[1 week],ESTABLISHEDPATIENT:[T]],FREE:[LAST:[Li],FIRST:[Stewart],PHONE:[phone]],FAX:[(   )    -],FOLLOWUP:[1 week],ESTABLISHEDPATIENT:[T]]

## 2025-02-06 NOTE — DISCHARGE NOTE NURSING/CASE MANAGEMENT/SOCIAL WORK - NSDCFUADDAPPT_GEN_ALL_CORE_FT
APPTS ARE READY TO BE MADE: [X] YES    Best Family or Patient Contact (if needed):    Additional Information about above appointments (if needed):    1: Urologist Dr. Gr  2: PCP Dr. Stewart Snell [phone]  3:     Other comments or requests:

## 2025-02-06 NOTE — DISCHARGE NOTE PROVIDER - HOSPITAL COURSE
HPI:   63 y/o M PMH HTN, HLD, DM, Gout, Schizophrenia, Hx SBO, chronic urinary retention (self-catheterizes), dysphagia 2/2 tardive dyskinesia Brought in by EMS from home for urinary urgency.    Patient  self catheterizes, over the past couple days has had to do it more frequently   (01 Feb 2025 13:37)      Hospital Course:      Important/Active Issues for Follow-Up:      Medication Changes and Reason:      Advance Directives:  [x] Full code [ ] Hospice [ ] DNR    Discharge Diagnoses:  Sepsis due to UTI  mild bilateral hydronephrosis   HPI:   65 y/o M PMH HTN, HLD, DM, Gout, Schizophrenia, Hx SBO, chronic urinary retention (self-catheterizes), dysphagia 2/2 tardive dyskinesia Brought in by EMS from home for urinary urgency.    Patient  self catheterizes, over the past couple days has had to do it more frequently   (01 Feb 2025 13:37)      Hospital Course:  CT A/P showed mild bilateral hydronephrosis without obstructing stone. Indwelling pryor catheter placed in ED. Urology and Infectious Disease were consulted, UA positive, patient empirically treated with cefepime and ceftriaxone while urine culture was pending. Speciated as ESBL E.coli, antibiotics then changed to IV Ertapenem. Urology recommended discharging patient with indwelling urinary catheter or intermittent straight catheterization 6 times a day. Patient opted for discharge with indwelling urinary catheter. PT evaluated patient and recommended [facility], opted for home PT. Flomax dose to be increased upon discharge. Follow up Renal Ultrasound and Bladder obtained prior to discharge visually showing improvement in mild bilateral hydronephrosis. Patient will follow up with his outpatient urologist. Discharge plan with medication reconciliation discussed with attending Dr. Mckeon. Patient is medically cleared for discharge home with home care.    Important/Active Issues for Follow-Up:  UTI, urinary retention, mild bilateral hydronephrosis- Primary Care, Urology    Medication Changes and Reason:  Flomax dose increased from 0.4mg QHS to 0.8mg QHS for urinary retention.    Advance Directives:  [x] Full code [ ] Hospice [ ] DNR    Discharge Diagnoses:  Sepsis due to UTI  mild bilateral hydronephrosis

## 2025-02-06 NOTE — PROGRESS NOTE ADULT - PROVIDER SPECIALTY LIST ADULT
Infectious Disease
Internal Medicine
Urology
Infectious Disease
Infectious Disease
Internal Medicine

## 2025-02-06 NOTE — PROGRESS NOTE ADULT - PROBLEM SELECTOR PLAN 2
continue home meds  affect appropriate

## 2025-02-06 NOTE — DISCHARGE NOTE PROVIDER - NSDCFUSCHEDAPPT_GEN_ALL_CORE_FT
Filiberto Salazar Physician [facility]  ORTHOSUR [address]  Scheduled Appointment: 03/03/2025     Filiberto Salazar  [facility]  ORTHOSURG [address]  Scheduled Appointment: 03/03/2025    [facility]  UROLOGY [address]  Scheduled Appointment: 03/13/2025    Dixon Gr  [facility]  UROLOGY [address] [place] [address] R  Scheduled Appointment: 03/13/2025

## 2025-02-06 NOTE — DISCHARGE NOTE PROVIDER - CARE PROVIDER_API CALL
Mckinley Gr  Urology  [address] [place] [address]  Phone: [phone]  Fax: [phone]  Established Patient  Follow Up Time: 1 week    Stewart Snell  Phone: [phone]  Fax: (   )    -  Established Patient  Follow Up Time: 1 week

## 2025-02-06 NOTE — PROGRESS NOTE ADULT - NSPROGADDITIONALINFOA_GEN_ALL_CORE
discussed with covering ACP  discussed with patient's wife at bedside in detail
discussed with covering ACP
discussed with patient, expresses understanding of treatment plans.  discussed with patient's wife at bedside in detail  discussed with case management
discussed with RN  discussed with covering ACP

## 2025-02-06 NOTE — PROGRESS NOTE ADULT - PROBLEM SELECTOR PLAN 3
finger sticks with short acting insulin sliding scale  no oral meds  diabetic diet  monitor for hypoglycemia  HbA1c 6.3  continue Ozempic at home after discharge
finger sticks with short acting insulin sliding scale  no oral meds  diabetic diet  monitor for hypoglycemia  HbA1c 6.3  continue Ozempic at home after discharge
finger sticks with short acting insulin sliding scale  no oral meds  diabetic diet  monitor for hypoglycemia  HbA1c
finger sticks with short acting insulin sliding scale  no oral meds  diabetic diet  monitor for hypoglycemia  HbA1c 6.3  continue Ozempic at home after discharge
finger sticks with short acting insulin sliding scale  no oral meds  diabetic diet  monitor for hypoglycemia  HbA1c

## 2025-02-06 NOTE — DISCHARGE NOTE PROVIDER - CARE PROVIDERS DIRECT ADDRESSES
[email or web address]

## 2025-02-06 NOTE — DISCHARGE NOTE NURSING/CASE MANAGEMENT/SOCIAL WORK - PATIENT PORTAL LINK FT
You can access the FollowMyHealth Patient Portal offered by [facility] by registering at the following website: [email or web address]. By joining [email or web address] FollowMyHealth portal, you will also be able to view your health information using other applications (apps) compatible with our system.

## 2025-02-06 NOTE — PROGRESS NOTE ADULT - SUBJECTIVE AND OBJECTIVE BOX
Follow Up:  bladder outlet obstn    Interval History/ROS:  asleep    Allergies  No Known Allergies        ANTIMICROBIALS:  ertapenem  IVPB 1000 every 24 hours      OTHER MEDS:  MEDICATIONS  (STANDING):  allopurinol 100 two times a day  benztropine 2 daily  bethanechol 50 four times a day  carvedilol 12.5 every 12 hours  divalproex  two times a day  enalapril 20 daily  FLUoxetine 20 daily  furosemide    Tablet 40 daily  heparin   Injectable 5000 every 12 hours  influenza   Vaccine 0.5 once  linaclotide 290 before breakfast  montelukast 10 at bedtime  NIFEdipine XL 60 daily  polyethylene glycol 3350 17 daily  risperiDONE   Tablet 6 daily  rosuvastatin 5 at bedtime  senna 2 at bedtime  tamsulosin 0.4 at bedtime      Vital Signs Last 24 Hrs  T(C): 36.7 (05 Feb 2025 17:25), Max: 36.9 (05 Feb 2025 04:51)  T(F): 98 (05 Feb 2025 17:25), Max: 98.4 (05 Feb 2025 04:51)  HR: 78 (05 Feb 2025 17:25) (65 - 80)  BP: 161/93 (05 Feb 2025 17:25) (140/73 - 161/93)  BP(mean): --  RR: 18 (05 Feb 2025 17:25) (18 - 18)  SpO2: 94% (05 Feb 2025 17:25) (93% - 96%)    Parameters below as of 05 Feb 2025 17:25  Patient On (Oxygen Delivery Method): room air        PHYSICAL EXAM:  General: NAD, Non-toxic  Neurology: asleep  Respiratory: no resp distress, occasional snores  Abdominal: prominent  Barnes to DS  clear urine  Extremities: No edema,   Line Sites: Clear  Skin: No rash    WBC Count: 9.50 (02-03 @ 07:25)  WBC Count: 14.47 (02-02 @ 07:22)  WBC Count: 17.66 (02-01 @ 09:09)    02-04    138  |  100  |  12  ----------------------------<  95  4.1   |  26  |  0.80    Ca    8.9      04 Feb 2025 07:12        MICROBIOLOGY:  Clean Catch Clean Catch (Midstream)  02-01-25   10,000 - 49,000 CFU/mL Klebsiella pneumoniae ESBL  --  Klebsiella pneumoniae ESBL      .Blood BLOOD  02-01-25   No growth at 4 days  --  --      .Blood BLOOD  02-01-25   No growth at 4 days  --  --      RADIOLOGY:    Itz Watkins MD; Division of Infectious Disease; Pager: [phone]; nights and weekends: [phone]
  Follow Up:  sharri alegria [facility]    Interval History/ROS: feels ok    Allergies  No Known Allergies    ANTIMICROBIALS:  ertapenem  IVPB 1000 every 24 hours      OTHER MEDS:  MEDICATIONS  (STANDING):  allopurinol 100 two times a day  benztropine 2 daily  bethanechol 50 four times a day  carvedilol 12.5 every 12 hours  divalproex  two times a day  enalapril 20 daily  FLUoxetine 20 daily  furosemide    Tablet 40 daily  heparin   Injectable 5000 every 12 hours  influenza   Vaccine 0.5 once  linaclotide 290 before breakfast  montelukast 10 at bedtime  NIFEdipine XL 60 daily  polyethylene glycol 3350 17 daily  risperiDONE   Tablet 6 daily  rosuvastatin 5 at bedtime  senna 2 at bedtime  tamsulosin 0.4 at bedtime      Vital Signs Last 24 Hrs  T(C): 36.8 (06 Feb 2025 17:16), Max: 37.1 (06 Feb 2025 12:30)  T(F): 98.3 (06 Feb 2025 17:16), Max: 98.7 (06 Feb 2025 12:30)  HR: 69 (06 Feb 2025 17:16) (68 - 73)  BP: 132/84 (06 Feb 2025 17:16) (124/76 - 155/94)  BP(mean): --  RR: 18 (06 Feb 2025 17:16) (18 - 18)  SpO2: 94% (06 Feb 2025 17:16) (91% - 95%)    Parameters below as of 06 Feb 2025 17:16  Patient On (Oxygen Delivery Method): room air        PHYSICAL EXAM:  General: WN/WD NAD, Non-toxic  Neurology: A&Ox3, nonfocal  Respiratory: Clear to auscultation bilaterally  CV: RRR, S1S2, no murmurs, rubs or gallops  Abdominal: Soft, Non-tender, non-distended, normal bowel sounds  Extremities: No edema, + peripheral pulses  Line Sites: Clear  Skin: No rash                          12.9   7.92  )-----------( 239      ( 06 Feb 2025 07:52 )             40.1       02-06    136  |  99  |  11  ----------------------------<  114[H]  3.7   |  26  |  0.70    Ca    9.4      06 Feb 2025 07:52          MICROBIOLOGY:  Clean Catch Clean Catch (Midstream)  02-01-25   10,000 - 49,000 CFU/mL Klebsiella pneumoniae ESBL  --  Klebsiella pneumoniae ESBL      .Blood BLOOD  02-01-25   No growth at 5 days  --  --      .Blood BLOOD  02-01-25   No growth at 5 days  --  --      RADIOLOGY:  < from: US Kidney and Bladder (02.05.25 @ 16:20) >  IMPRESSION:  Mild bilateral hydronephrosis. Bladder is underdistended with Barnes   catheter in place and a small focus of airwhich could be secondary to   instrumentation or represent emphysematous cystitis in the setting of UTI.    Evaluation is overall limited by patient body habitus.    < end of copied text >      Itz Watkins MD; Division of Infectious Disease; Pager: [phone]; nights and weekends: [phone]
  Follow Up:  UTI    Interval History/ROS:  "tired:  denies focal pain    Allergies  No Known Allergies    ANTIMICROBIALS:  cefepime   IVPB 2000 every 8 hours      OTHER MEDS:  MEDICATIONS  (STANDING):  allopurinol 100 two times a day  benztropine 2 daily  bethanechol 50 four times a day  carvedilol 12.5 every 12 hours  divalproex  two times a day  enalapril 20 daily  FLUoxetine 20 daily  furosemide    Tablet 40 daily  heparin   Injectable 5000 every 12 hours  linaclotide 290 before breakfast  montelukast 10 at bedtime  NIFEdipine XL 60 daily  risperiDONE   Tablet 6 daily  rosuvastatin 5 at bedtime  tamsulosin 0.4 at bedtime      Vital Signs Last 24 Hrs  T(C): 36.7 (03 Feb 2025 13:11), Max: 37.3 (02 Feb 2025 20:17)  T(F): 98.1 (03 Feb 2025 13:11), Max: 99.1 (02 Feb 2025 20:17)  HR: 66 (03 Feb 2025 13:11) (66 - 76)  BP: 124/76 (03 Feb 2025 13:11) (124/76 - 148/74)  BP(mean): --  RR: 18 (03 Feb 2025 13:11) (18 - 18)  SpO2: 93% (03 Feb 2025 13:11) (87% - 95%)    Parameters below as of 03 Feb 2025 13:11  Patient On (Oxygen Delivery Method): nasal cannula        PHYSICAL EXAM:  General: BMI = 39.6 NAD, Non-toxic  Neurology: Alert fatigued  Respiratory: Clear to auscultation bilaterally  CV: RRR, S1S2, no murmurs, rubs or gallops  Abdominal: Soft, Non-tender, non-distended, normal bowel sounds  Extremities: LE edema,  Line Sites: Clear  Skin: No rash                          11.7   9.50  )-----------( 188      ( 03 Feb 2025 07:25 )             36.6       02-03    135  |  99  |  13  ----------------------------<  83  4.1   |  23  |  0.84    Ca    8.7      03 Feb 2025 07:25    Urinalysis + Microscopic Examination (02.01.25 @ 09:36)   pH Urine: 7.0  Urine Appearance: Turbid  Color: Yellow  Specific Gravity: 1.014  Protein, Urine: >=1000 mg/dL  Glucose Qualitative, Urine: Negative mg/dL  Ketone - Urine: Negative mg/dL  Blood, Urine: Large  Bilirubin: Negative  Urobilinogen: 0.2 mg/dL  Leukocyte Esterase Concentration: Large  Nitrite: Positive  Review: Reviewed  White Blood Cell - Urine: >998 /HPF  Red Blood Cell - Urine: 302 /HPF  Bacteria: Few /HPF  Cast: 0 /LPF  Epithelial Cells: 1 /HPF    MICROBIOLOGY:  Clean Catch Clean Catch (Midstream)  02-01-25   10,000 - 49,000 CFU/mL Gram Negative Rods  --  --      .Blood BLOOD  02-01-25   No growth at 48 Hours  --  --      .Blood BLOOD  02-01-25   No growth at 48 Hours  --  --      RADIOLOGY:  < from: CT Abdomen and Pelvis w/ IV Cont (02.02.25 @ 17:04) >  PROCEDURE:  CT of the Abdomen and Pelvis was performed.  Sagittal and coronal reformats were performed.    FINDINGS:  LOWER CHEST: Within normal limits.    LIVER: Subcentimeter liver lesions too small to characterize, likely   cysts.  BILE DUCTS: Normal caliber.  GALLBLADDER: Cholelithiasis.  SPLEEN: Within normal limits.  PANCREAS: Within normal limits.  ADRENALS: Within normal limits.  KIDNEYS/URETERS: Moderate bilateral hydroureteronephrosis low-level the   bladder without evidence of obstructing ureteral stone. Symmetric   nephrograms.    BLADDER: Thickened trabeculated bladder wall. Small bladder diverticula.  REPRODUCTIVE ORGANS: TURP defect.    BOWEL: No bowel obstruction. Appendix is normal.  PERITONEUM/RETROPERITONEUM: Within normal limits.  VESSELS: Atherosclerotic calcifications.  LYMPH NODES: No lymphadenopathy.  ABDOMINAL WALL: Postoperative changes.  BONES: Degenerative changes.    IMPRESSION:  *  Mild bilateral hydroureteronephrosis level bladder without evidence of   obstructing ureteral stone.  *  Chronic changes of bladder outlet obstruction. Correlate with   urinalysis if cystitis is of concern.    < end of copied text >      Itz Watkins MD; Division of Infectious Disease; Pager: [phone]; nights and weekends: [phone]
64yPatient is a 64y old  Male who presents with a chief complaint of UTI (02 Feb 2025 14:37)      Interval history:  Afebrile, feeling better.       Allergies:   No Known Allergies      Antimicrobials:  cefepime   IVPB 2000 milliGRAM(s) IV Intermittent every 8 hours      REVIEW OF SYSTEMS:  No chest pain   No SOB  No abdominal pain  No rash.       Vital Signs Last 24 Hrs  T(C): 36.9 (02-02-25 @ 17:18), Max: 37.4 (02-02-25 @ 00:00)  T(F): 98.5 (02-02-25 @ 17:18), Max: 99.3 (02-02-25 @ 00:00)  HR: 76 (02-02-25 @ 17:18) (76 - 85)  BP: 130/79 (02-02-25 @ 17:18) (130/79 - 168/84)  BP(mean): --  RR: 18 (02-02-25 @ 17:18) (18 - 18)  SpO2: 95% (02-02-25 @ 17:18) (92% - 95%)      PHYSICAL EXAM:  Pt in no acute distress, alert, awake.   breathing comfortably   non distended abdomen  no edema LE   no phlebitis                             12.1   14.47 )-----------( 186      ( 02 Feb 2025 07:22 )             38.0   02-02    136  |  100  |  16  ----------------------------<  88  4.1   |  24  |  0.91    Ca    8.7      02 Feb 2025 07:25    TPro  6.7  /  Alb  3.5  /  TBili  0.7  /  DBili  x   /  AST  17  /  ALT  10  /  AlkPhos  67  02-01      LIVER FUNCTIONS - ( 01 Feb 2025 09:09 )  Alb: 3.5 g/dL / Pro: 6.7 g/dL / ALK PHOS: 67 U/L / ALT: 10 U/L / AST: 17 U/L / GGT: x               Culture - Urine (collected 01 Feb 2025 09:36)  Source: Clean Catch Clean Catch (Midstream)  Preliminary Report (02 Feb 2025 17:51):    10,000 - 49,000 CFU/mL Gram Negative Rods    Culture - Blood (collected 01 Feb 2025 08:46)  Source: .Blood BLOOD  Preliminary Report (02 Feb 2025 14:01):    No growth at 24 hours    Culture - Blood (collected 01 Feb 2025 08:31)  Source: .Blood BLOOD  Preliminary Report (02 Feb 2025 14:01):    No growth at 24 hours            
Subjective  feels okay     Objective    Vital signs  T(F): , Max: 98.7 (02-03-25 @ 10:12)  HR: 93 (02-04-25 @ 04:51)  BP: 167/96 (02-04-25 @ 04:51)  SpO2: 95% (02-04-25 @ 04:51)  Wt(kg): --    Output     OUT:    Intermittent Catheterization - Urethral (mL): 4950 mL  Total OUT: 4950 mL    Total NET: -4950 mL      OUT:    Intermittent Catheterization - Urethral (mL): 4200 mL  Total OUT: 4200 mL    Total NET: -4200 mL          Gen: NAD  Abd: soft, nontender, nondistended  : pryor secured in place, draining CYU    Labs      02-03 @ 07:25    WBC 9.50  / Hct 36.6  / SCr 0.84     02-02 @ 07:25    WBC --    / Hct --    / SCr 0.91         Culture - Urine (collected 02-01-25 @ 09:36)  Source: Clean Catch Clean Catch (Midstream)  Preliminary Report (02-03-25 @ 22:52):    10,000 - 49,000 CFU/mL Klebsiella pneumoniae    Culture - Blood (collected 02-01-25 @ 08:46)  Source: .Blood BLOOD  Preliminary Report (02-03-25 @ 14:02):    No growth at 48 Hours    Culture - Blood (collected 02-01-25 @ 08:31)  Source: .Blood BLOOD  Preliminary Report (02-03-25 @ 14:02):    No growth at 48 Hours        Urine Cx: ?  Blood Cx: ?    Imaging
  Follow Up:  UTI    Interval History/ROS:  does not want rehab  - wants PT at home  - he took tamsulosin in past without benefit  - MIRANDA in place    Allergies  No Known Allergies    ANTIMICROBIALS:  cefepime   IVPB 2000 every 12 hours      OTHER MEDS:  MEDICATIONS  (STANDING):  allopurinol 100 two times a day  benztropine 2 daily  bethanechol 50 four times a day  carvedilol 12.5 every 12 hours  divalproex  two times a day  enalapril 20 daily  FLUoxetine 20 daily  furosemide    Tablet 40 daily  heparin   Injectable 5000 every 12 hours  linaclotide 290 before breakfast  montelukast 10 at bedtime  NIFEdipine XL 60 daily  polyethylene glycol 3350 17 daily  risperiDONE   Tablet 6 daily  rosuvastatin 5 at bedtime  senna 2 at bedtime  tamsulosin 0.4 at bedtime      Vital Signs Last 24 Hrs  T(C): 36.7 (04 Feb 2025 11:49), Max: 36.8 (03 Feb 2025 18:01)  T(F): 98.1 (04 Feb 2025 11:49), Max: 98.2 (03 Feb 2025 18:01)  HR: 100 (04 Feb 2025 11:49) (70 - 100)  BP: 124/76 (04 Feb 2025 11:49) (124/67 - 167/96)  BP(mean): --  RR: 18 (04 Feb 2025 11:49) (18 - 18)  SpO2: 92% (04 Feb 2025 11:49) (92% - 95%)    Parameters below as of 04 Feb 2025 11:49  Patient On (Oxygen Delivery Method): room air        PHYSICAL EXAM:  General: WN/WD NAD, Non-toxic  Neurology: A&Ox3, nonfocal  Respiratory: Clear to auscultation bilaterally  CV: RRR, S1S2, no murmurs, rubs or gallops  Abdominal: Soft, Non-tender,distended, Miranda to SD  Extremities: No edema,  Line Sites: Clear  Skin: No rash                          11.7   9.50  )-----------( 188      ( 03 Feb 2025 07:25 )             36.6       02-04    138  |  100  |  12  ----------------------------<  95  4.1   |  26  |  0.80    Ca    8.9      04 Feb 2025 07:12    MICROBIOLOGY:  Clean Catch Clean Catch (Midstream)  02-01-25   10,000 - 49,000 CFU/mL Klebsiella pneumoniae  --  --        .Blood BLOOD  02-01-25   No growth at 72 Hours  --  --      .Blood BLOOD  02-01-25   No growth at 72 Hours  --  --      RADIOLOGY:  < from: CT Abdomen and Pelvis w/ IV Cont (02.02.25 @ 17:04) >  FINDINGS:  LOWER CHEST: Within normal limits.    LIVER: Subcentimeter liver lesions too small to characterize, likely   cysts.  BILE DUCTS: Normal caliber.  GALLBLADDER: Cholelithiasis.  SPLEEN: Within normal limits.  PANCREAS: Within normal limits.  ADRENALS: Within normal limits.  KIDNEYS/URETERS: Moderate bilateral hydroureteronephrosis low-level the   bladder without evidence of obstructing ureteral stone. Symmetric   nephrograms.    BLADDER: Thickened trabeculated bladder wall. Small bladder diverticula.  REPRODUCTIVE ORGANS: TURP defect.    BOWEL: No bowel obstruction. Appendix is normal.  PERITONEUM/RETROPERITONEUM: Within normal limits.  VESSELS: Atherosclerotic calcifications.  LYMPH NODES: No lymphadenopathy.  ABDOMINAL WALL: Postoperative changes.  BONES: Degenerative changes.    IMPRESSION:  *  Mild bilateral hydroureteronephrosis level bladder without evidence of   obstructing ureteral stone.  *  Chronic changes of bladder outlet obstruction. Correlate with   urinalysis if cystitis is of concern.    < end of copied text >      Itz Watkins MD; Division of Infectious Disease; Pager: [phone]; nights and weekends: [phone]
Patient is a 64y old  Male who presents with a chief complaint of UTI (02 Feb 2025 14:37)      DATE OF SERVICE: 02-03-25 @ 14:28    SUBJECTIVE / OVERNIGHT EVENTS: overnight events noted    ROS:  Resp: No cough no sputum production  CVS: No chest pain no palpitations no orthopnea  GI: no N/V/D  per RN        MEDICATIONS  (STANDING):  allopurinol 100 milliGRAM(s) Oral two times a day  benztropine 2 milliGRAM(s) Oral daily  bethanechol 50 milliGRAM(s) Oral four times a day  carvedilol 12.5 milliGRAM(s) Oral every 12 hours  cefepime   IVPB 2000 milliGRAM(s) IV Intermittent every 8 hours  divalproex  milliGRAM(s) Oral two times a day  enalapril 20 milliGRAM(s) Oral daily  FLUoxetine 20 milliGRAM(s) Oral daily  furosemide    Tablet 40 milliGRAM(s) Oral daily  heparin   Injectable 5000 Unit(s) SubCutaneous every 12 hours  lidocaine   4% Patch 2 Patch Transdermal every 24 hours  linaclotide 290 MICROGram(s) Oral before breakfast  montelukast 10 milliGRAM(s) Oral at bedtime  NIFEdipine XL 60 milliGRAM(s) Oral daily  risperiDONE   Tablet 6 milliGRAM(s) Oral daily  rosuvastatin 5 milliGRAM(s) Oral at bedtime  sodium chloride 0.9%. 1000 milliLiter(s) (75 mL/Hr) IV Continuous <Continuous>  tamsulosin 0.4 milliGRAM(s) Oral at bedtime    MEDICATIONS  (PRN):        CAPILLARY BLOOD GLUCOSE        I&O's Summary    02 Feb 2025 07:01  -  03 Feb 2025 07:00  --------------------------------------------------------  IN: 2440 mL / OUT: 4950 mL / NET: -2510 mL        Vital Signs Last 24 Hrs  T(C): 36.7 (03 Feb 2025 13:11), Max: 37.3 (02 Feb 2025 20:17)  T(F): 98.1 (03 Feb 2025 13:11), Max: 99.1 (02 Feb 2025 20:17)  HR: 66 (03 Feb 2025 13:11) (66 - 76)  BP: 124/76 (03 Feb 2025 13:11) (124/76 - 148/74)  BP(mean): --  RR: 18 (03 Feb 2025 13:11) (18 - 18)  SpO2: 93% (03 Feb 2025 13:11) (87% - 95%)    PHYSICAL EXAM:  CHEST/LUNG: clear   HEART: S1 S2; no murmurs   ABDOMEN: Soft, Nontender  EXTREMITIES:   no edema  NEUROLOGY: Alert non-focal    LABS:                        11.7   9.50  )-----------( 188      ( 03 Feb 2025 07:25 )             36.6     02-03    135  |  99  |  13  ----------------------------<  83  4.1   |  23  |  0.84    Ca    8.7      03 Feb 2025 07:25            Urinalysis Basic - ( 03 Feb 2025 07:25 )    Color: x / Appearance: x / SG: x / pH: x  Gluc: 83 mg/dL / Ketone: x  / Bili: x / Urobili: x   Blood: x / Protein: x / Nitrite: x   Leuk Esterase: x / RBC: x / WBC x   Sq Epi: x / Non Sq Epi: x / Bacteria: x          All consultant(s) notes reviewed and care discussed with other providers        Contact Number, Dr Mckeon [phone]
Patient is a 64y old  Male who presents with a chief complaint of UTI (03 Feb 2025 14:27)      DATE OF SERVICE: 02-04-25 @ 13:33    SUBJECTIVE / OVERNIGHT EVENTS: overnight events noted    ROS:  Resp: No cough no sputum production  CVS: No chest pain no palpitations no orthopnea  GI: no N/V/D    MEDICATIONS  (STANDING):  allopurinol 100 milliGRAM(s) Oral two times a day  benztropine 2 milliGRAM(s) Oral daily  bethanechol 50 milliGRAM(s) Oral four times a day  carvedilol 12.5 milliGRAM(s) Oral every 12 hours  cefepime   IVPB 2000 milliGRAM(s) IV Intermittent every 12 hours  divalproex  milliGRAM(s) Oral two times a day  enalapril 20 milliGRAM(s) Oral daily  FLUoxetine 20 milliGRAM(s) Oral daily  furosemide    Tablet 40 milliGRAM(s) Oral daily  heparin   Injectable 5000 Unit(s) SubCutaneous every 12 hours  lidocaine   4% Patch 2 Patch Transdermal every 24 hours  linaclotide 290 MICROGram(s) Oral before breakfast  montelukast 10 milliGRAM(s) Oral at bedtime  NIFEdipine XL 60 milliGRAM(s) Oral daily  polyethylene glycol 3350 17 Gram(s) Oral daily  risperiDONE   Tablet 6 milliGRAM(s) Oral daily  rosuvastatin 5 milliGRAM(s) Oral at bedtime  senna 2 Tablet(s) Oral at bedtime  sodium chloride 0.9%. 1000 milliLiter(s) (75 mL/Hr) IV Continuous <Continuous>  tamsulosin 0.4 milliGRAM(s) Oral at bedtime    MEDICATIONS  (PRN):        CAPILLARY BLOOD GLUCOSE      POCT Blood Glucose.: 148 mg/dL (04 Feb 2025 07:59)    I&O's Summary    03 Feb 2025 07:01  -  04 Feb 2025 07:00  --------------------------------------------------------  IN: 240 mL / OUT: 4200 mL / NET: -3960 mL    04 Feb 2025 07:01  -  04 Feb 2025 13:33  --------------------------------------------------------  IN: 200 mL / OUT: 2000 mL / NET: -1800 mL        Vital Signs Last 24 Hrs  T(C): 36.7 (04 Feb 2025 11:49), Max: 36.8 (03 Feb 2025 18:01)  T(F): 98.1 (04 Feb 2025 11:49), Max: 98.2 (03 Feb 2025 18:01)  HR: 100 (04 Feb 2025 11:49) (70 - 100)  BP: 124/76 (04 Feb 2025 11:49) (124/67 - 167/96)  BP(mean): --  RR: 18 (04 Feb 2025 11:49) (18 - 18)  SpO2: 92% (04 Feb 2025 11:49) (92% - 95%)    PHYSICAL EXAM:  wife at bedside   CHEST/LUNG: clear   HEART: S1 S2; no murmurs   ABDOMEN: Soft, Nontender  EXTREMITIES:   no edema  NEUROLOGY: Alert non-focal    LABS:                        11.7   9.50  )-----------( 188      ( 03 Feb 2025 07:25 )             36.6     02-04    138  |  100  |  12  ----------------------------<  95  4.1   |  26  |  0.80    Ca    8.9      04 Feb 2025 07:12            Urinalysis Basic - ( 04 Feb 2025 07:12 )    Color: x / Appearance: x / SG: x / pH: x  Gluc: 95 mg/dL / Ketone: x  / Bili: x / Urobili: x   Blood: x / Protein: x / Nitrite: x   Leuk Esterase: x / RBC: x / WBC x   Sq Epi: x / Non Sq Epi: x / Bacteria: x          All consultant(s) notes reviewed and care discussed with other providers        Contact Number, Dr Mckeon [phone]
Patient is a 64y old  Male who presents with a chief complaint of bladder outlet obstn (05 Feb 2025 18:50)      DATE OF SERVICE: 02-06-25 @ 13:27    SUBJECTIVE / OVERNIGHT EVENTS: overnight events noted    ROS:  Resp: No cough no sputum production  CVS: No chest pain no palpitations no orthopnea  GI: no N/V/D  "I want to go home"  wife at bedside       MEDICATIONS  (STANDING):  allopurinol 100 milliGRAM(s) Oral two times a day  benztropine 2 milliGRAM(s) Oral daily  bethanechol 50 milliGRAM(s) Oral four times a day  carvedilol 12.5 milliGRAM(s) Oral every 12 hours  divalproex  milliGRAM(s) Oral two times a day  enalapril 20 milliGRAM(s) Oral daily  ertapenem  IVPB 1000 milliGRAM(s) IV Intermittent every 24 hours  FLUoxetine 20 milliGRAM(s) Oral daily  furosemide    Tablet 40 milliGRAM(s) Oral daily  heparin   Injectable 5000 Unit(s) SubCutaneous every 12 hours  influenza   Vaccine 0.5 milliLiter(s) IntraMuscular once  lidocaine   4% Patch 2 Patch Transdermal every 24 hours  linaclotide 290 MICROGram(s) Oral before breakfast  montelukast 10 milliGRAM(s) Oral at bedtime  NIFEdipine XL 60 milliGRAM(s) Oral daily  polyethylene glycol 3350 17 Gram(s) Oral daily  risperiDONE   Tablet 6 milliGRAM(s) Oral daily  rosuvastatin 5 milliGRAM(s) Oral at bedtime  senna 2 Tablet(s) Oral at bedtime  sodium chloride 0.9%. 1000 milliLiter(s) (75 mL/Hr) IV Continuous <Continuous>  tamsulosin 0.4 milliGRAM(s) Oral at bedtime    MEDICATIONS  (PRN):        CAPILLARY BLOOD GLUCOSE        I&O's Summary    05 Feb 2025 07:01  -  06 Feb 2025 07:00  --------------------------------------------------------  IN: 540 mL / OUT: 5900 mL / NET: -5360 mL        Vital Signs Last 24 Hrs  T(C): 37.1 (06 Feb 2025 12:30), Max: 37.1 (06 Feb 2025 12:30)  T(F): 98.7 (06 Feb 2025 12:30), Max: 98.7 (06 Feb 2025 12:30)  HR: 73 (06 Feb 2025 12:30) (69 - 78)  BP: 138/78 (06 Feb 2025 12:30) (136/82 - 161/93)  BP(mean): --  RR: 18 (06 Feb 2025 12:30) (18 - 18)  SpO2: 95% (06 Feb 2025 12:30) (94% - 95%)    PHYSICAL EXAM:  CHEST/LUNG: clear   HEART: S1 S2; no murmurs   ABDOMEN: Soft, Nontender  EXTREMITIES:   no edema  NEUROLOGY: Alert non-focal    LABS:                        12.9   7.92  )-----------( 239      ( 06 Feb 2025 07:52 )             40.1     02-06    136  |  99  |  11  ----------------------------<  114[H]  3.7   |  26  |  0.70    Ca    9.4      06 Feb 2025 07:52            Urinalysis Basic - ( 06 Feb 2025 07:52 )    Color: x / Appearance: x / SG: x / pH: x  Gluc: 114 mg/dL / Ketone: x  / Bili: x / Urobili: x   Blood: x / Protein: x / Nitrite: x   Leuk Esterase: x / RBC: x / WBC x   Sq Epi: x / Non Sq Epi: x / Bacteria: x          All consultant(s) notes reviewed and care discussed with other providers        Contact Number, Dr Mckeon [phone]
Patient is a 64y old  Male who presents with a chief complaint of UTI (04 Feb 2025 15:40)      DATE OF SERVICE: 02-05-25 @ 13:43    SUBJECTIVE / OVERNIGHT EVENTS: overnight events noted    ROS:  Resp: No cough no sputum production  CVS: No chest pain no palpitations no orthopnea  GI: no N/V/D  "I feel OK"      MEDICATIONS  (STANDING):  allopurinol 100 milliGRAM(s) Oral two times a day  benztropine 2 milliGRAM(s) Oral daily  bethanechol 50 milliGRAM(s) Oral four times a day  carvedilol 12.5 milliGRAM(s) Oral every 12 hours  divalproex  milliGRAM(s) Oral two times a day  enalapril 20 milliGRAM(s) Oral daily  ertapenem  IVPB 1000 milliGRAM(s) IV Intermittent every 24 hours  FLUoxetine 20 milliGRAM(s) Oral daily  furosemide    Tablet 40 milliGRAM(s) Oral daily  heparin   Injectable 5000 Unit(s) SubCutaneous every 12 hours  influenza   Vaccine 0.5 milliLiter(s) IntraMuscular once  lidocaine   4% Patch 2 Patch Transdermal every 24 hours  linaclotide 290 MICROGram(s) Oral before breakfast  montelukast 10 milliGRAM(s) Oral at bedtime  NIFEdipine XL 60 milliGRAM(s) Oral daily  polyethylene glycol 3350 17 Gram(s) Oral daily  risperiDONE   Tablet 6 milliGRAM(s) Oral daily  rosuvastatin 5 milliGRAM(s) Oral at bedtime  senna 2 Tablet(s) Oral at bedtime  sodium chloride 0.9%. 1000 milliLiter(s) (75 mL/Hr) IV Continuous <Continuous>  tamsulosin 0.4 milliGRAM(s) Oral at bedtime    MEDICATIONS  (PRN):        CAPILLARY BLOOD GLUCOSE      POCT Blood Glucose.: 120 mg/dL (05 Feb 2025 12:12)  POCT Blood Glucose.: 127 mg/dL (05 Feb 2025 08:31)    I&O's Summary    04 Feb 2025 07:01  -  05 Feb 2025 07:00  --------------------------------------------------------  IN: 550 mL / OUT: 6800 mL / NET: -6250 mL        Vital Signs Last 24 Hrs  T(C): 36.5 (05 Feb 2025 13:00), Max: 36.9 (05 Feb 2025 04:51)  T(F): 97.7 (05 Feb 2025 13:00), Max: 98.4 (05 Feb 2025 04:51)  HR: 65 (05 Feb 2025 13:00) (65 - 80)  BP: 147/94 (05 Feb 2025 13:00) (140/73 - 154/90)  BP(mean): --  RR: 18 (05 Feb 2025 13:00) (18 - 18)  SpO2: 93% (05 Feb 2025 13:00) (93% - 96%)    PHYSICAL EXAM:  wife at bedside   CHEST/LUNG: clear   HEART: S1 S2; no murmurs   ABDOMEN: Soft, Nontender  EXTREMITIES:   no edema  NEUROLOGY: Alert non-focal    LABS:    02-04    138  |  100  |  12  ----------------------------<  95  4.1   |  26  |  0.80    Ca    8.9      04 Feb 2025 07:12            Urinalysis Basic - ( 04 Feb 2025 07:12 )    Color: x / Appearance: x / SG: x / pH: x  Gluc: 95 mg/dL / Ketone: x  / Bili: x / Urobili: x   Blood: x / Protein: x / Nitrite: x   Leuk Esterase: x / RBC: x / WBC x   Sq Epi: x / Non Sq Epi: x / Bacteria: x          All consultant(s) notes reviewed and care discussed with other providers        Contact Number, Dr Mckeon [phone]
Patient is a 64y old  Male who presents with a chief complaint of     DATE OF SERVICE: 02-02-25 @ 14:37    SUBJECTIVE / OVERNIGHT EVENTS: overnight events noted    ROS:  Resp: No cough no sputum production  CVS: No chest pain no palpitations no orthopnea  GI: no N/V/D  "I feel better"         MEDICATIONS  (STANDING):  allopurinol 100 milliGRAM(s) Oral two times a day  benztropine 2 milliGRAM(s) Oral daily  bethanechol 50 milliGRAM(s) Oral four times a day  carvedilol 12.5 milliGRAM(s) Oral every 12 hours  cefepime   IVPB 2000 milliGRAM(s) IV Intermittent every 8 hours  divalproex  milliGRAM(s) Oral two times a day  enalapril 20 milliGRAM(s) Oral daily  FLUoxetine 20 milliGRAM(s) Oral daily  furosemide    Tablet 40 milliGRAM(s) Oral daily  heparin   Injectable 5000 Unit(s) SubCutaneous every 12 hours  linaclotide 290 MICROGram(s) Oral before breakfast  montelukast 10 milliGRAM(s) Oral at bedtime  NIFEdipine XL 60 milliGRAM(s) Oral daily  risperiDONE   Tablet 6 milliGRAM(s) Oral daily  rosuvastatin 5 milliGRAM(s) Oral at bedtime  sodium chloride 0.9%. 1000 milliLiter(s) (75 mL/Hr) IV Continuous <Continuous>  tamsulosin 0.4 milliGRAM(s) Oral at bedtime    MEDICATIONS  (PRN):        CAPILLARY BLOOD GLUCOSE      POCT Blood Glucose.: 112 mg/dL (01 Feb 2025 21:37)  POCT Blood Glucose.: 112 mg/dL (01 Feb 2025 16:52)    I&O's Summary    01 Feb 2025 07:01  -  02 Feb 2025 07:00  --------------------------------------------------------  IN: 0 mL / OUT: 2225 mL / NET: -2225 mL        Vital Signs Last 24 Hrs  T(C): 37.2 (02 Feb 2025 13:53), Max: 37.4 (02 Feb 2025 00:00)  T(F): 98.9 (02 Feb 2025 13:53), Max: 99.3 (02 Feb 2025 00:00)  HR: 79 (02 Feb 2025 13:53) (77 - 85)  BP: 148/80 (02 Feb 2025 13:53) (135/76 - 168/84)  BP(mean): --  RR: 18 (02 Feb 2025 13:53) (18 - 18)  SpO2: 93% (02 Feb 2025 13:53) (92% - 93%)    PHYSICAL EXAM: increased BMI  EYES: EOMI, PERRLA, sclera clear  NECK: Supple,  CHEST/LUNG: clear   HEART: S1 S2; no murmurs   ABDOMEN: Soft, Nontender  EXTREMITIES:  no edema  NEUROLOGY: Alert non-focal      LABS:                        12.1   14.47 )-----------( 186      ( 02 Feb 2025 07:22 )             38.0     02-02    136  |  100  |  16  ----------------------------<  88  4.1   |  24  |  0.91    Ca    8.7      02 Feb 2025 07:25    TPro  6.7  /  Alb  3.5  /  TBili  0.7  /  DBili  x   /  AST  17  /  ALT  10  /  AlkPhos  67  02-01    PT/INR - ( 01 Feb 2025 09:09 )   PT: 12.1 sec;   INR: 1.05 ratio         PTT - ( 01 Feb 2025 09:09 )  PTT:31.8 sec      Urinalysis Basic - ( 02 Feb 2025 07:25 )    Color: x / Appearance: x / SG: x / pH: x  Gluc: 88 mg/dL / Ketone: x  / Bili: x / Urobili: x   Blood: x / Protein: x / Nitrite: x   Leuk Esterase: x / RBC: x / WBC x   Sq Epi: x / Non Sq Epi: x / Bacteria: x          All consultant(s) notes reviewed and care discussed with other providers        Contact Number, Dr Mckeon [phone]

## 2025-02-07 ENCOUNTER — NON-APPOINTMENT (OUTPATIENT)
Age: 65
End: 2025-02-07

## 2025-02-12 ENCOUNTER — NON-APPOINTMENT (OUTPATIENT)
Age: 65
End: 2025-02-12

## 2025-02-13 ENCOUNTER — APPOINTMENT (OUTPATIENT)
Dept: UROLOGY | Facility: CLINIC | Age: 65
End: 2025-02-13
Payer: MEDICARE

## 2025-02-13 ENCOUNTER — OUTPATIENT (OUTPATIENT)
Dept: OUTPATIENT SERVICES | Facility: HOSPITAL | Age: 65
LOS: 1 days | End: 2025-02-13
Payer: MEDICARE

## 2025-02-13 DIAGNOSIS — Z98.890 OTHER SPECIFIED POSTPROCEDURAL STATES: Chronic | ICD-10-CM

## 2025-02-13 DIAGNOSIS — N31.9 NEUROMUSCULAR DYSFUNCTION OF BLADDER, UNSPECIFIED: ICD-10-CM

## 2025-02-13 DIAGNOSIS — R35.0 FREQUENCY OF MICTURITION: ICD-10-CM

## 2025-02-13 LAB
ANION GAP SERPL CALC-SCNC: 12 MMOL/L
BUN SERPL-MCNC: 12 MG/DL
CALCIUM SERPL-MCNC: 9.5 MG/DL
CHLORIDE SERPL-SCNC: 97 MMOL/L
CO2 SERPL-SCNC: 27 MMOL/L
CREAT SERPL-MCNC: 0.85 MG/DL
EGFR: 98 ML/MIN/1.73M2
GLUCOSE SERPL-MCNC: 103 MG/DL
POTASSIUM SERPL-SCNC: 4.5 MMOL/L
PSA FREE FLD-MCNC: 9 %
PSA FREE SERPL-MCNC: 0.09 NG/ML
PSA SERPL-MCNC: 0.92 NG/ML
SODIUM SERPL-SCNC: 136 MMOL/L

## 2025-02-13 PROCEDURE — 51700 IRRIGATION OF BLADDER: CPT

## 2025-02-13 RX ORDER — AMIKACIN SULFATE 250 MG/ML
500 INJECTION, SOLUTION INTRAMUSCULAR; INTRAVENOUS
Refills: 0 | Status: COMPLETED | OUTPATIENT
Start: 2025-02-13

## 2025-02-13 RX ORDER — AMIKACIN SULFATE 250 MG/ML
1 INJECTION, SOLUTION INTRAMUSCULAR; INTRAVENOUS
Qty: 4 | Refills: 0 | Status: COMPLETED | OUTPATIENT
Start: 2025-02-12 | End: 2025-02-13

## 2025-02-13 RX ADMIN — AMIKACIN SULFATE 0 MG/2ML: 250 INJECTION INTRAMUSCULAR; INTRAVENOUS at 00:00

## 2025-02-14 DIAGNOSIS — N31.9 NEUROMUSCULAR DYSFUNCTION OF BLADDER, UNSPECIFIED: ICD-10-CM

## 2025-03-03 ENCOUNTER — APPOINTMENT (OUTPATIENT)
Dept: ORTHOPEDIC SURGERY | Facility: CLINIC | Age: 65
End: 2025-03-03

## 2025-03-06 ENCOUNTER — APPOINTMENT (OUTPATIENT)
Dept: UROLOGY | Facility: CLINIC | Age: 65
End: 2025-03-06

## 2025-03-07 ENCOUNTER — APPOINTMENT (OUTPATIENT)
Dept: ORTHOPEDIC SURGERY | Facility: CLINIC | Age: 65
End: 2025-03-07
Payer: MEDICARE

## 2025-03-07 ENCOUNTER — NON-APPOINTMENT (OUTPATIENT)
Age: 65
End: 2025-03-07

## 2025-03-07 VITALS — HEIGHT: 72 IN | BODY MASS INDEX: 39.28 KG/M2 | WEIGHT: 290 LBS

## 2025-03-07 DIAGNOSIS — G56.22 LESION OF ULNAR NERVE, LEFT UPPER LIMB: ICD-10-CM

## 2025-03-07 PROCEDURE — 99213 OFFICE O/P EST LOW 20 MIN: CPT

## 2025-03-10 ENCOUNTER — RX RENEWAL (OUTPATIENT)
Age: 65
End: 2025-03-10

## 2025-03-13 ENCOUNTER — OUTPATIENT (OUTPATIENT)
Dept: OUTPATIENT SERVICES | Facility: HOSPITAL | Age: 65
LOS: 1 days | End: 2025-03-13
Payer: MEDICARE

## 2025-03-13 ENCOUNTER — APPOINTMENT (OUTPATIENT)
Dept: UROLOGY | Facility: CLINIC | Age: 65
End: 2025-03-13
Payer: MEDICARE

## 2025-03-13 ENCOUNTER — APPOINTMENT (OUTPATIENT)
Dept: UROLOGY | Facility: CLINIC | Age: 65
End: 2025-03-13

## 2025-03-13 DIAGNOSIS — Z98.890 OTHER SPECIFIED POSTPROCEDURAL STATES: Chronic | ICD-10-CM

## 2025-03-13 DIAGNOSIS — R35.0 FREQUENCY OF MICTURITION: ICD-10-CM

## 2025-03-13 PROCEDURE — 76775 US EXAM ABDO BACK WALL LIM: CPT | Mod: XU

## 2025-03-13 PROCEDURE — 76775 US EXAM ABDO BACK WALL LIM: CPT | Mod: 26

## 2025-03-13 PROCEDURE — 51702 INSERT TEMP BLADDER CATH: CPT | Mod: 59

## 2025-03-13 PROCEDURE — 51702 INSERT TEMP BLADDER CATH: CPT

## 2025-03-13 PROCEDURE — 99214 OFFICE O/P EST MOD 30 MIN: CPT | Mod: 25

## 2025-03-17 DIAGNOSIS — N31.9 NEUROMUSCULAR DYSFUNCTION OF BLADDER, UNSPECIFIED: ICD-10-CM

## 2025-04-07 ENCOUNTER — APPOINTMENT (OUTPATIENT)
Dept: GASTROENTEROLOGY | Facility: CLINIC | Age: 65
End: 2025-04-07

## 2025-04-10 ENCOUNTER — APPOINTMENT (OUTPATIENT)
Dept: UROLOGY | Facility: CLINIC | Age: 65
End: 2025-04-10
Payer: MEDICARE

## 2025-04-10 ENCOUNTER — OUTPATIENT (OUTPATIENT)
Dept: OUTPATIENT SERVICES | Facility: HOSPITAL | Age: 65
LOS: 1 days | End: 2025-04-10
Payer: MEDICARE

## 2025-04-10 DIAGNOSIS — N39.0 URINARY TRACT INFECTION, SITE NOT SPECIFIED: ICD-10-CM

## 2025-04-10 DIAGNOSIS — R35.0 FREQUENCY OF MICTURITION: ICD-10-CM

## 2025-04-10 DIAGNOSIS — Z98.890 OTHER SPECIFIED POSTPROCEDURAL STATES: Chronic | ICD-10-CM

## 2025-04-10 PROCEDURE — 51700 IRRIGATION OF BLADDER: CPT

## 2025-04-10 RX ORDER — AMIKACIN SULFATE 250 MG/ML
1 INJECTION, SOLUTION INTRAMUSCULAR; INTRAVENOUS
Qty: 4 | Refills: 0 | Status: COMPLETED | OUTPATIENT
Start: 2025-04-10 | End: 2025-04-10

## 2025-04-10 RX ORDER — AMIKACIN SULFATE 250 MG/ML
500 INJECTION, SOLUTION INTRAMUSCULAR; INTRAVENOUS
Refills: 0 | Status: COMPLETED | OUTPATIENT
Start: 2025-04-10

## 2025-04-10 RX ADMIN — AMIKACIN SULFATE 0 MG/2ML: 250 INJECTION INTRAMUSCULAR; INTRAVENOUS at 00:00

## 2025-04-11 DIAGNOSIS — N39.0 URINARY TRACT INFECTION, SITE NOT SPECIFIED: ICD-10-CM

## 2025-04-17 ENCOUNTER — NON-APPOINTMENT (OUTPATIENT)
Age: 65
End: 2025-04-17

## 2025-04-18 ENCOUNTER — RX RENEWAL (OUTPATIENT)
Age: 65
End: 2025-04-18

## 2025-04-18 ENCOUNTER — APPOINTMENT (OUTPATIENT)
Dept: NEUROLOGY | Facility: CLINIC | Age: 65
End: 2025-04-18
Payer: MEDICARE

## 2025-04-18 DIAGNOSIS — Z86.69 PERSONAL HISTORY OF OTHER DISEASES OF THE NERVOUS SYSTEM AND SENSE ORGANS: ICD-10-CM

## 2025-04-18 PROCEDURE — 95911 NRV CNDJ TEST 9-10 STUDIES: CPT

## 2025-04-18 PROCEDURE — 95886 MUSC TEST DONE W/N TEST COMP: CPT

## 2025-04-22 ENCOUNTER — APPOINTMENT (OUTPATIENT)
Dept: ORTHOPEDIC SURGERY | Facility: CLINIC | Age: 65
End: 2025-04-22
Payer: MEDICARE

## 2025-04-22 DIAGNOSIS — G56.22 LESION OF ULNAR NERVE, LEFT UPPER LIMB: ICD-10-CM

## 2025-04-22 PROCEDURE — 99214 OFFICE O/P EST MOD 30 MIN: CPT | Mod: 25

## 2025-04-23 ENCOUNTER — APPOINTMENT (OUTPATIENT)
Dept: GASTROENTEROLOGY | Facility: CLINIC | Age: 65
End: 2025-04-23
Payer: MEDICARE

## 2025-04-23 VITALS
DIASTOLIC BLOOD PRESSURE: 100 MMHG | HEART RATE: 90 BPM | HEIGHT: 72 IN | RESPIRATION RATE: 15 BRPM | SYSTOLIC BLOOD PRESSURE: 144 MMHG | OXYGEN SATURATION: 99 % | WEIGHT: 300 LBS | TEMPERATURE: 98 F | BODY MASS INDEX: 40.63 KG/M2

## 2025-04-23 DIAGNOSIS — K59.00 CONSTIPATION, UNSPECIFIED: ICD-10-CM

## 2025-04-23 DIAGNOSIS — Z86.0100 PERSONAL HISTORY OF COLON POLYPS, UNSPECIFIED: ICD-10-CM

## 2025-04-23 PROCEDURE — G2211 COMPLEX E/M VISIT ADD ON: CPT

## 2025-04-23 PROCEDURE — 99213 OFFICE O/P EST LOW 20 MIN: CPT

## 2025-04-23 RX ORDER — LINACLOTIDE 290 UG/1
290 CAPSULE, GELATIN COATED ORAL
Qty: 90 | Refills: 2 | Status: ACTIVE | COMMUNITY
Start: 2025-04-23 | End: 1900-01-01

## 2025-05-01 ENCOUNTER — OUTPATIENT (OUTPATIENT)
Dept: OUTPATIENT SERVICES | Facility: HOSPITAL | Age: 65
LOS: 1 days | End: 2025-05-01

## 2025-05-01 VITALS
DIASTOLIC BLOOD PRESSURE: 84 MMHG | WEIGHT: 294.98 LBS | HEART RATE: 80 BPM | SYSTOLIC BLOOD PRESSURE: 133 MMHG | HEIGHT: 69.5 IN | RESPIRATION RATE: 20 BRPM | OXYGEN SATURATION: 96 % | TEMPERATURE: 98 F

## 2025-05-01 DIAGNOSIS — M10.9 GOUT, UNSPECIFIED: ICD-10-CM

## 2025-05-01 DIAGNOSIS — Z98.890 OTHER SPECIFIED POSTPROCEDURAL STATES: Chronic | ICD-10-CM

## 2025-05-01 DIAGNOSIS — E11.9 TYPE 2 DIABETES MELLITUS WITHOUT COMPLICATIONS: ICD-10-CM

## 2025-05-01 DIAGNOSIS — G56.22 LESION OF ULNAR NERVE, LEFT UPPER LIMB: ICD-10-CM

## 2025-05-01 DIAGNOSIS — Z86.59 PERSONAL HISTORY OF OTHER MENTAL AND BEHAVIORAL DISORDERS: ICD-10-CM

## 2025-05-01 DIAGNOSIS — G47.33 OBSTRUCTIVE SLEEP APNEA (ADULT) (PEDIATRIC): ICD-10-CM

## 2025-05-01 DIAGNOSIS — I10 ESSENTIAL (PRIMARY) HYPERTENSION: ICD-10-CM

## 2025-05-01 RX ORDER — ALBUTEROL SULFATE 2.5 MG/3ML
0 VIAL, NEBULIZER (ML) INHALATION
Refills: 0 | DISCHARGE

## 2025-05-01 RX ORDER — FUROSEMIDE 10 MG/ML
1 INJECTION INTRAMUSCULAR; INTRAVENOUS
Refills: 0 | DISCHARGE

## 2025-05-01 RX ORDER — ENALAPRIL MALEATE 20 MG
1 TABLET ORAL
Refills: 0 | DISCHARGE

## 2025-05-01 NOTE — H&P PST ADULT - NSICDXPASTSURGICALHX_GEN_ALL_CORE_FT
PAST SURGICAL HISTORY:  H/O knee surgery     History of Tonsillectomy     History of vein stripping     S/P Laparotomy

## 2025-05-01 NOTE — H&P PST ADULT - ANESTHESIA, PREVIOUS REACTION, PROFILE
Mallampati = II, denies loose teeth or dentures , dental  Implants/none Mallampati = III, denies loose teeth or dentures , dental  Implants/none

## 2025-05-01 NOTE — H&P PST ADULT - RESPIRATORY AND THORAX COMMENTS
asthma well controlled denies hospitalization or intubation, has not use any inhalers for "many months"

## 2025-05-01 NOTE — H&P PST ADULT - HISTORY OF PRESENT ILLNESS
64 y/o male with pmhx  HTN, HLD, DM, Gout, Schizophrenia, Hx SBO, chronic urinary retention (self-catheterizes), dysphagia 2/2 tardive dyskinesia Brought in by EMS from home for urinary urgency.    Patient  self catheterizes, over the past couple days has had to do it more frequently   66 y/o male with pmhx  HTN, HLD, DM, Gout, Schizophrenia, Hx SBO, chronic urinary retention (self-catheterizes), dysphagia 2/2 tardive dyskinesia Brought in by EMS from home for urinary urgency.    Patient  self catheterizes, over the past couple days has had to do it more frequently      Hospital Course: 2/1/25 - 2/6/25  CT A/P showed mild bilateral hydronephrosis without obstructing stone. Indwelling pryor catheter placed in ED. Urology and Infectious Disease were consulted, UA positive, patient empirically treated with cefepime and ceftriaxone while urine culture was pending. Speciated as ESBL E.coli, antibiotics then changed to IV Ertapenem. Urology recommended discharging patient with indwelling urinary catheter or intermittent straight catheterization 6 times a day. Patient opted for discharge with indwelling urinary catheter.   64 y/o male with pmhx  HTN, HLD, DM, Gout, Schizophrenia, Hx SBO, h/o laparotomy,  chronic urinary retention (self-catheterizes), dysphagia 2/2 tardive dyskinesia.  Pt present for preop eval for scheduled left cubital tunnel release.  Pt with c/o left elbow pain.  Evaluated by surgeon.  Imaging done.  Preop dx lesion of ulnar nerve left upper limb.    Hospital Course: 2/1/25 - 2/6/25   ([facility])  for urinary retention.  As per discharge note:  CT A/P showed mild bilateral hydronephrosis without obstructing stone. Indwelling pryor catheter placed in ED. Urology and Infectious Disease were consulted, UA positive, patient empirically treated with cefepime and ceftriaxone while urine culture was pending. Speciated as ESBL E.coli, antibiotics then changed to IV Ertapenem. Urology recommended discharging patient with indwelling urinary catheter or intermittent straight catheterization 6 times a day. Patient opted for discharge with indwelling urinary catheter.

## 2025-05-01 NOTE — H&P PST ADULT - MS GEN HX ROS MEA POS PC
h/o osteoarthritis generalized joint pain/arthralgia/arthritis h/o osteoarthritis generalized joint pain - left ulnar pain (see hpi)/arthralgia/arthritis/stiffness

## 2025-05-01 NOTE — H&P PST ADULT - PROBLEM SELECTOR PLAN 6
Pt instructed to take  allopurinol on morning of surgery. Pt instructed to take allopurinol on morning of surgery.

## 2025-05-01 NOTE — H&P PST ADULT - MUSCULOSKELETAL
details… strength 5/5 bilateral upper extremities/strength 5/5 bilateral lower extremities/abnormal gait

## 2025-05-01 NOTE — H&P PST ADULT - GENITOURINARY COMMENTS
neurogenic bladder, was self catheter, had frequent UTI, pryor inserted 2/2025 - last change  3 weeks pryor to leg bag with clear jas urine

## 2025-05-01 NOTE — H&P PST ADULT - PROBLEM SELECTOR PLAN 1
Scheduled for left cubital tunnel release  Written & verbal preop instructions, gi prophylaxis & surgical soap given  Pt verbalized good understanding.  Teach back done on surgical soap instructions.

## 2025-05-01 NOTE — H&P PST ADULT - NSICDXPASTMEDICALHX_GEN_ALL_CORE_FT
PAST MEDICAL HISTORY:  Anxiety     Asthma     DM (diabetes mellitus)     Gout     History of Schizophrenia     HTN (Hypertension)     Hyperlipidemia     Obstructive sleep apnea      PAST MEDICAL HISTORY:  Anxiety     Asthma     DM (diabetes mellitus)     Gout     History of Schizophrenia     HTN (Hypertension)     Hyperlipidemia     Lesion of ulnar nerve, left upper limb     Morbid obesity     Obstructive sleep apnea     Urinary retention      PAST MEDICAL HISTORY:  Anxiety     Asthma     DM (diabetes mellitus)     Gout     History of Schizophrenia     HTN (Hypertension)     Hyperlipidemia     Lesion of ulnar nerve, left upper limb     Morbid obesity     Neurogenic bladder     Obstructive sleep apnea     Urinary retention

## 2025-05-08 ENCOUNTER — TRANSCRIPTION ENCOUNTER (OUTPATIENT)
Age: 65
End: 2025-05-08

## 2025-05-08 ENCOUNTER — OUTPATIENT (OUTPATIENT)
Dept: OUTPATIENT SERVICES | Facility: HOSPITAL | Age: 65
LOS: 1 days | End: 2025-05-08
Payer: MEDICARE

## 2025-05-08 ENCOUNTER — APPOINTMENT (OUTPATIENT)
Dept: ORTHOPEDIC SURGERY | Facility: AMBULATORY SURGERY CENTER | Age: 65
End: 2025-05-08

## 2025-05-08 VITALS
TEMPERATURE: 98 F | DIASTOLIC BLOOD PRESSURE: 92 MMHG | WEIGHT: 294.98 LBS | HEIGHT: 69.5 IN | RESPIRATION RATE: 20 BRPM | OXYGEN SATURATION: 98 % | HEART RATE: 74 BPM | SYSTOLIC BLOOD PRESSURE: 151 MMHG

## 2025-05-08 VITALS
HEART RATE: 80 BPM | RESPIRATION RATE: 15 BRPM | OXYGEN SATURATION: 98 % | TEMPERATURE: 97 F | SYSTOLIC BLOOD PRESSURE: 168 MMHG | DIASTOLIC BLOOD PRESSURE: 87 MMHG

## 2025-05-08 DIAGNOSIS — Z98.890 OTHER SPECIFIED POSTPROCEDURAL STATES: Chronic | ICD-10-CM

## 2025-05-08 LAB
GLUCOSE BLDC GLUCOMTR-MCNC: 131 MG/DL — HIGH (ref 70–99)
POTASSIUM BLDV-SCNC: 4.4 MMOL/L — SIGNIFICANT CHANGE UP (ref 3.5–5.1)

## 2025-05-08 PROCEDURE — 64718 REVISE ULNAR NERVE AT ELBOW: CPT | Mod: LT

## 2025-05-08 NOTE — ASU DISCHARGE PLAN (ADULT/PEDIATRIC) - NURSING INSTRUCTIONS
You were given IV Tylenol (1000mg) for pain management in the Operating Room.  Please do NOT take any additonal tylenol/acetaminophen products (Percocet, Vicodin, Excedrin) for the next 6-8 hours (after 6:00PM). Please do not take tylenol AND percocet/vicodin/excedrin as narcotic prescription already contains tylenol.    When taking pain/narcotic medications - take with food as it can cause nausea if taken on an empty stomach, and know it may cause constipation. To prevent constipation increase fluids and fiber in diet. You may take stool softeners (such as Colace) and follow directions as printed on bottle. - Do NOT drive while on narcotics.

## 2025-05-08 NOTE — ASU DISCHARGE PLAN (ADULT/PEDIATRIC) - FINANCIAL ASSISTANCE
[facility] provides services at a reduced cost to those who are determined to be eligible through [facility]’s financial assistance program. Information regarding [facility]’s financial assistance program can be found by going to [email or web address] or by calling [phone].

## 2025-05-08 NOTE — ASU PREOPERATIVE ASSESSMENT, ADULT ([facility] ONLY) - FALL HARM RISK - HARM RISK INTERVENTIONS

## 2025-05-08 NOTE — ASU DISCHARGE PLAN (ADULT/PEDIATRIC) - BATHING
keep dressing clean, dry, intact for 3 days. keep dressing clean, dry, intact for 3 days./Do not submerge in water

## 2025-05-08 NOTE — ASU DISCHARGE PLAN (ADULT/PEDIATRIC) - CARE PROVIDER_API CALL
Filiberto Salazar  Surgery of the [facility]  [address], [address]  [place] [address]  Phone: [phone]  Fax: [phone]  Follow Up Time: 2 weeks

## 2025-05-10 PROBLEM — E66.01 MORBID (SEVERE) OBESITY DUE TO EXCESS CALORIES: Chronic | Status: ACTIVE | Noted: 2025-05-01

## 2025-05-10 PROBLEM — R33.9 RETENTION OF URINE, UNSPECIFIED: Chronic | Status: ACTIVE | Noted: 2025-05-01

## 2025-05-10 PROBLEM — N31.9 NEUROMUSCULAR DYSFUNCTION OF BLADDER, UNSPECIFIED: Chronic | Status: ACTIVE | Noted: 2025-05-02

## 2025-05-10 PROBLEM — G47.33 OBSTRUCTIVE SLEEP APNEA (ADULT) (PEDIATRIC): Chronic | Status: ACTIVE | Noted: 2025-05-01

## 2025-05-10 PROBLEM — G56.22 LESION OF ULNAR NERVE, LEFT UPPER LIMB: Chronic | Status: ACTIVE | Noted: 2025-05-01

## 2025-05-12 ENCOUNTER — OUTPATIENT (OUTPATIENT)
Dept: OUTPATIENT SERVICES | Facility: HOSPITAL | Age: 65
LOS: 1 days | End: 2025-05-12
Payer: MEDICARE

## 2025-05-12 ENCOUNTER — APPOINTMENT (OUTPATIENT)
Dept: UROLOGY | Facility: CLINIC | Age: 65
End: 2025-05-12
Payer: MEDICARE

## 2025-05-12 VITALS — HEART RATE: 83 BPM | DIASTOLIC BLOOD PRESSURE: 81 MMHG | SYSTOLIC BLOOD PRESSURE: 134 MMHG

## 2025-05-12 DIAGNOSIS — R35.0 FREQUENCY OF MICTURITION: ICD-10-CM

## 2025-05-12 DIAGNOSIS — N31.9 NEUROMUSCULAR DYSFUNCTION OF BLADDER, UNSPECIFIED: ICD-10-CM

## 2025-05-12 DIAGNOSIS — Z98.890 OTHER SPECIFIED POSTPROCEDURAL STATES: Chronic | ICD-10-CM

## 2025-05-12 PROCEDURE — 51700 IRRIGATION OF BLADDER: CPT

## 2025-05-12 RX ORDER — SEMAGLUTIDE 0.68 MG/ML
2 INJECTION, SOLUTION SUBCUTANEOUS
Qty: 3 | Refills: 0 | Status: ACTIVE | COMMUNITY
Start: 2024-12-18

## 2025-05-12 RX ORDER — AMIKACIN SULFATE 250 MG/ML
1 INJECTION, SOLUTION INTRAMUSCULAR; INTRAVENOUS
Qty: 4 | Refills: 0 | Status: COMPLETED | OUTPATIENT
Start: 2025-05-12 | End: 2025-05-12

## 2025-05-12 RX ORDER — FUROSEMIDE 40 MG/1
40 TABLET ORAL
Qty: 90 | Refills: 0 | Status: ACTIVE | COMMUNITY
Start: 2024-10-31

## 2025-05-12 RX ORDER — AMIKACIN SULFATE 250 MG/ML
500 INJECTION, SOLUTION INTRAMUSCULAR; INTRAVENOUS
Refills: 0 | Status: COMPLETED | OUTPATIENT
Start: 2025-05-12

## 2025-05-13 DIAGNOSIS — N31.9 NEUROMUSCULAR DYSFUNCTION OF BLADDER, UNSPECIFIED: ICD-10-CM

## 2025-05-23 ENCOUNTER — APPOINTMENT (OUTPATIENT)
Dept: ORTHOPEDIC SURGERY | Facility: CLINIC | Age: 65
End: 2025-05-23
Payer: MEDICARE

## 2025-05-23 DIAGNOSIS — G56.22 LESION OF ULNAR NERVE, LEFT UPPER LIMB: ICD-10-CM

## 2025-05-23 PROCEDURE — 99024 POSTOP FOLLOW-UP VISIT: CPT

## 2025-06-02 ENCOUNTER — APPOINTMENT (OUTPATIENT)
Dept: UROLOGY | Facility: CLINIC | Age: 65
End: 2025-06-02

## 2025-06-12 ENCOUNTER — OUTPATIENT (OUTPATIENT)
Dept: OUTPATIENT SERVICES | Facility: HOSPITAL | Age: 65
LOS: 1 days | End: 2025-06-12
Payer: MEDICARE

## 2025-06-12 ENCOUNTER — APPOINTMENT (OUTPATIENT)
Dept: UROLOGY | Facility: CLINIC | Age: 65
End: 2025-06-12
Payer: MEDICARE

## 2025-06-12 VITALS
OXYGEN SATURATION: 100 % | RESPIRATION RATE: 16 BRPM | DIASTOLIC BLOOD PRESSURE: 94 MMHG | TEMPERATURE: 98.2 F | SYSTOLIC BLOOD PRESSURE: 160 MMHG | HEART RATE: 85 BPM

## 2025-06-12 DIAGNOSIS — R35.0 FREQUENCY OF MICTURITION: ICD-10-CM

## 2025-06-12 DIAGNOSIS — Z98.890 OTHER SPECIFIED POSTPROCEDURAL STATES: Chronic | ICD-10-CM

## 2025-06-12 PROCEDURE — 51700 IRRIGATION OF BLADDER: CPT

## 2025-06-12 RX ORDER — AMIKACIN SULFATE 250 MG/ML
1 INJECTION INTRAMUSCULAR; INTRAVENOUS
Refills: 0 | Status: COMPLETED | OUTPATIENT
Start: 2025-06-12

## 2025-06-12 RX ORDER — CIPROFLOXACIN HYDROCHLORIDE 500 MG/1
500 TABLET, FILM COATED ORAL
Refills: 0 | Status: COMPLETED | OUTPATIENT
Start: 2025-06-12

## 2025-06-12 RX ORDER — AMIKACIN SULFATE 250 MG/ML
1 INJECTION INTRAMUSCULAR; INTRAVENOUS
Qty: 4 | Refills: 0 | Status: ACTIVE | OUTPATIENT
Start: 2025-06-12

## 2025-06-12 RX ADMIN — AMIKACIN SULFATE 0 GM/4ML: 250 INJECTION INTRAMUSCULAR; INTRAVENOUS at 00:00

## 2025-06-13 DIAGNOSIS — N31.9 NEUROMUSCULAR DYSFUNCTION OF BLADDER, UNSPECIFIED: ICD-10-CM

## 2025-06-25 ENCOUNTER — APPOINTMENT (OUTPATIENT)
Age: 65
End: 2025-06-25

## 2025-07-07 ENCOUNTER — APPOINTMENT (OUTPATIENT)
Dept: UROLOGY | Facility: CLINIC | Age: 65
End: 2025-07-07
Payer: MEDICARE

## 2025-07-07 ENCOUNTER — OUTPATIENT (OUTPATIENT)
Dept: OUTPATIENT SERVICES | Facility: HOSPITAL | Age: 65
LOS: 1 days | End: 2025-07-07
Payer: MEDICARE

## 2025-07-07 VITALS
OXYGEN SATURATION: 92 % | DIASTOLIC BLOOD PRESSURE: 82 MMHG | HEART RATE: 76 BPM | SYSTOLIC BLOOD PRESSURE: 139 MMHG | RESPIRATION RATE: 16 BRPM

## 2025-07-07 DIAGNOSIS — R35.0 FREQUENCY OF MICTURITION: ICD-10-CM

## 2025-07-07 DIAGNOSIS — Z98.890 OTHER SPECIFIED POSTPROCEDURAL STATES: Chronic | ICD-10-CM

## 2025-07-07 PROCEDURE — 51700 IRRIGATION OF BLADDER: CPT

## 2025-07-07 RX ORDER — AMIKACIN SULFATE 250 MG/ML
1 INJECTION INTRAMUSCULAR; INTRAVENOUS
Refills: 0 | Status: COMPLETED | OUTPATIENT
Start: 2025-07-07

## 2025-07-07 RX ADMIN — AMIKACIN SULFATE 0 GM/4ML: 250 INJECTION INTRAMUSCULAR; INTRAVENOUS at 00:00

## 2025-07-08 DIAGNOSIS — N31.9 NEUROMUSCULAR DYSFUNCTION OF BLADDER, UNSPECIFIED: ICD-10-CM

## 2025-08-06 DIAGNOSIS — N39.0 URINARY TRACT INFECTION, SITE NOT SPECIFIED: ICD-10-CM

## 2025-08-07 ENCOUNTER — APPOINTMENT (OUTPATIENT)
Dept: UROLOGY | Facility: CLINIC | Age: 65
End: 2025-08-07
Payer: MEDICARE

## 2025-08-07 ENCOUNTER — OUTPATIENT (OUTPATIENT)
Dept: OUTPATIENT SERVICES | Facility: HOSPITAL | Age: 65
LOS: 1 days | End: 2025-08-07
Payer: MEDICARE

## 2025-08-07 VITALS
HEART RATE: 71 BPM | SYSTOLIC BLOOD PRESSURE: 116 MMHG | OXYGEN SATURATION: 98 % | DIASTOLIC BLOOD PRESSURE: 75 MMHG | RESPIRATION RATE: 16 BRPM

## 2025-08-07 DIAGNOSIS — Z98.890 OTHER SPECIFIED POSTPROCEDURAL STATES: Chronic | ICD-10-CM

## 2025-08-07 DIAGNOSIS — R35.0 FREQUENCY OF MICTURITION: ICD-10-CM

## 2025-08-07 PROCEDURE — 51700 IRRIGATION OF BLADDER: CPT

## 2025-08-07 RX ORDER — AMIKACIN SULFATE 250 MG/ML
1 INJECTION INTRAMUSCULAR; INTRAVENOUS
Qty: 4 | Refills: 0 | Status: COMPLETED | OUTPATIENT
Start: 2025-08-06 | End: 2025-08-07

## 2025-08-07 RX ORDER — AMIKACIN SULFATE 250 MG/ML
500 INJECTION INTRAMUSCULAR; INTRAVENOUS
Refills: 0 | Status: COMPLETED | OUTPATIENT
Start: 2025-08-07

## 2025-08-07 RX ADMIN — AMIKACIN SULFATE 0 MG/2ML: 250 INJECTION INTRAMUSCULAR; INTRAVENOUS at 00:00

## 2025-08-08 DIAGNOSIS — N39.0 URINARY TRACT INFECTION, SITE NOT SPECIFIED: ICD-10-CM

## 2025-08-08 DIAGNOSIS — N31.9 NEUROMUSCULAR DYSFUNCTION OF BLADDER, UNSPECIFIED: ICD-10-CM

## 2025-09-04 ENCOUNTER — OUTPATIENT (OUTPATIENT)
Dept: OUTPATIENT SERVICES | Facility: HOSPITAL | Age: 65
LOS: 1 days | End: 2025-09-04
Payer: MEDICARE

## 2025-09-04 ENCOUNTER — APPOINTMENT (OUTPATIENT)
Dept: UROLOGY | Facility: CLINIC | Age: 65
End: 2025-09-04
Payer: MEDICARE

## 2025-09-04 VITALS — HEART RATE: 73 BPM | SYSTOLIC BLOOD PRESSURE: 116 MMHG | DIASTOLIC BLOOD PRESSURE: 77 MMHG

## 2025-09-04 DIAGNOSIS — R35.0 FREQUENCY OF MICTURITION: ICD-10-CM

## 2025-09-04 DIAGNOSIS — Z98.890 OTHER SPECIFIED POSTPROCEDURAL STATES: Chronic | ICD-10-CM

## 2025-09-04 DIAGNOSIS — N31.9 NEUROMUSCULAR DYSFUNCTION OF BLADDER, UNSPECIFIED: ICD-10-CM

## 2025-09-04 PROCEDURE — 51700 IRRIGATION OF BLADDER: CPT

## 2025-09-04 RX ORDER — AMIKACIN SULFATE 250 MG/ML
1 INJECTION INTRAMUSCULAR; INTRAVENOUS
Qty: 4 | Refills: 0 | Status: COMPLETED | OUTPATIENT
Start: 2025-09-04 | End: 2025-09-04

## 2025-09-04 RX ORDER — AMIKACIN SULFATE 250 MG/ML
500 INJECTION INTRAMUSCULAR; INTRAVENOUS
Refills: 0 | Status: COMPLETED | OUTPATIENT
Start: 2025-09-04

## 2025-09-04 RX ADMIN — AMIKACIN SULFATE 0 MG/2ML: 250 INJECTION INTRAMUSCULAR; INTRAVENOUS at 00:00

## 2025-09-05 DIAGNOSIS — N39.0 URINARY TRACT INFECTION, SITE NOT SPECIFIED: ICD-10-CM

## (undated) DEVICE — SUT MONOCRYL 5-0 18" P-3 UNDYED

## (undated) DEVICE — BIPOLAR FORCEP KIRWAN JEWELERS STR 4" X 0.4MM W 12FT CORD (GREEN)

## (undated) DEVICE — SUT ETHILON 4-0 18" PS-2

## (undated) DEVICE — WARMING BLANKET LOWER ADULT

## (undated) DEVICE — PACK HAND

## (undated) DEVICE — DRSG DERMABOND 0.7ML

## (undated) DEVICE — VENODYNE/SCD SLEEVE CALF MEDIUM

## (undated) DEVICE — GLV 7 PROTEXIS (WHITE)

## (undated) DEVICE — SOL IRR POUR NS 0.9% 500ML

## (undated) DEVICE — CAM-ESU VALLEYLAB FORCE FX 019523: Type: DURABLE MEDICAL EQUIPMENT

## (undated) DEVICE — DRSG COBAN 2" LF STERILE

## (undated) DEVICE — DRSG STERISTRIPS 0.5 X 4"

## (undated) DEVICE — PREP CHLORAPREP HI-LITE ORANGE 26ML